# Patient Record
Sex: FEMALE | Race: WHITE
[De-identification: names, ages, dates, MRNs, and addresses within clinical notes are randomized per-mention and may not be internally consistent; named-entity substitution may affect disease eponyms.]

---

## 2019-07-31 NOTE — XMS REPORT
Transition of Care/Referral Summary

                             Created on: 2081



DYLAN KHAN

External Reference #: [id number]

: 1960

Sex: Female



Demographics







                          Address                   [address]

[place]  [address]

 

                          Home Phone                [phone]

 

                          Preferred Language        English

 

                          Marital Status            

 

                          [employer] Affiliation     [employer]

 

                          Race                      White

 

                                        Additional Race(s)  

 

                          Ethnic Group              Not  or 





Author







                          Author                    [facility]

 

                          Organization              [facility]

 

                          Address                   Unknown

 

                          Phone                     Unavailable







Care Team Providers







                    Care Team Member Name    Role                Phone

 

                    Dhiraj Mijares    PCP                 [phone]

 

                    Boston Hein      PCP                 [phone]







Encounter





[facility] FIN [id number] Date(s): 2/15/18 - 2/15/18

[facility] [address] [place], [place] [address] [phone]

Encounter Diagnosis

Abnormal liver enzymes (Discharge Diagnosis) - 2/15/18

Uncontrolled type 2 diabetes mellitus with hyperglycemia (Discharge Diagnosis) -
2/15/18

Combined hyperlipidemia (Discharge Diagnosis) - 2/15/18

Discharge Disposition: 01-Home or Self Care

Attending Physician: Gayla Hook MD





Vital Signs







 



                           Most recent to            1



                                         oldest [Reference 



                                         Range]: 

 

 



                           Peripheral Pulse          83 bpm



                           Rate [ bpm]         (2/15/18 11:11 AM)

 

 



                           Blood Pressure            125/80 mmHg



                           [/60-90 mmHg]       (2/15/18 11:11 AM)







Problem List







    



              Condition     Effective Dates     Status       Health Status     Informant

 

    



                           Acquired                  Active  



                                         spondylolisthesis(Co    



                                         nfirmed)    

 

    



                           Acute                     Active  



                                         pain(Confirmed)    

 

    



                           Hay fever(Confirmed)      Active  

 

    



                           Arthritis(Confirmed)      Resolved  

 

    



                           Right Carpal tunnel       Resolved  



                                         release(Confirmed)    

 

    



                           Carpal tunnel             Resolved  



                                         release    



                                         decompression(Confir    



                                         med)    

 

    



                           Cholecystectomy(Conf      Resolved  



                                         irmed)    

 

    



                           IBS (irritable bowel      Active  



                                         syndrome)(Confirmed)    

 

    



                     CHF (congestive      Active              patient



                                         heart    



                                         failure)(Confirmed)    

 

    



                           CAD (coronary artery      Active  



                                         disease)(Confirmed)    

 

    



                           Decompression(Confir      Resolved  



                                         med)    

 

    



                           Depression(Confirmed      Resolved  



                                         )    

 

    



                           Depression(Confirmed      Active  



                                         )    

 

    



                           Diabetes(Confirmed)       Active  

 

    



                           Diabetes(Confirmed)       Resolved  

 

    



                           GERD                      Active  



                                         (gastroesophageal    



                                         reflux    



                                         disease)(Confirmed)    

 

    



                     Ischemic            < 5/8/15            Resolved  



                                         cardiomyopathy(Confi    



                                         rmed)    

 

    



                           Presence of               Active  



                                         biventricular    



                                         AICD(Confirmed)    

 

    



                           History of lumbar         Active  



                                         fusion(Confirmed)    

 

    



                           Hyperlipidemia(Confi      Active  



                                         rmed)    

 

    



                     Asthma(Confirmed)     < 17           Resolved  

 

    



                           Hypertension(Confirm      Resolved  



                                         ed)    

 

    



                           Hypertension(Confirm      Resolved  



                                         ed)    

 

    



                           HTN                       Active  



                                         (hypertension)(Confi    



                                         rmed)    

 

    



                           Hysterectomy(Confirm      Resolved  



                                         ed)    

 

    



                           Impaired gas              Active  



                                         exchange(Confirmed)1    

 

    



                           Irritable                 Resolved  



                                         bowel(Confirmed)    

 

    



                           Laminectomy(Confirme      Resolved  



                                         d)    

 

    



                           LBBB (left bundle         Active  



                                         branch    



                                         block)(Confirmed)    

 

    



                           LUQ pain(Confirmed)       Active  

 

    



                           Migraine(Confirmed)       Resolved  

 

    



                           Asthma(Confirmed)         Active  

 

    



                     Morbid              Active              patient



                                         obesity(Confirmed)    

 

    



                           Myocardial                Resolved  



                                         infarction(Confirmed    



                                         )    

 

    



                           Septicemia                Resolved  



                                         pneumonia(Confirmed)    

 

    



                     L knee              13              Resolved  



                                         Synovectomy(Confirme    



                                         d)    

 

    



                           Tissue perfusion          Active  



                                         alteration(Confirmed    



                                         )2    

 

    



                     Tobacco             Active              patient



                                         user(Confirmed)    

 

    



                     Tubal               79              Resolved  



                                         ligation(Confirmed)    

 

    



                           UTI (urinary tract        Active  



                                         infection)(Confirmed    



                                         )    

 

    



                     Chicken             < 17           Resolved  



                                         pox(Confirmed)    







1Problem added automatically by system based on initiation of Impaired Gas 
Exchange Plan of Care



2Problem added automatically by system based on initiation of Tissue Perfusion 
Cerebral Plan of Care



Allergies, Adverse Reactions, Alerts







   



                 Substance       Reaction        Severity        Status

 

   



                     sulfamethoxazole     Urticaria (hives)      Active



                                         HIVES  

 

   



                     morphine            Burning             Active

 

   



                     barium sulfate      Swelling, hives      Active

 

   



                     celecoxib           HIVES               Active

 

   



                     influenza virus vaccine,     Convulsion          Active



                                         inactivated   







Medications





acetaminophen

1,000 mg, Oral, q6hr, as needed for pain, 0 Refill(s)

Start Date: 14

Status: Ordered



Ambien 10 mg oral tablet

10 mg 1 tabs, Oral, Bedtime (once a day), as needed for sleep, [facility] [phone]
[phone], X 30 days, # 30 tabs, 0 Refill(s)

Start Date: 18

Stop Date: 18

Status: Ordered



amLODIPine 10 mg oral tablet

10 mg 1 tabs, Oral, Daily, # 90 tabs, 5 Refill(s), Pharmacy: [facility], 1 tabs Oral Daily

Start Date: 16

Status: Ordered



Aspir 81

81 mg, Oral, Daily, 0 Refill(s)

Start Date: 14

Status: Ordered



atorvastatin 10 mg oral tablet

See Instructions, TAKE 1 TABLET BY MOUTH  DAILY, # 90 tabs, [facility]Rx: [facility]

Start Date: 17

Status: Ordered



Basaglar KwikPen 100 units/mL subcutaneous solution

See Instructions, 34 units at hs SubCutaneous, # 1 boxes, 3 Refill(s), Pharmacy:
[facility], 34 units at hs SubCutaneous

Start Date: 17

Status: Ordered



Bentyl 20 mg oral tablet

20 mg 1 tabs, Oral, q8hr, Abdominal Cramping, # 15 tabs, 1 Refill(s), Pharmacy: 
[id number], 1 tabs Oral q8hr,PRN:Abdominal Cramping

Start Date: 18

Status: Ordered



bumetanide 1 mg oral tablet

See Instructions, Take 2 tablets by mouth in  the morning and 1 tablet by mouth 
in the evening, # 270 tabs, 1 Refill(s), Pharmacy: [facility], Take 2 
tablets by mouth in  the morning and 1 tablet by mouth in the evening

Start Date: 17

Status: Ordered



buPROPion 150 mg/12 hours (SR) oral tablet, extended release

See Instructions, Take 1 tablet by mouth two  times daily, # 180 tabs, 3 Refill(
s), eRx: [facility]

Start Date: 17

Status: Ordered



carvedilol 25 mg oral tablet

25 mg 1 tabs, Oral, BID, # 180 tabs, 1 Refill(s), Pharmacy: [facility]
, 1 tabs Oral BID

Start Date: 17

Status: Ordered



cloNIDine 0.1 mg oral tablet

0.1 mg 1 tabs, Oral, BID, # 180 tabs, 2 Refill(s), other reason (Rx)

Start Date: 18

Status: Ordered



clopidogrel 75 mg oral tablet

See Instructions, TAKE 1 TABLET BY MOUTH  DAILY, # 90 tabs, 2 Refill(s), [facility]: [facility]

Start Date: 18

Status: Ordered



diclofenac 3% topical gel

0.5 g, Topical, BID, # 50 g, 1 Refill(s), [facility]acy: [id number]

Start Date: 8/15/17

Status: Ordered



Digox 125 mcg (0.125 mg) oral tablet

See Instructions, TAKE 1 TABLET BY MOUTH  DAILY, # 90 tabs, 2 Refill(s), [facility]: [facility]

Start Date: 18

Status: Ordered



DULoxetine 20 mg oral delayed release capsule

20 mg 1 caps, Oral, Daily, do not crush or chew    (Take one capsule by mouth da
kervin with Duloxetine 60mg), # 90 caps, 1 Refill(s), [facility], 1 caps Oral Daily,x90 days,Instr:do not crush or chew; (Take one capsule by m
outh daily...

Start Date: 17

Stop Date: 3/4/18

Status: Ordered



DULoxetine 60 mg oral delayed release capsule

See Instructions, Take 1 capsule by mouth  daily . Do not crush or  chew.    (Ta
ke 1 tablet daily with Duloxetine 20mg), # 90 caps, 1 Refill(s), [facility], Take 1 capsule by mouth  daily . Do not crush or  chew. (Take 1
tablet porsche...

Start Date: 17

Status: Ordered



Glucometer Lancets (DME)

DME Item one touch delica lancets  use to test bg x3/daily  dx:e11.65, See Instr
uctions, # 300 Each, 3 Refill(s), Pharmacy: [facility] [id number], one touc
h delica lancets; use to test bg x3/daily; dx:e11.65, Supply

Start Date: 3/13/17

Status: Ordered



HumaLOG 100 units/mL subcutaneous solution

See Instructions, INJECT SUBCUTANEOUSLY 10  UNITS 3 TIMES DAILY BEFORE  MEALS, #
27 mL, 3 Refill(s), eRx: [facility]

Start Date: 17

Status: Ordered



Insulin Pin Needles (DME)

DME Item 32g x4mm (jacob needles)  use to inject insulin   dx:e11.65, See Instruc
tions, # 100 Each, 3 Refill(s), Pharmacy: [id number], 32g x4mm (
jacob needles); use to inject insulin ; dx:e11.65, Supply

Start Date: 17

Status: Ordered



Melatonin

10 mg, Oral, Bedtime (once a day), as needed for insomnia, 0 Refill(s)

Start Date: 14

Status: Ordered



metFORMIN 500 mg oral tablet

See Instructions, Take 2 tablets by mouth  twice daily, # 360 tabs, 1 Refill(s),
eRx: [facility], Take 2 tablets by mouth  twice daily

Start Date: 17

Status: Ordered



mometasone 50 mcg/inh nasal spray

See Instructions, Use 2 sprays nasally daily, # 17 g, 5 Refill(s), [facility]Rx: [facility]

Start Date: 17

Status: Ordered



Norco 10 mg-325 mg oral tablet

1 tabs, Oral, q6hr, as needed for pain, # 30 tabs, 0 Refill(s)

Start Date: 18

Status: Ordered



potassium chloride 20 mEq oral tablet, extended release

See Instructions, TAKE 1 TABLET BY MOUTH  DAILY, # 90 tabs, 2 Refill(s), [facility]: [facility]

Start Date: 18

Status: Ordered



ProAir HFA 90 mcg/inh inhalation aerosol

See Instructions, INHALE 2 PUFFS 4 TIMES  DAILY AS NEEDED FOR  WHEEZING, # 25.5 
g, 2 Refill(s), eRx: [facility]

Start Date: 18

Status: Ordered



RABEprazole 20 mg oral delayed release tablet

See Instructions, Take 2 tablets by mouth  daily, # 180 tabs, 1 Refill(s), eRx: 
[facility]

Start Date: 17

Status: Ordered



SUMAtriptan 50 mg oral tablet

See Instructions, TAKE 1 TABLET BY MOUTH DAILY AS NEEDED FOR MIGRAINE HEADACHE. 
MAY REPEAT DOSE AFTER 2 HOURS UP TO A. MAXIMUM  MG IN 24 HOURS, # 9 tabs, 
eRx: [id number]

Start Date: 18

Status: Ordered



Symbicort 160 mcg-4.5 mcg/inh inhalation aerosol

See Instructions, INHALE 2 PUFFS TWICE DAILY, # 30.6 g, 2 Refill(s), [facility]

Start Date: 18

Status: Ordered



SYRNG   .5CC ULTII 31 G SRT

See Instructions, Use to Inject insulin once  daily, # 90 Each, 2 Refill(s), eRx
: [facility], Use to Inject insulin once  daily

Start Date: 17

Status: Ordered



valsartan 160 mg oral tablet

160 mg 1 tabs, Oral, BID, X 90 days, # 180 tabs, 3 Refill(s), [facility], 1 tabs Oral BID,x90 days

Start Date: 4/3/17

Stop Date: 3/29/18

Status: Ordered



Results





No data available for this section



Immunizations





Given and Recorded





   



                 Vaccine         Date            Status          Refusal Reason

 

   



                     tetanus-diphth toxoids (Td) adult/adol     00             Given 







Procedures







    



              Procedure     Date         Related Diagnosis     Body Site     Status

 

    



                     L knee PMM/ part. synovectomy     13              Completed

 

    



                     lt median nerve decompression     5/10/11             Completed

 

    



                     Tubal ligation                      Completed

 

    



                           Carpal tunnel release rt        Completed

 

    



                           Cholecystectomy           Completed

 

    



                           Hysterectomy              Completed

 

    



                           Hysterectomy and bilateral        Completed



                                         salpingo-oophorectomy sample    

 

    



                           Laminectomy               Completed

 

    



                           Pacemaker                 Completed







Social History







 



                           Social History Type       Response

 

 



                           Smoking Status            Former smoker; Type: Cigarettes1



                                         entered on: 5/8/15







1Quit around 



Assessment and Plan





No data available for this section

## 2019-07-31 NOTE — XMS REPORT
Transition of Care/Referral Summary

                             Created on: 10/28/2096



BILLDYLAN VANEGAS

External Reference #: [id number]

: 1960

Sex: Female



Demographics







                          Address                   [address]

[place]  [address]

 

                          Home Phone                [phone]

 

                          Preferred Language        English

 

                          Marital Status            

 

                          Hoahaoism Affiliation     Nondenominational

 

                          Race                      White

 

                          Ethnic Group              Not  or 





Author







                          Author                    [facility]

 

                          Organization              [facility]

 

                          Address                   Unknown

 

                          Phone                     Unavailable







Care Team Providers







                    Care Team Member Name    Role                Phone

 

                    Dhiraj Mijares    PCP                 [phone]







Encounter





[facility] [id number] Date(s): 16 - 16

[facility] [address] [place], [place] [address] [phone]

Discharge Disposition: 01-Home or Self Care

Attending Physician: Linda Kruger MD

Admitting Physician: Linda Kruger MD





Vital Signs





No data available for this section



Problem List







    



              Condition     Effective Dates     Status       Health Status     Informant

 

    



                           Acquired                  Active  



                                         spondylolisthesis(Co    



                                         nfirmed)    

 

    



                           Acute                     Active  



                                         pain(Confirmed)    

 

    



                           Arthritis(Confirmed)      Resolved  

 

    



                           Right Carpal tunnel       Resolved  



                                         release(Confirmed)    

 

    



                           Carpal tunnel             Resolved  



                                         release    



                                         decompression(Confir    



                                         med)    

 

    



                           Cholecystectomy(Conf      Resolved  



                                         irmed)    

 

    



                     CHF (congestive      Active              patient



                                         heart    



                                         failure)(Confirmed)    

 

    



                           CAD (coronary artery      Active  



                                         disease)(Confirmed)    

 

    



                           Decompression(Confir      Resolved  



                                         med)    

 

    



                           Depression(Confirmed      Resolved  



                                         )    

 

    



                           Depression(Confirmed      Active  



                                         )    

 

    



                           Diabetes(Confirmed)       Active  

 

    



                           Diabetes(Confirmed)       Resolved  

 

    



                           GERD                      Active  



                                         (gastroesophageal    



                                         reflux    



                                         disease)(Confirmed)    

 

    



                           Ischemic                  Active  



                                         cardiomyopathy(Confi    



                                         rmed)    

 

    



                           Presence of               Active  



                                         biventricular    



                                         AICD(Confirmed)    

 

    



                           History of lumbar         Active  



                                         fusion(Confirmed)    

 

    



                           Hyperlipidemia(Confi      Resolved  



                                         rmed)    

 

    



                           Hypertension(Confirm      Resolved  



                                         ed)    

 

    



                           Hypertension(Confirm      Resolved  



                                         ed)    

 

    



                           HTN                       Active  



                                         (hypertension)(Confi    



                                         rmed)    

 

    



                           Hysterectomy(Confirm      Resolved  



                                         ed)    

 

    



                           Impaired gas              Active  



                                         exchange(Confirmed)1    

 

    



                           Irritable                 Resolved  



                                         bowel(Confirmed)    

 

    



                           Laminectomy(Confirme      Resolved  



                                         d)    

 

    



                           LBBB (left bundle         Active  



                                         branch    



                                         block)(Confirmed)    

 

    



                           LUQ pain(Confirmed)       Active  

 

    



                           Migraine(Confirmed)       Resolved  

 

    



                           Asthma(Confirmed)         Active  

 

    



                     Morbid              Active              patient



                                         obesity(Confirmed)    

 

    



                           Myocardial                Resolved  



                                         infarction(Confirmed    



                                         )    

 

    



                           Septicemia                Resolved  



                                         pneumonia(Confirmed)    

 

    



                     L knee              13              Resolved  



                                         Synovectomy(Confirme    



                                         d)    

 

    



                           Tissue perfusion          Active  



                                         alteration(Confirmed    



                                         )2    

 

    



                     Tobacco             Active              patient



                                         user(Confirmed)    

 

    



                     Tubal               79              Resolved  



                                         ligation(Confirmed)    







1Problem added automatically by system based on initiation of Impaired Gas 
Exchange Plan of Care



2Problem added automatically by system based on initiation of Tissue Perfusion 
Cerebral Plan of Care



Allergies, Adverse Reactions, Alerts







   



                 Substance       Reaction        Severity        Status

 

   



                     barium sulfate      Adverse Reaction      Active



                                         Swelling, hives  

 

   



                     celecoxib           HIVES               Active

 

   



                           influenza virus vaccine,       Active



                                         inactivated   

 

   



                     morphine            Adverse Reaction      Active

 

   



                     sulfamethoxazole     HIVES               Active



                                         Urticaria (hives)  







Medications





acetaminophen

1,000 mg, Oral, q6hr, as needed for pain, 0 Refill(s)

Start Date: 14

Status: Ordered



albuterol 2.5 mg/3 mL (0.083%) inhalation solution

3 mL, Inhalation, BID, Dx: Asthma, # 25 Each, 3 Refill(s), Pharmacy: [facility] [id number], 3 mL Inhalation BID,Instr:Dx: Asthma

Start Date: 4/21/15

Status: Ordered



amLODIPine 10 mg oral tablet

10 mg 1 tabs, Oral, Daily, # 90 tabs, 5 Refill(s), Pharmacy: [facility]
E, 1 tabs Oral Daily

Start Date: 16

Status: Ordered



Aspir 81

81 mg, Oral, Daily, 0 Refill(s)

Start Date: 14

Status: Ordered



atorvastatin 10 mg oral tablet

10 mg 1 tabs, Oral, Daily, # 90 tabs, 5 Refill(s), Pharmacy: [facility]
E, 1 tabs Oral Daily

Start Date: 16

Status: Ordered



bumetanide 1 mg oral tablet

See Instructions, Take 2 mg of Bumex in AM and 1 mg PM., # 300 tabs, 5 Refill(s)
, Pharmacy: [facility], Take 2 mg of Bumex in AM and 1 mg PM.

Start Date: 9/22/15

Status: Ordered



buPROPion 150 mg/12 hours (SR) oral tablet, extended release

See Instructions, Take 1 tablet by mouth two  times daily, # 180 tabs, 2 Refill(
s), eRx: [facility], Take 1 tablet by mouth two  times daily

Start Date: 3/2/16

Status: Ordered



carvedilol 25 mg oral tablet

25 mg 1 tabs, Oral, BID, # 180 tabs, 5 Refill(s), Pharmacy: [facility]
, 1 tabs Oral BID

Start Date: 9/22/15

Status: Ordered



cloNIDine 0.1 mg oral tablet

See Instructions, Take 1 tablet by mouth  every evening, # 90 tabs, 1 Refill(s),
eRx: [facility], Take 1 tablet by mouth  every evening

Start Date: 16

Status: Ordered



clopidogrel 75 mg oral tablet

See Instructions, Take 1 tablet by mouth  daily, # 90 tabs, 2 Refill(s), [facility]: [facility], Take 1 tablet by mouth  daily

Start Date: 16

Status: Ordered



digoxin 125 mcg (0.125 mg) oral tablet

125 mcg 1 tabs, Oral, Daily, # 90 tabs, 5 Refill(s), [facility], 1 tabs Oral Daily

Start Date: 9/22/15

Status: Ordered



DULoxetine 20 mg oral delayed release capsule

See Instructions, Take 1 capsule by mouth  daily . Do not crush or  chew, # 90 c
aps, eRx: [facility], Take 1 capsule by mouth  daily . Do not crush or
 chew

Start Date: 16

Status: Ordered



DULoxetine 60 mg oral delayed release capsule

60 mg 1 caps, Oral, Daily, takes along with 1 capsule of 20mg, 0 Refill(s)

Start Date: 16

Status: Ordered



glipiZIDE 5 mg oral tablet

See Instructions, Take 1 tablet by mouth two  times daily, # 180 tabs, [facility]: [facility], Take 1 tablet by mouth two  times daily

Start Date: 5/3/16

Status: Ordered



Insulin Syringe (DME)

DME Item use to inject insulin once daily     dx: E11.9, See Instructions, # 90 
Each, 4 Refill(s), Pharmacy: [id number], use to inject insulin o
nce daily     dx: E11.9, Supply

Start Date: 11/10/15

Status: Ordered



Lantus 100 units/mL subcutaneous solution

See Instructions, Inject subcutaneously 30  units every night at  bedtime once a
day, # 30 mL, 1 Refill(s), eRx: [facility], Inject subcutaneously 30  
units every night at  bedtime once a day

Start Date: 16

Status: Ordered



Melatonin

10 mg, Oral, Bedtime (once a day), as needed for insomnia, 0 Refill(s)

Start Date: 14

Status: Ordered



metFORMIN 500 mg oral tablet

See Instructions, Take 2 tablets by mouth  twice a day, # 360 tabs, [facility]: [facility], Take 2 tablets by mouth  twice a day

Start Date: 2/3/16

Status: Ordered



Norco 10 mg-325 mg oral tablet

1 tabs, Oral, q6hr, as needed for pain, # 30 tabs, 0 Refill(s)

Start Date: 16

Status: Ordered



potassium chloride 20 mEq oral tablet, extended release

See Instructions, Take 1 tablet by mouth  daily, # 90 tabs, [facility]: [facility], Take 1 tablet by mouth  daily

Start Date: 16

Status: Ordered



ProAir HFA 90 mcg/inh inhalation aerosol

2 puffs, Inhalation, QID, as needed for wheezing, # 3 Each, 3 Refill(s), Pharmac
y: [facility]

Start Date: 1/23/15

Status: Ordered



RABEprazole 20 mg oral delayed release tablet

See Instructions, Take 2 tablets by mouth  daily, # 180 tabs, [facility]: [facility], Take 2 tablets by mouth  daily

Start Date: 16

Status: Ordered



SUMAtriptan 50 mg oral tablet

50 mg 1 tabs, Oral, Daily, as needed for migraine headache, may repeat dose afte
r 2 hours up to a maximum of 200 mg in 24 hours, # 9 tabs, 1 Refill(s), Pharmacy
: [id number], 1 tabs Oral Daily,PRN:as needed for migraine heada
godwin,Instr:m...

Start Date: 16

Status: Ordered



Symbicort 160 mcg-4.5 mcg/inh inhalation aerosol

See Instructions, Use 2 puffs twice daily, # 30.6 g, 2 Refill(s), [facility]: [facility], Use 2 puffs twice daily

Start Date: 16

Status: Ordered



valsartan 160 mg oral tablet

See Instructions, 1 TABS ORAL DAILY, # 30 tabs, 1 Refill(s), [facility]: [id number], 1 TABS ORAL DAILY

Start Date: 10/12/15

Status: Ordered



Results





No data available for this section



Immunizations







  



                     Vaccine             Date                Refusal Reason

 

  



                           tetanus-diphth toxoids (Td) adult/adol     00 







Procedures







   



                 Procedure       Date            Related Diagnosis     Body Site

 

   



                           L knee PMM/ part. synovectomy     13  

 

   



                           lt median nerve decompression     5/10/11  

 

   



                           Tubal ligation            1979  

 

   



                                         Carpal tunnel release rt   

 

   



                                         Cholecystectomy   

 

   



                                         Hysterectomy   

 

   



                                         Hysterectomy and bilateral   



                                         salpingo-oophorectomy sample   

 

   



                                         Laminectomy   

 

   



                                         Pacemaker   







Social History







 



                           Social History Type       Response

 

 



                           Smoking Status            Former smoker; Type: Cigarettes1







1Quit around 



Assessment and Plan





No data available for this section

## 2019-07-31 NOTE — XMS REPORT
Transition of Care/Referral Summary

                             Created on: 2028



BILLDYLAN VANEGAS EDITH

External Reference #: [id number]

: 1960

Sex: Female



Demographics







                          Address                   [address]

[place]  [address]

 

                          Home Phone                [phone]

 

                          Preferred Language        English

 

                          Marital Status            

 

                          Pentecostal Affiliation     Zoroastrian

 

                          Race                      White

 

                          Ethnic Group              Not  or 





Author







                          Author                    [facility]

 

                          Organization              [facility]

 

                          Address                   Unknown

 

                          Phone                     Unavailable







Care Team Providers







                    Care Team Member Name    Role                Phone

 

                    Dhiraj Mijares    PCP                 [phone]







Encounter





VC FIN [id number] Date(s): 7/31/15 - 7/31/15

[facility]  [place], [place] [address] [place] [phone]

Discharge Diagnosis: ABDOMINAL PAIN

Discharge Disposition: 01-Home or Self Care

Attending Physician: Tang Dailey MD

Admitting Physician: Tang Dailey MD

Referring Physician: Dhiraj Mijares MD





Vital Signs







 



                           Most recent to            1



                                         oldest [Reference 



                                         Range]: 

 

 



                           Peripheral Pulse          88 bpm



                           Rate [ bpm]         (7/31/15 8:36 AM)

 

 



                           Blood Pressure            142/82 mmHg



                           [/60-90 mmHg]       *HI*



                                         (7/31/15 8:36 AM)







Problem List







    



              Condition     Effective Dates     Status       Health Status     Informant

 

    



                           Acquired                  Active  



                                         spondylolisthesis(Co    



                                         nfirmed)    

 

    



                           Acute                     Active  



                                         pain(Confirmed)    

 

    



                           Arthritis(Confirmed)      Resolved  

 

    



                           Right Carpal tunnel       Resolved  



                                         release(Confirmed)    

 

    



                           Carpal tunnel             Resolved  



                                         release    



                                         decompression(Confir    



                                         med)    

 

    



                           Cholecystectomy(Conf      Resolved  



                                         irmed)    

 

    



                     CHF (congestive      Active              patient



                                         heart    



                                         failure)(Confirmed)    

 

    



                           CAD (coronary artery      Active  



                                         disease)(Confirmed)    

 

    



                           Decompression(Confir      Resolved  



                                         med)    

 

    



                           Depression(Confirmed      Resolved  



                                         )    

 

    



                           Depression(Confirmed      Active  



                                         )    

 

    



                           Diabetes(Confirmed)       Active  

 

    



                           Diabetes(Confirmed)       Resolved  

 

    



                           GERD                      Active  



                                         (gastroesophageal    



                                         reflux    



                                         disease)(Confirmed)    

 

    



                           Ischemic                  Active  



                                         cardiomyopathy(Confi    



                                         rmed)    

 

    



                           Presence of               Active  



                                         biventricular    



                                         AICD(Confirmed)    

 

    



                           History of lumbar         Active  



                                         fusion(Confirmed)    

 

    



                           Hyperlipidemia(Confi      Resolved  



                                         rmed)    

 

    



                           Hypertension(Confirm      Resolved  



                                         ed)    

 

    



                           Hypertension(Confirm      Resolved  



                                         ed)    

 

    



                           HTN                       Active  



                                         (hypertension)(Confi    



                                         rmed)    

 

    



                           Hysterectomy(Confirm      Resolved  



                                         ed)    

 

    



                           Impaired gas              Active  



                                         exchange(Confirmed)1    

 

    



                           Irritable                 Resolved  



                                         bowel(Confirmed)    

 

    



                           Laminectomy(Confirme      Resolved  



                                         d)    

 

    



                           LBBB (left bundle         Active  



                                         branch    



                                         block)(Confirmed)    

 

    



                           LUQ pain(Confirmed)       Active  

 

    



                           Migraine(Confirmed)       Resolved  

 

    



                           Asthma(Confirmed)         Active  

 

    



                     Morbid              Active              patient



                                         obesity(Confirmed)    

 

    



                           Myocardial                Resolved  



                                         infarction(Confirmed    



                                         )    

 

    



                           Septicemia                Resolved  



                                         pneumonia(Confirmed)    

 

    



                     L knee              13              Resolved  



                                         Synovectomy(Confirme    



                                         d)    

 

    



                           Tissue perfusion          Active  



                                         alteration(Confirmed    



                                         )2    

 

    



                     Tobacco             Active              patient



                                         user(Confirmed)    

 

    



                     Tubal               79              Resolved  



                                         ligation(Confirmed)    







1Problem added automatically by system based on initiation of Impaired Gas 
Exchange Plan of Care



2Problem added automatically by system based on initiation of Tissue Perfusion 
Cerebral Plan of Care



Allergies, Adverse Reactions, Alerts







   



                 Substance       Reaction        Severity        Status

 

   



                     barium sulfate      Adverse Reaction      Active



                                         Swelling, hives  

 

   



                     celecoxib           HIVES               Active

 

   



                           influenza virus vaccine,       Active



                                         inactivated   

 

   



                     morphine            Adverse Reaction      Active

 

   



                     sulfamethoxazole     HIVES               Active



                                         Urticaria (hives)  







Medications





acetaminophen

1,000 mg, Oral, q6hr, as needed for pain, 0 Refill(s)

Start Date: 14

Status: Ordered



AcipHex 20 mg oral delayed release tablet

40 mg 2 tabs, Oral, Daily, # 180 tabs, 2 Refill(s), Pharmacy: [facility], 2 tabs Oral Daily

Start Date: 9/21/15

Status: Ordered



albuterol 2.5 mg/3 mL (0.083%) inhalation solution

3 mL, Inhalation, BID, Dx: Asthma, # 25 Each, 3 Refill(s), [facility]acy: [facility] [id number], 3 mL Inhalation BID,Instr:Dx: Asthma

Start Date: 4/21/15

Status: Ordered



amLODIPine 5 mg oral tablet

See Instructions, Take 1 tablet by mouth  daily, # 90 tabs, [facility]: [facility], Take 1 tablet by mouth  daily

Start Date: 2/3/16

Status: Ordered



Aspir 81

81 mg, Oral, Daily, 0 Refill(s)

Start Date: 14

Status: Ordered



atorvastatin 10 mg oral tablet

10 mg 1 tabs, Oral, Daily, # 90 tabs, 5 Refill(s), Pharmacy: [facility]
E, 1 tabs Oral Daily

Start Date: 16

Status: Ordered



bumetanide 1 mg oral tablet

See Instructions, Take 2 mg of Bumex in AM and 1 mg PM., # 300 tabs, 5 Refill(s)
, Pharmacy: [facility], Take 2 mg of Bumex in AM and 1 mg PM.

Start Date: 9/22/15

Status: Ordered



buPROPion 150 mg/12 hours (SR) oral tablet, extended release

See Instructions, Take 1 tablet by mouth two  times daily, # 180 unknown unit, 1
Refill(s), eRx: [facility], Take 1 tablet by mouth two  times daily

Start Date: 9/9/15

Status: Ordered



carvedilol 25 mg oral tablet

25 mg 1 tabs, Oral, BID, # 180 tabs, 5 Refill(s), Pharmacy: [facility]
, 1 tabs Oral BID

Start Date: 9/22/15

Status: Ordered



Claritin

10 mg, Oral, Daily, 0 Refill(s)

Start Date: 5/18/15

Status: Ordered



cloNIDine 0.1 mg oral tablet

0.1 mg 1 tabs, Oral, qPM, # 90 tabs, 1 Refill(s), Pharmacy: [facility]
, 1 tabs Oral qPM

Start Date: 10/12/15

Status: Ordered



clopidogrel 75 mg oral tablet

1 tabs, Oral, Daily, # 90 tabs, 3 Refill(s), Pharmacy: [facility], 1 t
abs Oral Daily

Start Date: 4/14/15

Status: Ordered



digoxin 125 mcg (0.125 mg) oral tablet

125 mcg 1 tabs, Oral, Daily, # 90 tabs, 5 Refill(s), [facility], 1 tabs Oral Daily

Start Date: 9/22/15

Status: Ordered



DULoxetine 20 mg oral delayed release capsule

See Instructions, Take 1 capsule by mouth  daily do not crush or chew, # 90 caps
, eRx: [facility], Take 1 capsule by mouth  daily do not crush or chew

Start Date: 16

Status: Ordered



glipiZIDE 5 mg oral tablet

See Instructions, Take 1 tablet by mouth two  times daily, # 180 tabs, [facility], Take 1 tablet by mouth two  times daily

Start Date: 2/3/16

Status: Ordered



Insulin Syringe (DME)

DME Item use to inject insulin once daily     dx: E11.9, See Instructions, # 90 
Each, 4 Refill(s), Pharmacy: [id number], use to inject insulin o
nce daily     dx: E11.9, Supply

Start Date: 11/10/15

Status: Ordered



Lantus 100 units/mL subcutaneous solution

See Instructions, Inject subcutaneously 30  units every night at  bedtime once a
day, # 30 mL, 2 Refill(s), eRx: [facility], Inject subcutaneously 30  
units every night at  bedtime once a day

Start Date: 7/31/15

Status: Ordered



Melatonin

10 mg, Oral, Bedtime (once a day), as needed for insomnia, 0 Refill(s)

Start Date: 14

Status: Ordered



metFORMIN 500 mg oral tablet

See Instructions, Take 2 tablets by mouth  twice a day, # 360 tabs, [facility]: [facility], Take 2 tablets by mouth  twice a day

Start Date: 2/3/16

Status: Ordered



Norco 10 mg-325 mg oral tablet

1 tabs, Oral, q6hr, as needed for pain, # 30 tabs, 0 Refill(s)

Start Date: 16

Status: Ordered



OTC Calcium

OTC Calcium, See Instructions, 1 tablet oral daily, 0 Refill(s)

Start Date: 2/3/15

Status: Ordered



potassium chloride 20 mEq oral tablet, extended release

See Instructions, Take 1 tablet by mouth  daily, # 90 unknown unit, 1 Refill(s),
eRx: [facility], Take 1 tablet by mouth  daily

Start Date: 10/2/15

Status: Ordered



ProAir HFA 90 mcg/inh inhalation aerosol

2 puffs, Inhalation, QID, as needed for wheezing, # 3 Each, 3 Refill(s), Pharmac
y: [facility]

Start Date: 1/23/15

Status: Ordered



SUMAtriptan 50 mg oral tablet

50 mg 1 tabs, Oral, Daily, as needed for migraine headache, may repeat dose afte
r 2 hours up to a maximum of 200 mg in 24 hours, # 9 tabs, 0 Refill(s), Pharmacy
: [id number], 1 tabs Oral Daily,PRN:as needed for migraine heada
godwin,Instr:m...

Start Date: 9/16/15

Status: Ordered



Symbicort 160 mcg-4.5 mcg/inh inhalation aerosol

See Instructions, Use 2 puffs twice daily, # 10.2 g, 2 Refill(s), [facility], Use 2 puffs twice daily

Start Date: 12/2/15

Status: Ordered



valsartan 160 mg oral tablet

See Instructions, 1 TABS ORAL DAILY, # 30 tabs, 1 Refill(s), [facility]: [id number], 1 TABS ORAL DAILY

Start Date: 10/12/15

Status: Ordered



Results





No data available for this section



Immunizations







  



                     Vaccine             Date                Refusal Reason

 

  



                           tetanus-diphth toxoids (Td) adult/adol     00 







Procedures







   



                 Procedure       Date            Related Diagnosis     Body Site

 

   



                           L knee PMM/ part. synovectomy     13  

 

   



                           lt median nerve decompression     5/10/11  

 

   



                           Tubal ligation            1979  

 

   



                                         Carpal tunnel release rt   

 

   



                                         Cholecystectomy   

 

   



                                         Hysterectomy   

 

   



                                         Hysterectomy and bilateral   



                                         salpingo-oophorectomy sample   

 

   



                                         Laminectomy   

 

   



                                         Pacemaker   







Social History







 



                           Social History Type       Response

 

 



                           Smoking Status            Former smoker; Type: Cigarettes1







1Quit around 



Assessment and Plan

Extracted from:





  



                     Title: Ambulatory Patient Education     Author: Tang Dailey MD     Date: 7/31/15











                                        Family Medicine

Hernia

A hernia occurs when an internal organ pushes out through a weak spot in the 
abdominal wall. Hernias most commonly occur in the groin and around the navel. 
Hernias often can be pushed back into place (reduced). Most hernias tend to get 
worse over time. Some abdominal hernias can get stuck in the opening 
(irreducible or incarcerated hernia) and cannot be reduced. An irreducible 
abdominal hernia which is tightly squeezed into the opening is at risk for 
impaired blood supply (strangulated hernia). A strangulated hernia is a medical 
emergency. Because of the risk for an irreducible or strangulated hernia, 
surgery may be recommended to repair a hernia.



CAUSES



  Heavy lifting.



  Prolonged coughing.



  Straining to have a bowel movement. 



  A cut (incision) made during an abdominal surgery.



HOME CARE INSTRUCTIONS



  Bed rest is not required. You may continue your normal activities. 



  Avoid lifting more than 10 pounds (4.5 kg) or straining. 



  Cough gently. If you are a smoker it is best to stop. Even the best hernia 
repair can break down with the continual strain of coughing. Even if you do not 
have your hernia repaired, a cough will continue to aggravate the problem. 



  Do not wear anything tight over your hernia. Do not try to keep it in with an
outside bandage or truss. These can damage abdominal contents if they are 
trapped within the hernia sac.



  Eat a normal diet. 



  Avoid constipation. Straining over long periods of time will increase hernia 
size and encourage breakdown of repairs. If you cannot do this with diet alone, 
stool softeners may be used. 



SEEK IMMEDIATE MEDICAL CARE IF:



  You have a fever.



  You develop increasing abdominal pain.



  You feel nauseous or vomit.



  Your hernia is stuck outside the abdomen, looks discolored, feels hard, or is
tender.



  You have any changes in your bowel habits or in the hernia that are unusual 
for you.



  You have increased pain or swelling around the hernia.



  You cannot push the hernia back in place by applying gentle pressure while 
lying down.



MAKE SURE YOU:



  Understand these instructions. 



  Will watch your condition.



  Will get help right away if you are not doing well or get worse.



Document Released: 2006 Document Revised: 2013 Document Reviewed: 
2009

[facility] Patient Information [facility] [employer]. This information is not 
intended to replace advice given to you by your health care provider. Make sure 
you discuss any questions you have with your health care provider.





No follow up information was provided.





Extracted from:





  



                     Title: Office Visit Note     Author: Brannon Moncada MD     Date: 7/31/15









                                         Assessment/Plan

 ABDOMINAL PAIN  Differential includes  Spigellian  hernia versus fat necrosis. 
We will obtain a CT scan of the abdomen in order to confirm the diagnosis. She 
would be very high risk for surgical intervention, but patient feels that her 
symptoms are significant enough that she would want a hernia fixed if it is 
present. We will discuss her options further after the results of the CT scan 
are back.





  Addendum:

  I have physically seen and examined the patient. I have discussed the patient 
with the Resident/APRN/PA/RN/PharmD, reviewed the chart, and concur with the 
assessment and plan as above.   Will proceed with CT. I will call her with 
the results of this.

## 2019-07-31 NOTE — XMS REPORT
Transition of Care/Referral Summary

                             Created on: 2129



DYLAN KHAN

External Reference #: [id number]

: 1960

Sex: Female



Demographics







                          Address                   [address]  [address]

 

                          Home Phone                [phone]

 

                          Preferred Language        English

 

                          Marital Status            

 

                          Restorationism Affiliation     Latter-day

 

                          Race                      White

 

                          Ethnic Group              Not  or 





Author







                          Author                    [facility]

 

                          Organization              [facility]

 

                          Address                   Unknown

 

                          Phone                     Unavailable







Care Team Providers







                    Care Team Member Name    Role                Phone

 

                    Dhiraj Mijares    PCP                 [phone]







Encounter





VC FIN [id number] Date(s): 16 - 16

[facility] [address] [place], [place] [address]
S [phone]

Discharge Diagnosis: Diabetes

Discharge Diagnosis: Hyperlipidemia

Discharge Diagnosis: Physical exam

Discharge Diagnosis: HTN (hypertension)

Discharge Diagnosis: CAD (coronary artery disease)

Discharge Disposition: 01-Home or Self Care

Attending Physician: Dhiraj Mijraes MD

Admitting Physician: Dhiraj Mijares MD





Vital Signs







 



                           Most recent to            1



                                         oldest [Reference 



                                         Range]: 

 

 



                           Apical Heart Rate         89 bpm



                           [ bpm]              (16 7:58 AM)

 

 



                           Blood Pressure            128/74 mmHg



                           [/60-90 mmHg]       (16 7:58 AM)

 

 



                           SpO2                      92 %



                                         (16 7:58 AM)







Problem List







    



              Condition     Effective Dates     Status       Health Status     Informant

 

    



                           Acquired                  Active  



                                         spondylolisthesis(Co    



                                         nfirmed)    

 

    



                           Acute                     Active  



                                         pain(Confirmed)    

 

    



                           Arthritis(Confirmed)      Resolved  

 

    



                           Right Carpal tunnel       Resolved  



                                         release(Confirmed)    

 

    



                           Carpal tunnel             Resolved  



                                         release    



                                         decompression(Confir    



                                         med)    

 

    



                           Cholecystectomy(Conf      Resolved  



                                         irmed)    

 

    



                     CHF (congestive      Active              patient



                                         heart    



                                         failure)(Confirmed)    

 

    



                           CAD (coronary artery      Active  



                                         disease)(Confirmed)    

 

    



                           Decompression(Confir      Resolved  



                                         med)    

 

    



                           Depression(Confirmed      Resolved  



                                         )    

 

    



                           Depression(Confirmed      Active  



                                         )    

 

    



                           Diabetes(Confirmed)       Active  

 

    



                           Diabetes(Confirmed)       Resolved  

 

    



                           GERD                      Active  



                                         (gastroesophageal    



                                         reflux    



                                         disease)(Confirmed)    

 

    



                           Ischemic                  Active  



                                         cardiomyopathy(Confi    



                                         rmed)    

 

    



                           Presence of               Active  



                                         biventricular    



                                         AICD(Confirmed)    

 

    



                           History of lumbar         Active  



                                         fusion(Confirmed)    

 

    



                           Hyperlipidemia(Confi      Resolved  



                                         rmed)    

 

    



                           Hypertension(Confirm      Resolved  



                                         ed)    

 

    



                           Hypertension(Confirm      Resolved  



                                         ed)    

 

    



                           HTN                       Active  



                                         (hypertension)(Confi    



                                         rmed)    

 

    



                           Hysterectomy(Confirm      Resolved  



                                         ed)    

 

    



                           Impaired gas              Active  



                                         exchange(Confirmed)1    

 

    



                           Irritable                 Resolved  



                                         bowel(Confirmed)    

 

    



                           Laminectomy(Confirme      Resolved  



                                         d)    

 

    



                           LBBB (left bundle         Active  



                                         branch    



                                         block)(Confirmed)    

 

    



                           LUQ pain(Confirmed)       Active  

 

    



                           Migraine(Confirmed)       Resolved  

 

    



                           Asthma(Confirmed)         Active  

 

    



                     Morbid              Active              patient



                                         obesity(Confirmed)    

 

    



                           Myocardial                Resolved  



                                         infarction(Confirmed    



                                         )    

 

    



                           Septicemia                Resolved  



                                         pneumonia(Confirmed)    

 

    



                     L knee              13              Resolved  



                                         Synovectomy(Confirme    



                                         d)    

 

    



                           Tissue perfusion          Active  



                                         alteration(Confirmed    



                                         )2    

 

    



                     Tobacco             Active              patient



                                         user(Confirmed)    

 

    



                     Tubal               79              Resolved  



                                         ligation(Confirmed)    







1Problem added automatically by system based on initiation of Impaired Gas 
Exchange Plan of Care



2Problem added automatically by system based on initiation of Tissue Perfusion 
Cerebral Plan of Care



Allergies, Adverse Reactions, Alerts







   



                 Substance       Reaction        Severity        Status

 

   



                     barium sulfate      Adverse Reaction      Active



                                         Swelling, hives  

 

   



                     celecoxib           HIVES               Active

 

   



                           influenza virus vaccine,       Active



                                         inactivated   

 

   



                     morphine            Adverse Reaction      Active

 

   



                     sulfamethoxazole     HIVES               Active



                                         Urticaria (hives)  







Medications





acetaminophen

1,000 mg, Oral, q6hr, as needed for pain, 0 Refill(s)

Start Date: 14

Status: Ordered



albuterol 2.5 mg/3 mL (0.083%) inhalation solution

3 mL, Inhalation, BID, Dx: Asthma, # 25 Each, 3 Refill(s), Pharmacy: [id number], 3 mL Inhalation BID,Instr:Dx: Asthma

Start Date: 4/21/15

Status: Ordered



amLODIPine 10 mg oral tablet

10 mg 1 tabs, Oral, Daily, # 90 tabs, 5 Refill(s), Pharmacy: [facility]
E, 1 tabs Oral Daily

Start Date: 16

Status: Ordered



Aspir 81

81 mg, Oral, Daily, 0 Refill(s)

Start Date: 14

Status: Ordered



atorvastatin 10 mg oral tablet

10 mg 1 tabs, Oral, Daily, # 90 tabs, 5 Refill(s), Pharmacy: [facility]
E, 1 tabs Oral Daily

Start Date: 16

Status: Ordered



bumetanide 1 mg oral tablet

See Instructions, Take 2 mg of Bumex in AM and 1 mg PM., # 300 tabs, 5 Refill(s)
, Pharmacy: [facility], Take 2 mg of Bumex in AM and 1 mg PM.

Start Date: 9/22/15

Status: Ordered



buPROPion 150 mg/12 hours (SR) oral tablet, extended release

See Instructions, Take 1 tablet by mouth two  times daily, # 180 tabs, 2 Refill(
s), eRx: [facility], Take 1 tablet by mouth two  times daily

Start Date: 3/2/16

Status: Ordered



carvedilol 25 mg oral tablet

25 mg 1 tabs, Oral, BID, # 180 tabs, 5 Refill(s), Pharmacy: [facility]
, 1 tabs Oral BID

Start Date: 9/22/15

Status: Ordered



cloNIDine 0.1 mg oral tablet

See Instructions, Take 1 tablet by mouth  every evening, # 90 tabs, 1 Refill(s),
eRx: [facility], Take 1 tablet by mouth  every evening

Start Date: 16

Status: Ordered



clopidogrel 75 mg oral tablet

See Instructions, Take 1 tablet by mouth  daily, # 90 tabs, 2 Refill(s), [facility]: [facility], Take 1 tablet by mouth  daily

Start Date: 16

Status: Ordered



digoxin 125 mcg (0.125 mg) oral tablet

125 mcg 1 tabs, Oral, Daily, # 90 tabs, 5 Refill(s), [facility], 1 tabs Oral Daily

Start Date: 9/22/15

Status: Ordered



DULoxetine 20 mg oral delayed release capsule

See Instructions, Take 1 capsule by mouth  daily . Do not crush or  chew, # 90 c
aps, eRx: [facility], Take 1 capsule by mouth  daily . Do not crush or
 chew

Start Date: 16

Status: Ordered



DULoxetine 60 mg oral delayed release capsule

60 mg 1 caps, Oral, Daily, takes along with 1 capsule of 20mg, 0 Refill(s)

Start Date: 16

Status: Ordered



glipiZIDE 5 mg oral tablet

See Instructions, Take 1 tablet by mouth two  times daily, # 180 tabs, [facility]: [facility], Take 1 tablet by mouth two  times daily

Start Date: 5/3/16

Status: Ordered



Insulin Syringe (DME)

DME Item use to inject insulin once daily     dx: E11.9, See Instructions, # 90 
Each, 4 Refill(s), Pharmacy: [id number], use to inject insulin o
nce daily     dx: E11.9, Supply

Start Date: 11/10/15

Status: Ordered



Lantus 100 units/mL subcutaneous solution

See Instructions, Inject subcutaneously 30  units every night at  bedtime once a
day, # 30 mL, 1 Refill(s), eRx: [facility], Inject subcutaneously 30  
units every night at  bedtime once a day

Start Date: 16

Status: Ordered



Melatonin

10 mg, Oral, Bedtime (once a day), as needed for insomnia, 0 Refill(s)

Start Date: 14

Status: Ordered



metFORMIN 500 mg oral tablet

See Instructions, Take 2 tablets by mouth  twice a day, # 360 tabs, [facility]: [facility], Take 2 tablets by mouth  twice a day

Start Date: 2/3/16

Status: Ordered



Norco 10 mg-325 mg oral tablet

1 tabs, Oral, q6hr, as needed for pain, # 30 tabs, 0 Refill(s)

Start Date: 16

Status: Ordered



potassium chloride 20 mEq oral tablet, extended release

See Instructions, Take 1 tablet by mouth  daily, # 90 tabs, [facility]: [facility], Take 1 tablet by mouth  daily

Start Date: 16

Status: Ordered



ProAir HFA 90 mcg/inh inhalation aerosol

2 puffs, Inhalation, QID, as needed for wheezing, # 3 Each, 3 Refill(s), Pharmac
y: [facility]

Start Date: 1/23/15

Status: Ordered



RABEprazole 20 mg oral delayed release tablet

See Instructions, Take 2 tablets by mouth  daily, # 180 tabs, [facility]: [facility], Take 2 tablets by mouth  daily

Start Date: 16

Status: Ordered



SUMAtriptan 50 mg oral tablet

50 mg 1 tabs, Oral, Daily, as needed for migraine headache, may repeat dose afte
r 2 hours up to a maximum of 200 mg in 24 hours, # 9 tabs, 1 Refill(s), Pharmacy
: [id number], 1 tabs Oral Daily,PRN:as needed for migraine heada
godwin,Instr:m...

Start Date: 16

Status: Ordered



Symbicort 160 mcg-4.5 mcg/inh inhalation aerosol

See Instructions, Use 2 puffs twice daily, # 30.6 g, 2 Refill(s), [facility]: [facility], Use 2 puffs twice daily

Start Date: 16

Status: Ordered



valsartan 160 mg oral tablet

See Instructions, 1 TABS ORAL DAILY, # 30 tabs, 1 Refill(s), [facility]: [id number], 1 TABS ORAL DAILY

Start Date: 10/12/15

Status: Ordered



Results





No data available for this section



Immunizations







  



                     Vaccine             Date                Refusal Reason

 

  



                           tetanus-diphth toxoids (Td) adult/adol     00 







Procedures







   



                 Procedure       Date            Related Diagnosis     Body Site

 

   



                           L knee PMM/ part. synovectomy     13  

 

   



                           lt median nerve decompression     5/10/11  

 

   



                           Tubal ligation            1979  

 

   



                                         Carpal tunnel release rt   

 

   



                                         Cholecystectomy   

 

   



                                         Hysterectomy   

 

   



                                         Hysterectomy and bilateral   



                                         salpingo-oophorectomy sample   

 

   



                                         Laminectomy   

 

   



                                         Pacemaker   







Social History







 



                           Social History Type       Response

 

 



                           Smoking Status            Former smoker; Type: Cigarettes1







1Quit around 



Assessment and Plan

Extracted from:





  



                     Title: Office Visit Note     Author: Dhiraj Mijares MD     Date: 16









                                         Assessment/Plan

 1.CAD (coronary artery disease) 

 1.Physical exam  Ordered:

 Hemoglobin A1c

 Urinalysis with Culture if Indicated 

 2.Diabetes  Ordered:

 Hemoglobin A1c 

 3.HTN (hypertension) 

 4.Hyperlipidemia 



 Follow-up on lab results when available

 No other changes are needed at this time. Recommend patient continue the same
dose of their current medications, and otherwise followup as needed. 
Medication refills will be provided as needed. Anticipate seeing them back at 
their next scheduled appointment.

 We briefly discussed the option of Neurontin forneuropathy symptoms. She 
wants to wait on this for now.

## 2019-07-31 NOTE — XMS REPORT
Transition of Care/Referral Summary

                             Created on: 06/10/2109



DYLAN KHAN EDITH

External Reference #: [id number]

: 1960

Sex: Female



Demographics







                          Address                   [address]

[place]  [address]

 

                          Home Phone                [phone]

 

                          Preferred Language        English

 

                          Marital Status            

 

                          Christian Affiliation     Nondenominational

 

                          Race                      White

 

                          Ethnic Group              Not  or 





Author







                          Author                    [facility]

 

                          Organization              [facility]

 

                          Address                   Unknown

 

                          Phone                     Unavailable







Care Team Providers







                    Care Team Member Name    Role                Phone

 

                    Dhiraj Mijares    PCP                 [phone]







Encounter





VC FIN [id number] Date(s): 16 - 16

[facility] [address] [place] [address]  [phone]
[phone]

Discharge Diagnosis: Pain in pacemaker pocket

Discharge Diagnosis: Chest wall pain

Discharge Diagnosis: Atypical chest pain

Discharge Disposition: 01-Home or Self Care

Attending Physician: Prabhjot Cobos DO

Admitting Physician: Prabhjot Cobos DO





Vital Signs







 



                           Most recent to            1



                                         oldest [Reference 



                                         Range]: 

 

 



                           Temperature Oral          36.9 degC



                           [35.8-37.3 degC]          (16 12:17 PM)

 

 



                           Peripheral Pulse          86 bpm



                           Rate [ bpm]         (16 2:56 PM)

 

 



                           Heart Rate Monitored      83 bpm



                           [ bpm]              (16 2:00 PM)

 

 



                           Respiratory Rate          17 br/min



                           [14-20 br/min]            (16 2:56 PM)

 

 



                           Blood Pressure            133/60 mmHg



                           [/60-90 mmHg]       (16 2:56 PM)

 

 



                           Mean Arterial             91 mmHg



                           Pressure, Cuff            (16 2:00 PM)

 

 



                           SpO2                      96 %



                                         (16 2:56 PM)







Problem List







    



              Condition     Effective Dates     Status       Health Status     Informant

 

    



                           Acquired                  Active  



                                         spondylolisthesis(Co    



                                         nfirmed)    

 

    



                           Acute                     Active  



                                         pain(Confirmed)    

 

    



                           Arthritis(Confirmed)      Resolved  

 

    



                           Right Carpal tunnel       Resolved  



                                         release(Confirmed)    

 

    



                           Carpal tunnel             Resolved  



                                         release    



                                         decompression(Confir    



                                         med)    

 

    



                           Cholecystectomy(Conf      Resolved  



                                         irmed)    

 

    



                     CHF (congestive      Active              patient



                                         heart    



                                         failure)(Confirmed)    

 

    



                           CAD (coronary artery      Active  



                                         disease)(Confirmed)    

 

    



                           Decompression(Confir      Resolved  



                                         med)    

 

    



                           Depression(Confirmed      Resolved  



                                         )    

 

    



                           Depression(Confirmed      Active  



                                         )    

 

    



                           Diabetes(Confirmed)       Active  

 

    



                           Diabetes(Confirmed)       Resolved  

 

    



                           GERD                      Active  



                                         (gastroesophageal    



                                         reflux    



                                         disease)(Confirmed)    

 

    



                           Ischemic                  Active  



                                         cardiomyopathy(Confi    



                                         rmed)    

 

    



                           Presence of               Active  



                                         biventricular    



                                         AICD(Confirmed)    

 

    



                           History of lumbar         Active  



                                         fusion(Confirmed)    

 

    



                           Hyperlipidemia(Confi      Resolved  



                                         rmed)    

 

    



                           Hypertension(Confirm      Resolved  



                                         ed)    

 

    



                           Hypertension(Confirm      Resolved  



                                         ed)    

 

    



                           HTN                       Active  



                                         (hypertension)(Confi    



                                         rmed)    

 

    



                           Hysterectomy(Confirm      Resolved  



                                         ed)    

 

    



                           Impaired gas              Active  



                                         exchange(Confirmed)1    

 

    



                           Irritable                 Resolved  



                                         bowel(Confirmed)    

 

    



                           Laminectomy(Confirme      Resolved  



                                         d)    

 

    



                           LBBB (left bundle         Active  



                                         branch    



                                         block)(Confirmed)    

 

    



                           LUQ pain(Confirmed)       Active  

 

    



                           Migraine(Confirmed)       Resolved  

 

    



                           Asthma(Confirmed)         Active  

 

    



                     Morbid              Active              patient



                                         obesity(Confirmed)    

 

    



                           Myocardial                Resolved  



                                         infarction(Confirmed    



                                         )    

 

    



                           Septicemia                Resolved  



                                         pneumonia(Confirmed)    

 

    



                     L knee              13              Resolved  



                                         Synovectomy(Confirme    



                                         d)    

 

    



                           Tissue perfusion          Active  



                                         alteration(Confirmed    



                                         )2    

 

    



                     Tobacco             Active              patient



                                         user(Confirmed)    

 

    



                     Tubal               79              Resolved  



                                         ligation(Confirmed)    







1Problem added automatically by system based on initiation of Impaired Gas 
Exchange Plan of Care



2Problem added automatically by system based on initiation of Tissue Perfusion 
Cerebral Plan of Care



Allergies, Adverse Reactions, Alerts







   



                 Substance       Reaction        Severity        Status

 

   



                     barium sulfate      Adverse Reaction      Active



                                         Swelling, hives  

 

   



                     celecoxib           HIVES               Active

 

   



                           influenza virus vaccine,       Active



                                         inactivated   

 

   



                     morphine            Adverse Reaction      Active

 

   



                     sulfamethoxazole     HIVES               Active



                                         Urticaria (hives)  







Medications





acetaminophen

1,000 mg, Oral, q6hr, as needed for pain, 0 Refill(s)

Start Date: 14

Status: Ordered



amLODIPine 10 mg oral tablet

10 mg 1 tabs, Oral, Daily, # 90 tabs, 5 Refill(s), [facility]acy: [facility]
E, 1 tabs Oral Daily

Start Date: 16

Status: Ordered



Aspir 81

81 mg, Oral, Daily, 0 Refill(s)

Start Date: 14

Status: Ordered



atorvastatin 10 mg oral tablet

10 mg 1 tabs, Oral, Daily, # 90 tabs, 5 Refill(s), [facility]: [facility]
E, 1 tabs Oral Daily

Start Date: 16

Status: Ordered



bumetanide 1 mg oral tablet

See Instructions, Take 2 tablets by mouth in  the morning and 1 tablet by mouth 
in the evening, # 270 tabs, eRx: [facility], Take 2 tablets by mouth i
n  the morning and 1 tablet by mouth in the evening

Start Date: 10/3/16

Status: Ordered



buPROPion 150 mg/12 hours (SR) oral tablet, extended release

See Instructions, Take 1 tablet by mouth two  times daily, # 180 tabs, 1 Refill(
s), [facility]Rx: [facility], Take 1 tablet by mouth two  times daily

Start Date: 10/3/16

Status: Ordered



carvedilol 25 mg oral tablet

See Instructions, Take 1 tablet by mouth two  times daily, # 180 tabs, [facility], Take 1 tablet by mouth two  times daily

Start Date: 10/3/16

Status: Ordered



cloNIDine 0.1 mg oral tablet

See Instructions, Take 1 tablet by mouth  every evening, # 90 tabs, 2 Refill(s),
eRx: [facility], Take 1 tablet by mouth  every evening

Start Date: 16

Status: Ordered



clopidogrel 75 mg oral tablet

See Instructions, Take 1 tablet by mouth  daily, # 90 tabs, 1 Refill(s), [facility]: [facility], Take 1 tablet by mouth  daily

Start Date: 10/3/16

Status: Ordered



digoxin 125 mcg (0.125 mg) oral tablet

See Instructions, Take 1 tablet by mouth  daily, # 90 tabs, [facility]: [facility], Take 1 tablet by mouth  daily

Start Date: 10/3/16

Status: Ordered



DULoxetine 60 mg oral delayed release capsule

See Instructions, Take 1 capsule by mouth  daily do not crush or chew, # 90 caps
, 1 Refill(s), eRx: [facility], Take 1 capsule by mouth  daily do not 
crush or chew

Start Date: 10/3/16

Status: Ordered



glipiZIDE 5 mg oral tablet

See Instructions, Take 1 tablet by mouth two  times daily, # 180 tabs, 3 Refill(
s), eRx: [facility], Take 1 tablet by mouth two  times daily

Start Date: 16

Status: Ordered



HumaLOG 100 units/mL subcutaneous solution

5 units, SubCutaneous, TIDAC, DX E11.9, # 30 mL, 2 Refill(s), [facility]acy: [facility], 5 units SubCutaneous TIDAC,Instr:DX E11.9

Start Date: 16

Status: Ordered



Insulin Syringe (DME)

DME Item use to inject insulin once daily     dx: E11.9, See Instructions, # 90 
Each, 4 Refill(s), Pharmacy: [id number], use to inject insulin o
nce daily     dx: E11.9, Supply

Start Date: 11/10/15

Status: Ordered



Lantus 100 units/mL subcutaneous solution

See Instructions, Inject subcutaneously 30  units every night at  bedtime, # 30 
mL, 1 Refill(s), eRx: [facility], Inject subcutaneously 30  units ever
y night at  bedtime

Start Date: 10/3/16

Status: Ordered



Melatonin

10 mg, Oral, Bedtime (once a day), as needed for insomnia, 0 Refill(s)

Start Date: 14

Status: Ordered



METFORMIN  500MG  TAB

See Instructions, Take 2 tablets by mouth  twice daily, # 360 tabs, 3 Refill(s),
eRx: [facility], Take 2 tablets by mouth  twice daily

Start Date: 16

Status: Ordered



Nasonex 50 mcg/inh nasal spray

2 sprays, Nasal, Daily, # 1 Each, 5 Refill(s), Pharmacy: [id number]
[id number]

Start Date: 10/3/16

Status: Ordered



Norco 10 mg-325 mg oral tablet

1 tabs, Oral, q6hr, as needed for pain, # 30 tabs, 0 Refill(s)

Start Date: 16

Status: Ordered



Norco 5 mg-325 mg oral tablet

1 tabs, Oral, q4hr, as needed for pain, # 24 tabs, 0 Refill(s)

Start Date: 16

Stop Date: 11/15/16

Status: Ordered



potassium chloride 20 mEq oral tablet, extended release

See Instructions, Take 1 tablet by mouth  daily, # 90 tabs, 1 Refill(s), [facility]Rx: [facility], Take 1 tablet by mouth  daily

Start Date: 16

Status: Ordered



ProAir HFA 90 mcg/inh inhalation aerosol

See Instructions, Inhale 2 puffs 4 times  daily as needed for  wheezing, # 25.5 
g, eRx: [facility], Inhale 2 puffs 4 times  daily as needed for  wheez
ing

Start Date: 10/3/16

Status: Ordered



RABEprazole 20 mg oral delayed release tablet

See Instructions, Take 2 tablets by mouth  daily, # 180 tabs, 1 Refill(s), eRx: 
[facility], Take 2 tablets by mouth  daily

Start Date: 16

Status: Ordered



SUMAtriptan 50 mg oral tablet

50 mg 1 tabs, Oral, Daily, as needed for migraine headache, may repeat dose afte
r 2 hours up to a maximum of 200 mg in 24 hours, # 9 tabs, 1 Refill(s), Pharmacy
: [id number], 1 tabs Oral Daily,PRN:as needed for migraine heada
godwin,Instr:m...

Start Date: 16

Status: Ordered



Symbicort 160 mcg-4.5 mcg/inh inhalation aerosol

See Instructions, Use 2 puffs twice daily, # 30.6 g, 2 Refill(s), [facility]: [facility], Use 2 puffs twice daily

Start Date: 10/3/16

Status: Ordered



SYRNG   .5CC ULTII 31 G SRT

See Instructions, Use to Inject insulin once  daily, # 90 Each, 3 Refill(s), eRx
: [facility], Use to Inject insulin once  daily

Start Date: 16

Status: Ordered



valsartan 160 mg oral tablet

See Instructions, 1 TABS ORAL DAILY, # 30 tabs, 1 Refill(s), eRx: [id number], 1 TABS ORAL DAILY

Start Date: 10/12/15

Status: Ordered



Results





Hematology





 



                           Most recent to            1



                                         oldest [Reference 



                                         Range]: 

 

 



                           WBC [4.8-10.8             13.1 10*3/uL



                           10*3/uL]                  *HI*



                                         (16 12:42 PM)

 

 



                           RBC [4.00-5.20]           4.05



                                         (16 12:42 PM)

 

 



                           Hgb [12.0-16.0            11.6 gm/dL



                           gm/dL]                    *LOW*



                                         (16 12:42 PM)

 

 



                           Hct [37.0-47.0 %]         36.9 %



                                         *LOW*



                                         (16 12:42 PM)

 

 



                           MCV [82.0-99.0 fL]        91.1 fL



                                         (16 12:42 PM)

 

 



                           MCH [27.0-32.0 pg]        28.6 pg



                                         (16 12:42 PM)

 

 



                           MCHC [32.0-36.0           31.4 gm/dL



                           gm/dL]                    *LOW*



                                         (16 12:42 PM)

 

 



                           RDW [11.5-14.5 %]         14.4 %



                                         (16 12:42 PM)

 

 



                           Platelet [150-400         280 10*3/uL



                           10*3/uL]                  (16 12:42 PM)

 

 



                           MPV [9.4-12.4 fL]         9.4 fL



                                         (16 12:42 PM)

 

 



                           Immature                  0.6 %



                           Granulocytes              (16:42 PM)



                                         [0.0-1.0 %] 

 

 



                           Neutrophils [51-75        73 %



                           %]                        (16 12:42 PM)

 

 



                           Lymphocytes [20-46        20 %



                           %]                        (16 12:42 PM)

 

 



                           Monocytes [4-11 %]        4 %



                                         (16 12:42 PM)

 

 



                           Eosinophils [0-4 %]       2 %



                                         (16 12:42 PM)

 

 



                           Basophils [0-2 %]         0 %



                                         (16 12:42 PM)

 

 



                           Neutro Absolute           9.62 10*3



                           [1.90-7.00 10*3]          *HI*



                                         (16 12:42 PM)

 

 



                           Lymph Absolute            2.59 10*3



                           [0.80-3.30 10*3]          (16 12:42 PM)

 

 



                           Mono Absolute             0.56 10*3



                           [0.30-1.00 10*3]          (16 12:42 PM)

 

 



                           Eos Absolute              0.24 10*3



                           [0.00-0.50 10*3]          (16 12:42 PM)

 

 



                           Baso Absolute             0.03 10*3



                           [0.00-0.20 10*3]          (16 12:42 PM)

 

 



                           Nucleated RBC             0.0 /100 WBC



                           Automated [0 /100         (16 12:42 PM)



                                         WBC] 







Chemistry





 



                           Most recent to            1



                                         oldest [Reference 



                                         Range]: 

 

 



                           Sodium Lvl [136-144       137 mEq/L



                           mEq/L]                    (16 12:42 PM)

 

 



                           Potassium Lvl             3.7 mEq/L



                           [3.6-5.1 mEq/L]           (16 12:42 PM)

 

 



                           Chloride [          95 mEq/L



                           mEq/L]                    *LOW*



                                         (16:42 PM)

 

 



                           CO2 [22-32 mEq/L]         30 mEq/L



                                         (16 12:42 PM)

 

 



                           AGAP [3-20]               12



                                         (16 12:42 PM)

 

 



                           BUN [4-20 mg/dL]          10 mg/dL



                                         (16 12:42 PM)

 

 



                           Glucose Lvl [       221 mg/dL



                           mg/dL]                    *HI*



                                         (16 12:42 PM)

 

 



                           Creatinine Lvl            0.58 mg/dL



                           [0.44-1.03 mg/dL]         (16 12:42 PM)

 

 



                           eGFR [>60]                >60 1



                                         (16 12:42 PM)

 

 



                           Calcium Lvl               9.3 mg/dL



                           [8.6-10.0 mg/dL]          (16 12:42 PM)

 

 



                           Albumin Lvl [3.5-4.8      3.6 gm/dL



                           gm/dL]                    (16 12:42 PM)

 

 



                           Total Protein             6.8 gm/dL



                           [6.1-7.9 gm/dL]           (16 12:42 PM)

 

 



                           Globulin [1.9-4.3         3.2 gm/dL



                           gm/dL]                    (16 12:42 PM)

 

 



                           ALT [14-54 U/L]           52 U/L



                                         (16 12:42 PM)

 

 



                           AST [15-41 U/L]           52 U/L



                                         *HI*



                                         (16 12:42 PM)

 

 



                           Alk Phos [          128 U/L



                           U/L]                      *HI*



                                         (16 12:42 PM)

 

 



                           Bili Total [0.2-1.2       0.3 mg/dL 2



                           mg/dL]                    (16 12:42 PM)

 

 



                           BNP [0-99 pg/mL]          51 pg/mL



                                         (16 12:42 PM)

 

 



                           Troponin [<0.06           <0.05 ng/mL



                           ng/mL]                    (16 12:42 PM)







1Result Comment: Multiply eGFR results by 1.21 for  race.



2Result Comment: Naproxen, specifically the metabolite O-desmethylnaproxen,

may cause spurious elevation in Total Bilirubin levels.



Immunizations







  



                     Vaccine             Date                Refusal Reason

 

  



                           tetanus-diphth toxoids (Td) adult/adol     00 







Procedures







   



                 Procedure       Date            Related Diagnosis     Body Site

 

   



                           L knee PMM/ part. synovectomy     13  

 

   



                           lt median nerve decompression     5/10/11  

 

   



                           Tubal ligation            1979  

 

   



                                         Carpal tunnel release rt   

 

   



                                         Cholecystectomy   

 

   



                                         Hysterectomy   

 

   



                                         Hysterectomy and bilateral   



                                         salpingo-oophorectomy sample   

 

   



                                         Laminectomy   

 

   



                                         Pacemaker   







Social History







 



                           Social History Type       Response

 

 



                           Smoking Status            Former smoker; Type: Cigarettes1







1Quit around 



Assessment and Plan





No data available for this section

## 2019-07-31 NOTE — DIAGNOSTIC IMAGING REPORT
INDICATION: Pain at the top of the right foot.



TIME OF EXAM: 10:57 a.m.



EXAMINATION: Three views of the right foot were obtained.



FINDINGS: Metatarsals appear to be intact. The phalanges are

intact. Midfoot and hindfoot are unremarkable apart from plantar

calcaneal spur. No fractures are seen.



IMPRESSION: No acute bony abnormality is detected.



Dictated by: 



  Dictated on workstation # [id number]

## 2019-07-31 NOTE — XMS REPORT
Transition of Care/Referral Summary

                             Created on: 2123



BILLDYLAN EDITH

External Reference #: [id number]

: 1960

Sex: Female



Demographics







                          Address                   [address]

[place]  [address]

 

                          Home Phone                [phone]

 

                          Preferred Language        English

 

                          Marital Status            

 

                          Cheondoism Affiliation     Taoism

 

                          Race                      White

 

                          Ethnic Group              Not  or 





Author







                          Author                    [facility]

 

                          Organization              [facility]

 

                          Address                   Unknown

 

                          Phone                     Unavailable







Care Team Providers







                    Care Team Member Name    Role                Phone

 

                    Dhiraj Mijares    PCP                 [phone]







Encounter





[facility] FIN [id number] Date(s): 16 - 16

[facility] [address], [place] [address] [phone]

Discharge Diagnosis: CAD (coronary artery disease)

Discharge Diagnosis: Benign essential HTN

Discharge Diagnosis: ICD (implantable cardioverter-defibrillator) in place

Discharge Diagnosis: Nonischemic cardiomyopathy

Discharge Diagnosis: Chronic systolic CHF (congestive heart failure)

Discharge Disposition: 01-Home or Self Care

Attending Physician: Linda Kruger MD

Admitting Physician: Linda Kruger MD

Referring Physician: Dhiraj Mijares MD





Vital Signs





No data available for this section



Problem List







    



              Condition     Effective Dates     Status       Health Status     Informant

 

    



                           Acquired                  Active  



                                         spondylolisthesis(Co    



                                         nfirmed)    

 

    



                           Acute                     Active  



                                         pain(Confirmed)    

 

    



                           Arthritis(Confirmed)      Resolved  

 

    



                           Right Carpal tunnel       Resolved  



                                         release(Confirmed)    

 

    



                           Carpal tunnel             Resolved  



                                         release    



                                         decompression(Confir    



                                         med)    

 

    



                           Cholecystectomy(Conf      Resolved  



                                         irmed)    

 

    



                     CHF (congestive      Active              patient



                                         heart    



                                         failure)(Confirmed)    

 

    



                           CAD (coronary artery      Active  



                                         disease)(Confirmed)    

 

    



                           Decompression(Confir      Resolved  



                                         med)    

 

    



                           Depression(Confirmed      Resolved  



                                         )    

 

    



                           Depression(Confirmed      Active  



                                         )    

 

    



                           Diabetes(Confirmed)       Resolved  

 

    



                           Diabetes(Confirmed)       Active  

 

    



                           GERD                      Active  



                                         (gastroesophageal    



                                         reflux    



                                         disease)(Confirmed)    

 

    



                           Ischemic                  Active  



                                         cardiomyopathy(Confi    



                                         rmed)    

 

    



                           Presence of               Active  



                                         biventricular    



                                         AICD(Confirmed)    

 

    



                           History of lumbar         Active  



                                         fusion(Confirmed)    

 

    



                           Hyperlipidemia(Confi      Resolved  



                                         rmed)    

 

    



                           Hypertension(Confirm      Resolved  



                                         ed)    

 

    



                           Hypertension(Confirm      Resolved  



                                         ed)    

 

    



                           HTN                       Active  



                                         (hypertension)(Confi    



                                         rmed)    

 

    



                           Hysterectomy(Confirm      Resolved  



                                         ed)    

 

    



                           Impaired gas              Active  



                                         exchange(Confirmed)1    

 

    



                           Irritable                 Resolved  



                                         bowel(Confirmed)    

 

    



                           Laminectomy(Confirme      Resolved  



                                         d)    

 

    



                           LBBB (left bundle         Active  



                                         branch    



                                         block)(Confirmed)    

 

    



                           LUQ pain(Confirmed)       Active  

 

    



                           Migraine(Confirmed)       Resolved  

 

    



                           Asthma(Confirmed)         Active  

 

    



                     Morbid              Active              patient



                                         obesity(Confirmed)    

 

    



                           Myocardial                Resolved  



                                         infarction(Confirmed    



                                         )    

 

    



                           Septicemia                Resolved  



                                         pneumonia(Confirmed)    

 

    



                     L knee              13              Resolved  



                                         Synovectomy(Confirme    



                                         d)    

 

    



                           Tissue perfusion          Active  



                                         alteration(Confirmed    



                                         )2    

 

    



                     Tobacco             Active              patient



                                         user(Confirmed)    

 

    



                     Tubal               79              Resolved  



                                         ligation(Confirmed)    







1Problem added automatically by system based on initiation of Impaired Gas 
Exchange Plan of Care



2Problem added automatically by system based on initiation of Tissue Perfusion 
Cerebral Plan of Care



Allergies, Adverse Reactions, Alerts







   



                 Substance       Reaction        Severity        Status

 

   



                     barium sulfate      Adverse Reaction      Active



                                         Swelling, hives  

 

   



                     celecoxib           HIVES               Active

 

   



                           influenza virus vaccine,       Active



                                         inactivated   

 

   



                     morphine            Adverse Reaction      Active

 

   



                     sulfamethoxazole     HIVES               Active



                                         Urticaria (hives)  







Medications





acetaminophen

1,000 mg, Oral, q6hr, as needed for pain, 0 Refill(s)

Start Date: 14

Status: Ordered



AcipHex 20 mg oral delayed release tablet

40 mg 2 tabs, Oral, Daily, # 180 tabs, 2 Refill(s), Pharmacy: [facility], 2 tabs Oral Daily

Start Date: 9/21/15

Status: Ordered



albuterol 2.5 mg/3 mL (0.083%) inhalation solution

3 mL, Inhalation, BID, Dx: Asthma, # 25 Each, 3 Refill(s), Pharmacy: [facility] [id number], 3 mL Inhalation BID,Instr:Dx: Asthma

Start Date: 4/21/15

Status: Ordered



amLODIPine 5 mg oral tablet

See Instructions, Take 1 tablet by mouth  daily, # 90 tabs, 1 Refill(s), Pharmac
y: [facility], Take 1 tablet by mouth  daily

Start Date: 9/2/15

Status: Ordered



Aspir 81

81 mg, Oral, Daily, 0 Refill(s)

Start Date: 14

Status: Ordered



atorvastatin 10 mg oral tablet

10 mg 1 tabs, Oral, Daily, # 90 tabs, 5 Refill(s), Pharmacy: [facility]
E, 1 tabs Oral Daily

Start Date: 16

Status: Ordered



bumetanide 1 mg oral tablet

See Instructions, Take 2 mg of Bumex in AM and 1 mg PM., # 300 tabs, 5 Refill(s)
, Pharmacy: [facility], Take 2 mg of Bumex in AM and 1 mg PM.

Start Date: 9/22/15

Status: Ordered



buPROPion 150 mg/12 hours (SR) oral tablet, extended release

See Instructions, Take 1 tablet by mouth two  times daily, # 180 unknown unit, 1
Refill(s), eRx: [facility], Take 1 tablet by mouth two  times daily

Start Date: 9/9/15

Status: Ordered



carvedilol 25 mg oral tablet

25 mg 1 tabs, Oral, BID, # 180 tabs, 5 Refill(s), Pharmacy: [facility]
, 1 tabs Oral BID

Start Date: 9/22/15

Status: Ordered



Claritin

10 mg, Oral, Daily, 0 Refill(s)

Start Date: 5/18/15

Status: Ordered



cloNIDine 0.1 mg oral tablet

0.1 mg 1 tabs, Oral, qPM, # 90 tabs, 1 Refill(s), Pharmacy: [facility]
, 1 tabs Oral qPM

Start Date: 10/12/15

Status: Ordered



clopidogrel 75 mg oral tablet

1 tabs, Oral, Daily, # 90 tabs, 3 Refill(s), Pharmacy: [facility], 1 t
abs Oral Daily

Start Date: 4/14/15

Status: Ordered



digoxin 125 mcg (0.125 mg) oral tablet

125 mcg 1 tabs, Oral, Daily, # 90 tabs, 5 Refill(s), [facility], 1 tabs Oral Daily

Start Date: 9/22/15

Status: Ordered



DULoxetine 20 mg oral delayed release capsule

20 mg 1 caps, Oral, Daily, takes with 60mg duloxetine=80mg daily, 0 Refill(s)

Start Date: 10/19/15

Status: Ordered



DULoxetine 60 mg oral delayed release capsule

See Instructions, Take 1 capsule by mouth  daily do not crush or chew, # 90 unkn
own unit, 1 Refill(s), eRx: [facility], Take 1 capsule by mouth  daily
do not crush or chew

Start Date: 9/30/15

Status: Ordered



glipiZIDE 5 mg oral tablet

5 mg 1 tabs, Oral, BID, X 90 days, # 180 tabs, 1 Refill(s), [facility]acy: [facility], 1 tabs Oral BID,x90 days

Start Date: 9/2/15

Stop Date: 16

Status: Ordered



Insulin Syringe (DME)

DME Item use to inject insulin once daily     dx: E11.9, See Instructions, # 90 
Each, 4 Refill(s), Pharmacy: [id number], use to inject insulin o
nce daily     dx: E11.9, Supply

Start Date: 11/10/15

Status: Ordered



Lantus 100 units/mL subcutaneous solution

See Instructions, Inject subcutaneously 30  units every night at  bedtime once a
day, # 30 mL, 2 Refill(s), eRx: [facility], Inject subcutaneously 30  
units every night at  bedtime once a day

Start Date: 7/31/15

Status: Ordered



Melatonin

10 mg, Oral, Bedtime (once a day), as needed for insomnia, 0 Refill(s)

Start Date: 14

Status: Ordered



metFORMIN 500 mg oral tablet

See Instructions, Take 2 tablets by mouth  twice a day, # 360 tabs, 1 Refill(s),
Pharmacy: [facility], Take 2 tablets by mouth  twice a day

Start Date: 9/2/15

Status: Ordered



Norco 10 mg-325 mg oral tablet

1 tabs, Oral, q6hr, as needed for pain, # 30 tabs, 0 Refill(s)

Start Date: 16

Status: Ordered



OTC Calcium

OTC Calcium, See Instructions, 1 tablet oral daily, 0 Refill(s)

Start Date: 2/3/15

Status: Ordered



potassium chloride 20 mEq oral tablet, extended release

See Instructions, Take 1 tablet by mouth  daily, # 90 unknown unit, 1 Refill(s),
eRx: [facility], Take 1 tablet by mouth  daily

Start Date: 10/2/15

Status: Ordered



ProAir HFA 90 mcg/inh inhalation aerosol

2 puffs, Inhalation, QID, as needed for wheezing, # 3 Each, 3 Refill(s), Pharmac
y: [facility]

Start Date: 1/23/15

Status: Ordered



SUMAtriptan 50 mg oral tablet

50 mg 1 tabs, Oral, Daily, as needed for migraine headache, may repeat dose afte
r 2 hours up to a maximum of 200 mg in 24 hours, # 9 tabs, 0 Refill(s), Pharmacy
: [id number], 1 tabs Oral Daily,PRN:as needed for migraine heada
godwin,Instr:m...

Start Date: 9/16/15

Status: Ordered



Symbicort 160 mcg-4.5 mcg/inh inhalation aerosol

See Instructions, Use 2 puffs twice daily, # 10.2 g, 2 Refill(s), [facility], Use 2 puffs twice daily

Start Date: 12/2/15

Status: Ordered



valsartan 160 mg oral tablet

See Instructions, 1 TABS ORAL DAILY, # 30 tabs, 1 Refill(s), eRx: [facility] [id number], 1 TABS ORAL DAILY

Start Date: 10/12/15

Status: Ordered



Results





Chemistry





 



                           Most recent to            1



                                         oldest [Reference 



                                         Range]: 

 

 



                           Sodium Lvl [135-144       139 mEq/L



                           mEq/L]                    (16 4:48 PM)

 

 



                           Potassium Lvl             4.7 mEq/L



                           [3.5-5.2 mEq/L]           (16 4:48 PM)

 

 



                           Chloride [          96 mEq/L



                           mEq/L]                    *LOW*



                                         (16 4:48 PM)

 

 



                           CO2 [22-31 mEq/L]         31 mEq/L



                                         (16 4:48 PM)

 

 



                           AGAP [3-20]               12



                                         (16 4:48 PM)

 

 



                           BUN [10-20 mg/dL]         13 mg/dL



                                         (16 4:48 PM)

 

 



                           Glucose Lvl [70-99        211 mg/dL



                           mg/dL]                    *HI*



                                         (16 4:48 PM)

 

 



                           Creatinine Lvl            0.69 mg/dL



                           [0.57-1.11 mg/dL]         (16 4:48 PM)

 

 



                           eGFR [>60 mL/min]         >60 mL/min 1



                                         (16 4:48 PM)

 

 



                           Calcium Lvl               9.6 mg/dL



                           [8.9-10.5 mg/dL]          (16 4:48 PM)







1Result Comment: Multiply eGFR results by 1.21 for  race.



Immunizations







  



                     Vaccine             Date                Refusal Reason

 

  



                           tetanus-diphth toxoids (Td) adult/adol     00 







Procedures







   



                 Procedure       Date            Related Diagnosis     Body Site

 

   



                           L knee PMM/ part. synovectomy     13  

 

   



                           lt median nerve decompression     5/10/11  

 

   



                           Tubal ligation            1979  

 

   



                                         Carpal tunnel release rt   

 

   



                                         Cholecystectomy   

 

   



                                         Hysterectomy   

 

   



                                         Hysterectomy and bilateral   



                                         salpingo-oophorectomy sample   

 

   



                                         Laminectomy   

 

   



                                         Pacemaker   







Social History







 



                           Social History Type       Response

 

 



                           Smoking Status            Former smoker; Type: Cigarettes1







1Quit around 



Assessment and Plan

Extracted from:





  



                     Title: Ambulatory Patient Education     Author: Linda Kruger MD     Date:

 16









                                        Cardiovascular

Heart Failure

Heart failure is a condition in which the heart has trouble pumping blood. This 
means your heart does not pump blood efficiently for your body to work well. In 
some cases of heart failure, fluid may back up into your lungs or you may have 
swelling (edema) in your lower legs. Heart failure is usually a long-term 
(chronic) condition. It is important for you to take good care of yourself and 
follow your health care provider's treatment plan.





CAUSES

Some health conditions can cause heart failure. Those health conditions include:



  High blood pressure (hypertension). Hypertension causes the heart muscle to 
work harder than normal. When pressure in the blood vessels is high, the heart 
needs to pump (contract) with more force in order to circulate blood throughout 
the body. High blood pressure eventually causes the heart to become stiff and 
weak.



  Coronary artery disease (CAD). CAD is the buildup of cholesterol and fat 
(plaque) in the arteries of the heart. The blockage in the arteries deprives the
heart muscle of oxygen and blood. This can cause chest pain and may lead to a 
heart attack. High blood pressure can also contribute to CAD.



  Heart attack (myocardial infarction). A heart attack occurs when one or more 
arteries in the heart become blocked. The loss of oxygen damages the muscle 
tissue of the heart. When this happens, part of the heart muscle dies. The 
injured tissue does not contract as well and weakens the heart's ability to pump
blood.



  Abnormal heart valves. When the heart valves do not open and close properly, 
it can cause heart failure. This makes the heart muscle pump harder to keep the 
blood flowing. 



  Heart muscle disease (cardiomyopathy or myocarditis). Heart muscle disease is
damage to the heart muscle from a variety of causes. These can include drug or 
alcohol abuse, infections, or unknown reasons. These can increase the risk of 
heart failure.



  Lung disease. Lung disease makes the heart work harder because the lungs do 
not work properly. This can cause a strain on the heart, leading it to fail. 



  Diabetes. Diabetes increases the risk of heart failure. High blood sugar 
contributes to high fat (lipid) levels in the blood. Diabetes can also cause 
slow damage to tiny blood vessels that carry important nutrients to the heart 
muscle. When the heart does not get enough oxygen and food, it can cause the 
heart to become weak and stiff. This leads to a heart that does not contract 
efficiently.



  Other conditions can contribute to heart failure. These include abnormal 
heart rhythms, thyroid problems, and low blood counts (anemia).

Certain unhealthy behaviors can increase the risk of heart failure, including:



  Being overweight. 



  Smoking or chewing tobacco. 



  Eating foods high in fat and cholesterol. 



  Abusing illicit drugs or alcohol. 



  Lacking physical activity. 



SYMPTOMS

Heart failure symptoms may vary and can be hard to detect. Symptoms may include:



  Shortness of breath with activity, such as climbing stairs.



  Persistent cough.



  Swelling of the feet, ankles, legs, or abdomen.



  Unexplained weight gain.



  Difficulty breathing when lying flat (orthopnea).



  Waking from sleep because of the need to sit up and get more air.



  Rapid heartbeat.



  Fatigue and loss of energy.



  Feeling light-headed, dizzy, or close to fainting.



  Loss of appetite.



  Nausea. 



  Increased urination during the night (nocturia).



DIAGNOSIS

A diagnosis of heart failure is based on your history, symptoms, physical 
examination, and diagnostic tests. Diagnostic tests for heart failure may 
include:



  Echocardiography.



  Electrocardiography. 



  Chest X-ray.



  Blood tests.



  Exercise stress test.



  Cardiac angiography.



  Radionuclide scans.



TREATMENT

Treatment is aimed at managing the symptoms of heart failure. Medicines, 
behavioral changes, or surgical intervention may be necessary to treat heart 
failure.



  Medicines to help treat heart failure may include:





  Angiotensin-converting enzyme (ACE) inhibitors. This type of medicine blocks 
the effects of a blood protein called angiotensin-converting enzyme. ACE 
inhibitors relax (dilate) the blood vessels and help lower blood pressure. 





  Angiotensin receptor blockers (ARBs). This type of medicine blocks the actions
of a blood protein called angiotensin. Angiotensin receptor blockers dilate the 
blood vessels and help lower blood pressure. 





  Water pills (diuretics). Diuretics cause the kidneys to remove salt and water 
from the blood. The extra fluid is removed through urination. This loss of extra
fluid lowers the volume of blood the heart pumps.





  Beta blockers. These prevent the heart from beating too fast and improve heart
muscle strength. 





  Digitalis. This increases the force of the heartbeat.



  Healthy behavior changes include:





  Obtaining and maintaining a healthy weight.





  Stopping smoking or chewing tobacco.





  Eating heart-healthy foods.





  Limiting or avoiding alcohol.





  Stopping illicit drug use. 





  Physical activity as directed by your health care provider. 



  Surgical treatment for heart failure may include:





  A procedure to open blocked arteries, repair damaged heart valves, or remove 
damaged heart muscle tissue.





  A pacemaker to improve heart muscle function and control certain abnormal 
heart rhythms.





  An internal cardioverter defibrillator to treat certain serious abnormal heart
rhythms.





  A left ventricular assist device (LVAD) to assist the pumping ability of the 
heart.



HOME CARE INSTRUCTIONS



  Take medicines only as directed by your health care provider. Medicines are 
important in reducing the workload of your heart, slowing the progression of 
heart failure, and improving your symptoms.





  Do not stop taking your medicine unless directed by your health care provider.





  Do not skip any dose of medicine.





  Refill your prescriptions before you run out of medicine. Your medicines are 
needed every day.



  Engage in moderate physical activity if directed by your health care 
provider. Moderate physical activity can benefit some people. The elderly and 
people with severe heart failure should consult with a health care provider for 
physical activity recommendations.



  Eat heart-healthy foods. Food choices should be free of trans fat and low in 
saturated fat, cholesterol, and salt (sodium). Healthy choices include fresh or 
frozen fruits and vegetables, fish, lean meats, legumes, fat-free or low-fat 
dairy products, and whole grain or high fiber foods. Talk to a dietitian to 
learn more about heart-healthy foods.



  Limit sodium if directed by your health care provider. Sodium restriction may
reduce symptoms of heart failure in some people. Talk to a dietitian to learn 
more about heart-healthy seasonings. 



  Use healthy cooking methods. Healthy cooking methods include roasting, 
grilling, broiling, baking, poaching, steaming, or stir-frying. Talk to a 
dietitian to learn more about healthy cooking methods. 



  Limit fluids if directed by your health care provider. Fluid restriction may 
reduce symptoms of heart failure in some people.



  Weigh yourself every day. Daily weights are important in the early 
recognition of excess fluid. You should weigh yourself every morning after you 
urinate and before you eat breakfast. Wear the same amount of clothing each time
you weigh yourself. Record your daily weight. Provide your health care provider 
with your weight record. 



  Monitor and record your blood pressure if directed by your health care 
provider.



  Check your pulse if directed by your health care provider.



  Lose weight if directed by your health care provider. Weight loss may reduce 
symptoms of heart failure in some people.



  Stop smoking or chewing tobacco. Nicotine makes your heart work harder by 
causing your blood vessels to constrict. Do not use nicotine gum or patches 
before talking to your health care provider.



  Keep all follow-up visits as directed by your health care provider. This is 
important.



  Limit alcohol intake to no more than 1 drink per day for nonpregnant women 
and 2 drinks per day for men. One drink equals 12 ounces of beer, 5 ounces of 
wine, or 1 ounces of hard liquor. Drinking more than that is harmful to your 
heart. Tell your health care provider if you drink alcohol several times a week.
Talk with your health care provider about whether alcohol is safe for you. If 
your heart has already been damaged by alcohol or you have severe heart failure,
drinking alcohol should be stopped completely. 



  Stop illicit drug use.



  Stay up-to-date with immunizations. It is especially important to prevent 
respiratory infections through current pneumococcal and influenza immunizations.



  Manage other health conditions such as hypertension, diabetes, thyroid 
disease, or abnormal heart rhythms as directed by your health care provider.



  Learn to manage stress.



  Plan rest periods when fatigued.



  Learn strategies to manage high temperatures. If the weather is extremely 
hot:





  Avoid vigorous physical activity.





  Use air conditioning or fans or seek a cooler location. 





  Avoid caffeine and alcohol.





  Wear loose-fitting, lightweight, and light-colored clothing.



  Learn strategies to manage cold temperatures. If the weather is extremely 
cold:





  Avoid vigorous physical activity.





  Layer clothes.





  Wear mittens or gloves, a hat, and a scarf when going outside.





  Avoid alcohol.



  Obtain ongoing education and support as needed.



  Participate in or seek rehabilitation as needed to maintain or improve 
independence and quality of life.



SEEK MEDICAL CARE IF:



  Your weight increases by 03 lb/1.4 kg in 1 day or 05 lb/2.3 kg in a week.



  You have increasing shortness of breath that is unusual for you.



  You are unable to participate in your usual physical activities.



  You tire easily.



  You cough more than normal, especially with physical activity.



  You have any or more swelling in areas such as your hands, feet, ankles, or 
abdomen.



  You are unable to sleep because it is hard to breathe.



  You feel like your heart is beating fast (palpitations).



  You become dizzy or light-headed upon standing up. 



SEEK IMMEDIATE MEDICAL CARE IF:



  You have difficulty breathing.



  There is a change in mental status such as decreased alertness or difficulty 
with concentration.



  You have a pain or discomfort in your chest.



  You have an episode of fainting (syncope).



MAKE SURE YOU:



  Understand these instructions.



  Will watch your condition.



  Will get help right away if you are not doing well or get worse.



Document Released: 2006 Document Revised: 2015 Document Reviewed: 
2014

[facility] Patient Information 2015 [employer]. This information is not 
intended to replace advice given to you by your health care provider. Make sure 
you discuss any questions you have with your health care provider.





No follow up information was provided.





Extracted from:





  



                     Title: Office Visit Note     Author: Linda Kruger MD     Date: 16









                                         Assessment/Plan

 1.Chronic systolic CHF (congestive heart failure)  Euvolemic on the exam. 
LVEF 50% in 10/2015. S/p BiV ICD in 2015. Continue Bumex, Valsartan, Coreg and
Digoxin.

 2.Nonischemic cardiomyopathy  LVEF improved to 50%. See above.

 3.CAD (coronary artery disease)  Continue aspirin, Plavix, statin, Coreg.

 4.ICD (implantable cardioverter-defibrillator) in place

 5.Benign essential HTN  Controlled. Continue current meds.



  F/u 5 months.







Referrals to Other Providers





Referred by: Linda Kruger MD

## 2019-07-31 NOTE — XMS REPORT
Transition of Care/Referral Summary

                             Created on: 2020



DYLAN KHAN

External Reference #: [id number]

: 1960

Sex: Female



Demographics







                          Address                   [address]  [address]

 

                          Home Phone                [phone]

 

                          Preferred Language        English

 

                          Marital Status            

 

                          Baptist Affiliation     Anabaptist

 

                          Race                      White

 

                          Ethnic Group              Not  or 





Author







                          Author                    [facility]

 

                          Organization              [facility]

 

                          Address                   Unknown

 

                          Phone                     Unavailable







Care Team Providers







                    Care Team Member Name    Role                Phone

 

                    Dhiraj Mijares    PCP                 [phone]







Encounter





 FIN [id number] Date(s): 17 - 17

[facility] [address] [place], [place] [address]
S [phone]

Discharge Disposition: 01-Home or Self Care

Attending Physician: Dhiraj Mijares MD

Admitting Physician: Dhiraj Mijares MD





Vital Signs







 



                           Most recent to            1



                                         oldest [Reference 



                                         Range]: 

 

 



                           Apical Heart Rate         85 bpm



                           [ bpm]              (17 8:55 AM)

 

 



                           Blood Pressure            126/70 mmHg



                           [/60-90 mmHg]       (17 8:55 AM)

 

 



                           SpO2                      93 %



                                         (17 8:55 AM)







Problem List







    



              Condition     Effective Dates     Status       Health Status     Informant

 

    



                           Acquired                  Active  



                                         spondylolisthesis(Co    



                                         nfirmed)    

 

    



                           Acute                     Active  



                                         pain(Confirmed)    

 

    



                           Hay fever(Confirmed)      Active  

 

    



                           Arthritis(Confirmed)      Resolved  

 

    



                           Right Carpal tunnel       Resolved  



                                         release(Confirmed)    

 

    



                           Carpal tunnel             Resolved  



                                         release    



                                         decompression(Confir    



                                         med)    

 

    



                           Cholecystectomy(Conf      Resolved  



                                         irmed)    

 

    



                           IBS (irritable bowel      Active  



                                         syndrome)(Confirmed)    

 

    



                     CHF (congestive      Active              patient



                                         heart    



                                         failure)(Confirmed)    

 

    



                           CAD (coronary artery      Active  



                                         disease)(Confirmed)    

 

    



                           Decompression(Confir      Resolved  



                                         med)    

 

    



                           Depression(Confirmed      Resolved  



                                         )    

 

    



                           Depression(Confirmed      Active  



                                         )    

 

    



                           Diabetes(Confirmed)       Active  

 

    



                           Diabetes(Confirmed)       Resolved  

 

    



                           GERD                      Active  



                                         (gastroesophageal    



                                         reflux    



                                         disease)(Confirmed)    

 

    



                           Ischemic                  Active  



                                         cardiomyopathy(Confi    



                                         rmed)    

 

    



                           Presence of               Active  



                                         biventricular    



                                         AICD(Confirmed)    

 

    



                           History of lumbar         Active  



                                         fusion(Confirmed)    

 

    



                           Hyperlipidemia(Confi      Resolved  



                                         rmed)    

 

    



                           Asthma(Confirmed)         Active  

 

    



                           Hypertension(Confirm      Resolved  



                                         ed)    

 

    



                           Hypertension(Confirm      Resolved  



                                         ed)    

 

    



                           HTN                       Active  



                                         (hypertension)(Confi    



                                         rmed)    

 

    



                           Hysterectomy(Confirm      Resolved  



                                         ed)    

 

    



                           Impaired gas              Active  



                                         exchange(Confirmed)1    

 

    



                           Irritable                 Resolved  



                                         bowel(Confirmed)    

 

    



                           Laminectomy(Confirme      Resolved  



                                         d)    

 

    



                           LBBB (left bundle         Active  



                                         branch    



                                         block)(Confirmed)    

 

    



                           LUQ pain(Confirmed)       Active  

 

    



                           Migraine(Confirmed)       Resolved  

 

    



                           Asthma(Confirmed)         Active  

 

    



                     Morbid              Active              patient



                                         obesity(Confirmed)    

 

    



                           Myocardial                Resolved  



                                         infarction(Confirmed    



                                         )    

 

    



                           Septicemia                Resolved  



                                         pneumonia(Confirmed)    

 

    



                     L knee              13              Resolved  



                                         Synovectomy(Confirme    



                                         d)    

 

    



                           Tissue perfusion          Active  



                                         alteration(Confirmed    



                                         )2    

 

    



                     Tobacco             Active              patient



                                         user(Confirmed)    

 

    



                     Tubal               79              Resolved  



                                         ligation(Confirmed)    

 

    



                           UTI (urinary tract        Active  



                                         infection)(Confirmed    



                                         )    

 

    



                           Chicken                   Active  



                                         pox(Confirmed)    







1Problem added automatically by system based on initiation of Impaired Gas 
Exchange Plan of Care



2Problem added automatically by system based on initiation of Tissue Perfusion 
Cerebral Plan of Care



Allergies, Adverse Reactions, Alerts







   



                 Substance       Reaction        Severity        Status

 

   



                     barium sulfate      Swelling, hives      Active

 

   



                     celecoxib           HIVES               Active

 

   



                     influenza virus vaccine,     Convulsion          Active



                                         inactivated   

 

   



                     morphine            Burning             Active

 

   



                     sulfamethoxazole     HIVES               Active



                                         Urticaria (hives)  







Medications





acetaminophen

1,000 mg, Oral, q6hr, as needed for pain, 0 Refill(s)

Start Date: 14

Status: Ordered



albuterol 2.5 mg/3 mL (0.083%) inhalation solution

2.5 mg 3 mL, Inhalation, q6hr (scheduled), # 25 Each, 0 Refill(s)

Start Date: 4/3/17

Status: Ordered



Ambien 10 mg oral tablet

10 mg 1 tabs, Oral, Bedtime (once a day), as needed for sleep, X 30 days, # 30 t
abs, 2 Refill(s)

Start Date: 17

Stop Date: 17

Status: Ordered



amLODIPine 10 mg oral tablet

10 mg 1 tabs, Oral, Daily, # 90 tabs, 5 Refill(s), Pharmacy: [facility]
E, 1 tabs Oral Daily

Start Date: 16

Status: Ordered



Aspir 81

81 mg, Oral, Daily, 0 Refill(s)

Start Date: 14

Status: Ordered



atorvastatin 10 mg oral tablet

10 mg 1 tabs, Oral, Daily, # 90 tabs, 5 Refill(s), [facility]: [facility]
E, 1 tabs Oral Daily

Start Date: 16

Status: Ordered



basaglar

basaglar, See Instructions, inject 30 units at bedtime daily, # 1 boxes, 3 Refil
l(s), Pharmacy: [facility] [id number], inject 30 units at bedtime daily

Start Date: 17

Status: Ordered



bumetanide 1 mg oral tablet

See Instructions, Take 2 tablets by mouth in  the morning and 1 tablet by mouth 
in the evening, # 270 tabs, eRx: [facility]

Start Date: 3/6/17

Status: Ordered



buPROPion 150 mg/12 hours (SR) oral tablet, extended release

See Instructions, Take 1 tablet by mouth two  times daily, # 180 tabs, 1 Refill(
s), eRx: [facility], Take 1 tablet by mouth two  times daily

Start Date: 10/3/16

Status: Ordered



carvedilol 25 mg oral tablet

See Instructions, Take 1 tablet by mouth two  times daily, # 180 tabs, [facility], Take 1 tablet by mouth two  times daily

Start Date: 10/3/16

Status: Ordered



cloNIDine 0.1 mg oral tablet

See Instructions, Take 1 tablet by mouth  every evening, # 90 tabs, 2 Refill(s),
eRx: [facility], Take 1 tablet by mouth  every evening

Start Date: 16

Status: Ordered



clopidogrel 75 mg oral tablet

See Instructions, Take 1 tablet by mouth  daily, # 90 tabs, 1 Refill(s), [facility]: [facility], Take 1 tablet by mouth  daily

Start Date: 10/3/16

Status: Ordered



digoxin 125 mcg (0.125 mg) oral tablet

See Instructions, Take 1 tablet by mouth  daily, # 90 tabs, [facility]: [facility]

Start Date: 17

Status: Ordered



DULoxetine 60 mg oral delayed release capsule

See Instructions, Take 1 capsule by mouth  daily . Do not crush or  chew., # 90 
caps, 1 Refill(s), eRx: [facility]

Start Date: 17

Status: Ordered



Glucometer Lancets (DME)

DME Item one touch delica lancets  use to test bg x3/daily  dx:e11.65, See Instr
uctions, # 300 Each, 3 Refill(s), Pharmacy: [email or web address] [id number], one touc
h delica lancets; use to test bg x3/daily; dx:e11.65, Supply

Start Date: 3/13/17

Status: Ordered



HumaLOG 100 units/mL subcutaneous solution

5 units, SubCutaneous, TIDAC, DX E11.9, # 30 mL, 2 Refill(s), [facility]: [facility], 5 units SubCutaneous TIDAC,Instr:DX E11.9

Start Date: 16

Status: Ordered



Insulin Pin Needles (DME)

DME Item 32g x4mm (jacob needles)  use to inject insulin   dx:e11.65, See Instruc
tions, # 100 Each, 3 Refill(s), Pharmacy: [id number], 32g x4mm (
jacob needles); use to inject insulin ; dx:e11.65, Supply

Start Date: 17

Status: Ordered



Insulin Syringe (DME)

DME Item use to inject insulin once daily     dx: E11.9, See Instructions, # 90 
Each, 4 Refill(s), Pharmacy: [email or web address] [id number], use to inject insulin o
nce daily     dx: E11.9, Supply

Start Date: 11/10/15

Status: Ordered



Melatonin

10 mg, Oral, Bedtime (once a day), as needed for insomnia, 0 Refill(s)

Start Date: 14

Status: Ordered



metFORMIN 500 mg oral tablet

1,000 mg 2 tabs, Oral, BID, # 360 tabs, 2 Refill(s), other reason (Rx)

Start Date: 17

Status: Ordered



Miscellaneous DME

DME Item One Touch verio Test Strips   Use to check blood sugar 3 times daily.  
 DX E11.9, See Instructions, # 300 Each, 6 Refill(s), Pharmacy: [email or web address] [id number], One Touch verio Test Strips ; Use to check blood sugar 3 times jey
y. DX E11.9...

Start Date: 17

Status: Ordered



Nasonex 50 mcg/inh nasal spray

2 sprays, Nasal, Daily, # 1 Each, 2 Refill(s), Pharmacy: [facility]

Start Date: 17

Status: Ordered



Norco 10 mg-325 mg oral tablet

1 tabs, Oral, q6hr, as needed for pain, # 30 tabs, 0 Refill(s)

Start Date: 17

Status: Ordered



potassium chloride 20 mEq oral tablet, extended release

See Instructions, Take 1 tablet by mouth  daily, # 90 tabs, 1 Refill(s), [facility]: [facility], Take 1 tablet by mouth  daily

Start Date: 16

Status: Ordered



ProAir HFA 90 mcg/inh inhalation aerosol

See Instructions, Inhale 2 puffs 4 times  daily as needed for  wheezing, # 25.5 
g, 5 Refill(s), eRx: [facility]

Start Date: 17

Status: Ordered



Promethazine with Codeine 6.25 mg-10 mg/5 mL oral syrup

See Instructions, as needed for cough, 1-2 tsp po q 6hrs prn cough, # 240 mL, 0 
Refill(s)

Start Date: 17

Stop Date: 17

Status: Ordered



RABEprazole 20 mg oral delayed release tablet

See Instructions, Take 2 tablets by mouth  daily, # 180 tabs, 1 Refill(s), eRx: 
[facility], Take 2 tablets by mouth  daily

Start Date: 16

Status: Ordered



SUMAtriptan 50 mg oral tablet

50 mg 1 tabs, Oral, Daily, as needed for migraine headache, may repeat dose afte
r 2 hours up to a maximum of 200 mg in 24 hours, # 9 tabs, 1 Refill(s), Pharmacy
: [facility] [id number], 1 tabs Oral Daily,PRN:as needed for migraine heada
gdowin,Instr:m...

Start Date: 2/15/17

Status: Ordered



Symbicort 160 mcg-4.5 mcg/inh inhalation aerosol

See Instructions, Use 2 puffs twice daily, # 30.6 g, 2 Refill(s), [facility]: [facility], Use 2 puffs twice daily

Start Date: 10/3/16

Status: Ordered



SYRNG   .5CC ULTII 31 G SRT

See Instructions, Use to Inject insulin once  daily, # 90 Each, 3 Refill(s), eRx
: [facility], Use to Inject insulin once  daily

Start Date: 16

Status: Ordered



valsartan 160 mg oral tablet

160 mg 1 tabs, Oral, BID, X 90 days, # 180 tabs, 3 Refill(s), Pharmacy: [facility], 1 tabs Oral BID,x90 days

Start Date: 4/3/17

Stop Date: 3/29/18

Status: Ordered



Results





No data available for this section



Immunizations





Given and Recorded





   



                 Vaccine         Date            Status          Refusal Reason

 

   



                     tetanus-diphth toxoids (Td) adult/adol     00             Given 







Procedures







   



                 Procedure       Date            Related Diagnosis     Body Site

 

   



                           L knee PMM/ part. synovectomy     13  

 

   



                           lt median nerve decompression     5/10/11  

 

   



                           Tubal ligation            1979  

 

   



                                         Carpal tunnel release rt   

 

   



                                         Cholecystectomy   

 

   



                                         Hysterectomy   

 

   



                                         Hysterectomy and bilateral   



                                         salpingo-oophorectomy sample   

 

   



                                         Laminectomy   

 

   



                                         Pacemaker   







Social History







 



                           Social History Type       Response

 

 



                           Smoking Status            Former smoker; Type: Cigarettes1







1Quit around 



Assessment and Plan





No data available for this section

## 2019-07-31 NOTE — XMS REPORT
Transition of Care/Referral Summary

                             Created on: 2020



BILLDYLAN EDITH

External Reference #: [id number]

: 1960

Sex: Female



Demographics







                          Address                   [address]

[place]  [address]

 

                          Home Phone                [phone]

 

                          Preferred Language        English

 

                          Marital Status            

 

                          Voodoo Affiliation     Restoration

 

                          Race                      White

 

                          Ethnic Group              Not  or 





Author







                          Author                    [facility]

 

                          Organization              [facility]

 

                          Address                   Unknown

 

                          Phone                     Unavailable







Care Team Providers







                    Care Team Member Name    Role                Phone

 

                    Dhiraj Mijares    PCP                 [phone]







Encounter





VC FIN [id number] Date(s): 5/18/15 - 5/18/15

[facility] [address] [place], [place] [address] [phone]

Discharge Disposition: 01-Home or Self Care

Attending Physician: JIM Jerry MD

Admitting Physician: JIM Jerry MD

Referring Physician: Dhiraj Mijares MD





Vital Signs





No data available for this section



Problem List







    



              Condition     Effective Dates     Status       Health Status     Informant

 

    



                           Acquired                  Active  



                                         spondylolisthesis(Co    



                                         nfirmed)    

 

    



                           Acute                     Active  



                                         pain(Confirmed)    

 

    



                           Arthritis(Confirmed)      Resolved  

 

    



                           Right Carpal tunnel       Resolved  



                                         release(Confirmed)    

 

    



                           Carpal tunnel             Resolved  



                                         release    



                                         decompression(Confir    



                                         med)    

 

    



                           Cholecystectomy(Conf      Resolved  



                                         irmed)    

 

    



                     CHF (congestive      Active              patient



                                         heart    



                                         failure)(Confirmed)    

 

    



                           CAD (coronary artery      Active  



                                         disease)(Confirmed)    

 

    



                           Decompression(Confir      Resolved  



                                         med)    

 

    



                           Depression(Confirmed      Resolved  



                                         )    

 

    



                           Depression(Confirmed      Active  



                                         )    

 

    



                           Diabetes(Confirmed)       Resolved  

 

    



                           Diabetes(Confirmed)       Active  

 

    



                           GERD                      Active  



                                         (gastroesophageal    



                                         reflux    



                                         disease)(Confirmed)    

 

    



                           Ischemic                  Active  



                                         cardiomyopathy(Confi    



                                         rmed)    

 

    



                           Presence of               Active  



                                         biventricular    



                                         AICD(Confirmed)    

 

    



                           History of lumbar         Active  



                                         fusion(Confirmed)    

 

    



                           Hyperlipidemia(Confi      Resolved  



                                         rmed)    

 

    



                           Hypertension(Confirm      Resolved  



                                         ed)    

 

    



                           Hypertension(Confirm      Resolved  



                                         ed)    

 

    



                           HTN                       Active  



                                         (hypertension)(Confi    



                                         rmed)    

 

    



                           Hysterectomy(Confirm      Resolved  



                                         ed)    

 

    



                           Impaired gas              Active  



                                         exchange(Confirmed)1    

 

    



                           Irritable                 Resolved  



                                         bowel(Confirmed)    

 

    



                           Laminectomy(Confirme      Resolved  



                                         d)    

 

    



                           LBBB (left bundle         Active  



                                         branch    



                                         block)(Confirmed)    

 

    



                           LUQ pain(Confirmed)       Active  

 

    



                           Migraine(Confirmed)       Resolved  

 

    



                           Asthma(Confirmed)         Active  

 

    



                     Morbid              Active              patient



                                         obesity(Confirmed)    

 

    



                           Myocardial                Resolved  



                                         infarction(Confirmed    



                                         )    

 

    



                           Septicemia                Resolved  



                                         pneumonia(Confirmed)    

 

    



                     L knee              13              Resolved  



                                         Synovectomy(Confirme    



                                         d)    

 

    



                           Tissue perfusion          Active  



                                         alteration(Confirmed    



                                         )2    

 

    



                     Tobacco             Active              patient



                                         user(Confirmed)    

 

    



                     Tubal               79              Resolved  



                                         ligation(Confirmed)    







1Problem added automatically by system based on initiation of Impaired Gas 
Exchange Plan of Care



2Problem added automatically by system based on initiation of Tissue Perfusion 
Cerebral Plan of Care



Allergies, Adverse Reactions, Alerts







   



                 Substance       Reaction        Severity        Status

 

   



                     barium sulfate      Adverse Reaction      Active



                                         Swelling, hives  

 

   



                     celecoxib           HIVES               Active

 

   



                           influenza virus vaccine,       Active



                                         inactivated   

 

   



                     morphine            Adverse Reaction      Active

 

   



                     sulfamethoxazole     HIVES               Active



                                         Urticaria (hives)  







Medications





acetaminophen

1,000 mg, Oral, q6hr, as needed for pain, 0 Refill(s)

Start Date: 14

Status: Ordered



AcipHex 20 mg oral delayed release tablet

40 mg 2 tabs, Oral, Daily, # 180 tabs, 2 Refill(s), [facility]: [facility], 2 tabs Oral Daily

Start Date: 9/21/15

Status: Ordered



albuterol 2.5 mg/3 mL (0.083%) inhalation solution

3 mL, Inhalation, BID, Dx: Asthma, # 25 Each, 3 Refill(s), [facility]acy: [facility] [id number], 3 mL Inhalation BID,Instr:Dx: Asthma

Start Date: 4/21/15

Status: Ordered



amLODIPine 5 mg oral tablet

See Instructions, Take 1 tablet by mouth  daily, # 90 tabs, 1 Refill(s), Pharmac
y: [facility], Take 1 tablet by mouth  daily

Start Date: 9/2/15

Status: Ordered



Aspir 81

81 mg, Oral, Daily, 0 Refill(s)

Start Date: 14

Status: Ordered



bumetanide 1 mg oral tablet

See Instructions, Take 2 mg of Bumex in AM and 1 mg PM., # 300 tabs, 5 Refill(s)
, Pharmacy: [facility], Take 2 mg of Bumex in AM and 1 mg PM.

Start Date: 9/22/15

Status: Ordered



buPROPion 150 mg/12 hours (SR) oral tablet, extended release

See Instructions, Take 1 tablet by mouth two  times daily, # 180 unknown unit, 1
Refill(s), eRx: [facility], Take 1 tablet by mouth two  times daily

Start Date: 9/9/15

Status: Ordered



carvedilol 25 mg oral tablet

25 mg 1 tabs, Oral, BID, # 180 tabs, 5 Refill(s), Pharmacy: [facility]
, 1 tabs Oral BID

Start Date: 9/22/15

Status: Ordered



Claritin

10 mg, Oral, Daily, 0 Refill(s)

Start Date: 5/18/15

Status: Ordered



cloNIDine 0.1 mg oral tablet

0.1 mg 1 tabs, Oral, qPM, # 90 tabs, 1 Refill(s), Pharmacy: [facility]
, 1 tabs Oral qPM

Start Date: 10/12/15

Status: Ordered



clopidogrel 75 mg oral tablet

1 tabs, Oral, Daily, # 90 tabs, 3 Refill(s), Pharmacy: [facility], 1 t
abs Oral Daily

Start Date: 4/14/15

Status: Ordered



digoxin 125 mcg (0.125 mg) oral tablet

125 mcg 1 tabs, Oral, Daily, # 90 tabs, 5 Refill(s), [facility], 1 tabs Oral Daily

Start Date: 9/22/15

Status: Ordered



DULoxetine 20 mg oral delayed release capsule

20 mg 1 caps, Oral, BID, 0 Refill(s)

Start Date: 10/19/15

Status: Ordered



DULoxetine 60 mg oral delayed release capsule

See Instructions, Take 1 capsule by mouth  daily do not crush or chew, # 90 unkn
own unit, 1 Refill(s), eRx: [facility], Take 1 capsule by mouth  daily
do not crush or chew

Start Date: 9/30/15

Status: Ordered



glipiZIDE 5 mg oral tablet

5 mg 1 tabs, Oral, BID, X 90 days, # 180 tabs, 1 Refill(s), [facility], 1 tabs Oral BID,x90 days

Start Date: 9/2/15

Stop Date: 16

Status: Ordered



Insulin Syringe (DME)

DME Item use to inject insulin once daily     dx: E11.9, See Instructions, # 90 
Each, 4 Refill(s), Pharmacy: [id number], use to inject insulin o
nce daily     dx: E11.9, Supply

Start Date: 11/10/15

Status: Ordered



Lantus 100 units/mL subcutaneous solution

See Instructions, Inject subcutaneously 30  units every night at  bedtime once a
day, # 30 mL, 2 Refill(s), eRx: [facility], Inject subcutaneously 30  
units every night at  bedtime once a day

Start Date: 7/31/15

Status: Ordered



Melatonin

10 mg, Oral, Bedtime (once a day), as needed for insomnia, 0 Refill(s)

Start Date: 14

Status: Ordered



metFORMIN 500 mg oral tablet

See Instructions, Take 2 tablets by mouth  twice a day, # 360 tabs, 1 Refill(s),
Pharmacy: [facility], Take 2 tablets by mouth  twice a day

Start Date: 9/2/15

Status: Ordered



Multiple Vitamins oral tablet

1 tabs, Oral, Daily, 0 Refill(s)

Start Date: 14

Status: Ordered



Norco 10 mg-325 mg oral tablet

1 tabs, Oral, q6hr, as needed for pain, # 30 tabs, 0 Refill(s)

Start Date: 10/19/15

Status: Ordered



OTC Black Cohash

OTC Black Cohash, See Instructions, 1 tablet oral daily, 0 Refill(s)

Start Date: 2/3/15

Status: Ordered



OTC Calcium

OTC Calcium, See Instructions, 1 tablet oral daily, 0 Refill(s)

Start Date: 2/3/15

Status: Ordered



potassium chloride 20 mEq oral tablet, extended release

See Instructions, Take 1 tablet by mouth  daily, # 90 unknown unit, 1 Refill(s),
eRx: [facility], Take 1 tablet by mouth  daily

Start Date: 10/2/15

Status: Ordered



ProAir HFA 90 mcg/inh inhalation aerosol

2 puffs, Inhalation, QID, as needed for wheezing, # 3 Each, 3 Refill(s), Pharmac
y: [facility]

Start Date: 1/23/15

Status: Ordered



SUMAtriptan 50 mg oral tablet

50 mg 1 tabs, Oral, Daily, as needed for migraine headache, may repeat dose afte
r 2 hours up to a maximum of 200 mg in 24 hours, # 9 tabs, 0 Refill(s), Pharmacy
: [id number], 1 tabs Oral Daily,PRN:as needed for migraine heada
godwin,Instr:rosalinda..

Start Date: 9/16/15

Status: Ordered



Symbicort 160 mcg-4.5 mcg/inh inhalation aerosol

See Instructions, Use 2 puffs twice daily, # 10.2 g, 2 Refill(s), [facility]: [facility], Use 2 puffs twice daily

Start Date: 9/9/15

Status: Ordered



valsartan 160 mg oral tablet

See Instructions, 1 TABS ORAL DAILY, # 30 tabs, 1 Refill(s), [facility]Rx: [id number], 1 TABS ORAL DAILY

Start Date: 10/12/15

Status: Ordered



Vitamin D3

4000 unit, Oral, Daily, 0 Refill(s)

Start Date: 2/3/15

Status: Ordered



Results





No data available for this section



Immunizations







  



                     Vaccine             Date                Refusal Reason

 

  



                           tetanus-diphth toxoids (Td) adult/adol     00 







Procedures







   



                 Procedure       Date            Related Diagnosis     Body Site

 

   



                           Insertion of pacing electrode, cardiac venous     5/18/15  



                                         system, for left ventricular pacing, at time   



                                         of insertion of implantable defibrillator or   



                                         pacemaker pulse generator (eg, for upgrade to   



                                         dual chamber system) (List separately in   



                                         addition to code for primary pro   

 

   



                           Insertion or replacement of permanent     5/18/15  



                                         implantable defibrillator system, with   



                                         transvenous lead(s), single or dual chamber   

 

   



                           L knee PMM/ part. synovectomy     13  

 

   



                           lt median nerve decompression     5/10/11  

 

   



                           Tubal ligation            1979  

 

   



                                         Carpal tunnel release rt   

 

   



                                         Cholecystectomy   

 

   



                                         Hysterectomy   

 

   



                                         Hysterectomy and bilateral   



                                         salpingo-oophorectomy sample   

 

   



                                         Laminectomy   

 

   



                                         Pacemaker   







Social History







 



                           Social History Type       Response

 

 



                           Smoking Status            Former smoker; Type: Cigarettes1







1Quit around 



Assessment and Plan





No data available for this section

## 2019-07-31 NOTE — XMS REPORT
Transition of Care/Referral Summary

                             Created on: 2071



BILLDYLAN VANEGAS EDITH

External Reference #: [id number]

: 1960

Sex: Female



Demographics







                          Address                   [address]

[place]  [address]

 

                          Home Phone                [phone]

 

                          Preferred Language        English

 

                          Marital Status            

 

                          [employer] Affiliation     Voodoo

 

                          Race                      White

 

                          Ethnic Group              Not  or 





Author







                          Author                    [facility]

 

                          Organization              [facility]

 

                          Address                   Unknown

 

                          Phone                     Unavailable







Care Team Providers







                    Care Team Member Name    Role                Phone

 

                    Dhiraj Mijares    PCP                 [phone]







Encounter





[facility] FIN [id number] Date(s): 10/2/15 - 10/2/15

[facility] [address] [place], [place] [address] [phone]

Discharge Diagnosis: Nonischemic cardiomyopathy

Discharge Diagnosis: Chronic systolic CHF (congestive heart failure)

Discharge Diagnosis: CAD (coronary artery disease)

Discharge Disposition: 01-Home or Self Care

Attending Physician: Linda Kruger MD

Admitting Physician: Linda Kruger MD





Vital Signs





No data available for this section



Problem List







    



              Condition     Effective Dates     Status       Health Status     Informant

 

    



                           Acquired                  Active  



                                         spondylolisthesis(Co    



                                         nfirmed)    

 

    



                           Acute                     Active  



                                         pain(Confirmed)    

 

    



                           Arthritis(Confirmed)      Resolved  

 

    



                           Right Carpal tunnel       Resolved  



                                         release(Confirmed)    

 

    



                           Carpal tunnel             Resolved  



                                         release    



                                         decompression(Confir    



                                         med)    

 

    



                           Cholecystectomy(Conf      Resolved  



                                         irmed)    

 

    



                     CHF (congestive      Active              patient



                                         heart    



                                         failure)(Confirmed)    

 

    



                           CAD (coronary artery      Active  



                                         disease)(Confirmed)    

 

    



                           Decompression(Confir      Resolved  



                                         med)    

 

    



                           Depression(Confirmed      Resolved  



                                         )    

 

    



                           Depression(Confirmed      Active  



                                         )    

 

    



                           Diabetes(Confirmed)       Active  

 

    



                           Diabetes(Confirmed)       Resolved  

 

    



                           GERD                      Active  



                                         (gastroesophageal    



                                         reflux    



                                         disease)(Confirmed)    

 

    



                           Ischemic                  Active  



                                         cardiomyopathy(Confi    



                                         rmed)    

 

    



                           Presence of               Active  



                                         biventricular    



                                         AICD(Confirmed)    

 

    



                           History of lumbar         Active  



                                         fusion(Confirmed)    

 

    



                           Hyperlipidemia(Confi      Resolved  



                                         rmed)    

 

    



                           Hypertension(Confirm      Resolved  



                                         ed)    

 

    



                           Hypertension(Confirm      Resolved  



                                         ed)    

 

    



                           HTN                       Active  



                                         (hypertension)(Confi    



                                         rmed)    

 

    



                           Hysterectomy(Confirm      Resolved  



                                         ed)    

 

    



                           Impaired gas              Active  



                                         exchange(Confirmed)1    

 

    



                           Irritable                 Resolved  



                                         bowel(Confirmed)    

 

    



                           Laminectomy(Confirme      Resolved  



                                         d)    

 

    



                           LBBB (left bundle         Active  



                                         branch    



                                         block)(Confirmed)    

 

    



                           LUQ pain(Confirmed)       Active  

 

    



                           Migraine(Confirmed)       Resolved  

 

    



                           Asthma(Confirmed)         Active  

 

    



                     Morbid              Active              patient



                                         obesity(Confirmed)    

 

    



                           Myocardial                Resolved  



                                         infarction(Confirmed    



                                         )    

 

    



                           Septicemia                Resolved  



                                         pneumonia(Confirmed)    

 

    



                     L knee              13              Resolved  



                                         Synovectomy(Confirme    



                                         d)    

 

    



                           Tissue perfusion          Active  



                                         alteration(Confirmed    



                                         )2    

 

    



                     Tobacco             Active              patient



                                         user(Confirmed)    

 

    



                     Tubal               79              Resolved  



                                         ligation(Confirmed)    







1Problem added automatically by system based on initiation of Impaired Gas 
Exchange Plan of Care



2Problem added automatically by system based on initiation of Tissue Perfusion 
Cerebral Plan of Care



Allergies, Adverse Reactions, Alerts







   



                 Substance       Reaction        Severity        Status

 

   



                     barium sulfate      Adverse Reaction      Active



                                         Swelling, hives  

 

   



                     celecoxib           HIVES               Active

 

   



                           influenza virus vaccine,       Active



                                         inactivated   

 

   



                     morphine            Adverse Reaction      Active

 

   



                     sulfamethoxazole     HIVES               Active



                                         Urticaria (hives)  







Medications





acetaminophen

1,000 mg, Oral, q6hr, as needed for pain, 0 Refill(s)

Start Date: 14

Status: Ordered



AcipHex 20 mg oral delayed release tablet

40 mg 2 tabs, Oral, Daily, # 180 tabs, 2 Refill(s), Pharmacy: [facility], 2 tabs Oral Daily

Start Date: 9/21/15

Status: Ordered



albuterol 2.5 mg/3 mL (0.083%) inhalation solution

3 mL, Inhalation, BID, Dx: Asthma, # 25 Each, 3 Refill(s), [facility]: [facility], 3 mL Inhalation BID,Instr:Dx: Asthma

Start Date: 4/21/15

Status: Ordered



amLODIPine 5 mg oral tablet

See Instructions, Take 1 tablet by mouth  daily, # 90 tabs, [facility]: [facility], Take 1 tablet by mouth  daily

Start Date: 2/3/16

Status: Ordered



Aspir 81

81 mg, Oral, Daily, 0 Refill(s)

Start Date: 14

Status: Ordered



atorvastatin 10 mg oral tablet

10 mg 1 tabs, Oral, Daily, # 90 tabs, 5 Refill(s), Pharmacy: [facility]
E, 1 tabs Oral Daily

Start Date: 16

Status: Ordered



bumetanide 1 mg oral tablet

See Instructions, Take 2 mg of Bumex in AM and 1 mg PM., # 300 tabs, 5 Refill(s)
, Pharmacy: [facility], Take 2 mg of Bumex in AM and 1 mg PM.

Start Date: 9/22/15

Status: Ordered



buPROPion 150 mg/12 hours (SR) oral tablet, extended release

See Instructions, Take 1 tablet by mouth two  times daily, # 180 tabs, 2 Refill(
s), eRx: [facility], Take 1 tablet by mouth two  times daily

Start Date: 3/2/16

Status: Ordered



carvedilol 25 mg oral tablet

25 mg 1 tabs, Oral, BID, # 180 tabs, 5 Refill(s), Pharmacy: [facility]
, 1 tabs Oral BID

Start Date: 9/22/15

Status: Ordered



cloNIDine 0.1 mg oral tablet

See Instructions, Take 1 tablet by mouth  every evening, # 90 tabs, 1 Refill(s),
eRx: [facility], Take 1 tablet by mouth  every evening

Start Date: 16

Status: Ordered



clopidogrel 75 mg oral tablet

See Instructions, Take 1 tablet by mouth  daily, # 90 tabs, 2 Refill(s), [facility], Take 1 tablet by mouth  daily

Start Date: 16

Status: Ordered



digoxin 125 mcg (0.125 mg) oral tablet

125 mcg 1 tabs, Oral, Daily, # 90 tabs, 5 Refill(s), [facility], 1 tabs Oral Daily

Start Date: 9/22/15

Status: Ordered



DULoxetine 20 mg oral delayed release capsule

See Instructions, Take 1 capsule by mouth  daily do not crush or chew, # 90 caps
, eRx: [facility], Take 1 capsule by mouth  daily do not crush or chew

Start Date: 16

Status: Ordered



glipiZIDE 5 mg oral tablet

See Instructions, Take 1 tablet by mouth two  times daily, # 180 tabs, [facility], Take 1 tablet by mouth two  times daily

Start Date: 2/3/16

Status: Ordered



Insulin Syringe (DME)

DME Item use to inject insulin once daily     dx: E11.9, See Instructions, # 90 
Each, 4 Refill(s), Pharmacy: [id number], use to inject insulin o
nce daily     dx: E11.9, Supply

Start Date: 11/10/15

Status: Ordered



Lantus 100 units/mL subcutaneous solution

See Instructions, Inject subcutaneously 30  units every night at  bedtime once a
day, # 30 mL, 1 Refill(s), eRx: [facility], Inject subcutaneously 30  
units every night at  bedtime once a day

Start Date: 16

Status: Ordered



Melatonin

10 mg, Oral, Bedtime (once a day), as needed for insomnia, 0 Refill(s)

Start Date: 14

Status: Ordered



metFORMIN 500 mg oral tablet

See Instructions, Take 2 tablets by mouth  twice a day, # 360 tabs, [facility]: [facility], Take 2 tablets by mouth  twice a day

Start Date: 2/3/16

Status: Ordered



Norco 10 mg-325 mg oral tablet

1 tabs, Oral, q6hr, as needed for pain, # 30 tabs, 0 Refill(s)

Start Date: 16

Status: Ordered



OTC Calcium

OTC Calcium, See Instructions, 1 tablet oral daily, 0 Refill(s)

Start Date: 2/3/15

Status: Ordered



potassium chloride 20 mEq oral tablet, extended release

See Instructions, Take 1 tablet by mouth  daily, # 90 unknown unit, 1 Refill(s),
eRx: [facility], Take 1 tablet by mouth  daily

Start Date: 10/2/15

Status: Ordered



ProAir HFA 90 mcg/inh inhalation aerosol

2 puffs, Inhalation, QID, as needed for wheezing, # 3 Each, 3 Refill(s), Pharmac
y: [facility]

Start Date: 1/23/15

Status: Ordered



SUMAtriptan 50 mg oral tablet

50 mg 1 tabs, Oral, Daily, as needed for migraine headache, may repeat dose afte
r 2 hours up to a maximum of 200 mg in 24 hours, # 9 tabs, 1 Refill(s), Pharmacy
: [id number], 1 tabs Oral Daily,PRN:as needed for migraine heada
godwin,Instr:m...

Start Date: 16

Status: Ordered



Symbicort 160 mcg-4.5 mcg/inh inhalation aerosol

See Instructions, Use 2 puffs twice daily, # 10.2 g, 2 Refill(s), [facility], Use 2 puffs twice daily

Start Date: 12/2/15

Status: Ordered



valsartan 160 mg oral tablet

See Instructions, 1 TABS ORAL DAILY, # 30 tabs, 1 Refill(s), [facility]: [id number], 1 TABS ORAL DAILY

Start Date: 10/12/15

Status: Ordered



Results





No data available for this section



Immunizations







  



                     Vaccine             Date                Refusal Reason

 

  



                           tetanus-diphth toxoids (Td) adult/adol     00 







Procedures







   



                 Procedure       Date            Related Diagnosis     Body Site

 

   



                           L knee PMM/ part. synovectomy     13  

 

   



                           lt median nerve decompression     5/10/11  

 

   



                           Tubal ligation            1979  

 

   



                                         Carpal tunnel release rt   

 

   



                                         Cholecystectomy   

 

   



                                         Hysterectomy   

 

   



                                         Hysterectomy and bilateral   



                                         salpingo-oophorectomy sample   

 

   



                                         Laminectomy   

 

   



                                         Pacemaker   







Social History







 



                           Social History Type       Response

 

 



                           Smoking Status            Former smoker; Type: Cigarettes1







1Quit around 



Assessment and Plan





No data available for this section

## 2019-07-31 NOTE — XMS REPORT
Transition of Care/Referral Summary

                             Created on: 2062



BILLDYLAN VANEGAS

External Reference #: [id number]

: 1960

Sex: Female



Demographics







                          Address                   [address]

[place]  [address]

 

                          Home Phone                [phone]

 

                          Preferred Language        English

 

                          Marital Status            

 

                          Mosque Affiliation     Tenriism

 

                          Race                      White

 

                                        Additional Race(s)  

 

                          Ethnic Group              Not  or 





Author







                          Author                    [facility]

 

                          Organization              [facility]

 

                          Address                   Unknown

 

                          Phone                     Unavailable







Care Team Providers







                    Care Team Member Name    Role                Phone

 

                    Dhiraj Mijares    PCP                 [phone]







Encounter





VC FIN [id number] Date(s): 17 - 17

[facility] [address] [place] [address], [place] [address] [phone]

Discharge Diagnosis: Presence of biventricular AICD

Discharge Diagnosis: Ischemic cardiomyopathy

Discharge Diagnosis: LBBB (left bundle branch block)

Discharge Disposition: 01-Home or Self Care

Attending Physician: JIM Jerry MD

Admitting Physician: JIM Jerry MD





Vital Signs







 



                           Most recent to            1



                                         oldest [Reference 



                                         Range]: 

 

 



                           Peripheral Pulse          84 bpm



                           Rate [ bpm]         (17 1:43 PM)

 

 



                           Blood Pressure            140/80 mmHg



                           [/60-90 mmHg]       (17 1:43 PM)







Problem List







    



              Condition     Effective Dates     Status       Health Status     Informant

 

    



                           Acquired                  Active  



                                         spondylolisthesis(Co    



                                         nfirmed)    

 

    



                           Acute                     Active  



                                         pain(Confirmed)    

 

    



                           Hay fever(Confirmed)      Active  

 

    



                           Arthritis(Confirmed)      Resolved  

 

    



                           Right Carpal tunnel       Resolved  



                                         release(Confirmed)    

 

    



                           Carpal tunnel             Resolved  



                                         release    



                                         decompression(Confir    



                                         med)    

 

    



                           Cholecystectomy(Conf      Resolved  



                                         irmed)    

 

    



                           IBS (irritable bowel      Active  



                                         syndrome)(Confirmed)    

 

    



                     CHF (congestive      Active              patient



                                         heart    



                                         failure)(Confirmed)    

 

    



                           CAD (coronary artery      Active  



                                         disease)(Confirmed)    

 

    



                           Decompression(Confir      Resolved  



                                         med)    

 

    



                           Depression(Confirmed      Resolved  



                                         )    

 

    



                           Depression(Confirmed      Active  



                                         )    

 

    



                           Diabetes(Confirmed)       Active  

 

    



                           Diabetes(Confirmed)       Resolved  

 

    



                           GERD                      Active  



                                         (gastroesophageal    



                                         reflux    



                                         disease)(Confirmed)    

 

    



                           Ischemic                  Active  



                                         cardiomyopathy(Confi    



                                         rmed)    

 

    



                           Presence of               Active  



                                         biventricular    



                                         AICD(Confirmed)    

 

    



                           History of lumbar         Active  



                                         fusion(Confirmed)    

 

    



                           Hyperlipidemia(Confi      Resolved  



                                         rmed)    

 

    



                           Asthma(Confirmed)         Active  

 

    



                           Hypertension(Confirm      Resolved  



                                         ed)    

 

    



                           Hypertension(Confirm      Resolved  



                                         ed)    

 

    



                           HTN                       Active  



                                         (hypertension)(Confi    



                                         rmed)    

 

    



                           Hysterectomy(Confirm      Resolved  



                                         ed)    

 

    



                           Impaired gas              Active  



                                         exchange(Confirmed)1    

 

    



                           Irritable                 Resolved  



                                         bowel(Confirmed)    

 

    



                           Laminectomy(Confirme      Resolved  



                                         d)    

 

    



                           LBBB (left bundle         Active  



                                         branch    



                                         block)(Confirmed)    

 

    



                           LUQ pain(Confirmed)       Active  

 

    



                           Migraine(Confirmed)       Resolved  

 

    



                           Asthma(Confirmed)         Active  

 

    



                     Morbid              Active              patient



                                         obesity(Confirmed)    

 

    



                           Myocardial                Resolved  



                                         infarction(Confirmed    



                                         )    

 

    



                           Septicemia                Resolved  



                                         pneumonia(Confirmed)    

 

    



                     L knee              13              Resolved  



                                         Synovectomy(Confirme    



                                         d)    

 

    



                           Tissue perfusion          Active  



                                         alteration(Confirmed    



                                         )2    

 

    



                     Tobacco             Active              patient



                                         user(Confirmed)    

 

    



                     Tubal               79              Resolved  



                                         ligation(Confirmed)    

 

    



                           UTI (urinary tract        Active  



                                         infection)(Confirmed    



                                         )    

 

    



                           Chicken                   Active  



                                         pox(Confirmed)    







1Problem added automatically by system based on initiation of Impaired Gas 
Exchange Plan of Care



2Problem added automatically by system based on initiation of Tissue Perfusion 
Cerebral Plan of Care



Allergies, Adverse Reactions, Alerts







   



                 Substance       Reaction        Severity        Status

 

   



                     sulfamethoxazole     Urticaria (hives)      Active



                                         HIVES  

 

   



                     morphine            Burning             Active

 

   



                     barium sulfate      Swelling, hives      Active

 

   



                     celecoxib           HIVES               Active

 

   



                     influenza virus vaccine,     Convulsion          Active



                                         inactivated   







Medications





acetaminophen

1,000 mg, Oral, q6hr, as needed for pain, 0 Refill(s)

Start Date: 14

Status: Ordered



albuterol 2.5 mg/3 mL (0.083%) inhalation solution

2.5 mg 3 mL, Inhalation, q6hr (scheduled), # 25 Each, 0 Refill(s)

Start Date: 4/3/17

Status: Ordered



Ambien 10 mg oral tablet

10 mg 1 tabs, Oral, Bedtime (once a day), as needed for sleep, X 30 days, # 30 t
abs, 2 Refill(s)

Start Date: 17

Stop Date: 17

Status: Ordered



amLODIPine 10 mg oral tablet

10 mg 1 tabs, Oral, Daily, # 90 tabs, 5 Refill(s), Pharmacy: [facility]
E, 1 tabs Oral Daily

Start Date: 16

Status: Ordered



Aspir 81

81 mg, Oral, Daily, 0 Refill(s)

Start Date: 14

Status: Ordered



atorvastatin 10 mg oral tablet

See Instructions, Take 1 tablet by mouth  daily, # 90 tabs, 1 Refill(s), [facility]: [facility]

Start Date: 17

Status: Ordered



basaglar

basaglar, See Instructions, inject 30 units at bedtime daily, # 1 boxes, 3 Refil
l(s), Pharmacy: [id number], inject 30 units at bedtime daily

Start Date: 17

Status: Ordered



bumetanide 1 mg oral tablet

See Instructions, Take 2 tablets by mouth in  the morning and 1 tablet by mouth 
in the evening, # 270 tabs, 1 Refill(s), Pharmacy: [facility], Take 2 
tablets by mouth in  the morning and 1 tablet by mouth in the evening

Start Date: 17

Status: Ordered



buPROPion 150 mg/12 hours (SR) oral tablet, extended release

See Instructions, Take 1 tablet by mouth two  times daily, # 180 tabs, 3 Refill(
s), eRx: [facility]

Start Date: 17

Status: Ordered



carvedilol 25 mg oral tablet

25 mg 1 tabs, Oral, BID, # 180 tabs, 1 Refill(s), Pharmacy: [facility]
, 1 tabs Oral BID

Start Date: 17

Status: Ordered



cloNIDine 0.1 mg oral tablet

See Instructions, Take 1 tablet by mouth  every evening, # 90 tabs, 1 Refill(s),
eRx: [facility]

Start Date: 17

Status: Ordered



clopidogrel 75 mg oral tablet

See Instructions, Take 1 tablet by mouth  daily, # 90 tabs, 1 Refill(s), eRx: [facility]

Start Date: 17

Status: Ordered



diclofenac 3% topical gel

0.5 g, Topical, BID, # 50 g, 1 Refill(s), Pharmacy: [facility] [id number]

Start Date: 8/15/17

Status: Ordered



digoxin 125 mcg (0.125 mg) oral tablet

See Instructions, Take 1 tablet by mouth  daily, # 90 tabs, [facility]: [facility]

Start Date: 17

Status: Ordered



DULoxetine 20 mg oral delayed release capsule

20 mg 1 caps, Oral, Daily, do not crush or chew    (Take one capsule by mouth da
kervin with Duloxetine 60mg), # 90 caps, 1 Refill(s), Pharmacy: [facility]
E, 1 caps Oral Daily,x90 days,Instr:do not crush or chew; (Take one capsule by m
outh daily...

Start Date: 17

Stop Date: 3/4/18

Status: Ordered



DULoxetine 60 mg oral delayed release capsule

See Instructions, Take 1 capsule by mouth  daily . Do not crush or  chew.    (Ta
ke 1 tablet daily with Duloxetine 20mg), # 90 caps, 1 Refill(s), [facility]acy: [facility], Take 1 capsule by mouth  daily . Do not crush or  chew. (Take 1
tablet porsche...

Start Date: 17

Status: Ordered



Glucometer Lancets (DME)

DME Item one touch delica lancets  use to test bg x3/daily  dx:e11.65, See Instr
uctions, # 300 Each, 3 Refill(s), Pharmacy: [facility] [id number], one touc
h delica lancets; use to test bg x3/daily; dx:e11.65, Supply

Start Date: 3/13/17

Status: Ordered



glucosamine

Oral, 0 Refill(s)

Start Date: 17

Status: Ordered



HumaLOG 100 units/mL subcutaneous solution

See Instructions, Inject subcutaneously 10  units 3 times daily before  meals, #
27 mL, 3 Refill(s), eRx: [facility]

Start Date: 17

Status: Ordered



Insulin Pin Needles (DME)

DME Item 32g x4mm (jacob needles)  use to inject insulin   dx:e11.65, See Instruc
tions, # 100 Each, 3 Refill(s), Pharmacy: [id number], 32g x4mm (
jacob needles); use to inject insulin ; dx:e11.65, Supply

Start Date: 17

Status: Ordered



Melatonin

10 mg, Oral, Bedtime (once a day), as needed for insomnia, 0 Refill(s)

Start Date: 14

Status: Ordered



metFORMIN 500 mg oral tablet

1,000 mg 2 tabs, Oral, BID, # 360 tabs, 2 Refill(s), other reason (Rx)

Start Date: 17

Status: Ordered



mometasone 50 mcg/inh nasal spray

See Instructions, Use 2 sprays nasally daily, # 51 g, eRx: [facility]

Start Date: 17

Status: Ordered



Norco 10 mg-325 mg oral tablet

1 tabs, Oral, q6hr, as needed for pain, # 30 tabs, 0 Refill(s)

Start Date: 17

Status: Ordered



potassium chloride 20 mEq oral tablet, extended release

See Instructions, Take 1 tablet by mouth  daily, # 90 tabs, 1 Refill(s), eRx: [facility]

Start Date: 17

Status: Ordered



ProAir HFA 90 mcg/inh inhalation aerosol

See Instructions, Inhale 2 puffs 4 times  daily as needed for  wheezing, # 25.5 
g, 5 Refill(s), eRx: [facility]

Start Date: 17

Status: Ordered



RABEprazole 20 mg oral delayed release tablet

See Instructions, Take 2 tablets by mouth  daily, # 180 tabs, eRx: [facility]

Start Date: 17

Status: Ordered



SUMAtriptan 50 mg oral tablet

50 mg 1 tabs, Oral, Daily, as needed for migraine headache, may repeat dose afte
r 2 hours up to a maximum of 200 mg in 24 hours, # 9 tabs, 1 Refill(s), Pharmacy
: [facility] [id number], 1 tabs Oral Daily,PRN:as needed for migraine heada
godwin,Instr:m...

Start Date: 2/15/17

Status: Ordered



Symbicort 160 mcg-4.5 mcg/inh inhalation aerosol

See Instructions, Use 2 puffs twice daily, # 10.2 g, 2 Refill(s), [facility]

Start Date: 17

Status: Ordered



SYRNG   .5CC ULTII 31 G SRT

See Instructions, Use to Inject insulin once  daily, # 90 Each, 2 Refill(s), eRx
: [facility], Use to Inject insulin once  daily

Start Date: 17

Status: Ordered



valsartan 160 mg oral tablet

160 mg 1 tabs, Oral, BID, X 90 days, # 180 tabs, 3 Refill(s), Pharmacy: [facility], 1 tabs Oral BID,x90 days

Start Date: 4/3/17

Stop Date: 3/29/18

Status: Ordered



Results





No data available for this section



Immunizations





Given and Recorded





   



                 Vaccine         Date            Status          Refusal Reason

 

   



                     tetanus-diphth toxoids (Td) adult/adol     00             Given 







Procedures







   



                 Procedure       Date            Related Diagnosis     Body Site

 

   



                           L knee PMM/ part. synovectomy     13  

 

   



                           lt median nerve decompression     5/10/11  

 

   



                           Tubal ligation            1979  

 

   



                                         Carpal tunnel release rt   

 

   



                                         Cholecystectomy   

 

   



                                         Hysterectomy   

 

   



                                         Hysterectomy and bilateral   



                                         salpingo-oophorectomy sample   

 

   



                                         Laminectomy   

 

   



                                         Pacemaker   







Social History







 



                           Social History Type       Response

 

 



                           Smoking Status            Former smoker; Type: Cigarettes1



                                         entered on: 5/8/15







1Quit around 



Assessment and Plan

Extracted from:





  



                     Title: Office Visit Note     Author: JIM Jerry MD     Date: 17









                                                  1.Ischemic cardiomyopathy  

  Ordered: 

   Icd Device Progr Eval Mult [id number] 

  Office Visit Level 4 Est 86294 

  Return to Clinic   

    

  2.LBBB (left bundle branch block)  

   

  3.Presence of biventricular AICD  

    

  Dictation performed with dragon voice recognition and not proofread       









Referrals to Other Providers





Referred by: JIM Jerry MD

## 2019-07-31 NOTE — XMS REPORT
Transition of Care/Referral Summary

                             Created on: 2051



BILLDYLAN VANEGAS EDITH

External Reference #: [id number]

: 1960

Sex: Female



Demographics







                          Address                   [address]

[place]  [address]

 

                          Home Phone                [phone]

 

                          Preferred Language        English

 

                          Marital Status            

 

                          Bahai Affiliation     Yarsanism

 

                          Race                      White

 

                          Ethnic Group              Not  or 





Author







                          Author                    [facility]

 

                          Organization              [facility]

 

                          Address                   Unknown

 

                          Phone                     Unavailable







Care Team Providers







                    Care Team Member Name    Role                Phone

 

                    Dhiraj Mijares    PCP                 [phone]







Encounter





[facility] FIN [id number] Date(s): 10/2/15 - 10/2/15

[facility] [address] [place], [place] [address] [[email or web address]] [phone]

Discharge Diagnosis: Nonischemic cardiomyopathy

Discharge Diagnosis: Chronic systolic CHF (congestive heart failure)

Discharge Diagnosis: CAD (coronary artery disease)

Discharge Disposition: 01-Home or Self Care

Attending Physician: Linda Kruger MD

Admitting Physician: Linda Kruger MD





Vital Signs





No data available for this section



Problem List







    



              Condition     Effective Dates     Status       Health Status     Informant

 

    



                           Acquired                  Active  



                                         spondylolisthesis(Co    



                                         nfirmed)    

 

    



                           Acute                     Active  



                                         pain(Confirmed)    

 

    



                           Arthritis(Confirmed)      Resolved  

 

    



                           Right Carpal tunnel       Resolved  



                                         release(Confirmed)    

 

    



                           Carpal tunnel             Resolved  



                                         release    



                                         decompression(Confir    



                                         med)    

 

    



                           Cholecystectomy(Conf      Resolved  



                                         irmed)    

 

    



                     CHF (congestive      Active              patient



                                         heart    



                                         failure)(Confirmed)    

 

    



                           CAD (coronary artery      Active  



                                         disease)(Confirmed)    

 

    



                           Decompression(Confir      Resolved  



                                         med)    

 

    



                           Depression(Confirmed      Resolved  



                                         )    

 

    



                           Depression(Confirmed      Active  



                                         )    

 

    



                           Diabetes(Confirmed)       Resolved  

 

    



                           Diabetes(Confirmed)       Active  

 

    



                           GERD                      Active  



                                         (gastroesophageal    



                                         reflux    



                                         disease)(Confirmed)    

 

    



                           Ischemic                  Active  



                                         cardiomyopathy(Confi    



                                         rmed)    

 

    



                           Presence of               Active  



                                         biventricular    



                                         AICD(Confirmed)    

 

    



                           History of lumbar         Active  



                                         fusion(Confirmed)    

 

    



                           Hyperlipidemia(Confi      Resolved  



                                         rmed)    

 

    



                           Hypertension(Confirm      Resolved  



                                         ed)    

 

    



                           Hypertension(Confirm      Resolved  



                                         ed)    

 

    



                           HTN                       Active  



                                         (hypertension)(Confi    



                                         rmed)    

 

    



                           Hysterectomy(Confirm      Resolved  



                                         ed)    

 

    



                           Impaired gas              Active  



                                         exchange(Confirmed)1    

 

    



                           Irritable                 Resolved  



                                         bowel(Confirmed)    

 

    



                           Laminectomy(Confirme      Resolved  



                                         d)    

 

    



                           LBBB (left bundle         Active  



                                         branch    



                                         block)(Confirmed)    

 

    



                           Migraine(Confirmed)       Resolved  

 

    



                           Asthma(Confirmed)         Active  

 

    



                     Morbid              Active              patient



                                         obesity(Confirmed)    

 

    



                           Myocardial                Resolved  



                                         infarction(Confirmed    



                                         )    

 

    



                           Septicemia                Resolved  



                                         pneumonia(Confirmed)    

 

    



                     L knee              13              Resolved  



                                         Synovectomy(Confirme    



                                         d)    

 

    



                           Tissue perfusion          Active  



                                         alteration(Confirmed    



                                         )2    

 

    



                     Tobacco             Active              patient



                                         user(Confirmed)    

 

    



                     Tubal               79              Resolved  



                                         ligation(Confirmed)    







1Problem added automatically by system based on initiation of Impaired Gas 
Exchange Plan of Care



2Problem added automatically by system based on initiation of Tissue Perfusion 
Cerebral Plan of Care



Allergies, Adverse Reactions, Alerts







   



                 Substance       Reaction        Severity        Status

 

   



                     barium sulfate      Adverse Reaction      Active



                                         Swelling, hives  

 

   



                     celecoxib           HIVES               Active

 

   



                           influenza virus vaccine,       Active



                                         inactivated   

 

   



                     morphine            Adverse Reaction      Active

 

   



                     sulfamethoxazole     HIVES               Active



                                         Urticaria (hives)  







Medications





acetaminophen

1,000 mg, Oral, q6hr, as needed for pain, 0 Refill(s)

Start Date: 14

Status: Ordered



AcipHex 20 mg oral delayed release tablet

40 mg 2 tabs, Oral, Daily, # 180 tabs, 2 Refill(s), [facility]: [facility], 2 tabs Oral Daily

Start Date: 9/21/15

Status: Ordered



albuterol 2.5 mg/3 mL (0.083%) inhalation solution

3 mL, Inhalation, BID, Dx: Asthma, # 25 Each, 3 Refill(s), [facility]: [id number], 3 mL Inhalation BID,Instr:Dx: Asthma

Start Date: 4/21/15

Status: Ordered



amLODIPine 5 mg oral tablet

See Instructions, Take 1 tablet by mouth  daily, # 90 tabs, 1 Refill(s), Pharmac
y: [facility], Take 1 tablet by mouth  daily

Start Date: 9/2/15

Status: Ordered



Aspir 81

81 mg, Oral, Daily, 0 Refill(s)

Start Date: 14

Status: Ordered



bumetanide 1 mg oral tablet

See Instructions, Take 2 mg of Bumex in AM and 1 mg PM., # 300 tabs, 5 Refill(s)
, Pharmacy: [facility], Take 2 mg of Bumex in AM and 1 mg PM.

Start Date: 9/22/15

Status: Ordered



buPROPion 150 mg/12 hours (SR) oral tablet, extended release

See Instructions, Take 1 tablet by mouth two  times daily, # 180 unknown unit, 1
Refill(s), eRx: [facility], Take 1 tablet by mouth two  times daily

Start Date: 9/9/15

Status: Ordered



carvedilol 25 mg oral tablet

25 mg 1 tabs, Oral, BID, # 180 tabs, 5 Refill(s), Pharmacy: [facility]
, 1 tabs Oral BID

Start Date: 9/22/15

Status: Ordered



Claritin

10 mg, Oral, Daily, 0 Refill(s)

Start Date: 5/18/15

Status: Ordered



cloNIDine 0.1 mg oral tablet

0.1 mg 1 tabs, Oral, qPM, # 14 tabs, 1 Refill(s), Pharmacy: [facility]
[id number], 1 tabs Oral qPM,x14 days

Start Date: 9/17/15

Stop Date: 10/15/15

Status: Ordered



clopidogrel 75 mg oral tablet

1 tabs, Oral, Daily, # 90 tabs, 3 Refill(s), Pharmacy: [facility], 1 t
abs Oral Daily

Start Date: 4/14/15

Status: Ordered



digoxin 125 mcg (0.125 mg) oral tablet

125 mcg 1 tabs, Oral, Daily, # 90 tabs, 5 Refill(s), [facility], 1 tabs Oral Daily

Start Date: 9/22/15

Status: Ordered



DULoxetine 60 mg oral delayed release capsule

See Instructions, Take 1 capsule by mouth  daily do not crush or chew, # 90 unkn
own unit, 1 Refill(s), eRx: [facility], Take 1 capsule by mouth  daily
do not crush or chew

Start Date: 9/30/15

Status: Ordered



glipiZIDE 5 mg oral tablet

5 mg 1 tabs, Oral, BID, X 90 days, # 180 tabs, 1 Refill(s), [facility], 1 tabs Oral BID,x90 days

Start Date: 9/2/15

Stop Date: 16

Status: Ordered



Insulin Syringe (DME)

DME Item use to inject insulin once daily     dx: 250.00, See Instructions, # 90
Each, 4 Refill(s), Pharmacy: [facility], use to inject insulin once d
aily     dx: 250.00, Supply

Start Date: 5/8/15

Status: Ordered



Lantus 100 units/mL subcutaneous solution

See Instructions, Inject subcutaneously 30  units every night at  bedtime once a
day, # 30 mL, 2 Refill(s), eRx: [facility], Inject subcutaneously 30  
units every night at  bedtime once a day

Start Date: 7/31/15

Status: Ordered



Melatonin

10 mg, Oral, Bedtime (once a day), as needed for insomnia, 0 Refill(s)

Start Date: 14

Status: Ordered



metFORMIN 500 mg oral tablet

See Instructions, Take 2 tablets by mouth  twice a day, # 360 tabs, 1 Refill(s),
Pharmacy: [facility], Take 2 tablets by mouth  twice a day

Start Date: 9/2/15

Status: Ordered



Multiple Vitamins oral tablet

1 tabs, Oral, Daily, 0 Refill(s)

Start Date: 14

Status: Ordered



Norco 10 mg-325 mg oral tablet

1 tabs, Oral, q6hr, as needed for pain, # 30 tabs, 0 Refill(s)

Start Date: 9/8/15

Status: Ordered



omeprazole 20 mg oral delayed release capsule

20 mg 1 caps, Oral, BID, X 90 days, # 180 caps, 1 Refill(s), [facility], 1 caps Oral BID,x90 days

Start Date: 9/2/15

Stop Date: 16

Status: Ordered



OTC Black Cohash

OTC Black Cohash, See Instructions, 1 tablet oral daily, 0 Refill(s)

Start Date: 2/3/15

Status: Ordered



OTC Calcium

OTC Calcium, See Instructions, 1 tablet oral daily, 0 Refill(s)

Start Date: 2/3/15

Status: Ordered



potassium chloride 20 mEq oral tablet, extended release

See Instructions, Take 1 tablet by mouth  daily, # 90 unknown unit, 1 Refill(s),
eRx: [facility], Take 1 tablet by mouth  daily

Start Date: 10/2/15

Status: Ordered



ProAir HFA 90 mcg/inh inhalation aerosol

2 puffs, Inhalation, QID, as needed for wheezing, # 3 Each, 3 Refill(s), Pharmac
y: [facility]

Start Date: 1/23/15

Status: Ordered



SUMAtriptan 50 mg oral tablet

50 mg 1 tabs, Oral, Daily, as needed for migraine headache, may repeat dose afte
r 2 hours up to a maximum of 200 mg in 24 hours, # 9 tabs, 0 Refill(s), Pharmacy
: [id number], 1 tabs Oral Daily,PRN:as needed for migraine heada
godwin,Instr:m...

Start Date: 9/16/15

Status: Ordered



Symbicort 160 mcg-4.5 mcg/inh inhalation aerosol

See Instructions, Use 2 puffs twice daily, # 10.2 g, 2 Refill(s), [facility], Use 2 puffs twice daily

Start Date: 9/9/15

Status: Ordered



valsartan 160 mg oral tablet

1 tabs, Oral, Daily, # 30 tabs, 5 Refill(s), Pharmacy: [id number]
0, 1 tabs Oral Daily

Start Date: 4/16/15

Status: Ordered



Vitamin D3

4000 unit, Oral, Daily, 0 Refill(s)

Start Date: 2/3/15

Status: Ordered



Results





No data available for this section



Immunizations







  



                     Vaccine             Date                Refusal Reason

 

  



                           tetanus-diphth toxoids (Td) adult/adol     00 







Procedures







   



                 Procedure       Date            Related Diagnosis     Body Site

 

   



                           L knee PMM/ part. synovectomy     13  

 

   



                           lt median nerve decompression     5/10/11  

 

   



                           Tubal ligation            1979  

 

   



                                         Carpal tunnel release rt   

 

   



                                         Cholecystectomy   

 

   



                                         Hysterectomy   

 

   



                                         Hysterectomy and bilateral   



                                         salpingo-oophorectomy sample   

 

   



                                         Laminectomy   

 

   



                                         Pacemaker   







Social History







 



                           Social History Type       Response

 

 



                           Smoking Status            Former smoker; Type: Cigarettes1







1Quit around 



Assessment and Plan





No data available for this section

## 2019-07-31 NOTE — XMS REPORT
Transition of Care/Referral Summary

                             Created on: 2093



DYLAN KHAN

External Reference #: [id number]

: 1960

Sex: Female



Demographics







                          Address                   [address]  [address]

 

                          Home Phone                [phone]

 

                          Preferred Language        English

 

                          Marital Status            

 

                          Judaism Affiliation     Confucianism

 

                          Race                      White

 

                                        Additional Race(s)  

 

                          Ethnic Group              Not  or 





Author







                          Author                    [facility]

 

                          Organization              [facility]

 

                          Address                   Unknown

 

                          Phone                     Unavailable







Care Team Providers







                    Care Team Member Name    Role                Phone

 

                    Dhiraj Mijares    PCP                 [phone]







Encounter





VC FIN [id number] Date(s): 18 - 18

[facility] [address] [place] [address] 199
[facility] [phone]

Discharge Disposition: 01-Home or Self Care

Attending Physician: Dhiraj Mjiares MD





Vital Signs







 



                           Most recent to            1



                                         oldest [Reference 



                                         Range]: 

 

 



                           Blood Pressure            134/72 mmHg



                           [/60-90 mmHg]       (18 1:12 PM)







Problem List







    



              Condition     Effective Dates     Status       Health Status     Informant

 

    



                           Acquired                  Active  



                                         spondylolisthesis(Co    



                                         nfirmed)    

 

    



                           Acute                     Active  



                                         pain(Confirmed)    

 

    



                           Hay fever(Confirmed)      Active  

 

    



                           Arthritis(Confirmed)      Resolved  

 

    



                           Right Carpal tunnel       Resolved  



                                         release(Confirmed)    

 

    



                           Carpal tunnel             Resolved  



                                         release    



                                         decompression(Confir    



                                         med)    

 

    



                           Cholecystectomy(Conf      Resolved  



                                         irmed)    

 

    



                           IBS (irritable bowel      Active  



                                         syndrome)(Confirmed)    

 

    



                     CHF (congestive      Active              patient



                                         heart    



                                         failure)(Confirmed)    

 

    



                           CAD (coronary artery      Active  



                                         disease)(Confirmed)    

 

    



                           Decompression(Confir      Resolved  



                                         med)    

 

    



                           Depression(Confirmed      Resolved  



                                         )    

 

    



                           Depression(Confirmed      Active  



                                         )    

 

    



                           Diabetes(Confirmed)       Active  

 

    



                           Diabetes(Confirmed)       Resolved  

 

    



                           GERD                      Active  



                                         (gastroesophageal    



                                         reflux    



                                         disease)(Confirmed)    

 

    



                     Ischemic            < 5/8/15            Resolved  



                                         cardiomyopathy(Confi    



                                         rmed)    

 

    



                           Presence of               Active  



                                         biventricular    



                                         AICD(Confirmed)    

 

    



                           History of lumbar         Active  



                                         fusion(Confirmed)    

 

    



                           Hyperlipidemia(Confi      Active  



                                         rmed)    

 

    



                     Asthma(Confirmed)     < 17           Resolved  

 

    



                           Hypertension(Confirm      Resolved  



                                         ed)    

 

    



                           Hypertension(Confirm      Resolved  



                                         ed)    

 

    



                           HTN                       Active  



                                         (hypertension)(Confi    



                                         rmed)    

 

    



                           Hysterectomy(Confirm      Resolved  



                                         ed)    

 

    



                           Impaired gas              Active  



                                         exchange(Confirmed)1    

 

    



                           Irritable                 Resolved  



                                         bowel(Confirmed)    

 

    



                           Laminectomy(Confirme      Resolved  



                                         d)    

 

    



                           LBBB (left bundle         Active  



                                         branch    



                                         block)(Confirmed)    

 

    



                           LUQ pain(Confirmed)       Active  

 

    



                           Migraine(Confirmed)       Resolved  

 

    



                           Asthma(Confirmed)         Active  

 

    



                     Morbid              Active              patient



                                         obesity(Confirmed)    

 

    



                           Myocardial                Resolved  



                                         infarction(Confirmed    



                                         )    

 

    



                           Septicemia                Resolved  



                                         pneumonia(Confirmed)    

 

    



                     L knee              13              Resolved  



                                         Synovectomy(Confirme    



                                         d)    

 

    



                           Tissue perfusion          Active  



                                         alteration(Confirmed    



                                         )2    

 

    



                     Tobacco             Active              patient



                                         user(Confirmed)    

 

    



                     Tubal               79              Resolved  



                                         ligation(Confirmed)    

 

    



                           UTI (urinary tract        Active  



                                         infection)(Confirmed    



                                         )    

 

    



                     Chicken             < 17           Resolved  



                                         pox(Confirmed)    







1Problem added automatically by system based on initiation of Impaired Gas 
Exchange Plan of Care



2Problem added automatically by system based on initiation of Tissue Perfusion 
Cerebral Plan of Care



Allergies, Adverse Reactions, Alerts







   



                 Substance       Reaction        Severity        Status

 

   



                     sulfamethoxazole     Urticaria (hives)      Active



                                         HIVES  

 

   



                     morphine            Burning             Active

 

   



                     barium sulfate      Swelling, hives      Active

 

   



                     celecoxib           HIVES               Active

 

   



                     influenza virus vaccine,     Convulsion          Active



                                         inactivated   







Medications





100ML 3ML SOLUTION  VIAL HUMALOG U

See Instructions, INJECT SUBCUTANEOUSLY 10  UNITS 3 TIMES DAILY BEFORE  MEALS, #
27 mL, 4 Refill(s), eRx: [facility], INJECT SUBCUTANEOUSLY 10  UNITS 3
TIMES DAILY BEFORE  MEALS

Start Date: 18

Status: Ordered



acetaminophen

1,000 mg, Oral, q6hr, as needed for pain, 0 Refill(s)

Start Date: 14

Status: Ordered



Ambien 10 mg oral tablet

10 mg 1 tabs, Oral, Bedtime (once a day), as needed for sleep, [phone], X
90 days, # 90 tabs, 0 Refill(s)

Start Date: 18

Stop Date: 18

Status: Ordered



amLODIPine 10 mg oral tablet

10 mg 1 tabs, Oral, Daily, # 90 tabs, 5 Refill(s), Pharmacy: [facility]
E, 1 tabs Oral Daily

Start Date: 16

Status: Ordered



Aspir 81

81 mg, Oral, Daily, 0 Refill(s)

Start Date: 14

Status: Ordered



atorvastatin 10 mg oral tablet

See Instructions, TAKE 1 TABLET BY MOUTH  DAILY, # 90 tabs, 3 Refill(s), eRx: [facility]

Start Date: 18

Status: Ordered



B-D PEN NDL JACOB [id number]() GRN

See Instructions, USE TO INJECT INSULIN, # 100 unknown unit, eRx: [facility] [id number], USE TO INJECT INSULIN

Start Date: 3/27/18

Status: Ordered



Basaglar KwikPen 100 units/mL subcutaneous solution

See Instructions, 42 units at hs SubCutaneous, # 1 boxes, 3 Refill(s), Pharmacy:
[facility], 42 units at hs SubCutaneous

Start Date: 18

Status: Ordered



Bentyl 20 mg oral tablet

20 mg 1 tabs, Oral, q8hr, Abdominal Cramping, # 15 tabs, 1 Refill(s), Pharmacy: 
[facility] [id number], 1 tabs Oral q8hr,PRN:Abdominal Cramping

Start Date: 18

Status: Ordered



bumetanide 1 mg oral tablet

See Instructions, TAKE 2 TABLETS BY MOUTH IN  THE MORNING AND 1 TABLET BY MOUTH 
IN THE EVENING, # 270 tabs, eRx: [facility]

Start Date: 18

Status: Ordered



buPROPion 150 mg/12 hours (SR) oral tablet, extended release

See Instructions, Take 1 tablet by mouth two  times daily, # 180 tabs, 3 Refill(
s), eRx: [facility]

Start Date: 17

Status: Ordered



carvedilol 25 mg oral tablet

See Instructions, TAKE 1 TABLET BY MOUTH TWO  TIMES DAILY, # 180 tabs, [facility]: [facility]

Start Date: 4/10/18

Status: Ordered



cloNIDine 0.2 mg oral tablet

0.2 mg 1 tabs, Oral, BID, # 180 tabs, 2 Refill(s), Pharmacy: [facility]
E, 1 tabs Oral BID

Start Date: 18

Status: Ordered



clopidogrel 75 mg oral tablet

See Instructions, TAKE 1 TABLET BY MOUTH  DAILY, # 90 tabs, 2 Refill(s), eRx: [facility]

Start Date: 18

Status: Ordered



diclofenac 3% topical gel

0.5 g, Topical, BID, # 50 g, 1 Refill(s), Pharmacy: [facility] [id number]

Start Date: 8/15/17

Status: Ordered



DULoxetine 20 mg oral delayed release capsule

See Instructions, TAKE 1 CAP BY MOUTH DAILY.  DO NOT CRUSH OR CHEW; (TAKE WITH D
ULOXETINE 60MG), # 90 caps, 1 Refill(s), eRx: [facility]

Start Date: 18

Status: Ordered



DULoxetine 60 mg oral delayed release capsule

See Instructions, TAKE 1 CAPSULE BY MOUTH  DAILY . DO NOT CRUSH OR  CHEW. (TAKE 
WITH DULOXETINE 20MG), # 90 caps, 1 Refill(s), eRx: [facility]

Start Date: 18

Status: Ordered



gabapentin 100 mg oral capsule

See Instructions, 1-3 caps up to 3 times daily, # 100 caps, 3 Refill(s)

Start Date: 18

Status: Ordered



Glucometer Lancets (DME)

DME Item one touch delica lancets  use to test bg x3/daily  dx:e11.65, See Instr
uctions, # 300 Each, 3 Refill(s), Pharmacy: [facility] [id number], one touc
h delica lancets; use to test bg x3/daily; dx:e11.65, Supply

Start Date: 3/13/17

Status: Ordered



HumaLOG 100 units/mL injectable solution

See Instructions, 12 units SubCutaneous TIDAC, # 3 vials, 1 Refill(s), Pharmacy:
[facility], 12 units SubCutaneous TIDAC

Start Date: 18

Status: Ordered



Insulin Pin Needles (DME)

DME Item 32g x4mm (jacob needles)  use to inject insulin   dx:e11.65, See Instruc
tions, # 100 Each, 3 Refill(s), Pharmacy: [facility] [id number], 32g x4mm (
jacob needles); use to inject insulin ; dx:e11.65, Supply

Start Date: 17

Status: Ordered



Melatonin

10 mg, Oral, Bedtime (once a day), as needed for insomnia, 0 Refill(s)

Start Date: 14

Status: Ordered



metFORMIN 500 mg oral tablet

See Instructions, TAKE 2 TABLETS BY MOUTH  TWICE DAILY, # 360 tabs, eRx: [facility]

Start Date: 4/10/18

Status: Ordered



mometasone 50 mcg/inh nasal spray

See Instructions, Use 2 sprays nasally daily, # 17 g, 5 Refill(s), eRx: [facility]

Start Date: 17

Status: Ordered



Norco 10 mg-325 mg oral tablet

1 tabs, Oral, q6hr, as needed for pain, # 30 tabs, 0 Refill(s)

Start Date: 18

Status: Ordered



ONE TOUCH VERIO TEST ST(NEW)100'S

See Instructions, TEST THREE TIMES DAILY, # 300 strip, eRx: [facility]
[id number], TEST THREE TIMES DAILY

Start Date: 3/27/18

Status: Ordered



potassium chloride 20 mEq oral tablet, extended release

See Instructions, TAKE 1 TABLET BY MOUTH  DAILY, # 90 tabs, 2 Refill(s), eRx: [facility]

Start Date: 18

Status: Ordered



ProAir HFA 90 mcg/inh inhalation aerosol

See Instructions, INHALE 2 PUFFS 4 TIMES  DAILY AS NEEDED FOR  WHEEZING, # 25.5 
g, 2 Refill(s), eRx: [facility]

Start Date: 18

Status: Ordered



RABEPRAZOLE  20MG  TAB

See Instructions, TAKE 2 TABLETS BY MOUTH  DAILY, # 180 tabs, 2 Refill(s), eRx: 
[facility], TAKE 2 TABLETS BY MOUTH  DAILY

Start Date: 4/3/18

Status: Ordered



RABEprazole 20 mg oral delayed release tablet

See Instructions, Take 2 tablets by mouth  daily, # 180 tabs, 1 Refill(s), eRx: 
[facility]

Start Date: 17

Status: Ordered



SUMAtriptan 50 mg oral tablet

See Instructions, TAKE 1 TABLET BY MOUTH DAILY AS NEEDED FOR MIGRAINE HEADACHE. 
MAY REPEAT DOSE AFTER 2 HOURS UP TO A. MAXIMUM  MG IN 24 HOURS, # 9 tabs, 
eRx: [facility] [id number]

Start Date: 18

Status: Ordered



Symbicort 160 mcg-4.5 mcg/inh inhalation aerosol

See Instructions, INHALE 2 PUFFS TWICE DAILY, # 30.6 g, 2 Refill(s), [facility]: [facility]

Start Date: 18

Status: Ordered



SYRNG   .5CC ULTII 31 G SRT

See Instructions, Use to Inject insulin once  daily, # 90 Each, 2 Refill(s), eRx
: [facility], Use to Inject insulin once  daily

Start Date: 17

Status: Ordered



traMADol 50 mg oral tablet

See Instructions, 1-2 tabs po q 6hrs prn pain, # 50 tabs, 1 Refill(s)

Start Date: 18

Stop Date: 18

Status: Ordered



valsartan 160 mg oral tablet

160 mg 1 tabs, Oral, BID, # 180 tabs, 1 Refill(s), Pharmacy: [facility]
E, 1 tabs Oral BID

Start Date: 18

Status: Ordered



Results





Chemistry





 



                           Most recent to            1



                                         oldest [Reference 



                                         Range]: 

 

 



                           Sodium Lvl [135-144       141 mEq/L



                           mEq/L]                    (18 12:00 AM)

 

 



                           Potassium Lvl             4.7 mEq/L



                           [3.5-5.2 mEq/L]           (18 12:00 AM)

 

 



                           Chloride [          97 mEq/L



                           mEq/L]                    *LOW*



                                         (18 12:00 AM)

 

 



                           CO2 [22-31 mEq/L]         34 mEq/L



                                         *HI*



                                         (18 12:00 AM)

 

 



                           AGAP [3-20 mEq/L]         10 mEq/L



                                         (18 12:00 AM)

 

 



                           BUN [10-20 mg/dL]         19 mg/dL



                                         (18 12:00 AM)

 

 



                           Glucose Lvl [70-99        111 mg/dL



                           mg/dL]                    *HI*



                                         (18 12:00 AM)

 

 



                           Creatinine Lvl            0.81 mg/dL



                           [0.57-1.11 mg/dL]         (18 12:00 AM)

 

 



                           eGFR [>60 mL/min]         >60 mL/min 1



                                         (18 12:00 AM)

 

 



                           Calcium Lvl               9.9 mg/dL



                           [8.4-10.2 mg/dL]          (18 12:00 AM)

 

 



                           Hgb A1c [4.1-5.6 %]       9.1 %



                                         *HI*



                                         (18 12:00 AM)

 

 



                           eAvg Glucose              214.5 mg/dL



                                         (18 12:00 AM)







1Result Comment: Multiply eGFR results by 1.21 for African American race.



Immunizations





Given and Recorded





   



                 Vaccine         Date            Status          Refusal Reason

 

   



                     tetanus-diphth toxoids (Td) adult/adol     00             Given 







Procedures







    



              Procedure     Date         Related Diagnosis     Body Site     Status

 

    



                     L knee PMM/ part. synovectomy     13              Completed

 

    



                     lt median nerve decompression     5/10/11             Completed

 

    



                     Tubal ligation                      Completed

 

    



                           Carpal tunnel release rt        Completed

 

    



                           Cholecystectomy           Completed

 

    



                           Hysterectomy              Completed

 

    



                           Hysterectomy and bilateral        Completed



                                         salpingo-oophorectomy sample    

 

    



                           Laminectomy               Completed

 

    



                           Pacemaker                 Completed







Social History







 



                           Social History Type       Response

 

 



                           Smoking Status            Former smoker; Type: Cigarettes1



                                         entered on: 5/8/15







1Quit around 



Assessment and Plan





No data available for this section

## 2019-07-31 NOTE — XMS REPORT
Transition of Care/Referral Summary

                             Created on: 2019



BILLDYLAN EDITH

External Reference #: [id number]

: 1960

Sex: Female



Demographics







                          Address                   [address]

[place]  [address]

 

                          Home Phone                [phone]

 

                          Preferred Language        English

 

                          Marital Status            

 

                          Religion Affiliation     Zoroastrianism

 

                          Race                      White

 

                          Ethnic Group              Not  or 





Author







                          Organization              Unknown

 

                          Address                   Unknown

 

                          Phone                     Unavailable







Care Team Providers







                    Care Team Member Name    Role                Phone

 

                    Dhiraj Mijares    PCP                 [phone]







Encounter





VC FIN [id number] Date(s): 1/13/15 - 1/13/15

[address], [facility] [address], [place] [address]
S [phone]

Discharge Diagnosis: Viral gastroenteritis

Discharge Diagnosis: Fever

Discharge Diagnosis: Wheeze

Discharge Disposition: Home or Self Care

Attending Physician: Ember Schwarz

Admitting Physician: Ember Schwarz





Vital Signs







 



                           Most recent to            1



                                         oldest [Reference 



                                         Range]: 

 

 



                           Temperature Tympanic      36.6 degC



                           [36.6-38.1 degC]          (1/13/15 9:08 AM)

 

 



                           Peripheral Pulse          93 bpm



                           Rate [ bpm]         (1/13/15 9:08 AM)

 

 



                           Blood Pressure            154/90 mmHg



                           [/60-90 mmHg]       *HI*



                                         (1/13/15 9:08 AM)









 



                           Most recent to            1



                                         oldest [Reference 



                                         Range]: 

 

 



                           SpO2                      100 %



                                         (1/13/15 9:08 AM)







Problem List







    



              Condition     Effective Dates     Status       Health Status     Informant

 

    



                           Acute                     Active  



                                         pain(Confirmed)    

 

    



                           Arthritis(Confirmed)      Resolved  

 

    



                           Asthma(Confirmed)         Active  

 

    



                           Right Carpal tunnel       Resolved  



                                         release(Confirmed)    

 

    



                           Carpal tunnel             Resolved  



                                         release    



                                         decompression(Confir    



                                         med)    

 

    



                           Cholecystectomy(Conf      Resolved  



                                         irmed)    

 

    



                           CAD (coronary artery      Active  



                                         disease)(Confirmed)    

 

    



                           Decompression(Confir      Resolved  



                                         med)    

 

    



                           Depression(Confirmed      Resolved  



                                         )    

 

    



                           Depression(Confirmed      Active  



                                         )    

 

    



                           Diabetes(Confirmed)       Resolved  

 

    



                           Diabetes(Confirmed)       Active  

 

    



                           Hyperlipidemia(Confi      Resolved  



                                         rmed)    

 

    



                           Hypertension(Confirm      Resolved  



                                         ed)    

 

    



                           Hypertension(Confirm      Resolved  



                                         ed)    

 

    



                           HTN                       Active  



                                         (hypertension)(Confi    



                                         rmed)    

 

    



                           Hysterectomy(Confirm      Resolved  



                                         ed)    

 

    



                           Impaired gas              Active  



                                         exchange(Confirmed)1    

 

    



                           Irritable                 Resolved  



                                         bowel(Confirmed)    

 

    



                           Laminectomy(Confirme      Resolved  



                                         d)    

 

    



                           Migraine(Confirmed)       Resolved  

 

    



                           Myocardial                Resolved  



                                         infarction(Confirmed    



                                         )    

 

    



                           Septicemia                Resolved  



                                         pneumonia(Confirmed)    

 

    



                     L knee              13              Resolved  



                                         Synovectomy(Confirme    



                                         d)    

 

    



                     Tubal               79              Resolved  



                                         ligation(Confirmed)    







1Problem added automatically by system based on initiation of Impaired Gas 
Exchange Plan of Care



Allergies, Adverse Reactions, Alerts







   



                 Substance       Reaction        Severity        Status

 

   



                     barium sulfate      Adverse Reaction      Active



                                         Swelling, hives  

 

   



                     celecoxib           HIVES               Active

 

   



                     morphine            Adverse Reaction      Active

 

   



                     sulfamethoxazole     HIVES               Active



                                         Urticaria (hives)  







Medications





acetaminophen

1,000 mg, Oral, q6hr, as needed for pain, 0 Refill(s)

Start Date: 14

Status: Ordered



albuterol 2.5 mg/3 mL (0.083%) inhalation solution

3 mL, Inhalation, BID, Dx: Asthma, # 25 Each, 3 Refill(s), Pharmacy: [facility]
, 3 mL Inhalation BID,Instr:Dx: Asthma

Special Instructions: Dx: Asthma

Start Date: 14

Status: Ordered



Aspir 81

81 mg, Oral, Daily, 0 Refill(s)

Start Date: 14

Status: Ordered



atorvastatin

20 mg, Oral, Bedtime (once a day), 0 Refill(s)

Start Date: 14

Status: Ordered



BD Ultra-Fine Needles and Syringes

BD Ultra-Fine Needles and Syringes, See Instructions, Use a needle and syringe t
o inject insuline one time daily Dx 250.00, # 50 Each, 5 Refill(s), Pharmacy: [facility], Use a needle and syringe to inject insuline one time daily Dx 250.00

Special Instructions: Use a needle and syringe to inject insuline one time daily
Dx 250.00

Start Date: 14

Status: Ordered



clopidogrel 75 mg oral tablet

1 tabs, Oral, Daily, # 90 tabs, 5 Refill(s), Pharmacy: [facility], 1 tabs Oral 
Daily

Start Date: 14

Status: Ordered



Coreg 3.125 mg oral tablet

1 tabs, Oral, BIDWM, # 180 tabs, 3 Refill(s), Pharmacy: [facility], 1 tabs Oral BIDWM,x90 days

Start Date: 1/12/15

Stop Date: 16

Status: Ordered



Cymbalta 20 mg oral delayed release capsule

1 caps, Oral, Daily, do not crush or chew, # 90 caps, 0 Refill(s), [facility], 1 caps Oral Daily,Instr:do not crush or chew

Special Instructions: do not crush or chew

Start Date: 1/13/15

Status: Ordered



Diovan  mg-12.5 mg oral tablet

1 tabs, Oral, Daily, 0 Refill(s)

Start Date: 14

Status: Ordered



Dulera 200 mcg-5 mcg/inh inhalation aerosol

2 puffs, Inhalation, BID, # 3 inhalers, 3 Refill(s), Pharmacy: [facility]

Start Date: 1/13/15

Status: Ordered



DULoxetine 60 mg oral delayed release capsule

1 caps, Oral, Daily, take with 20mg tab, 0 Refill(s)

Special Instructions: take with 20mg tab

Start Date: 14

Status: Ordered



glipiZIDE 5 mg oral tablet

1 tabs, Oral, Before BKFT and Dinner, # 60 tabs, 0 Refill(s)

Start Date: 14

Status: Ordered



Lantus 100 units/mL subcutaneous solution

30 units, SubCutaneous, Bedtime (once a day), # 10 mL, 0 Refill(s)

Start Date: 14

Stop Date: 1/20/15

Status: Ordered



Lasix 40 mg oral tablet

1 tabs, Oral, Daily, # 90 tabs, 3 Refill(s), Pharmacy: [facility]
VERY, 1 tabs Oral Daily,x90 days

Start Date: 1/12/15

Stop Date: 16

Status: Ordered



Melatonin

10 mg, Oral, Bedtime (once a day), as needed for insomnia, 0 Refill(s)

Start Date: 14

Status: Ordered



metFORMIN 500 mg oral tablet

2 tabs, Oral, BID, # 120 tabs, 0 Refill(s)

Start Date: 14

Status: Ordered



Multiple Vitamins oral tablet

Oral, Daily, 0 Refill(s)

Start Date: 14

Status: Ordered



omeprazole

20 mg, Oral, BID, 0 Refill(s)

Start Date: 14

Status: Ordered



Once Touch Delica Lancets

Once Touch Delica Lancets, See Instructions, Use a new lancet to check blood sug
ars four times daily dx 250.00, # 100 Each, 5 Refill(s), Pharmacy: [facility], 
Use a new lancet to check blood sugars four times daily dx 250.00

Special Instructions: Use a new lancet to check blood sugars four times daily dx
250.00

Start Date: 14

Status: Ordered



Once touch Test strips

Once touch Test strips, See Instructions, Use a new strip four times daily to ch
aysha blood sugars Dx 250.00, # 100 Each, 5 Refill(s), Pharmacy: [facility], Use 
a new strip four times daily to check blood sugars Dx 250.00

Special Instructions: Use a new strip four times daily to check blood sugars Dx 
250.00

Start Date: 14

Status: Ordered



One Touch glucometer

One Touch glucometer, See Instructions, Use monitor to check blood sugars four t
imes daily dx 250.00, # 1 Each, 0 Refill(s), Pharmacy: [facility], Use monitor 
to check blood sugars four times daily dx 250.00

Special Instructions: Use monitor to check blood sugars four times daily dx 250.
00

Start Date: 14

Status: Ordered



potassium chloride 20 mEq oral tablet, extended release

1 tabs, Oral, Daily, # 90 tabs, 3 Refill(s), Pharmacy: [facility]
VERY, 1 tabs Oral Daily,x90 days

Start Date: 1/12/15

Stop Date: 16

Status: Ordered



ProAir HFA 90 mcg/inh inhalation aerosol

2 puffs, Inhalation, QID, as needed for wheezing, # 1 Each, 3 Refill(s), Pharmac
y: [facility]

Start Date: 14

Status: Ordered



ProAir HFA 90 mcg/inh inhalation aerosol

2 puffs, Inhalation, QID, as needed for wheezing, # 3 Each, 3 Refill(s), Pharmac
y: [facility]

Start Date: 1/13/15

Status: Ordered



SUMAtriptan 50 mg oral tablet

1 tabs, Oral, Daily, as needed for migraine headache, may repeat dose after 2 ho
urs up to a maximum of 200 mg in 24 hours, # 18 tabs, 0 Refill(s)

Special Instructions: may repeat dose after 2 hours up to a maximum of 200 mg in
24 hours

Start Date: 14

Status: Ordered



Symbicort 160 mcg-4.5 mcg/inh inhalation aerosol

2 puffs, Inhalation, BID, # 10.2 g, 3 Refill(s), Pharmacy: [facility]

Start Date: 14

Status: Ordered



Wellbutrin  mg/12 hours oral tablet, extended release

2 tabs, Oral, Daily, # 180 tabs, 0 Refill(s)

Start Date: 14

Status: Ordered



Results





No data available for this section



Immunizations







  



                     Vaccine             Date                Refusal Reason

 

  



                           tetanus-diphth toxoids (Td) adult/adol     00 







Procedures







   



                 Procedure       Date            Related Diagnosis     Body Site

 

   



                           L knee PMM/ part. synovectomy     13  

 

   



                           lt median nerve decompression     5/10/11  

 

   



                           Tubal ligation            1979  

 

   



                                         Carpal tunnel release rt   

 

   



                                         Cholecystectomy   

 

   



                                         Hysterectomy   

 

   



                                         Hysterectomy and bilateral   



                                         salpingo-oophorectomy sample   

 

   



                                         Laminectomy   







Social History







 



                           Social History Type       Response

 

 



                           Smoking Status            Former smoker; Type: Cigarettes







Assessment and Plan

Extracted from:





  



                     Title: Office Visit Note     Author: Ember Schwarz     Date: 1/13/15









                                         Assessment/Plan

 Fever  She hasn't had a fever since yesterday, so she can go back to work 
tomorrow if she continues to improve. If she doesn't improve, she will call 
back.

  Ordered:

 Office Visit Level 4 Est 18678 

 Viral gastroenteritis  continue with symptomatic treatment. She can't push 
fluids be cause the cardiologist has her on fluid restriction.

  Ordered:

 Office Visit Level 4 Est 22690 

 Wheeze  She was given a new Rx for Dulera and she will use albuterol as 
needed.

  Ordered:

 Office Visit Level 4 Est 93180 

 Orders:  albuterol, 2 puffs, Inhalation, QID, as needed for wheezing, # 3 Each,
3 Refill(s), Pharmacy: [facility]

  DULoxetine, 1 caps, Oral, Daily, do not crush or chew, # 90 caps, 0 Refill(s),
Pharmacy: [facility], 1 caps Oral Daily,Instr:do not crush or
chew

  mometasone-formoterol, 2 puffs, Inhalation, BID, # 3 inhalers, 3 Refill(s), 
Pharmacy: [facility]





Extracted from:





  



                     Title: Work Excuse     Author: Tonio Whitney MA     Date: 1/13/15









                                        SIMON Sherman MD



                                        [address]

[place] [address]

Ph: [phone]





Re:  Dylan Urbina

      :1960





To Whom It May Concern,



This patient is currently under my medical care.  Please excuse her from work 
01/12/15 through 01/13/15 due to an acute illness.



If you have any questions or concerns please feel free to contact my office at 
the number above.





Sincerely,



SIMON Sherman

## 2019-07-31 NOTE — XMS REPORT
Transition of Care/Referral Summary

                             Created on: 07/15/2105



DYLAN KHAN

External Reference #: [id number]

: 1960

Sex: Female



Demographics







                          Address                   [address]

[place]  [address]

 

                          Home Phone                [phone]

 

                          Preferred Language        English

 

                          Marital Status            

 

                          Christianity Affiliation     Nondenominational

 

                          Race                      White

 

                                        Additional Race(s)  

 

                          Ethnic Group              Not  or 





Author







                          Author                    [facility]

 

                          Organization              [facility]

 

                          Address                   Unknown

 

                          Phone                     Unavailable







Care Team Providers







                    Care Team Member Name    Role                Phone

 

                    Dhiraj Mijares    PCP                 [phone]







Encounter





[facility] FIN [id number] Date(s): 19 - 19

[facility] [address], [place] [address] [phone]

Encounter Diagnosis

HFrecEF (Discharge Diagnosis) - 19

CAD (coronary artery disease) (Discharge Diagnosis) - 19

Hyperlipidemia (Discharge Diagnosis) - 19

HTN (hypertension) (Discharge Diagnosis) - 19

Discharge Disposition: 01-Home or Self Care

Attending Physician: Duane Mccarty MD

Referring Physician: Lexi Velazquez MD





Vital Signs







 



                           Most recent to            1



                                         oldest [Reference 



                                         Range]: 

 

 



                           Peripheral Pulse          76 bpm



                           Rate [ bpm]         (19 12:55 PM)

 

 



                           Blood Pressure            108/78 mmHg



                           [/60-90 mmHg]       (19 12:55 PM)







Problem List







    



              Condition     Effective Dates     Status       Health Status     Informant

 

    



                           Acquired                  Active  



                                         spondylolisthesis(Co    



                                         nfirmed)    

 

    



                           Acute                     Active  



                                         pain(Confirmed)    

 

    



                           Hay fever(Confirmed)      Active  

 

    



                           Arthritis(Confirmed)      Resolved  

 

    



                           Carpal tunnel             Resolved  



                                         release    



                                         decompression(Confir    



                                         med)    

 

    



                           Right Carpal tunnel       Resolved  



                                         release(Confirmed)    

 

    



                           Cholecystectomy(Conf      Resolved  



                                         irmed)    

 

    



                           IBS (irritable bowel      Active  



                                         syndrome)(Confirmed)    

 

    



                     CHF (congestive      Active              patient



                                         heart    



                                         failure)(Confirmed)    

 

    



                           CAD (coronary artery      Active  



                                         disease)(Confirmed)    

 

    



                           Decompression(Confir      Resolved  



                                         med)    

 

    



                           Depression(Confirmed      Resolved  



                                         )    

 

    



                           Depression(Confirmed      Active  



                                         )    

 

    



                           Diabetes(Confirmed)       Active  

 

    



                           Diabetes(Confirmed)       Resolved  

 

    



                           Diabetic                  Active  



                                         neuropathy(Confirmed    



                                         )    

 

    



                           GERD                      Active  



                                         (gastroesophageal    



                                         reflux    



                                         disease)(Confirmed)    

 

    



                     Ischemic            < 5/8/15            Resolved  



                                         cardiomyopathy(Confi    



                                         rmed)    

 

    



                           Presence of               Active  



                                         biventricular    



                                         AICD(Confirmed)    

 

    



                           History of lumbar         Active  



                                         fusion(Confirmed)    

 

    



                           Hyperlipidemia(Confi      Active  



                                         rmed)    

 

    



                     Asthma(Confirmed)     < 17           Resolved  

 

    



                           Hypertension(Confirm      Resolved  



                                         ed)    

 

    



                           Hypertension(Confirm      Resolved  



                                         ed)    

 

    



                           HTN                       Active  



                                         (hypertension)(Confi    



                                         rmed)    

 

    



                           Hysterectomy(Confirm      Resolved  



                                         ed)    

 

    



                           Impaired gas              Active  



                                         exchange(Confirmed)1    

 

    



                           Irritable                 Resolved  



                                         bowel(Confirmed)    

 

    



                           Laminectomy(Confirme      Resolved  



                                         d)    

 

    



                           LBBB (left bundle         Active  



                                         branch    



                                         block)(Confirmed)    

 

    



                           LUQ pain(Confirmed)       Active  

 

    



                           Migraine(Confirmed)       Resolved  

 

    



                           Asthma(Confirmed)         Active  

 

    



                     Morbid              Active              patient



                                         obesity(Confirmed)    

 

    



                           Myocardial                Resolved  



                                         infarction(Confirmed    



                                         )    

 

    



                           Septicemia                Resolved  



                                         pneumonia(Confirmed)    

 

    



                     L knee              13              Resolved  



                                         Synovectomy(Confirme    



                                         d)    

 

    



                           Tissue perfusion          Active  



                                         alteration(Confirmed    



                                         )2    

 

    



                     Tobacco             Active              patient



                                         user(Confirmed)    

 

    



                     Tubal               79              Resolved  



                                         ligation(Confirmed)    

 

    



                           UTI (urinary tract        Active  



                                         infection)(Confirmed    



                                         )    

 

    



                     Chicken             < 17           Resolved  



                                         pox(Confirmed)    







1Problem added automatically by system based on initiation of Impaired Gas 
Exchange Plan of Care



2Problem added automatically by system based on initiation of Tissue Perfusion 
Cerebral Plan of Care



Allergies, Adverse Reactions, Alerts







   



                 Substance       Reaction        Severity        Status

 

   



                     sulfamethoxazole     Urticaria (hives)      Active



                                         HIVES  

 

   



                     morphine            Burning             Active

 

   



                     barium sulfate      Swelling, hives      Active

 

   



                     celecoxib           HIVES               Active

 

   



                     influenza virus vaccine,     Convulsion          Active



                                         inactivated   







Medications





100ML 3ML SOLUTION  VIAL HUMALOG U

See Instructions, INJECT SUBCUTANEOUSLY 10  UNITS 3 TIMES DAILY BEFORE  MEALS, #
27 mL, 4 Refill(s), eRx: [facility], INJECT SUBCUTANEOUSLY 10  UNITS 3
TIMES DAILY BEFORE  MEALS

Start Date: 18

Status: Ordered



acetaminophen

1,000 mg, Oral, q6hr, as needed for pain, 0 Refill(s)

Start Date: 14

Status: Ordered



Ambien 10 mg oral tablet

10 mg 1 tabs, Oral, Bedtime (once a day), as needed for sleep, [phone], #
90 tabs, 0 Refill(s)

Start Date: 19

Stop Date: 19

Status: Ordered



Aspir 81

81 mg, Oral, Daily, 0 Refill(s)

Start Date: 14

Status: Ordered



atorvastatin 20 mg oral tablet

20 mg 1 tabs, Oral, Daily, # 90 tabs, 1 Refill(s), Pharmacy: [facility]
E, 1 tabs Oral Daily

Start Date: 19

Status: Ordered



Basaglar KwikPen 100 units/mL subcutaneous solution

See Instructions, INJECT SUBCUTANEOUSLY 56 units at hs, # 15 mL, 3 Refill(s), Ph
armacy: [facility], INJECT SUBCUTANEOUSLY 56 units at hs

Start Date: 3/11/19

Status: Ordered



bd syringes

bd syringes, See Instructions, bd syringe  0.5ml  31gx5/16  dx:e11.65, # [address]
h, 3 Refill(s), Pharmacy: [facility], bd syringe; 0.5ml; 31gx5/16; dx:
e11.65

Start Date: 18

Status: Ordered



Bentyl 20 mg oral tablet

20 mg 1 tabs, Oral, q8hr, Abdominal Cramping, # 15 tabs, 0 Refill(s), Pharmacy: 
[id number], 1 tabs Oral q8hr,PRN:Abdominal Cramping

Start Date: 3/28/19

Status: Ordered



bumetanide 1 mg oral tablet

See Instructions, TAKE 2 TABLETS BY MOUTH IN  THE MORNING AND 1 TABLET BY MOUTH 
IN THE EVENING, # 270 tabs, 3 Refill(s), eRx: [facility]

Start Date: 19

Status: Ordered



buPROPion 150 mg/12 hours (SR) oral tablet, extended release

See Instructions, TAKE 1 TABLET BY MOUTH TWO  TIMES DAILY, # 180 tabs, [facility]: [facility]

Start Date: 6/10/19

Status: Ordered



carvedilol 25 mg oral tablet

See Instructions, TAKE 1 TABLET BY MOUTH TWO  TIMES DAILY, # 180 tabs, [facility]: [facility]

Start Date: 19

Status: Ordered



cloNIDine 0.2 mg oral tablet

See Instructions, TAKE 1 TABLET BY MOUTH TWO  TIMES DAILY, # 180 tabs, 3 Refill(
s), eRx: [facility]

Start Date: 18

Status: Ordered



clopidogrel 75 mg oral tablet

See Instructions, TAKE 1 TABLET BY MOUTH  DAILY, # 90 tabs, 1 Refill(s), Pharmac
y: [facility], TAKE 1 TABLET BY MOUTH  DAILY

Start Date: 19

Status: Ordered



colchicine 0.6 mg oral tablet

See Instructions, as needed for gout pain, first sign of gout flare then 1 tab 1
hr later.  Max 1.8 mg in 24 hours.  Must wait 3 days before next dose, # 10 tab
s, 0 Refill(s), Pharmacy: [facility] [id number], first sign of gout flare t
hen 1 tab 1...

Start Date: 19

Status: Ordered



diclofenac 3% topical gel

0.5 g, Topical, BID, # 50 g, 1 Refill(s), Pharmacy: [facility] [id number]

Start Date: 8/15/17

Status: Ordered



doxycycline hyclate 100 mg oral capsule

100 mg 1 caps, Oral, BID, X 10 days, # 20 caps, 0 Refill(s), Pharmacy: [facility] [id number], 1 caps Oral BID,x10 days

Start Date: 19

Stop Date: 19

Status: Ordered



DULoxetine 20 mg oral delayed release capsule

See Instructions, TAKE 1 CAPSULE BY MOUTH  DAILY.  DO NOT CRUSH OR  CHEW.  (TAKE
WITH  DULOXETINE 60MG), # 90 caps, eRx: [facility]

Start Date: 6/10/19

Status: Ordered



DULoxetine 60 mg oral delayed release capsule

See Instructions, TAKE 1 CAPSULE BY MOUTH  DAILY. DO NOT CRUSH OR  CHEW. (TAKE W
ITH DULOXETINE 20MG), # 90 caps, eRx: [facility]

Start Date: 6/10/19

Status: Ordered



gabapentin 100 mg oral capsule

See Instructions, 1-3 caps up to 3 times daily, # 100 caps, 3 Refill(s)

Start Date: 18

Status: Ordered



Glucometer Lancets (DME)

DME Item one touch delica lancets  use to test bg x3/daily  dx:e11.65, See Instr
uctions, # 300 Each, 3 Refill(s), Pharmacy: [facility] [id number], one touc
h delica lancets; use to test bg x3/daily; dx:e11.65, Supply

Start Date: 3/13/17

Status: Ordered



HumaLOG 100 units/mL injectable solution

See Instructions, INJECT 16 units for breakfast and lunch and 19 units for dinne
r., # 40 mL, 4 Refill(s), Pharmacy: [facility], INJECT 16 units for br
eakfast and lunch and 19 units for dinner.

Start Date: 3/11/19

Status: Ordered



Insulin Pen Needles (DME)

DME Item insulin pen needle jacob [id number]  use to inject insulin  dx:e11.65, See 
Instructions, # 100 Each, 3 Refill(s), [facility], insulin p
en needle jacob [id number]; use to inject insulin; dx:e11.65, Supply

Start Date: 18

Status: Ordered



Insulin Syringe (DME)

DME Item bd insulin syringe  1ml syringe [id number]  use to inject insulin x4/day  
dx:e11.65, See Instructions, # 3 boxes, 3 Refill(s), [id number], bd insulin syringe; 1ml syringe [id number]; use to inject insulin x4/day
; dx:e11.65...

Start Date: 18

Status: Ordered



losartan 50 mg oral tablet

50 mg 1 tabs, Oral, BID, # 180 tabs, 1 Refill(s), Pharmacy: [facility]

Start Date: 19

Status: Ordered



Melatonin

10 mg, Oral, Bedtime (once a day), as needed for insomnia, 0 Refill(s)

Start Date: 14

Status: Ordered



metFORMIN 500 mg oral tablet

See Instructions, TAKE 2 TABLETS BY MOUTH  TWICE DAILY, # 360 tabs, [facility]Rx: [facility]

Start Date: 19

Status: Ordered



mometasone 50 mcg/inh nasal spray

2 sprays, Nasal, Daily, # 3 Each, 3 Refill(s), Pharmacy: [facility]

Start Date: 19

Status: Ordered



Norco 7.5 mg-325 mg oral tablet

2 tabs, Oral, q8hr, as needed for pain, # 50 tabs, 0 Refill(s)

Start Date: 19

Status: Ordered



ONE TOUCH VERIO TEST ST(NEW)100'S

See Instructions, TEST THREE TIMES DAILY, # 300 strip, 3 Refill(s), eRx: [id number], TEST THREE TIMES DAILY

Start Date: 18

Status: Ordered



potassium chloride 20 mEq oral tablet, extended release

See Instructions, TAKE 1 TABLET BY MOUTH  DAILY, # 90 tabs, 3 Refill(s), Pharmac
y: [facility], TAKE 1 TABLET BY MOUTH  DAILY

Start Date: 19

Status: Ordered



ProAir HFA 90 mcg/inh inhalation aerosol

See Instructions, INHALE 2 PUFFS 4 TIMES  DAILY AS NEEDED FOR  WHEEZING, # 25.5 
g, 5 Refill(s), eRx: [facility]

Start Date: 18

Status: Ordered



RABEprazole 20 mg oral delayed release tablet

See Instructions, TAKE 2 TABLETS BY MOUTH  DAILY, # 180 tabs, 2 Refill(s), eRx: 
[facility]

Start Date: 12/10/18

Status: Ordered



SUMAtriptan 50 mg oral tablet

See Instructions, TAKE 1 TABLET BY MOUTH DAILY AS NEEDED FOR MIGRAINE HEADACHE. 
MAY REPEAT DOSE AFTER 2 HOURS UP TO A. MAXIMUM  MG IN 24 HOURS, # 9 tabs, 
eRx: [facility] [id number]

Start Date: 9/10/18

Status: Ordered



Symbicort 160 mcg-4.5 mcg/inh inhalation aerosol

See Instructions, INHALE 2 PUFFS TWICE DAILY, # 30.6 g, 3 Refill(s), [facility]: [facility]

Start Date: 19

Status: Ordered



SYR_INS_UF_0.5ML_31GX5/16"

See Instructions, USE TO INJECT INSULIN ONCE  DAILY, # 90 Each, [facility]Rx: [facility], USE TO INJECT INSULIN ONCE  DAILY

Start Date: 18

Status: Ordered



Results





No data available for this section



Immunizations





Given and Recorded





   



                 Vaccine         Date            Status          Refusal Reason

 

   



                     tetanus/diphth/pertuss (Tdap) adult/adol     19              Given 

 

   



                     tetanus-diphth toxoids (Td) adult/adol     00             Given 







Procedures







    



              Procedure     Date         Related Diagnosis     Body Site     Status

 

    



                     L knee PMM/ part. synovectomy     13              Completed

 

    



                     lt median nerve decompression     5/10/11             Completed

 

    



                     Tubal ligation                      Completed

 

    



                           Carpal tunnel release rt        Completed

 

    



                           Cholecystectomy           Completed

 

    



                           Hysterectomy              Completed

 

    



                           Hysterectomy and bilateral        Completed



                                         salpingo-oophorectomy sample    

 

    



                           Laminectomy               Completed

 

    



                           Pacemaker                 Completed







Social History







 



                           Social History Type       Response

 

 



                           Smoking Status            Former smoker; Type: Cigarettes1



                                         entered on: 5/8/15







1Quit around 2000



Assessment and Plan

Extracted from:





  



                     Title: Office Visit Note     Author: Lagosdimas BeyerDuane     Date: 19



                                         Gualberto REED 









                                                  1.HFrecEF 

  ACC/AHA Stage chronic HFrecEFbased on echocardiogram fromJu2016 
(LVEF=55-60%), secondary to nonischemic cardiomyopathybased on coronary 
angiogram fromMa2014. She underwentCRT-D, given underlying LBBB on May 
2015. She currently reports NYHA Class I-II symptoms, looks euvolemic and well 
perfused on today's examination withadequately controlledblood 
pressure.Most recent echocardiogram from 2019revealed a preserved 
left ventricular systolic function(LVEF=55-60%), grade 1 diastolic 
dysfunction, no significant valvular abnormality and mildly elevated pulmonary 
artery systolic pressure (40 mmHg). She will continue current doses 
ofbumetanide, carvedilol and losartan. Patient is instructed to follow a low 
caloric, low-fat, low-salt diet, to become more physically active and to lose 
weight.I will see her back in clinic within 6 months, sooner if needed.

   

    

  2.CAD (coronary artery disease) 

  Single vessel obstructive coronary artery disease with prior NSTEMI in May 
2014 secondary to obstructive disease of ramus intermedius, which was unableto
be percutaneously revascularizedsincewire was notable to cross the lesion,
and evidence of nonobstructive coronary artery disease on remaining epicardial 
coronary arteries. She currently reports no chest pain,angina or worsening 
dyspnea. Echocardiogram from 2019 revealed a preserved left 
ventricular function with LVEF=55-60% and no regional wall motion 
abnormality.She will continue appropriate medical therapy for her coronary 
artery disease with dual antiplatelettherapy, including aspirin and 
clopidogrel, dualantianginal therapy, including calcium channel blockersand 
beta blockersand moderate intensity statin therapy with atorvastatin 20 mg 
daily. I will see her back in clinic within 6 months or sooner if any problem 
arises.  

    

  3.HTN (hypertension) 

  Blood pressure is adequately controlled with current doses ofdiuretics, 
losartan, clonidine and beta blockers. No changes made to her antihypertensive
therapy today.Patient is advised to follow a low salt diet, to become more 
physically active and to lose weight.  

    

  4.Hyperlipidemia 

  Lipid profile from 2018 revealed LDL=65 and MN=240. Continue 
atorvastatin 20 mg daily. Patient is advised to follow a low caloric, low-fat, 
low carbohydrate diet, to become more physically active and to lose weight.   

    

  5.Pre-operative cardiovascular evaluation 

  Elderly female with hypertension, former tobacco use, hyperlipidemia, obesity,
asthma, single vessel obstructive coronary artery disease with prior non-STEMI 
inMay involving the ramus intermedius,which was unable to 
bepercutaneously revascularizedand chronic systolic heart failure s/p BiV 
AICD implantationin May 2015 with recovered left ventricular systolic function
based on echocardiogram fromJuly  (LVEF=55-60%), presented for 
preoperative cardiovascular evaluation before undergoing excision of atypical 
ductal hypertrophy in the right breast. She reports chronic exertional dyspnea
but denies chest pain, orthopnea, PND, leg swelling, palpitations, claudication 
or syncope. She doesn't have history ofvalvularheart disease, 
arrhythmias,renal impairment,prior stroke, peripheral vascular disease or 
syncope. She is active and able to perform all her activities of daily living 
with no major limitations, achieving at least 4 METs in a daily basis. She 
will undergo anlow risk procedure. She has an elevated risk for 
perioperative cardiovascular evaluation undergoinglow risk surgery. 
However,her chronic cardiac conditions are stable and there is no further
intervention, different than medications,that would reduce her risks, 
therefore, she can undergo scheduled surgery with no 
furtherintervention.I'll recommend to hold aspirin and clopidogrel (since 
she does nothave coronary stents)7 days beforesurgery and to restartthe 
day after surgery. Recommend to continuecurrent doses of beta blockers and 
statins during the perioperative period without interruption to minimize her 
cardiovascular risk.  

   Ordered: 

   EKG (AMB)

## 2019-07-31 NOTE — XMS REPORT
Transition of Care/Referral Summary

                             Created on: 2109



BILLDYLAN VANEGAS

External Reference #: [id number]

: 1960

Sex: Female



Demographics







                          Address                   [address]

[place]  [address]

 

                          Home Phone                [phone]

 

                          Preferred Language        English

 

                          Marital Status            

 

                          Confucianist Affiliation     Yazdanism

 

                          Race                      White

 

                          Ethnic Group              Not  or 





Author







                          Author                    [facility]

 

                          Organization              [facility]

 

                          Address                   Unknown

 

                          Phone                     Unavailable







Care Team Providers







                    Care Team Member Name    Role                Phone

 

                    Dhiraj Mijares    PCP                 [phone]







Encounter





VC FIN [id number] Date(s): 9/1/15 - 9/1/15

[facility] [address] [place], [place] [address] [phone]

Discharge Diagnosis: Presence of biventricular AICD

Discharge Diagnosis: CHF (congestive heart failure)

Discharge Diagnosis: CAD (coronary artery disease)

Discharge Diagnosis: LBBB (left bundle branch block)

Discharge Disposition: 01-Home or Self Care

Attending Physician: JIM Jerry MD

Admitting Physician: JIM Jerry MD





Vital Signs







 



                           Most recent to            1



                                         oldest [Reference 



                                         Range]: 

 

 



                           Peripheral Pulse          86 bpm



                           Rate [ bpm]         (9/1/15 2:17 PM)

 

 



                           Blood Pressure            114/72 mmHg



                           [/60-90 mmHg]       (9/1/15 2:17 PM)







Problem List







    



              Condition     Effective Dates     Status       Health Status     Informant

 

    



                           Acquired                  Active  



                                         spondylolisthesis(Co    



                                         nfirmed)    

 

    



                           Acute                     Active  



                                         pain(Confirmed)    

 

    



                           Arthritis(Confirmed)      Resolved  

 

    



                           Right Carpal tunnel       Resolved  



                                         release(Confirmed)    

 

    



                           Carpal tunnel             Resolved  



                                         release    



                                         decompression(Confir    



                                         med)    

 

    



                           Cholecystectomy(Conf      Resolved  



                                         irmed)    

 

    



                     CHF (congestive      Active              patient



                                         heart    



                                         failure)(Confirmed)    

 

    



                           CAD (coronary artery      Active  



                                         disease)(Confirmed)    

 

    



                           Decompression(Confir      Resolved  



                                         med)    

 

    



                           Depression(Confirmed      Resolved  



                                         )    

 

    



                           Depression(Confirmed      Active  



                                         )    

 

    



                           Diabetes(Confirmed)       Active  

 

    



                           Diabetes(Confirmed)       Resolved  

 

    



                           GERD                      Active  



                                         (gastroesophageal    



                                         reflux    



                                         disease)(Confirmed)    

 

    



                           Ischemic                  Active  



                                         cardiomyopathy(Confi    



                                         rmed)    

 

    



                           Presence of               Active  



                                         biventricular    



                                         AICD(Confirmed)    

 

    



                           History of lumbar         Active  



                                         fusion(Confirmed)    

 

    



                           Hyperlipidemia(Confi      Resolved  



                                         rmed)    

 

    



                           Hypertension(Confirm      Resolved  



                                         ed)    

 

    



                           Hypertension(Confirm      Resolved  



                                         ed)    

 

    



                           HTN                       Active  



                                         (hypertension)(Confi    



                                         rmed)    

 

    



                           Hysterectomy(Confirm      Resolved  



                                         ed)    

 

    



                           Impaired gas              Active  



                                         exchange(Confirmed)1    

 

    



                           Irritable                 Resolved  



                                         bowel(Confirmed)    

 

    



                           Laminectomy(Confirme      Resolved  



                                         d)    

 

    



                           LBBB (left bundle         Active  



                                         branch    



                                         block)(Confirmed)    

 

    



                           LUQ pain(Confirmed)       Active  

 

    



                           Migraine(Confirmed)       Resolved  

 

    



                           Asthma(Confirmed)         Active  

 

    



                     Morbid              Active              patient



                                         obesity(Confirmed)    

 

    



                           Myocardial                Resolved  



                                         infarction(Confirmed    



                                         )    

 

    



                           Septicemia                Resolved  



                                         pneumonia(Confirmed)    

 

    



                     L knee              13              Resolved  



                                         Synovectomy(Confirme    



                                         d)    

 

    



                           Tissue perfusion          Active  



                                         alteration(Confirmed    



                                         )2    

 

    



                     Tobacco             Active              patient



                                         user(Confirmed)    

 

    



                     Tubal               79              Resolved  



                                         ligation(Confirmed)    







1Problem added automatically by system based on initiation of Impaired Gas 
Exchange Plan of Care



2Problem added automatically by system based on initiation of Tissue Perfusion 
Cerebral Plan of Care



Allergies, Adverse Reactions, Alerts







   



                 Substance       Reaction        Severity        Status

 

   



                     barium sulfate      Adverse Reaction      Active



                                         Swelling, hives  

 

   



                     celecoxib           HIVES               Active

 

   



                           influenza virus vaccine,       Active



                                         inactivated   

 

   



                     morphine            Adverse Reaction      Active

 

   



                     sulfamethoxazole     HIVES               Active



                                         Urticaria (hives)  







Medications





acetaminophen

1,000 mg, Oral, q6hr, as needed for pain, 0 Refill(s)

Start Date: 14

Status: Ordered



AcipHex 20 mg oral delayed release tablet

40 mg 2 tabs, Oral, Daily, # 180 tabs, 2 Refill(s), Pharmacy: [facility], 2 tabs Oral Daily

Start Date: 9/21/15

Status: Ordered



albuterol 2.5 mg/3 mL (0.083%) inhalation solution

3 mL, Inhalation, BID, Dx: Asthma, # 25 Each, 3 Refill(s), [facility]acy: [facility] [address], 3 mL Inhalation BID,Instr:Dx: Asthma

Start Date: 4/21/15

Status: Ordered



amLODIPine 5 mg oral tablet

See Instructions, Take 1 tablet by mouth  daily, # 90 tabs, eRx: [facility], Take 1 tablet by mouth  daily

Start Date: 2/3/16

Status: Ordered



Aspir 81

81 mg, Oral, Daily, 0 Refill(s)

Start Date: 14

Status: Ordered



atorvastatin 10 mg oral tablet

10 mg 1 tabs, Oral, Daily, # 90 tabs, 5 Refill(s), Pharmacy: [facility]
E, 1 tabs Oral Daily

Start Date: 16

Status: Ordered



bumetanide 1 mg oral tablet

See Instructions, Take 2 mg of Bumex in AM and 1 mg PM., # 300 tabs, 5 Refill(s)
, Pharmacy: [facility], Take 2 mg of Bumex in AM and 1 mg PM.

Start Date: 9/22/15

Status: Ordered



buPROPion 150 mg/12 hours (SR) oral tablet, extended release

See Instructions, Take 1 tablet by mouth two  times daily, # 180 tabs, 2 Refill(
s), eRx: [facility], Take 1 tablet by mouth two  times daily

Start Date: 3/2/16

Status: Ordered



carvedilol 25 mg oral tablet

25 mg 1 tabs, Oral, BID, # 180 tabs, 5 Refill(s), Pharmacy: [facility]
, 1 tabs Oral BID

Start Date: 9/22/15

Status: Ordered



cloNIDine 0.1 mg oral tablet

See Instructions, Take 1 tablet by mouth  every evening, # 90 tabs, 1 Refill(s),
eRx: [facility], Take 1 tablet by mouth  every evening

Start Date: 16

Status: Ordered



clopidogrel 75 mg oral tablet

See Instructions, Take 1 tablet by mouth  daily, # 90 tabs, 2 Refill(s), [facility]: [facility], Take 1 tablet by mouth  daily

Start Date: 16

Status: Ordered



digoxin 125 mcg (0.125 mg) oral tablet

125 mcg 1 tabs, Oral, Daily, # 90 tabs, 5 Refill(s), [facility], 1 tabs Oral Daily

Start Date: 9/22/15

Status: Ordered



DULoxetine 20 mg oral delayed release capsule

See Instructions, Take 1 capsule by mouth  daily do not crush or chew, # 90 caps
, eRx: [facility], Take 1 capsule by mouth  daily do not crush or chew

Start Date: 16

Status: Ordered



glipiZIDE 5 mg oral tablet

See Instructions, Take 1 tablet by mouth two  times daily, # 180 tabs, [facility]: [facility], Take 1 tablet by mouth two  times daily

Start Date: 2/3/16

Status: Ordered



Insulin Syringe (DME)

DME Item use to inject insulin once daily     dx: E11.9, See Instructions, # 90 
Each, 4 Refill(s), Pharmacy: [id number], use to inject insulin o
nce daily     dx: E11.9, Supply

Start Date: 11/10/15

Status: Ordered



Lantus 100 units/mL subcutaneous solution

See Instructions, Inject subcutaneously 30  units every night at  bedtime once a
day, # 30 mL, 2 Refill(s), eRx: [facility], Inject subcutaneously 30  
units every night at  bedtime once a day

Start Date: 7/31/15

Status: Ordered



Melatonin

10 mg, Oral, Bedtime (once a day), as needed for insomnia, 0 Refill(s)

Start Date: 14

Status: Ordered



metFORMIN 500 mg oral tablet

See Instructions, Take 2 tablets by mouth  twice a day, # 360 tabs, [facility]: [facility], Take 2 tablets by mouth  twice a day

Start Date: 2/3/16

Status: Ordered



Norco 10 mg-325 mg oral tablet

1 tabs, Oral, q6hr, as needed for pain, # 30 tabs, 0 Refill(s)

Start Date: 16

Status: Ordered



OTC Calcium

OTC Calcium, See Instructions, 1 tablet oral daily, 0 Refill(s)

Start Date: 2/3/15

Status: Ordered



potassium chloride 20 mEq oral tablet, extended release

See Instructions, Take 1 tablet by mouth  daily, # 90 unknown unit, 1 Refill(s),
eRx: [facility], Take 1 tablet by mouth  daily

Start Date: 10/2/15

Status: Ordered



ProAir HFA 90 mcg/inh inhalation aerosol

2 puffs, Inhalation, QID, as needed for wheezing, # 3 Each, 3 Refill(s), Pharmac
y: [facility]

Start Date: 1/23/15

Status: Ordered



SUMAtriptan 50 mg oral tablet

50 mg 1 tabs, Oral, Daily, as needed for migraine headache, may repeat dose afte
r 2 hours up to a maximum of 200 mg in 24 hours, # 9 tabs, 1 Refill(s), Pharmacy
: [id number], 1 tabs Oral Daily,PRN:as needed for migraine heada
godwin,Instr:m...

Start Date: 16

Status: Ordered



Symbicort 160 mcg-4.5 mcg/inh inhalation aerosol

See Instructions, Use 2 puffs twice daily, # 10.2 g, 2 Refill(s), [facility], Use 2 puffs twice daily

Start Date: 12/2/15

Status: Ordered



valsartan 160 mg oral tablet

See Instructions, 1 TABS ORAL DAILY, # 30 tabs, 1 Refill(s), [facility]: [id number], 1 TABS ORAL DAILY

Start Date: 10/12/15

Status: Ordered



Results





No data available for this section



Immunizations







  



                     Vaccine             Date                Refusal Reason

 

  



                           tetanus-diphth toxoids (Td) adult/adol     00 







Procedures







   



                 Procedure       Date            Related Diagnosis     Body Site

 

   



                           L knee PMM/ part. synovectomy     13  

 

   



                           lt median nerve decompression     5/10/11  

 

   



                           Tubal ligation            1979  

 

   



                                         Carpal tunnel release rt   

 

   



                                         Cholecystectomy   

 

   



                                         Hysterectomy   

 

   



                                         Hysterectomy and bilateral   



                                         salpingo-oophorectomy sample   

 

   



                                         Laminectomy   

 

   



                                         Pacemaker   







Social History







 



                           Social History Type       Response

 

 



                           Smoking Status            Former smoker; Type: Cigarettes1







1Quit around 



Assessment and Plan

Extracted from:





  



                     Title: Office Visit Note     Author: JIM Jerry MD     Date: 9/1/15









                                         Assessment/Plan

 1.Presence of biventricular AICD  Ordered:

 Icd Device Progr Eval Mult 51837

 Office Visit Level 3 Est 61599

 Return to Clinic 

 CAD (coronary artery disease) 

 CHF (congestive heart failure) 

 LBBB (left bundle branch block) 







Referrals to Other Providers





Referred by: JIM Jerry MD

## 2019-07-31 NOTE — XMS REPORT
Transition of Care/Referral Summary

                             Created on: 2110



DYLAN KHAN

External Reference #: [id number]

: 1960

Sex: Female



Demographics







                          Address                   [address]

[place]  [address]

 

                          Home Phone                [phone]

 

                          Preferred Language        English

 

                          Marital Status            

 

                          Islam Affiliation     Shinto

 

                          Race                      White

 

                                        Additional Race(s)  

 

                          Ethnic Group              Not  or 





Author







                          Author                    [facility]

 

                          Organization              [facility]

 

                          Address                   Unknown

 

                          Phone                     Unavailable







Care Team Providers







                    Care Team Member Name    Role                Phone

 

                    Dhiraj Mijares    PCP                 [phone]







Encounter





VC FIN [id number] Date(s): 19 - 19

[facility] [address] [place] [address]  [phone]
[phone]

Discharge Disposition: 01-Home or Self Care

Attending Physician: Lexi Velazquez MD

Admitting Physician: Lexi Velazquez MD





Vital Signs







 



                           Most recent to            1



                                         oldest [Reference 



                                         Range]: 

 

 



                           Temperature Oral          36.7 degC



                           [35.8-37.3 degC]          (19 10:09 AM)

 

 



                           Temperature Temporal      36.1 degC



                           Artery [36.3-37.8         *LOW*



                           degC]                     (19 1:30 PM)

 

 



                           Peripheral Pulse          94 bpm



                           Rate [ bpm]         (19 10:09 AM)

 

 



                           Heart Rate Monitored      82 bpm



                           [ bpm]              (19 9:09 PM)

 

 



                           Respiratory Rate          16 br/min



                           [14-20 br/min]            (19 10:13 AM)

 

 



                           Blood Pressure            122/65 mmHg



                           [/60-90 mmHg]       (19 10:09 AM)

 

 



                           Mean Arterial             71 mmHg



                           Pressure, Cuff            (19 3:00 AM)

 

 



                           Pulse Rate [        84 bpm



                           bpm]                      (19 10:14 AM)

 

 



                           SpO2                      94 %



                                         (19 10:09 AM)

 

 



                           Remote Telemetry          Ongoing



                                         (19 8:30 PM)







Problem List







    



              Condition     Effective Dates     Status       Health Status     Informant

 

    



                           Acquired                  Active  



                                         spondylolisthesis(Co    



                                         nfirmed)    

 

    



                           Hay fever(Confirmed)      Active  

 

    



                           Arthritis(Confirmed)      Resolved  

 

    



                           Carpal tunnel             Resolved  



                                         release    



                                         decompression(Confir    



                                         med)    

 

    



                           Right Carpal tunnel       Resolved  



                                         release(Confirmed)    

 

    



                           Cholecystectomy(Conf      Resolved  



                                         irmed)    

 

    



                           IBS (irritable bowel      Active  



                                         syndrome)(Confirmed)    

 

    



                     CHF (congestive      Active              patient



                                         heart    



                                         failure)(Confirmed)    

 

    



                           CAD (coronary artery      Active  



                                         disease)(Confirmed)    

 

    



                           Decompression(Confir      Resolved  



                                         med)    

 

    



                           Depression(Confirmed      Resolved  



                                         )    

 

    



                           Depression(Confirmed      Active  



                                         )    

 

    



                           Diabetes(Confirmed)       Active  

 

    



                           Diabetes(Confirmed)       Resolved  

 

    



                           Diabetic                  Active  



                                         neuropathy(Confirmed    



                                         )    

 

    



                           Diabetic peripheral       Active  



                                         neuropathy(Confirmed    



                                         )    

 

    



                           GERD                      Active  



                                         (gastroesophageal    



                                         reflux    



                                         disease)(Confirmed)    

 

    



                     Ischemic            < 5/8/15            Resolved  



                                         cardiomyopathy(Confi    



                                         rmed)    

 

    



                           Presence of               Active  



                                         biventricular    



                                         AICD(Confirmed)    

 

    



                           History of lumbar         Active  



                                         fusion(Confirmed)    

 

    



                           Hyperlipidemia(Confi      Active  



                                         rmed)    

 

    



                     Asthma(Confirmed)     < 17           Resolved  

 

    



                           Hypertension(Confirm      Resolved  



                                         ed)    

 

    



                           Hypertension(Confirm      Resolved  



                                         ed)    

 

    



                           HTN                       Active  



                                         (hypertension)(Confi    



                                         rmed)    

 

    



                           Hysterectomy(Confirm      Resolved  



                                         ed)    

 

    



                           Irritable                 Resolved  



                                         bowel(Confirmed)    

 

    



                           Laminectomy(Confirme      Resolved  



                                         d)    

 

    



                           LBBB (left bundle         Active  



                                         branch    



                                         block)(Confirmed)    

 

    



                           LUQ pain(Confirmed)       Active  

 

    



                           Long term current         Active  



                                         use of    



                                         insulin(Confirmed)    

 

    



                           Migraine(Confirmed)       Resolved  

 

    



                           Asthma(Confirmed)         Active  

 

    



                     Morbid              Active              patient



                                         obesity(Confirmed)    

 

    



                           Myocardial                Resolved  



                                         infarction(Confirmed    



                                         )    

 

    



                           Septicemia                Resolved  



                                         pneumonia(Confirmed)    

 

    



                     L knee              13              Resolved  



                                         Synovectomy(Confirme    



                                         d)    

 

    



                     Tobacco             Active              patient



                                         user(Confirmed)    

 

    



                     Tubal               79              Resolved  



                                         ligation(Confirmed)    

 

    



                           Type 2 diabetes           Active  



                                         mellitus(Confirmed)    

 

    



                           UTI (urinary tract        Active  



                                         infection)(Confirmed    



                                         )    

 

    



                     Chicken             < 17           Resolved  



                                         pox(Confirmed)    







Allergies, Adverse Reactions, Alerts







   



                 Substance       Reaction        Severity        Status

 

   



                     sulfamethoxazole     Urticaria (hives)      Active



                                         HIVES  

 

   



                     morphine            Burning             Active

 

   



                     barium sulfate      Swelling, hives      Active

 

   



                     celecoxib           HIVES               Active

 

   



                     influenza virus vaccine,     Convulsion          Active



                                         inactivated   

 

   



                           Strawberry C              Active







Medications





acetaminophen

1,000 mg, Oral, q6hr, as needed for pain, 0 Refill(s)

Start Date: 14

Status: Ordered



Ambien 10 mg oral tablet

10 mg 1 tabs, Oral, Bedtime (once a day), as needed for sleep, [phone], #
90 tabs, 0 Refill(s)

Start Date: 19

Stop Date: 19

Status: Ordered



Aspir 81

81 mg, Oral, Daily, 0 Refill(s)

Start Date: 14

Status: Ordered



atorvastatin 20 mg oral tablet

20 mg 1 tabs, Oral, Daily, # 90 tabs, 1 Refill(s), Pharmacy: [facility], 1 tabs Oral Daily

Start Date: 19

Status: Ordered



Basaglar KwikPen 100 units/mL subcutaneous solution

See Instructions, INJECT SUBCUTANEOUSLY 56 units at hs, # 15 mL, 3 Refill(s), Ph
armacy: [facility], INJECT SUBCUTANEOUSLY 56 units at hs

Start Date: 3/11/19

Status: Ordered



Bentyl 20 mg oral tablet

20 mg 1 tabs, Oral, q8hr, Abdominal Cramping, # 15 tabs, 0 Refill(s), Pharmacy: 
[facility] [id number], 1 tabs Oral q8hr,PRN:Abdominal Cramping

Start Date: 3/28/19

Status: Ordered



bumetanide 1 mg oral tablet

1 mg 1 tabs, Oral, After Lunch, pm, 0 Refill(s)

Start Date: 19

Status: Ordered



bumetanide 2 mg oral tablet

2 mg 1 tabs, Oral, Daily, am, 0 Refill(s)

Start Date: 19

Status: Ordered



buPROPion 150 mg/12 hours (SR) oral tablet, extended release

150 mg 1 tabs, Oral, BID, 0 Refill(s)

Start Date: 19

Status: Ordered



carvedilol

25 mg, Oral, BID, 0 Refill(s)

Start Date: 19

Status: Ordered



cloNIDine 0.2 mg oral tablet

0.2 mg 1 tabs, Oral, BID, 0 Refill(s)

Start Date: 19

Status: Ordered



clopidogrel 75 mg oral tablet

75 mg 1 tabs, Oral, Daily, 0 Refill(s)

Start Date: 19

Status: Ordered



colchicine 0.6 mg oral capsule

0.6 mg 1 caps, Oral, Daily, at first sign of gout.  May repeat in one hr.  Max 1
.8mg/24 hrs. Must wait for 3 days before next dose., 0 Refill(s)

Start Date: 19

Status: Ordered



DULoxetine

60 mg, Oral, Daily, take with duloxetine 20mg, 0 Refill(s)

Start Date: 19

Status: Ordered



DULoxetine 20 mg oral delayed release capsule

20 mg 1 caps, Oral, Daily, take with duloxetine 60 mg, 0 Refill(s)

Start Date: 19

Status: Ordered



gabapentin 100 mg oral capsule

See Instructions, 1-3 caps up to 3 times daily, # 100 caps, 3 Refill(s)

Start Date: 18

Status: Ordered



HumaLOG 100 units/mL injectable solution

See Instructions, INJECT 16 units for breakfast and lunch and 19 units for dinne
r., # 40 mL, 4 Refill(s), Pharmacy: [facility], INJECT 16 units for br
eakfast and lunch and 19 units for dinner.

Start Date: 3/11/19

Status: Ordered



losartan 50 mg oral tablet

50 mg 1 tabs, Oral, BID, # 180 tabs, 1 Refill(s), Pharmacy: [facility]

Start Date: 19

Status: Ordered



Melatonin

10 mg, Oral, Bedtime (once a day), as needed for insomnia, 0 Refill(s)

Start Date: 14

Status: Ordered



metFORMIN

1,000 mg, Oral, BID, 0 Refill(s)

Start Date: 19

Status: Ordered



mometasone 50 mcg/inh nasal spray

2 sprays, Nasal, Daily, # 3 Each, 3 Refill(s), Pharmacy: [facility]

Start Date: 19

Status: Ordered



Norco 7.5 mg-325 mg oral tablet

2 tabs, Oral, q8hr, as needed for pain, # 50 tabs, 0 Refill(s)

Start Date: 19

Status: Ordered



potassium chloride 20 mEq oral tablet, extended release

20 mEq 1 tabs, Oral, Daily, 0 Refill(s)

Start Date: 19

Status: Ordered



ProAir HFA 90 mcg/inh inhalation aerosol

See Instructions, INHALE 2 PUFFS 4 TIMES  DAILY AS NEEDED FOR  WHEEZING, # 25.5 
g, 5 Refill(s), eRx: [facility]

Start Date: 18

Status: Ordered



RABEprazole 20 mg oral delayed release tablet

20 mg 1 tabs, Oral, BID, 0 Refill(s)

Start Date: 19

Status: Ordered



SUMAtriptan

50 mg, Oral, Once, as needed for migraine headache, 0 Refill(s)

Start Date: 19

Status: Ordered



Symbicort 160 mcg-4.5 mcg/inh inhalation aerosol

See Instructions, INHALE 2 PUFFS TWICE DAILY, # 30.6 g, 3 Refill(s), [facility]: [facility]

Start Date: 19

Status: Ordered



Results











 



                           Most recent to            1



                                         oldest [Reference 



                                         Range]: 

 

 



                           BUN [4-20 mg/dL]          13 mg/dL



                                         (19 9:37 AM)

 

 



                           Glucose Lvl [       96 mg/dL



                           mg/dL]                    (19 9:37 AM)

 

 



                           Potassium Lvl             3.7 mEq/L



                           [3.6-5.1 mEq/L]           (19 9:37 AM)

 

 



                           Sodium Lvl [136-144       138 mEq/L



                           mEq/L]                    (19 9:37 AM)

 

 



                           Calcium Lvl               8.9 mg/dL



                           [8.6-10.0 mg/dL]          (19 9:37 AM)

 

 



                           Chloride [          98 mEq/L



                           mEq/L]                    *LOW*



                                         (19 9:37 AM)

 

 



                           CO2 [22-32 mEq/L]         30 mEq/L



                                         (19 9:37 AM)

 

 



                           AGAP [3-20 mEq/L]         10 mEq/L



                                         (19 9:37 AM)

 

 



                           eGFR [>60 mL/min]         >60 mL/min 1



                                         (19 9:37 AM)

 

 



                           Creatinine Lvl            0.91 mg/dL



                           [0.44-1.03 mg/dL]         (19 9:37 AM)

 

 



                           Blood Glucose,            74 mg/dL



                           Capillary [         (19 6:17 AM)



                                         mg/dL] 







1Result Comment: Multiply eGFR results by 1.21 for African American race.



Immunizations





Given and Recorded





   



                 Vaccine         Date            Status          Refusal Reason

 

   



                     tetanus/diphth/pertuss (Tdap) adult/adol     19              Given 

 

   



                     tetanus-diphth toxoids (Td) adult/adol     00             Given 







Procedures







    



              Procedure     Date         Related Diagnosis     Body Site     Status

 

    



                     Lumpectomy Breast1     19             Completed

 

    



                     Placement of breast localization device(s)     19             Completed



                                         (eg, clip, metallic pellet, wire/needle,    



                                         radioactive seeds), percutaneous; first    



                                         lesion, including mammographic guidance    

 

    



                     L knee PMM/ part. synovectomy     13              Completed

 

    



                     lt median nerve decompression     5/10/11             Completed

 

    



                     Tubal ligation                      Completed

 

    



                           Carpal tunnel release rt        Completed

 

    



                           Cholecystectomy           Completed

 

    



                           Hysterectomy              Completed

 

    



                           Hysterectomy and bilateral        Completed



                                         salpingo-oophorectomy sample    

 

    



                           Laminectomy               Completed

 

    



                           Pacemaker                 Completed







1auto-populated from documented surgical case



Social History







 



                           Social History Type       Response

 

 



                           Smoking Status            Former smoker; Type: Cigarettes1



                                         entered on: 5/8/15







1Quit around 



Assessment and Plan





No data available for this section

## 2019-07-31 NOTE — XMS REPORT
Transition of Care/Referral Summary

                             Created on: 2067



DYLAN KHAN

External Reference #: [id number]

: 1960

Sex: Female



Demographics







                          Address                   [address]  [address]

 

                          Home Phone                [phone]

 

                          Preferred Language        English

 

                          Marital Status            

 

                          Church Affiliation     [employer]

 

                          Race                      White

 

                          Ethnic Group              Not  or 





Author







                          Author                    [facility]

 

                          Organization              [facility]

 

                          Address                   Unknown

 

                          Phone                     Unavailable







Care Team Providers







                    Care Team Member Name    Role                Phone

 

                    Dhiraj Mijares    PCP                 [phone]







Encounter





VC FIN [id number] Date(s): 8/25/15 - 8/25/15

[facility] [address] [place], [place] [address]
S [phone]

Discharge Diagnosis: Abdominal pain

Discharge Diagnosis: Hepatomegaly

Discharge Diagnosis: Hiatal hernia

Discharge Diagnosis: Fatty liver

Discharge Disposition: -Home or Self Care

Attending Physician: Dhiraj Mijares MD

Admitting Physician: Dhiraj Mijares MD





Vital Signs







 



                           Most recent to            1



                                         oldest [Reference 



                                         Range]: 

 

 



                           Blood Pressure            136/76 mmHg



                           [/60-90 mmHg]       (8/25/15 2:20 PM)







Problem List







    



              Condition     Effective Dates     Status       Health Status     Informant

 

    



                           Acquired                  Active  



                                         spondylolisthesis(Co    



                                         nfirmed)    

 

    



                           Acute                     Active  



                                         pain(Confirmed)    

 

    



                           Arthritis(Confirmed)      Resolved  

 

    



                           Right Carpal tunnel       Resolved  



                                         release(Confirmed)    

 

    



                           Carpal tunnel             Resolved  



                                         release    



                                         decompression(Confir    



                                         med)    

 

    



                           Cholecystectomy(Conf      Resolved  



                                         irmed)    

 

    



                     CHF (congestive      Active              patient



                                         heart    



                                         failure)(Confirmed)    

 

    



                           CAD (coronary artery      Active  



                                         disease)(Confirmed)    

 

    



                           Decompression(Confir      Resolved  



                                         med)    

 

    



                           Depression(Confirmed      Resolved  



                                         )    

 

    



                           Depression(Confirmed      Active  



                                         )    

 

    



                           Diabetes(Confirmed)       Active  

 

    



                           Diabetes(Confirmed)       Resolved  

 

    



                           GERD                      Active  



                                         (gastroesophageal    



                                         reflux    



                                         disease)(Confirmed)    

 

    



                           Ischemic                  Active  



                                         cardiomyopathy(Confi    



                                         rmed)    

 

    



                           Presence of               Active  



                                         biventricular    



                                         AICD(Confirmed)    

 

    



                           History of lumbar         Active  



                                         fusion(Confirmed)    

 

    



                           Hyperlipidemia(Confi      Resolved  



                                         rmed)    

 

    



                           Hypertension(Confirm      Resolved  



                                         ed)    

 

    



                           Hypertension(Confirm      Resolved  



                                         ed)    

 

    



                           HTN                       Active  



                                         (hypertension)(Confi    



                                         rmed)    

 

    



                           Hysterectomy(Confirm      Resolved  



                                         ed)    

 

    



                           Impaired gas              Active  



                                         exchange(Confirmed)1    

 

    



                           Irritable                 Resolved  



                                         bowel(Confirmed)    

 

    



                           Laminectomy(Confirme      Resolved  



                                         d)    

 

    



                           LBBB (left bundle         Active  



                                         branch    



                                         block)(Confirmed)    

 

    



                           LUQ pain(Confirmed)       Active  

 

    



                           Migraine(Confirmed)       Resolved  

 

    



                           Asthma(Confirmed)         Active  

 

    



                     Morbid              Active              patient



                                         obesity(Confirmed)    

 

    



                           Myocardial                Resolved  



                                         infarction(Confirmed    



                                         )    

 

    



                           Septicemia                Resolved  



                                         pneumonia(Confirmed)    

 

    



                     L knee              13              Resolved  



                                         Synovectomy(Confirme    



                                         d)    

 

    



                           Tissue perfusion          Active  



                                         alteration(Confirmed    



                                         )2    

 

    



                     Tobacco             Active              patient



                                         user(Confirmed)    

 

    



                     Tubal               79              Resolved  



                                         ligation(Confirmed)    







1Problem added automatically by system based on initiation of Impaired Gas 
Exchange Plan of Care



2Problem added automatically by system based on initiation of Tissue Perfusion 
Cerebral Plan of Care



Allergies, Adverse Reactions, Alerts







   



                 Substance       Reaction        Severity        Status

 

   



                     barium sulfate      Adverse Reaction      Active



                                         Swelling, hives  

 

   



                     celecoxib           HIVES               Active

 

   



                           influenza virus vaccine,       Active



                                         inactivated   

 

   



                     morphine            Adverse Reaction      Active

 

   



                     sulfamethoxazole     HIVES               Active



                                         Urticaria (hives)  







Medications





acetaminophen

1,000 mg, Oral, q6hr, as needed for pain, 0 Refill(s)

Start Date: 14

Status: Ordered



AcipHex 20 mg oral delayed release tablet

40 mg 2 tabs, Oral, Daily, # 180 tabs, 2 Refill(s), Pharmacy: [facility], 2 tabs Oral Daily

Start Date: 9/21/15

Status: Ordered



albuterol 2.5 mg/3 mL (0.083%) inhalation solution

3 mL, Inhalation, BID, Dx: Asthma, # 25 Each, 3 Refill(s), [facility]: [id number], 3 mL Inhalation BID,Instr:Dx: Asthma

Start Date: 4/21/15

Status: Ordered



amLODIPine 5 mg oral tablet

See Instructions, Take 1 tablet by mouth  daily, # 90 tabs, [facility]: [facility], Take 1 tablet by mouth  daily

Start Date: 2/3/16

Status: Ordered



Aspir 81

81 mg, Oral, Daily, 0 Refill(s)

Start Date: 14

Status: Ordered



atorvastatin 10 mg oral tablet

10 mg 1 tabs, Oral, Daily, # 90 tabs, 5 Refill(s), Pharmacy: [facility]
E, 1 tabs Oral Daily

Start Date: 16

Status: Ordered



bumetanide 1 mg oral tablet

See Instructions, Take 2 mg of Bumex in AM and 1 mg PM., # 300 tabs, 5 Refill(s)
, Pharmacy: [facility], Take 2 mg of Bumex in AM and 1 mg PM.

Start Date: 9/22/15

Status: Ordered



buPROPion 150 mg/12 hours (SR) oral tablet, extended release

See Instructions, Take 1 tablet by mouth two  times daily, # 180 tabs, 2 Refill(
s), eRx: [facility], Take 1 tablet by mouth two  times daily

Start Date: 3/2/16

Status: Ordered



carvedilol 25 mg oral tablet

25 mg 1 tabs, Oral, BID, # 180 tabs, 5 Refill(s), Pharmacy: [facility]
, 1 tabs Oral BID

Start Date: 9/22/15

Status: Ordered



cloNIDine 0.1 mg oral tablet

See Instructions, Take 1 tablet by mouth  every evening, # 90 tabs, 1 Refill(s),
eRx: [facility], Take 1 tablet by mouth  every evening

Start Date: 16

Status: Ordered



clopidogrel 75 mg oral tablet

See Instructions, Take 1 tablet by mouth  daily, # 90 tabs, 2 Refill(s), [facility]: [facility], Take 1 tablet by mouth  daily

Start Date: 16

Status: Ordered



digoxin 125 mcg (0.125 mg) oral tablet

125 mcg 1 tabs, Oral, Daily, # 90 tabs, 5 Refill(s), [facility], 1 tabs Oral Daily

Start Date: 9/22/15

Status: Ordered



DULoxetine 20 mg oral delayed release capsule

See Instructions, Take 1 capsule by mouth  daily do not crush or chew, # 90 caps
, eRx: [facility], Take 1 capsule by mouth  daily do not crush or chew

Start Date: 16

Status: Ordered



glipiZIDE 5 mg oral tablet

See Instructions, Take 1 tablet by mouth two  times daily, # 180 tabs, [facility], Take 1 tablet by mouth two  times daily

Start Date: 2/3/16

Status: Ordered



Insulin Syringe (DME)

DME Item use to inject insulin once daily     dx: E11.9, See Instructions, # 90 
Each, 4 Refill(s), Pharmacy: [id number], use to inject insulin o
nce daily     dx: E11.9, Supply

Start Date: 11/10/15

Status: Ordered



Lantus 100 units/mL subcutaneous solution

See Instructions, Inject subcutaneously 30  units every night at  bedtime once a
day, # 30 mL, 2 Refill(s), eRx: [facility], Inject subcutaneously 30  
units every night at  bedtime once a day

Start Date: 7/31/15

Status: Ordered



Melatonin

10 mg, Oral, Bedtime (once a day), as needed for insomnia, 0 Refill(s)

Start Date: 14

Status: Ordered



metFORMIN 500 mg oral tablet

See Instructions, Take 2 tablets by mouth  twice a day, # 360 tabs, [facility]: [facility], Take 2 tablets by mouth  twice a day

Start Date: 2/3/16

Status: Ordered



Norco 10 mg-325 mg oral tablet

1 tabs, Oral, q6hr, as needed for pain, # 30 tabs, 0 Refill(s)

Start Date: 16

Status: Ordered



OTC Calcium

OTC Calcium, See Instructions, 1 tablet oral daily, 0 Refill(s)

Start Date: 2/3/15

Status: Ordered



potassium chloride 20 mEq oral tablet, extended release

See Instructions, Take 1 tablet by mouth  daily, # 90 unknown unit, 1 Refill(s),
eRx: [facility], Take 1 tablet by mouth  daily

Start Date: 10/2/15

Status: Ordered



ProAir HFA 90 mcg/inh inhalation aerosol

2 puffs, Inhalation, QID, as needed for wheezing, # 3 Each, 3 Refill(s), Pharmac
y: [facility]

Start Date: 1/23/15

Status: Ordered



SUMAtriptan 50 mg oral tablet

50 mg 1 tabs, Oral, Daily, as needed for migraine headache, may repeat dose afte
r 2 hours up to a maximum of 200 mg in 24 hours, # 9 tabs, 1 Refill(s), Pharmacy
: [id number], 1 tabs Oral Daily,PRN:as needed for migraine heada
godwin,Instr:m...

Start Date: 16

Status: Ordered



Symbicort 160 mcg-4.5 mcg/inh inhalation aerosol

See Instructions, Use 2 puffs twice daily, # 10.2 g, 2 Refill(s), [facility]: [facility], Use 2 puffs twice daily

Start Date: 12/2/15

Status: Ordered



valsartan 160 mg oral tablet

See Instructions, 1 TABS ORAL DAILY, # 30 tabs, 1 Refill(s), [facility]: [id number], 1 TABS ORAL DAILY

Start Date: 10/12/15

Status: Ordered



Results





No data available for this section



Immunizations







  



                     Vaccine             Date                Refusal Reason

 

  



                           tetanus-diphth toxoids (Td) adult/adol     00 







Procedures







   



                 Procedure       Date            Related Diagnosis     Body Site

 

   



                           L knee PMM/ part. synovectomy     13  

 

   



                           lt median nerve decompression     5/10/11  

 

   



                           Tubal ligation            1979  

 

   



                                         Carpal tunnel release rt   

 

   



                                         Cholecystectomy   

 

   



                                         Hysterectomy   

 

   



                                         Hysterectomy and bilateral   



                                         salpingo-oophorectomy sample   

 

   



                                         Laminectomy   

 

   



                                         Pacemaker   







Social History







 



                           Social History Type       Response

 

 



                           Smoking Status            Former smoker; Type: Cigarettes1







1Quit around 



Assessment and Plan

Extracted from:





  



                     Title: Office Visit Note     Author: Dhiraj Mijares MD     Date: 8/25/15









                                         Assessment/Plan

 1.Abdominal pain

 2.Hepatomegaly  Cause is unclear but this is most likely related to fatty 
liver change

 3.Fatty liver  Aggravated by her weight

 4.Hiatal hernia  Still symptomatic, despite omeprazole



 I recommended we try changing her from omeprazole to Dexilant 60 mg daily. 
Samples provided. Follow-up for results in the next one to 2 weeks

 I reassured her that I think the hepatomegaly is most likely related to her 
fatty liver change, even though her liver enzymes are normal. The rest of her 
abdominal CT was normal. No change or other evaluation indicated at this 
time. If her GI symptoms persist despite changing her to Dexilant, we will 
probably pursue referral to a GI specialist for further evaluation anyway.

## 2019-07-31 NOTE — XMS REPORT
Transition of Care/Referral Summary

                             Created on: 2042



DYLAN KHAN

External Reference #: [id number]

: 1960

Sex: Female



Demographics







                          Address                   [address]  [address]

 

                          Home Phone                [phone]

 

                          Preferred Language        English

 

                          Marital Status            

 

                          Christianity Affiliation     Mormonism

 

                          Race                      White

 

                          Ethnic Group              Not  or 





Author







                          Author                    [facility]

 

                          Organization              [facility]

 

                          Address                   Unknown

 

                          Phone                     Unavailable







Care Team Providers







                    Care Team Member Name    Role                Phone

 

                    Dhiraj Mijares    PCP                 [phone]







Encounter





[facility] [id number] Date(s): 9/17/15 - 9/17/15

[facility] [address] [place], [place] [address]
S [phone]

Discharge Diagnosis: DM2 (diabetes mellitus, type 2)

Discharge Diagnosis: Epigastric pain

Discharge Disposition: 01-Home or Self Care

Attending Physician: Ember Schwarz

Admitting Physician: Ember Schwarz





Vital Signs







 



                           Most recent to            1



                                         oldest [Reference 



                                         Range]: 

 

 



                           Blood Pressure            130/72 mmHg



                           [/60-90 mmHg]       (9/17/15 8:35 AM)







Problem List







    



              Condition     Effective Dates     Status       Health Status     Informant

 

    



                           Acquired                  Active  



                                         spondylolisthesis(Co    



                                         nfirmed)    

 

    



                           Acute                     Active  



                                         pain(Confirmed)    

 

    



                           Arthritis(Confirmed)      Resolved  

 

    



                           Right Carpal tunnel       Resolved  



                                         release(Confirmed)    

 

    



                           Carpal tunnel             Resolved  



                                         release    



                                         decompression(Confir    



                                         med)    

 

    



                           Cholecystectomy(Conf      Resolved  



                                         irmed)    

 

    



                     CHF (congestive      Active              patient



                                         heart    



                                         failure)(Confirmed)    

 

    



                           CAD (coronary artery      Active  



                                         disease)(Confirmed)    

 

    



                           Decompression(Confir      Resolved  



                                         med)    

 

    



                           Depression(Confirmed      Resolved  



                                         )    

 

    



                           Depression(Confirmed      Active  



                                         )    

 

    



                           Diabetes(Confirmed)       Active  

 

    



                           Diabetes(Confirmed)       Resolved  

 

    



                           GERD                      Active  



                                         (gastroesophageal    



                                         reflux    



                                         disease)(Confirmed)    

 

    



                           Ischemic                  Active  



                                         cardiomyopathy(Confi    



                                         rmed)    

 

    



                           Presence of               Active  



                                         biventricular    



                                         AICD(Confirmed)    

 

    



                           History of lumbar         Active  



                                         fusion(Confirmed)    

 

    



                           Hyperlipidemia(Confi      Resolved  



                                         rmed)    

 

    



                           Hypertension(Confirm      Resolved  



                                         ed)    

 

    



                           Hypertension(Confirm      Resolved  



                                         ed)    

 

    



                           HTN                       Active  



                                         (hypertension)(Confi    



                                         rmed)    

 

    



                           Hysterectomy(Confirm      Resolved  



                                         ed)    

 

    



                           Impaired gas              Active  



                                         exchange(Confirmed)1    

 

    



                           Irritable                 Resolved  



                                         bowel(Confirmed)    

 

    



                           Laminectomy(Confirme      Resolved  



                                         d)    

 

    



                           LBBB (left bundle         Active  



                                         branch    



                                         block)(Confirmed)    

 

    



                           LUQ pain(Confirmed)       Active  

 

    



                           Migraine(Confirmed)       Resolved  

 

    



                           Asthma(Confirmed)         Active  

 

    



                     Morbid              Active              patient



                                         obesity(Confirmed)    

 

    



                           Myocardial                Resolved  



                                         infarction(Confirmed    



                                         )    

 

    



                           Septicemia                Resolved  



                                         pneumonia(Confirmed)    

 

    



                     L knee              13              Resolved  



                                         Synovectomy(Confirme    



                                         d)    

 

    



                           Tissue perfusion          Active  



                                         alteration(Confirmed    



                                         )2    

 

    



                     Tobacco             Active              patient



                                         user(Confirmed)    

 

    



                     Tubal               79              Resolved  



                                         ligation(Confirmed)    







1Problem added automatically by system based on initiation of Impaired Gas 
Exchange Plan of Care



2Problem added automatically by system based on initiation of Tissue Perfusion 
Cerebral Plan of Care



Allergies, Adverse Reactions, Alerts







   



                 Substance       Reaction        Severity        Status

 

   



                     barium sulfate      Adverse Reaction      Active



                                         Swelling, hives  

 

   



                     celecoxib           HIVES               Active

 

   



                           influenza virus vaccine,       Active



                                         inactivated   

 

   



                     morphine            Adverse Reaction      Active

 

   



                     sulfamethoxazole     HIVES               Active



                                         Urticaria (hives)  







Medications





acetaminophen

1,000 mg, Oral, q6hr, as needed for pain, 0 Refill(s)

Start Date: 14

Status: Ordered



AcipHex 20 mg oral delayed release tablet

40 mg 2 tabs, Oral, Daily, # 180 tabs, 2 Refill(s), Pharmacy: [facility], 2 tabs Oral Daily

Start Date: 9/21/15

Status: Ordered



albuterol 2.5 mg/3 mL (0.083%) inhalation solution

3 mL, Inhalation, BID, Dx: Asthma, # 25 Each, 3 Refill(s), [facility]: [facility]
[place] [id number], 3 mL Inhalation BID,Instr:Dx: Asthma

Start Date: 4/21/15

Status: Ordered



amLODIPine 5 mg oral tablet

See Instructions, Take 1 tablet by mouth  daily, # 90 tabs, [facility], Take 1 tablet by mouth  daily

Start Date: 2/3/16

Status: Ordered



Aspir 81

81 mg, Oral, Daily, 0 Refill(s)

Start Date: 14

Status: Ordered



atorvastatin 10 mg oral tablet

10 mg 1 tabs, Oral, Daily, # 90 tabs, 5 Refill(s), Pharmacy: [facility]
E, 1 tabs Oral Daily

Start Date: 16

Status: Ordered



bumetanide 1 mg oral tablet

See Instructions, Take 2 mg of Bumex in AM and 1 mg PM., # 300 tabs, 5 Refill(s)
, Pharmacy: [facility], Take 2 mg of Bumex in AM and 1 mg PM.

Start Date: 9/22/15

Status: Ordered



buPROPion 150 mg/12 hours (SR) oral tablet, extended release

See Instructions, Take 1 tablet by mouth two  times daily, # 180 tabs, 2 Refill(
s), eRx: [facility], Take 1 tablet by mouth two  times daily

Start Date: 3/2/16

Status: Ordered



carvedilol 25 mg oral tablet

25 mg 1 tabs, Oral, BID, # 180 tabs, 5 Refill(s), Pharmacy: [facility]
, 1 tabs Oral BID

Start Date: 9/22/15

Status: Ordered



cloNIDine 0.1 mg oral tablet

See Instructions, Take 1 tablet by mouth  every evening, # 90 tabs, 1 Refill(s),
eRx: [facility], Take 1 tablet by mouth  every evening

Start Date: 16

Status: Ordered



clopidogrel 75 mg oral tablet

See Instructions, Take 1 tablet by mouth  daily, # 90 tabs, 2 Refill(s), [facility]: [facility], Take 1 tablet by mouth  daily

Start Date: 16

Status: Ordered



digoxin 125 mcg (0.125 mg) oral tablet

125 mcg 1 tabs, Oral, Daily, # 90 tabs, 5 Refill(s), [facility], 1 tabs Oral Daily

Start Date: 9/22/15

Status: Ordered



DULoxetine 20 mg oral delayed release capsule

See Instructions, Take 1 capsule by mouth  daily do not crush or chew, # 90 caps
, eRx: [facility], Take 1 capsule by mouth  daily do not crush or chew

Start Date: 16

Status: Ordered



glipiZIDE 5 mg oral tablet

See Instructions, Take 1 tablet by mouth two  times daily, # 180 tabs, [facility], Take 1 tablet by mouth two  times daily

Start Date: 2/3/16

Status: Ordered



Insulin Syringe (DME)

DME Item use to inject insulin once daily     dx: E11.9, See Instructions, # 90 
Each, 4 Refill(s), Pharmacy: [id number], use to inject insulin o
nce daily     dx: E11.9, Supply

Start Date: 11/10/15

Status: Ordered



Lantus 100 units/mL subcutaneous solution

See Instructions, Inject subcutaneously 30  units every night at  bedtime once a
day, # 30 mL, 2 Refill(s), eRx: [facility], Inject subcutaneously 30  
units every night at  bedtime once a day

Start Date: 7/31/15

Status: Ordered



Melatonin

10 mg, Oral, Bedtime (once a day), as needed for insomnia, 0 Refill(s)

Start Date: 14

Status: Ordered



metFORMIN 500 mg oral tablet

See Instructions, Take 2 tablets by mouth  twice a day, # 360 tabs, [facility]: [facility], Take 2 tablets by mouth  twice a day

Start Date: 2/3/16

Status: Ordered



Norco 10 mg-325 mg oral tablet

1 tabs, Oral, q6hr, as needed for pain, # 30 tabs, 0 Refill(s)

Start Date: 16

Status: Ordered



OTC Calcium

OTC Calcium, See Instructions, 1 tablet oral daily, 0 Refill(s)

Start Date: 2/3/15

Status: Ordered



potassium chloride 20 mEq oral tablet, extended release

See Instructions, Take 1 tablet by mouth  daily, # 90 unknown unit, 1 Refill(s),
eRx: [facility], Take 1 tablet by mouth  daily

Start Date: 10/2/15

Status: Ordered



ProAir HFA 90 mcg/inh inhalation aerosol

2 puffs, Inhalation, QID, as needed for wheezing, # 3 Each, 3 Refill(s), Pharmac
y: [facility]

Start Date: 1/23/15

Status: Ordered



SUMAtriptan 50 mg oral tablet

50 mg 1 tabs, Oral, Daily, as needed for migraine headache, may repeat dose afte
r 2 hours up to a maximum of 200 mg in 24 hours, # 9 tabs, 1 Refill(s), Pharmacy
: [id number], 1 tabs Oral Daily,PRN:as needed for migraine heada
godwin,Instr:m...

Start Date: 16

Status: Ordered



Symbicort 160 mcg-4.5 mcg/inh inhalation aerosol

See Instructions, Use 2 puffs twice daily, # 10.2 g, 2 Refill(s), [facility], Use 2 puffs twice daily

Start Date: 12/2/15

Status: Ordered



valsartan 160 mg oral tablet

See Instructions, 1 TABS ORAL DAILY, # 30 tabs, 1 Refill(s), [facility]Rx: [id number], 1 TABS ORAL DAILY

Start Date: 10/12/15

Status: Ordered



Results





Hematology





 



                           Most recent to            1



                                         oldest [Reference 



                                         Range]: 

 

 



                           WBC [4.8-10.8             10.7 10*3/uL



                           10*3/uL]                  (9/17/15 9:12 AM)

 

 



                           RBC [4.00-5.20]           3.94



                                         *LOW*



                                         (9/17/15 9:12 AM)

 

 



                           Hgb [12.0-16.0            11.6 gm/dL



                           gm/dL]                    *LOW*



                                         (9/17/15 9:12 AM)

 

 



                           Hct [37.0-47.0 %]         37.1 %



                                         (9/17/15 9:12 AM)

 

 



                           MCV [82.0-99.0 fL]        94.2 fL



                                         (9/17/15 9:12 AM)

 

 



                           MCH [27.0-32.0 pg]        29.4 pg



                                         (9/17/15 9:12 AM)

 

 



                           MCHC [32.0-36.0           31.3 gm/dL



                           gm/dL]                    *LOW*



                                         (9/17/15 9:12 AM)

 

 



                           RDW [11.5-14.5 %]         15.2 %



                                         *HI*



                                         (9/17/15 9:12 AM)

 

 



                           Platelet [150-400         312 10*3/uL



                           10*3/uL]                  (9/17/15 9:12 AM)

 

 



                           MPV [8.8-14.8 fL]         10.0 fL



                                         (9/17/15 9:12 AM)

 

 



                           Immature                  0.6 %



                           Granulocytes              (9/17/15 9:12 AM)



                                         [0.0-1.0 %] 

 

 



                           Neutrophils [51-75        72 %



                           %]                        (9/17/15 9:12 AM)

 

 



                           Lymphocytes [20-46        20 %



                           %]                        (9/17/15 9:12 AM)

 

 



                           Monocytes [4-11 %]        5 %



                                         (9/17/15 9:12 AM)

 

 



                           Eosinophils [0-4 %]       2 %



                                         (9/17/15 9:12 AM)

 

 



                           Basophils [0-2 %]         0 %



                                         (9/17/15 9:12 AM)

 

 



                           Neutro Absolute           7.62 10*3



                           [1.90-7.00 10*3]          *HI*



                                         (9/17/15 9:12 AM)

 

 



                           Lymph Absolute            2.13 10*3



                           [0.80-3.30 10*3]          (9/17/15 9:12 AM)

 

 



                           Mono Absolute             0.57 10*3



                           [0.30-1.00 10*3]          (9/17/15 9:12 AM)

 

 



                           Eos Absolute              0.26 10*3



                           [0.00-0.50 10*3]          (9/17/15 9:12 AM)

 

 



                           Baso Absolute             0.02 10*3



                           [0.00-0.20 10*3]          (9/17/15 9:12 AM)







Chemistry





 



                           Most recent to            1



                                         oldest [Reference 



                                         Range]: 

 

 



                           Sodium Lvl [135-144       142 mEq/L



                           mEq/L]                    (9/17/15 9:12 AM)

 

 



                           Potassium Lvl             5.1 mEq/L



                           [3.5-5.2 mEq/L]           (9/17/15 9:12 AM)

 

 



                           Chloride [          96 mEq/L



                           mEq/L]                    *LOW*



                                         (9/17/15 9:12 AM)

 

 



                           CO2 [22-31 mEq/L]         35 mEq/L



                                         *HI*



                                         (9/17/15 9:12 AM)

 

 



                           AGAP [3-20]               11



                                         (9/17/15 9:12 AM)

 

 



                           BUN [10-20 mg/dL]         20 mg/dL



                                         (9/17/15 9:12 AM)

 

 



                           Glucose Lvl [70-99        275 mg/dL



                           mg/dL]                    *HI*



                                         (9/17/15 9:12 AM)

 

 



                           Creatinine Lvl            0.88 mg/dL



                           [0.57-1.11 mg/dL]         (9/17/15 9:12 AM)

 

 



                           eGFR [>60 mL/min]         >60 mL/min 1



                                         (9/17/15 9:12 AM)

 

 



                           Calcium Lvl               10.5 mg/dL



                           [8.9-10.5 mg/dL]          (9/17/15 9:12 AM)

 

 



                           Albumin Lvl [3.5-5.0      4.1 gm/dL



                           gm/dL]                    (9/17/15 9:12 AM)

 

 



                           Total Protein             7.0 gm/dL



                           [6.4-8.3 gm/dL]           (9/17/15 9:12 AM)

 

 



                           Globulin [1.8-4.0         2.9 gm/dL



                           gm/dL]                    (9/17/15 9:12 AM)

 

 



                           ALT [0-55 U/L]            48 U/L



                                         (9/17/15 9:12 AM)

 

 



                           AST [5-34 U/L]            28 U/L



                                         (9/17/15 9:12 AM)

 

 



                           Alk Phos [          143 U/L



                           U/L]                      (9/17/15 9:12 AM)

 

 



                           Bili Total [0.2-1.2       0.3 mg/dL



                           mg/dL]                    (9/17/15 9:12 AM)

 

 



                           Chol [0-199 mg/dL]        218 mg/dL



                                         *HI*



                                         (9/17/15 9:12 AM)

 

 



                           Trig [0-149 mg/dL]        242 mg/dL



                                         *HI*



                                         (9/17/15 9:12 AM)

 

 



                           HDL [40-84 mg/dL]         52 mg/dL



                                         (9/17/15 9:12 AM)

 

 



                           LDL [0-130 mg/dL]         118 mg/dL



                                         (9/17/15 9:12 AM)

 

 



                           VLDL Cholesterol          48 mg/dL



                           [0-28 mg/dL]              *HI*



                                         (9/17/15 9:12 AM)

 

 



                           Cardiac Risk              4.2



                           [0.0-5.0]                 (9/17/15 9:12 AM)

 

 



                           TSH [0.35-4.94]           1.59



                                         (9/17/15 9:12 AM)

 

 



                           Hgb A1c [4.1-5.6 %]       8.9 %



                                         *HI*



                                         (9/17/15 9:12 AM)

 

 



                           eAvg Glucose              208.7 mg/dL



                                         (9/17/15 9:12 AM)







1Result Comment: Multiply eGFR results by 1.21 for African American race.



Urinalysis





 



                           Most recent to            1



                                         oldest [Reference 



                                         Range]: 

 

 



                           UA Color                  Yellow



                                         (9/17/15 9:47 AM)

 

 



                           UA Appear                 Sl Cloudy



                                         (9/17/15 9:47 AM)

 

 



                           UA pH [5.0-8.0]           5.5



                                         (9/17/15 9:47 AM)

 

 



                           UA Leuk Est               Negative



                           [Negative]                (9/17/15 9:47 AM)

 

 



                           UA Nitrite                Negative



                           [Negative]                (9/17/15 9:47 AM)

 

 



                           UA Protein                Negative



                           [Negative]                (9/17/15 9:47 AM)

 

 



                           UA Glucose                Negative



                           [Negative]                (9/17/15 9:47 AM)

 

 



                           UA Ketones                Negative



                           [Negative]                (9/17/15 9:47 AM)

 

 



                           UA Urobilinogen           0.2 mg/dL



                                         (9/17/15 9:47 AM)

 

 



                           UA Bili [Negative]        Negative



                                         (9/17/15 9:47 AM)

 

 



                           UA Blood                  Negative



                                         (9/17/15 9:47 AM)

 

 



                           UA Spec Grav              1.020



                           [1.003-1.030]             (9/17/15 9:47 AM)

 

 



                           Type                      Clean Catch



                                         (9/17/15 9:47 AM)







Immunizations







  



                     Vaccine             Date                Refusal Reason

 

  



                           tetanus-diphth toxoids (Td) adult/adol     00 







Procedures







   



                 Procedure       Date            Related Diagnosis     Body Site

 

   



                           Collection of venous blood by venipuncture     9/17/15  

 

   



                           L knee PMM/ part. synovectomy     13  

 

   



                           lt median nerve decompression     5/10/11  

 

   



                           Tubal ligation            1979  

 

   



                                         Carpal tunnel release rt   

 

   



                                         Cholecystectomy   

 

   



                                         Hysterectomy   

 

   



                                         Hysterectomy and bilateral   



                                         salpingo-oophorectomy sample   

 

   



                                         Laminectomy   

 

   



                                         Pacemaker   







Social History







 



                           Social History Type       Response

 

 



                           Smoking Status            Former smoker; Type: Cigarettes1







1Quit around 



Assessment and Plan

Extracted from:





  



                     Title: Ambulatory Patient Education     Author: Ember Schwarz APRN     Date: 9/17/15











                                        Family Medicine

Diabetes and Standards of Medical Care

Diabetes is complicated. You may find that your diabetes team includes a 
dietitian, nurse, diabetes educator, eye doctor, and more. To help everyone know
 what is going on and to help you get the care you deserve, the following 
schedule of care was developed to help keep you on track. Below are the tests, 
exams, vaccines, medicines, education, and plans you will need.

HbA1c test

This test shows how well you have controlled your glucose over the past 2



3 months. It is used to see if your diabetes management plan needs to be 
adjusted. 



  It is performed at least 2 times a year if you are meeting treatment goals. 



  It is performed 4 times a year if therapy has changed or if you are not 
meeting treatment goals.

Blood pressure test



  This test is performed at every routine medical visit. The goal is less than 
140/90 mm Hg for most people, but 130/80 mm Hg in some cases. Ask your health 
care provider about your goal. 

Dental exam



  Follow up with the dentist regularly.

Eye exam



  If you are diagnosed with type 1 diabetes as a child, get an exam upon 
reaching the age of 10 years or older and have had diabetes for 3



5 years. Yearly eye exams are recommended after that initial eye exam.



  If you are diagnosed with type 1 diabetes as an adult, get an exam within 5 
years of diagnosis and then yearly.



  If you are diagnosed with type 2 diabetes, get an exam as soon as possible 
after the diagnosis and then yearly. 

Foot care exam



  Visual foot exams are performed at every routine medical visit. The exams 
check for cuts, injuries, or other problems with the feet.



  A comprehensive foot exam should be done yearly. This includes visual 
inspection as well as assessing foot pulses and testing for loss of sensation.



  Check your feet nightly for cuts, injuries, or other problems with your feet.
 Tell your health care provider if anything is not healing.

Kidney function test (urine microalbumin)



  This test is performed once a year. 





  Type 1 diabetes: The first test is performed 5 years after diagnosis.





  Type 2 diabetes: The first test is performed at the time of diagnosis.



  A serum creatinine and estimated glomerular filtration rate (eGFR) test is 
done once a year to assess the level of chronic kidney disease (CKD), if 
present.

Lipid profile (cholesterol, HDL, LDL, triglycerides)



  Performed every 5 years for most people.





  The goal for LDL is less than 100 mg/dL. If you are at high risk, the goal is 
less than 70 mg/dL.





  The goal for HDL is 40 mg/dL



50 mg/dL for men and 50 mg/dL



60 mg/dL for women. An HDL cholesterol of 60 mg/dL or higher gives some 
protection against heart disease.





  The goal for triglycerides is less than 150 mg/dL.

Influenza vaccine, pneumococcal vaccine, and hepatitis B vaccine



  The influenza vaccine is recommended yearly.



  It is recommended that people with diabetes who are over 65 years old get the
 pneumonia vaccine. In some cases, two separate shots may be given. Ask your 
health care provider if your pneumonia vaccination is up to date.



  The hepatitis B vaccine is also recommended for adults with diabetes.

Diabetes self-management education



  Education is recommended at diagnosis and ongoing as needed.

Treatment plan



  Your treatment plan is reviewed at every medical visit.



Document Released: 10/15/2010 Document Revised: 2015 Document Reviewed: 
2014

[facility] Patient Information 2015 [facility]. This information is not 
intended to replace advice given to you by your health care provider. Make sure 
you discuss any questions you have with your health care provider.





No follow up information was provided.





Extracted from:





  



                     Title: Office Visit Note     Author: Ember Schwarz APRN     Date: 9/17/15









                                         Assessment/Plan

 1.DM2 (diabetes mellitus, type 2)  She has not been checking her blood 
sugars. We will get an A1c level today.

  Ordered:

 CBC w/ Differential

 Hemoglobin A1c

 Office Visit Level 4 Est 85775 

 2.Epigastric pain  She was given samples of Exelon 60 mg 1 a day number 
10. Then, she will start on Nexium. She is to take a liquid antiacid 30 
minutes before meals and at bedtime. She was given an appointment with a 
gastroenterologist.

  Ordered:

 Office Visit Level 4 Est 80757 

 Hypertension  She will start on clonidine for hot flashes. This was okayed 
by the cardiologist.

## 2019-07-31 NOTE — XMS REPORT
Transition of Care/Referral Summary

                             Created on: 10/19/2130



DYLAN KHAN

External Reference #: [id number]

: 1960

Sex: Female



Demographics







                          Address                   [address]

[place]  [address]

 

                          Home Phone                [phone]

 

                          Preferred Language        English

 

                          Marital Status            

 

                          Christian Affiliation     Rastafarian

 

                          Race                      White

 

                                        Additional Race(s)  

 

                          Ethnic Group              Not  or 





Author







                          Author                    [facility]

 

                          Organization              [facility]

 

                          Address                   Unknown

 

                          Phone                     Unavailable







Care Team Providers







                    Care Team Member Name    Role                Phone

 

                    Dhiraj Mijares    PCP                 [phone]







Encounter





[facility] FIN [id number] Date(s): 18 - 18

[facility] [address], [place] [address] [phone]

Encounter Diagnosis

Uncontrolled type 2 diabetes mellitus with hyperglycemia (Discharge Diagnosis) -
18

Elevated liver function tests (Discharge Diagnosis) - 18

Combined hyperlipidemia (Discharge Diagnosis) - 18

Discharge Disposition: 01-Home or Self Care

Attending Physician: Gayla Hook MD

Admitting Physician: Gayla Hook MD





Vital Signs







 



                           Most recent to            1



                                         oldest [Reference 



                                         Range]: 

 

 



                           Peripheral Pulse          79 bpm



                           Rate [ bpm]         (18 1:44 PM)

 

 



                           Blood Pressure            138/82 mmHg



                           [/60-90 mmHg]       (18 1:44 PM)







Problem List







    



              Condition     Effective Dates     Status       Health Status     Informant

 

    



                           Acquired                  Active  



                                         spondylolisthesis(Co    



                                         nfirmed)    

 

    



                           Acute                     Active  



                                         pain(Confirmed)    

 

    



                           Hay fever(Confirmed)      Active  

 

    



                           Arthritis(Confirmed)      Resolved  

 

    



                           Right Carpal tunnel       Resolved  



                                         release(Confirmed)    

 

    



                           Carpal tunnel             Resolved  



                                         release    



                                         decompression(Confir    



                                         med)    

 

    



                           Cholecystectomy(Conf      Resolved  



                                         irmed)    

 

    



                           IBS (irritable bowel      Active  



                                         syndrome)(Confirmed)    

 

    



                     CHF (congestive      Active              patient



                                         heart    



                                         failure)(Confirmed)    

 

    



                           CAD (coronary artery      Active  



                                         disease)(Confirmed)    

 

    



                           Decompression(Confir      Resolved  



                                         med)    

 

    



                           Depression(Confirmed      Resolved  



                                         )    

 

    



                           Depression(Confirmed      Active  



                                         )    

 

    



                           Diabetes(Confirmed)       Active  

 

    



                           Diabetes(Confirmed)       Resolved  

 

    



                           Diabetic                  Active  



                                         neuropathy(Confirmed    



                                         )    

 

    



                           GERD                      Active  



                                         (gastroesophageal    



                                         reflux    



                                         disease)(Confirmed)    

 

    



                     Ischemic            < 5/8/15            Resolved  



                                         cardiomyopathy(Confi    



                                         rmed)    

 

    



                           Presence of               Active  



                                         biventricular    



                                         AICD(Confirmed)    

 

    



                           History of lumbar         Active  



                                         fusion(Confirmed)    

 

    



                           Hyperlipidemia(Confi      Active  



                                         rmed)    

 

    



                     Asthma(Confirmed)     < 17           Resolved  

 

    



                           Hypertension(Confirm      Resolved  



                                         ed)    

 

    



                           Hypertension(Confirm      Resolved  



                                         ed)    

 

    



                           HTN                       Active  



                                         (hypertension)(Confi    



                                         rmed)    

 

    



                           Hysterectomy(Confirm      Resolved  



                                         ed)    

 

    



                           Impaired gas              Active  



                                         exchange(Confirmed)1    

 

    



                           Irritable                 Resolved  



                                         bowel(Confirmed)    

 

    



                           Laminectomy(Confirme      Resolved  



                                         d)    

 

    



                           LBBB (left bundle         Active  



                                         branch    



                                         block)(Confirmed)    

 

    



                           LUQ pain(Confirmed)       Active  

 

    



                           Migraine(Confirmed)       Resolved  

 

    



                           Asthma(Confirmed)         Active  

 

    



                     Morbid              Active              patient



                                         obesity(Confirmed)    

 

    



                           Myocardial                Resolved  



                                         infarction(Confirmed    



                                         )    

 

    



                           Septicemia                Resolved  



                                         pneumonia(Confirmed)    

 

    



                     L knee              13              Resolved  



                                         Synovectomy(Confirme    



                                         d)    

 

    



                           Tissue perfusion          Active  



                                         alteration(Confirmed    



                                         )2    

 

    



                     Tobacco             Active              patient



                                         user(Confirmed)    

 

    



                     Tubal               79              Resolved  



                                         ligation(Confirmed)    

 

    



                           UTI (urinary tract        Active  



                                         infection)(Confirmed    



                                         )    

 

    



                     Chicken             < 17           Resolved  



                                         pox(Confirmed)    







1Problem added automatically by system based on initiation of Impaired Gas 
Exchange Plan of Care



2Problem added automatically by system based on initiation of Tissue Perfusion 
Cerebral Plan of Care



Allergies, Adverse Reactions, Alerts







   



                 Substance       Reaction        Severity        Status

 

   



                     sulfamethoxazole     Urticaria (hives)      Active



                                         HIVES  

 

   



                     morphine            Burning             Active

 

   



                     barium sulfate      Swelling, hives      Active

 

   



                     celecoxib           HIVES               Active

 

   



                     influenza virus vaccine,     Convulsion          Active



                                         inactivated   







Medications





100ML 3ML SOLUTION  VIAL HUMALOG U

See Instructions, INJECT SUBCUTANEOUSLY 10  UNITS 3 TIMES DAILY BEFORE  MEALS, #
27 mL, 4 Refill(s), eRx: [facility], INJECT SUBCUTANEOUSLY 10  UNITS 3
TIMES DAILY BEFORE  MEALS

Start Date: 18

Status: Ordered



acetaminophen

1,000 mg, Oral, q6hr, as needed for pain, 0 Refill(s)

Start Date: 14

Status: Ordered



Ambien 10 mg oral tablet

10 mg 1 tabs, Oral, Bedtime (once a day), as needed for sleep, [phone], X
90 days, # 90 tabs, 0 Refill(s)

Start Date: 18

Stop Date: 18

Status: Ordered



amLODIPine 10 mg oral tablet

10 mg 1 tabs, Oral, Daily, # 90 tabs, 5 Refill(s), Pharmacy: [facility]
E, 1 tabs Oral Daily

Start Date: 16

Status: Ordered



Aspir 81

81 mg, Oral, Daily, 0 Refill(s)

Start Date: 14

Status: Ordered



atorvastatin 10 mg oral tablet

See Instructions, TAKE 1 TABLET BY MOUTH  DAILY, # 90 tabs, 3 Refill(s), [facility]: [facility]

Start Date: 18

Status: Ordered



B-D PEN NDL JACOB [id number]() GRN

See Instructions, USE TO INJECT INSULIN, # 100 unknown unit, 3 Refill(s), eRx: [facility] [address], USE TO INJECT INSULIN

Start Date: 18

Status: Ordered



B-D PEN NDL JACOB [id number]() GRN

See Instructions, USE TO INJECT INSULIN, # 100 unknown unit, eRx: [facility] [address], USE TO INJECT INSULIN

Start Date: 3/27/18

Status: Ordered



Basaglar KwikPen 100 units/mL subcutaneous solution

See Instructions, 42 units at hs SubCutaneous, # 1 boxes, 3 Refill(s), Pharmacy:
[facility] [id number], 42 units at hs SubCutaneous

Start Date: 18

Status: Ordered



bd syringes

bd syringes, See Instructions, bd syringe  0.5ml  31gx5/16  dx:e11.65, # 300 [address]
h, 3 Refill(s), [facility]: [facility], bd syringe; 0.5ml; 31gx5/16; dx:
e11.65

Start Date: 18

Status: Ordered



Bentyl 20 mg oral tablet

20 mg 1 tabs, Oral, q8hr, Abdominal Cramping, # 15 tabs, 1 Refill(s), Pharmacy: 
[facility] [id number], 1 tabs Oral q8hr,PRN:Abdominal Cramping

Start Date: 18

Status: Ordered



bumetanide 1 mg oral tablet

See Instructions, TAKE 2 TABLETS BY MOUTH IN  THE MORNING AND 1 TABLET BY MOUTH 
IN THE EVENING, # 270 tabs, eRx: [facility]

Start Date: 18

Status: Ordered



buPROPion 150 mg/12 hours (SR) oral tablet, extended release

See Instructions, Take 1 tablet by mouth two  times daily, # 180 tabs, 3 Refill(
s), eRx: [facility]

Start Date: 17

Status: Ordered



carvedilol 25 mg oral tablet

See Instructions, TAKE 1 TABLET BY MOUTH TWO  TIMES DAILY, # 180 tabs, [facility]

Start Date: 4/10/18

Status: Ordered



cloNIDine 0.2 mg oral tablet

0.2 mg 1 tabs, Oral, BID, # 180 tabs, 2 Refill(s), Pharmacy: [facility]
E, 1 tabs Oral BID

Start Date: 18

Status: Ordered



clopidogrel 75 mg oral tablet

See Instructions, TAKE 1 TABLET BY MOUTH  DAILY, # 90 tabs, 2 Refill(s), [facility]: [facility]

Start Date: 18

Status: Ordered



diclofenac 3% topical gel

0.5 g, Topical, BID, # 50 g, 1 Refill(s), Pharmacy: [facility] [id number]

Start Date: 8/15/17

Status: Ordered



DULoxetine 20 mg oral delayed release capsule

See Instructions, TAKE 1 CAP BY MOUTH DAILY.  DO NOT CRUSH OR CHEW; (TAKE WITH D
ULOXETINE 60MG), # 90 caps, 1 Refill(s), eRx: [facility]

Start Date: 18

Status: Ordered



DULoxetine 60 mg oral delayed release capsule

See Instructions, TAKE 1 CAPSULE BY MOUTH  DAILY . DO NOT CRUSH OR  CHEW. (TAKE 
WITH DULOXETINE 20MG), # 90 caps, 1 Refill(s), eRx: [facility]

Start Date: 18

Status: Ordered



gabapentin 100 mg oral capsule

See Instructions, 1-3 caps up to 3 times daily, # 100 caps, 3 Refill(s)

Start Date: 18

Status: Ordered



Glucometer Lancets (DME)

DME Item one touch delica lancets  use to test bg x3/daily  dx:e11.65, See Instr
uctions, # 300 Each, 3 Refill(s), Pharmacy: [facility] [id number], one touc
h delica lancets; use to test bg x3/daily; dx:e11.65, Supply

Start Date: 3/13/17

Status: Ordered



HumaLOG 100 units/mL injectable solution

See Instructions, 12 units SubCutaneous TIDAC, # 3 vials, 1 Refill(s), Pharmacy:
[facility] [id number], 12 units SubCutaneous TIDAC

Start Date: 18

Status: Ordered



Insulin Pin Needles (DME)

DME Item 32g x4mm (jacob needles)  use to inject insulin   dx:e11.65, See Instruc
tions, # 100 Each, 3 Refill(s), Pharmacy: [facility] [id number], 32g x4mm (
jacob needles); use to inject insulin ; dx:e11.65, Supply

Start Date: 17

Status: Ordered



Insulin Syringe (DME)

DME Item bd insulin syringe  1ml syringe [id number]  use to inject insulin x4/day  
dx:e11.65, See Instructions, # 3 boxes, 3 Refill(s), Pharmacy: [facility] [id number], bd insulin syringe; 1ml syringe [id number]; use to inject insulin x4/day
; dx:e11.65...

Start Date: 18

Status: Ordered



Melatonin

10 mg, Oral, Bedtime (once a day), as needed for insomnia, 0 Refill(s)

Start Date: 14

Status: Ordered



metFORMIN 500 mg oral tablet

See Instructions, TAKE 2 TABLETS BY MOUTH  TWICE DAILY, # 360 tabs, [facility]Rx: [facility]

Start Date: 4/10/18

Status: Ordered



mometasone 50 mcg/inh nasal spray

See Instructions, Use 2 sprays nasally daily, # 17 g, 5 Refill(s), eRx: [facility]

Start Date: 17

Status: Ordered



Norco 10 mg-325 mg oral tablet

1 tabs, Oral, q6hr, as needed for pain, # 30 tabs, 0 Refill(s)

Start Date: 18

Status: Ordered



ONE TOUCH VERIO TEST ST(NEW)100'S

See Instructions, TEST THREE TIMES DAILY, # 300 strip, eRx: [facility]
[id number], TEST THREE TIMES DAILY

Start Date: 3/27/18

Status: Ordered



potassium chloride 20 mEq oral tablet, extended release

See Instructions, TAKE 1 TABLET BY MOUTH  DAILY, # 90 tabs, 2 Refill(s), [facility]: [facility]

Start Date: 18

Status: Ordered



ProAir HFA 90 mcg/inh inhalation aerosol

See Instructions, INHALE 2 PUFFS 4 TIMES  DAILY AS NEEDED FOR  WHEEZING, # 25.5 
g, 5 Refill(s), eRx: [facility]

Start Date: 18

Status: Ordered



RABEPRAZOLE  20MG  TAB

See Instructions, TAKE 2 TABLETS BY MOUTH  DAILY, # 180 tabs, 2 Refill(s), eRx: 
[facility], TAKE 2 TABLETS BY MOUTH  DAILY

Start Date: 4/3/18

Status: Ordered



RABEprazole 20 mg oral delayed release tablet

See Instructions, Take 2 tablets by mouth  daily, # 180 tabs, 1 Refill(s), eRx: 
[facility]

Start Date: 17

Status: Ordered



SUMAtriptan 50 mg oral tablet

See Instructions, TAKE 1 TABLET BY MOUTH DAILY AS NEEDED FOR MIGRAINE HEADACHE. 
MAY REPEAT DOSE AFTER 2 HOURS UP TO A. MAXIMUM  MG IN 24 HOURS, # 9 tabs, 
eRx: [facility] [id number]

Start Date: 18

Status: Ordered



Symbicort 160 mcg-4.5 mcg/inh inhalation aerosol

See Instructions, INHALE 2 PUFFS TWICE DAILY, # 30.6 g, 2 Refill(s), [facility]: [facility]

Start Date: 18

Status: Ordered



valsartan 160 mg oral tablet

160 mg 1 tabs, Oral, BID, # 180 tabs, 1 Refill(s), Pharmacy: [facility]
E, 1 tabs Oral BID

Start Date: 18

Status: Ordered



Results





No data available for this section



Immunizations





Given and Recorded





   



                 Vaccine         Date            Status          Refusal Reason

 

   



                     tetanus-diphth toxoids (Td) adult/adol     00             Given 







Procedures







    



              Procedure     Date         Related Diagnosis     Body Site     Status

 

    



                     L knee PMM/ part. synovectomy     13              Completed

 

    



                     lt median nerve decompression     5/10/11             Completed

 

    



                     Tubal ligation                      Completed

 

    



                           Carpal tunnel release rt        Completed

 

    



                           Cholecystectomy           Completed

 

    



                           Hysterectomy              Completed

 

    



                           Hysterectomy and bilateral        Completed



                                         salpingo-oophorectomy sample    

 

    



                           Laminectomy               Completed

 

    



                           Pacemaker                 Completed







Social History







 



                           Social History Type       Response

 

 



                           Smoking Status            Former smoker; Type: Cigarettes1



                                         entered on: 5/8/15







1Quit around 



Assessment and Plan





No data available for this section

## 2019-07-31 NOTE — XMS REPORT
Transition of Care/Referral Summary

                             Created on: 2038



DYLAN KHAN

External Reference #: [id number]

: 1960

Sex: Female



Demographics







                          Address                   [address]

[place]  [address]

 

                          Home Phone                [phone]

 

                          Preferred Language        English

 

                          Marital Status            

 

                          Alevism Affiliation     Moravian

 

                          Race                      White

 

                          Ethnic Group              Not  or 





Author







                          Author                    [facility]

 

                          Organization              [facility]

 

                          Address                   Unknown

 

                          Phone                     Unavailable







Care Team Providers







                    Care Team Member Name    Role                Phone

 

                    Dhiraj Mijares    PCP                 [phone]







Encounter





[facility] [id number] Date(s): 9/22/15 - 9/22/15

[facility] [address], [place] [address] [phone]

Discharge Diagnosis: CAD (coronary artery disease)

Discharge Diagnosis: Benign essential HTN

Discharge Diagnosis: Nonischemic cardiomyopathy

Discharge Diagnosis: Chronic systolic CHF (congestive heart failure)

Discharge Diagnosis: ICD (implantable cardioverter-defibrillator) in place

Discharge Disposition: 01-Home or Self Care

Attending Physician: Linda Kruger MD

Admitting Physician: Linda Kruger MD





Vital Signs







 



                           Most recent to            1



                                         oldest [Reference 



                                         Range]: 

 

 



                           Peripheral Pulse          90 bpm



                           Rate [ bpm]         (9/22/15 2:26 PM)

 

 



                           Blood Pressure            122/80 mmHg



                           [/60-90 mmHg]       (9/22/15 2:26 PM)







Problem List







    



              Condition     Effective Dates     Status       Health Status     Informant

 

    



                           Acquired                  Active  



                                         spondylolisthesis(Co    



                                         nfirmed)    

 

    



                           Acute                     Active  



                                         pain(Confirmed)    

 

    



                           Arthritis(Confirmed)      Resolved  

 

    



                           Right Carpal tunnel       Resolved  



                                         release(Confirmed)    

 

    



                           Carpal tunnel             Resolved  



                                         release    



                                         decompression(Confir    



                                         med)    

 

    



                           Cholecystectomy(Conf      Resolved  



                                         irmed)    

 

    



                     CHF (congestive      Active              patient



                                         heart    



                                         failure)(Confirmed)    

 

    



                           CAD (coronary artery      Active  



                                         disease)(Confirmed)    

 

    



                           Decompression(Confir      Resolved  



                                         med)    

 

    



                           Depression(Confirmed      Resolved  



                                         )    

 

    



                           Depression(Confirmed      Active  



                                         )    

 

    



                           Diabetes(Confirmed)       Active  

 

    



                           Diabetes(Confirmed)       Resolved  

 

    



                           GERD                      Active  



                                         (gastroesophageal    



                                         reflux    



                                         disease)(Confirmed)    

 

    



                           Ischemic                  Active  



                                         cardiomyopathy(Confi    



                                         rmed)    

 

    



                           Presence of               Active  



                                         biventricular    



                                         AICD(Confirmed)    

 

    



                           History of lumbar         Active  



                                         fusion(Confirmed)    

 

    



                           Hyperlipidemia(Confi      Resolved  



                                         rmed)    

 

    



                           Hypertension(Confirm      Resolved  



                                         ed)    

 

    



                           Hypertension(Confirm      Resolved  



                                         ed)    

 

    



                           HTN                       Active  



                                         (hypertension)(Confi    



                                         rmed)    

 

    



                           Hysterectomy(Confirm      Resolved  



                                         ed)    

 

    



                           Impaired gas              Active  



                                         exchange(Confirmed)1    

 

    



                           Irritable                 Resolved  



                                         bowel(Confirmed)    

 

    



                           Laminectomy(Confirme      Resolved  



                                         d)    

 

    



                           LBBB (left bundle         Active  



                                         branch    



                                         block)(Confirmed)    

 

    



                           LUQ pain(Confirmed)       Active  

 

    



                           Migraine(Confirmed)       Resolved  

 

    



                           Asthma(Confirmed)         Active  

 

    



                     Morbid              Active              patient



                                         obesity(Confirmed)    

 

    



                           Myocardial                Resolved  



                                         infarction(Confirmed    



                                         )    

 

    



                           Septicemia                Resolved  



                                         pneumonia(Confirmed)    

 

    



                     L knee              13              Resolved  



                                         Synovectomy(Confirme    



                                         d)    

 

    



                           Tissue perfusion          Active  



                                         alteration(Confirmed    



                                         )2    

 

    



                     Tobacco             Active              patient



                                         user(Confirmed)    

 

    



                     Tubal               79              Resolved  



                                         ligation(Confirmed)    







1Problem added automatically by system based on initiation of Impaired Gas 
Exchange Plan of Care



2Problem added automatically by system based on initiation of Tissue Perfusion 
Cerebral Plan of Care



Allergies, Adverse Reactions, Alerts







   



                 Substance       Reaction        Severity        Status

 

   



                     barium sulfate      Adverse Reaction      Active



                                         Swelling, hives  

 

   



                     celecoxib           HIVES               Active

 

   



                           influenza virus vaccine,       Active



                                         inactivated   

 

   



                     morphine            Adverse Reaction      Active

 

   



                     sulfamethoxazole     HIVES               Active



                                         Urticaria (hives)  







Medications





acetaminophen

1,000 mg, Oral, q6hr, as needed for pain, 0 Refill(s)

Start Date: 14

Status: Ordered



AcipHex 20 mg oral delayed release tablet

40 mg 2 tabs, Oral, Daily, # 180 tabs, 2 Refill(s), Pharmacy: [facility], 2 tabs Oral Daily

Start Date: 9/21/15

Status: Ordered



albuterol 2.5 mg/3 mL (0.083%) inhalation solution

3 mL, Inhalation, BID, Dx: Asthma, # 25 Each, 3 Refill(s), [id number], 3 mL Inhalation BID,Instr:Dx: Asthma

Start Date: 4/21/15

Status: Ordered



amLODIPine 5 mg oral tablet

See Instructions, Take 1 tablet by mouth  daily, # 90 tabs, [facility]: [facility], Take 1 tablet by mouth  daily

Start Date: 2/3/16

Status: Ordered



Aspir 81

81 mg, Oral, Daily, 0 Refill(s)

Start Date: 14

Status: Ordered



atorvastatin 10 mg oral tablet

10 mg 1 tabs, Oral, Daily, # 90 tabs, 5 Refill(s), Pharmacy: [facility]
E, 1 tabs Oral Daily

Start Date: 16

Status: Ordered



bumetanide 1 mg oral tablet

See Instructions, Take 2 mg of Bumex in AM and 1 mg PM., # 300 tabs, 5 Refill(s)
, Pharmacy: [facility], Take 2 mg of Bumex in AM and 1 mg PM.

Start Date: 9/22/15

Status: Ordered



buPROPion 150 mg/12 hours (SR) oral tablet, extended release

See Instructions, Take 1 tablet by mouth two  times daily, # 180 tabs, 2 Refill(
s), eRx: [facility], Take 1 tablet by mouth two  times daily

Start Date: 3/2/16

Status: Ordered



carvedilol 25 mg oral tablet

25 mg 1 tabs, Oral, BID, # 180 tabs, 5 Refill(s), Pharmacy: [facility]
, 1 tabs Oral BID

Start Date: 9/22/15

Status: Ordered



cloNIDine 0.1 mg oral tablet

See Instructions, Take 1 tablet by mouth  every evening, # 90 tabs, 1 Refill(s),
eRx: [facility], Take 1 tablet by mouth  every evening

Start Date: 16

Status: Ordered



clopidogrel 75 mg oral tablet

See Instructions, Take 1 tablet by mouth  daily, # 90 tabs, 2 Refill(s), [facility], Take 1 tablet by mouth  daily

Start Date: 16

Status: Ordered



digoxin 125 mcg (0.125 mg) oral tablet

125 mcg 1 tabs, Oral, Daily, # 90 tabs, 5 Refill(s), [facility], 1 tabs Oral Daily

Start Date: 9/22/15

Status: Ordered



DULoxetine 20 mg oral delayed release capsule

See Instructions, Take 1 capsule by mouth  daily do not crush or chew, # 90 caps
, eRx: [facility], Take 1 capsule by mouth  daily do not crush or chew

Start Date: 16

Status: Ordered



glipiZIDE 5 mg oral tablet

See Instructions, Take 1 tablet by mouth two  times daily, # 180 tabs, [facility], Take 1 tablet by mouth two  times daily

Start Date: 2/3/16

Status: Ordered



Insulin Syringe (DME)

DME Item use to inject insulin once daily     dx: E11.9, See Instructions, # 90 
Each, 4 Refill(s), Pharmacy: [facility] [id number], use to inject insulin o
nce daily     dx: E11.9, Supply

Start Date: 11/10/15

Status: Ordered



Lantus 100 units/mL subcutaneous solution

See Instructions, Inject subcutaneously 30  units every night at  bedtime once a
day, # 30 mL, 2 Refill(s), eRx: [facility], Inject subcutaneously 30  
units every night at  bedtime once a day

Start Date: 7/31/15

Status: Ordered



Melatonin

10 mg, Oral, Bedtime (once a day), as needed for insomnia, 0 Refill(s)

Start Date: 14

Status: Ordered



metFORMIN 500 mg oral tablet

See Instructions, Take 2 tablets by mouth  twice a day, # 360 tabs, [facility]: [facility], Take 2 tablets by mouth  twice a day

Start Date: 2/3/16

Status: Ordered



Norco 10 mg-325 mg oral tablet

1 tabs, Oral, q6hr, as needed for pain, # 30 tabs, 0 Refill(s)

Start Date: 16

Status: Ordered



OTC Calcium

OTC Calcium, See Instructions, 1 tablet oral daily, 0 Refill(s)

Start Date: 2/3/15

Status: Ordered



potassium chloride 20 mEq oral tablet, extended release

See Instructions, Take 1 tablet by mouth  daily, # 90 unknown unit, 1 Refill(s),
eRx: [facility], Take 1 tablet by mouth  daily

Start Date: 10/2/15

Status: Ordered



ProAir HFA 90 mcg/inh inhalation aerosol

2 puffs, Inhalation, QID, as needed for wheezing, # 3 Each, 3 Refill(s), Pharmac
y: [facility]

Start Date: 1/23/15

Status: Ordered



SUMAtriptan 50 mg oral tablet

50 mg 1 tabs, Oral, Daily, as needed for migraine headache, may repeat dose afte
r 2 hours up to a maximum of 200 mg in 24 hours, # 9 tabs, 1 Refill(s), Pharmacy
: [facility] [id number], 1 tabs Oral Daily,PRN:as needed for migraine heada
godwin,Instr:m...

Start Date: 16

Status: Ordered



Symbicort 160 mcg-4.5 mcg/inh inhalation aerosol

See Instructions, Use 2 puffs twice daily, # 10.2 g, 2 Refill(s), [facility]: [facility], Use 2 puffs twice daily

Start Date: 12/2/15

Status: Ordered



valsartan 160 mg oral tablet

See Instructions, 1 TABS ORAL DAILY, # 30 tabs, 1 Refill(s), [facility]Rx: [facility] [id number], 1 TABS ORAL DAILY

Start Date: 10/12/15

Status: Ordered



Results





No data available for this section



Immunizations







  



                     Vaccine             Date                Refusal Reason

 

  



                           tetanus-diphth toxoids (Td) adult/adol     00 







Procedures







   



                 Procedure       Date            Related Diagnosis     Body Site

 

   



                           L knee PMM/ part. synovectomy     13  

 

   



                           lt median nerve decompression     5/10/11  

 

   



                           Tubal ligation            1979  

 

   



                                         Carpal tunnel release rt   

 

   



                                         Cholecystectomy   

 

   



                                         Hysterectomy   

 

   



                                         Hysterectomy and bilateral   



                                         salpingo-oophorectomy sample   

 

   



                                         Laminectomy   

 

   



                                         Pacemaker   







Social History







 



                           Social History Type       Response

 

 



                           Smoking Status            Former smoker; Type: Cigarettes1







1Quit around 



Assessment and Plan

Extracted from:





  



                     Title: Ambulatory Patient Education     Author: Linda Kruger MD     Date:

 9/22/15









                                        Cardiovascular

Heart Failure

Heart failure is a condition in which the heart has trouble pumping blood. This 
means your heart does not pump blood efficiently for your body to work well. In 
some cases of heart failure, fluid may back up into your lungs or you may have 
swelling (edema) in your lower legs. Heart failure is usually a long-term 
(chronic) condition. It is important for you to take good care of yourself and 
follow your health care provider's treatment plan.





CAUSES

Some health conditions can cause heart failure. Those health conditions include:



  High blood pressure (hypertension). Hypertension causes the heart muscle to 
work harder than normal. When pressure in the blood vessels is high, the heart 
needs to pump (contract) with more force in order to circulate blood throughout 
the body. High blood pressure eventually causes the heart to become stiff and 
weak.



  Coronary artery disease (CAD). CAD is the buildup of cholesterol and fat 
(plaque) in the arteries of the heart. The blockage in the arteries deprives the
heart muscle of oxygen and blood. This can cause chest pain and may lead to a 
heart attack. High blood pressure can also contribute to CAD.



  Heart attack (myocardial infarction). A heart attack occurs when one or more 
arteries in the heart become blocked. The loss of oxygen damages the muscle 
tissue of the heart. When this happens, part of the heart muscle dies. The 
injured tissue does not contract as well and weakens the heart's ability to pump
blood.



  Abnormal heart valves. When the heart valves do not open and close properly, 
it can cause heart failure. This makes the heart muscle pump harder to keep the 
blood flowing. 



  Heart muscle disease (cardiomyopathy or myocarditis). Heart muscle disease is
damage to the heart muscle from a variety of causes. These can include drug or 
alcohol abuse, infections, or unknown reasons. These can increase the risk of 
heart failure.



  Lung disease. Lung disease makes the heart work harder because the lungs do 
not work properly. This can cause a strain on the heart, leading it to fail. 



  Diabetes. Diabetes increases the risk of heart failure. High blood sugar 
contributes to high fat (lipid) levels in the blood. Diabetes can also cause 
slow damage to tiny blood vessels that carry important nutrients to the heart 
muscle. When the heart does not get enough oxygen and food, it can cause the 
heart to become weak and stiff. This leads to a heart that does not contract 
efficiently.



  Other conditions can contribute to heart failure. These include abnormal 
heart rhythms, thyroid problems, and low blood counts (anemia).

Certain unhealthy behaviors can increase the risk of heart failure, including:



  Being overweight. 



  Smoking or chewing tobacco. 



  Eating foods high in fat and cholesterol. 



  Abusing illicit drugs or alcohol. 



  Lacking physical activity. 



SYMPTOMS

Heart failure symptoms may vary and can be hard to detect. Symptoms may include:



  Shortness of breath with activity, such as climbing stairs.



  Persistent cough.



  Swelling of the feet, ankles, legs, or abdomen.



  Unexplained weight gain.



  Difficulty breathing when lying flat (orthopnea).



  Waking from sleep because of the need to sit up and get more air.



  Rapid heartbeat.



  Fatigue and loss of energy.



  Feeling light-headed, dizzy, or close to fainting.



  Loss of appetite.



  Nausea. 



  Increased urination during the night (nocturia).



DIAGNOSIS

A diagnosis of heart failure is based on your history, symptoms, physical 
examination, and diagnostic tests. Diagnostic tests for heart failure may 
include:



  Echocardiography.



  Electrocardiography. 



  Chest X-ray.



  Blood tests.



  Exercise stress test.



  Cardiac angiography.



  Radionuclide scans.



TREATMENT

Treatment is aimed at managing the symptoms of heart failure. Medicines, 
behavioral changes, or surgical intervention may be necessary to treat heart 
failure.



  Medicines to help treat heart failure may include:





  Angiotensin-converting enzyme (ACE) inhibitors. This type of medicine blocks 
the effects of a blood protein called angiotensin-converting enzyme. ACE 
inhibitors relax (dilate) the blood vessels and help lower blood pressure. 





  Angiotensin receptor blockers (ARBs). This type of medicine blocks the actions
of a blood protein called angiotensin. Angiotensin receptor blockers dilate the 
blood vessels and help lower blood pressure. 





  Water pills (diuretics). Diuretics cause the kidneys to remove salt and water 
from the blood. The extra fluid is removed through urination. This loss of extra
fluid lowers the volume of blood the heart pumps.





  Beta blockers. These prevent the heart from beating too fast and improve heart
muscle strength. 





  Digitalis. This increases the force of the heartbeat.



  Healthy behavior changes include:





  Obtaining and maintaining a healthy weight.





  Stopping smoking or chewing tobacco.





  Eating heart-healthy foods.





  Limiting or avoiding alcohol.





  Stopping illicit drug use. 





  Physical activity as directed by your health care provider. 



  Surgical treatment for heart failure may include:





  A procedure to open blocked arteries, repair damaged heart valves, or remove 
damaged heart muscle tissue.





  A pacemaker to improve heart muscle function and control certain abnormal 
heart rhythms.





  An internal cardioverter defibrillator to treat certain serious abnormal heart
rhythms.





  A left ventricular assist device (LVAD) to assist the pumping ability of the 
heart.



HOME CARE INSTRUCTIONS



  Take medicines only as directed by your health care provider. Medicines are 
important in reducing the workload of your heart, slowing the progression of 
heart failure, and improving your symptoms.





  Do not stop taking your medicine unless directed by your health care provider.





  Do not skip any dose of medicine.





  Refill your prescriptions before you run out of medicine. Your medicines are 
needed every day.



  Engage in moderate physical activity if directed by your health care 
provider. Moderate physical activity can benefit some people. The elderly and 
people with severe heart failure should consult with a health care provider for 
physical activity recommendations.



  Eat heart-healthy foods. Food choices should be free of trans fat and low in 
saturated fat, cholesterol, and salt (sodium). Healthy choices include fresh or 
frozen fruits and vegetables, fish, lean meats, legumes, fat-free or low-fat 
dairy products, and whole grain or high fiber foods. Talk to a dietitian to 
learn more about heart-healthy foods.



  Limit sodium if directed by your health care provider. Sodium restriction may
reduce symptoms of heart failure in some people. Talk to a dietitian to learn 
more about heart-healthy seasonings. 



  Use healthy cooking methods. Healthy cooking methods include roasting, 
grilling, broiling, baking, poaching, steaming, or stir-frying. Talk to a 
dietitian to learn more about healthy cooking methods. 



  Limit fluids if directed by your health care provider. Fluid restriction may 
reduce symptoms of heart failure in some people.



  Weigh yourself every day. Daily weights are important in the early 
recognition of excess fluid. You should weigh yourself every morning after you 
urinate and before you eat breakfast. Wear the same amount of clothing each time
you weigh yourself. Record your daily weight. Provide your health care provider 
with your weight record. 



  Monitor and record your blood pressure if directed by your health care 
provider.



  Check your pulse if directed by your health care provider.



  Lose weight if directed by your health care provider. Weight loss may reduce 
symptoms of heart failure in some people.



  Stop smoking or chewing tobacco. Nicotine makes your heart work harder by 
causing your blood vessels to constrict. Do not use nicotine gum or patches 
before talking to your health care provider.



  Keep all follow-up visits as directed by your health care provider. This is 
important.



  Limit alcohol intake to no more than 1 drink per day for nonpregnant women 
and 2 drinks per day for men. One drink equals 12 ounces of beer, 5 ounces of 
wine, or 1 ounces of hard liquor. Drinking more than that is harmful to your 
heart. Tell your health care provider if you drink alcohol several times a week.
Talk with your health care provider about whether alcohol is safe for you. If 
your heart has already been damaged by alcohol or you have severe heart failure,
drinking alcohol should be stopped completely. 



  Stop illicit drug use.



  Stay up-to-date with immunizations. It is especially important to prevent 
respiratory infections through current pneumococcal and influenza immunizations.



  Manage other health conditions such as hypertension, diabetes, thyroid 
disease, or abnormal heart rhythms as directed by your health care provider.



  Learn to manage stress.



  Plan rest periods when fatigued.



  Learn strategies to manage high temperatures. If the weather is extremely 
hot:





  Avoid vigorous physical activity.





  Use air conditioning or fans or seek a cooler location. 





  Avoid caffeine and alcohol.





  Wear loose-fitting, lightweight, and light-colored clothing.



  Learn strategies to manage cold temperatures. If the weather is extremely 
cold:





  Avoid vigorous physical activity.





  Layer clothes.





  Wear mittens or gloves, a hat, and a scarf when going outside.





  Avoid alcohol.



  Obtain ongoing education and support as needed.



  Participate in or seek rehabilitation as needed to maintain or improve 
independence and quality of life.



SEEK MEDICAL CARE IF:



  Your weight increases by 03 lb/1.4 kg in 1 day or 05 lb/2.3 kg in a week.



  You have increasing shortness of breath that is unusual for you.



  You are unable to participate in your usual physical activities.



  You tire easily.



  You cough more than normal, especially with physical activity.



  You have any or more swelling in areas such as your hands, feet, ankles, or 
abdomen.



  You are unable to sleep because it is hard to breathe.



  You feel like your heart is beating fast (palpitations).



  You become dizzy or light-headed upon standing up. 



SEEK IMMEDIATE MEDICAL CARE IF:



  You have difficulty breathing.



  There is a change in mental status such as decreased alertness or difficulty 
with concentration.



  You have a pain or discomfort in your chest.



  You have an episode of fainting (syncope).



MAKE SURE YOU:



  Understand these instructions.



  Will watch your condition.



  Will get help right away if you are not doing well or get worse.



Document Released: 2006 Document Revised: 2015 Document Reviewed: 
2014

[facility] Patient Information 2015 [facility], [facility]. This information is not 
intended to replace advice given to you by your health care provider. Make sure 
you discuss any questions you have with your health care provider.





No follow up information was provided.





Extracted from:





  



                     Title: Office Visit Note     Author: Linda Kruger MD     Date: 9/22/15









                                         Assessment/Plan

 Benign essential HTN  Controlled. Continue current meds.

  Ordered:

 Office Visit Level 4 Est 72387

 Return to Clinic 

 CAD (coronary artery disease)  Non angina pectoris. Continue aspirin, Plavix, 
Atorvastatin.  Increase Coreg for cardiomyopathy.

  Ordered:

 Office Visit Level 4 Est 02899

 Return to Clinic 

 Chronic systolic CHF (congestive heart failure)  NYHA FC III. 
Euvolemic.Continue ARB. Will increase Coreg to 25 mg bid. Resume Digoxin 125 
mcg daily. Continue Bumex. Recheck echo whether LVEF is improving.

  Ordered:

 Office Visit Level 4 Est 92010

 Request for Cardiovascular Echo

 Return to Clinic 

 ICD (implantable cardioverter-defibrillator) in place  S/p Biv-ICD on 
2015 by Dr. Jerry. Check 2D echo.

  Ordered:

 Office Visit Level 4 Est 20291

 Return to Clinic 

 Nonischemic cardiomyopathy  LVEF 30-35% s/p Biv-ICD. See CHF. F/u in 3 
months.

  Ordered:

 Office Visit Level 4 Est 55780

 Return to Clinic 

 Orders:  bumetanide, See Instructions, Take 2 mg of Bumex in AM and 1 mg PM., #
300 tabs, 5 Refill(s), Pharmacy: [facility], Take 2 mg of Bumex in AM 
and 1 mg PM.

  carvedilol, 25 mg 1 tabs, Oral, BID, # 180 tabs, 5 Refill(s), Pharmacy: 
[facility], 1 tabs Oral BID

  digoxin, 125 mcg 1 tabs, Oral, Daily, # 90 tabs, 5 Refill(s), Pharmacy: 
[facility], 1 tabs Oral Daily







Referrals to Other Providers





Referred by: Linda Kruger MD

## 2019-07-31 NOTE — XMS REPORT
Transition of Care/Referral Summary

                             Created on: 2094



BILLDYLAN EDITH

External Reference #: [id number]

: 1960

Sex: Female



Demographics







                          Address                   [address]

[place]  [address]

 

                          Home Phone                [phone]

 

                          Preferred Language        English

 

                          Marital Status            

 

                          Latter day Affiliation     Hoahaoism

 

                          Race                      White

 

                          Ethnic Group              Not  or 





Author







                          Author                    [facility]

 

                          Organization              [facility]

 

                          Address                   Unknown

 

                          Phone                     Unavailable







Care Team Providers







                    Care Team Member Name    Role                Phone

 

                    Dhiraj Mijares    PCP                 [phone]







Encounter





[facility] [id number] Date(s): 16 - 16

[facility] [address] [place], [place] [address] [phone]

Discharge Diagnosis: Ischemic cardiomyopathy

Discharge Diagnosis: Presence of biventricular AICD

Discharge Diagnosis: LBBB (left bundle branch block)

Discharge Disposition: 01-Home or Self Care

Attending Physician: JIM Jerry MD

Admitting Physician: JIM Jerry MD

Referring Physician: Dhiraj Mijares MD





Vital Signs







 



                           Most recent to            1



                                         oldest [Reference 



                                         Range]: 

 

 



                           Peripheral Pulse          90 bpm



                           Rate [ bpm]         (16 2:20 PM)

 

 



                           Blood Pressure            158/84 mmHg



                           [/60-90 mmHg]       *HI*



                                         (16 2:20 PM)







Problem List







    



              Condition     Effective Dates     Status       Health Status     Informant

 

    



                           Acquired                  Active  



                                         spondylolisthesis(Co    



                                         nfirmed)    

 

    



                           Acute                     Active  



                                         pain(Confirmed)    

 

    



                           Arthritis(Confirmed)      Resolved  

 

    



                           Right Carpal tunnel       Resolved  



                                         release(Confirmed)    

 

    



                           Carpal tunnel             Resolved  



                                         release    



                                         decompression(Confir    



                                         med)    

 

    



                           Cholecystectomy(Conf      Resolved  



                                         irmed)    

 

    



                     CHF (congestive      Active              patient



                                         heart    



                                         failure)(Confirmed)    

 

    



                           CAD (coronary artery      Active  



                                         disease)(Confirmed)    

 

    



                           Decompression(Confir      Resolved  



                                         med)    

 

    



                           Depression(Confirmed      Resolved  



                                         )    

 

    



                           Depression(Confirmed      Active  



                                         )    

 

    



                           Diabetes(Confirmed)       Active  

 

    



                           Diabetes(Confirmed)       Resolved  

 

    



                           GERD                      Active  



                                         (gastroesophageal    



                                         reflux    



                                         disease)(Confirmed)    

 

    



                           Ischemic                  Active  



                                         cardiomyopathy(Confi    



                                         rmed)    

 

    



                           Presence of               Active  



                                         biventricular    



                                         AICD(Confirmed)    

 

    



                           History of lumbar         Active  



                                         fusion(Confirmed)    

 

    



                           Hyperlipidemia(Confi      Resolved  



                                         rmed)    

 

    



                           Hypertension(Confirm      Resolved  



                                         ed)    

 

    



                           Hypertension(Confirm      Resolved  



                                         ed)    

 

    



                           HTN                       Active  



                                         (hypertension)(Confi    



                                         rmed)    

 

    



                           Hysterectomy(Confirm      Resolved  



                                         ed)    

 

    



                           Impaired gas              Active  



                                         exchange(Confirmed)1    

 

    



                           Irritable                 Resolved  



                                         bowel(Confirmed)    

 

    



                           Laminectomy(Confirme      Resolved  



                                         d)    

 

    



                           LBBB (left bundle         Active  



                                         branch    



                                         block)(Confirmed)    

 

    



                           LUQ pain(Confirmed)       Active  

 

    



                           Migraine(Confirmed)       Resolved  

 

    



                           Asthma(Confirmed)         Active  

 

    



                     Morbid              Active              patient



                                         obesity(Confirmed)    

 

    



                           Myocardial                Resolved  



                                         infarction(Confirmed    



                                         )    

 

    



                           Septicemia                Resolved  



                                         pneumonia(Confirmed)    

 

    



                     L knee              13              Resolved  



                                         Synovectomy(Confirme    



                                         d)    

 

    



                           Tissue perfusion          Active  



                                         alteration(Confirmed    



                                         )2    

 

    



                     Tobacco             Active              patient



                                         user(Confirmed)    

 

    



                     Tubal               79              Resolved  



                                         ligation(Confirmed)    







1Problem added automatically by system based on initiation of Impaired Gas 
Exchange Plan of Care



2Problem added automatically by system based on initiation of Tissue Perfusion 
Cerebral Plan of Care



Allergies, Adverse Reactions, Alerts







   



                 Substance       Reaction        Severity        Status

 

   



                     barium sulfate      Adverse Reaction      Active



                                         Swelling, hives  

 

   



                     celecoxib           HIVES               Active

 

   



                           influenza virus vaccine,       Active



                                         inactivated   

 

   



                     morphine            Adverse Reaction      Active

 

   



                     sulfamethoxazole     HIVES               Active



                                         Urticaria (hives)  







Medications





acetaminophen

1,000 mg, Oral, q6hr, as needed for pain, 0 Refill(s)

Start Date: 14

Status: Ordered



albuterol 2.5 mg/3 mL (0.083%) inhalation solution

3 mL, Inhalation, BID, Dx: Asthma, # 25 Each, 3 Refill(s), Pharmacy: [facility] [id number], 3 mL Inhalation BID,Instr:Dx: Asthma

Start Date: 4/21/15

Status: Ordered



amLODIPine 10 mg oral tablet

10 mg 1 tabs, Oral, Daily, # 90 tabs, 5 Refill(s), Pharmacy: [facility]
E, 1 tabs Oral Daily

Start Date: 16

Status: Ordered



Aspir 81

81 mg, Oral, Daily, 0 Refill(s)

Start Date: 14

Status: Ordered



atorvastatin 10 mg oral tablet

10 mg 1 tabs, Oral, Daily, # 90 tabs, 5 Refill(s), Pharmacy: [facility]
E, 1 tabs Oral Daily

Start Date: 16

Status: Ordered



bumetanide 1 mg oral tablet

See Instructions, Take 2 mg of Bumex in AM and 1 mg PM., # 300 tabs, 5 Refill(s)
, Pharmacy: [facility], Take 2 mg of Bumex in AM and 1 mg PM.

Start Date: 9/22/15

Status: Ordered



buPROPion 150 mg/12 hours (SR) oral tablet, extended release

See Instructions, Take 1 tablet by mouth two  times daily, # 180 tabs, 2 Refill(
s), eRx: [facility], Take 1 tablet by mouth two  times daily

Start Date: 3/2/16

Status: Ordered



carvedilol 25 mg oral tablet

25 mg 1 tabs, Oral, BID, # 180 tabs, 5 Refill(s), Pharmacy: [facility]
, 1 tabs Oral BID

Start Date: 9/22/15

Status: Ordered



cloNIDine 0.1 mg oral tablet

See Instructions, Take 1 tablet by mouth  every evening, # 90 tabs, 2 Refill(s),
eRx: [facility], Take 1 tablet by mouth  every evening

Start Date: 16

Status: Ordered



clopidogrel 75 mg oral tablet

See Instructions, Take 1 tablet by mouth  daily, # 90 tabs, 2 Refill(s), [facility]: [facility], Take 1 tablet by mouth  daily

Start Date: 16

Status: Ordered



digoxin 125 mcg (0.125 mg) oral tablet

125 mcg 1 tabs, Oral, Daily, # 90 tabs, 5 Refill(s), [facility]: [facility], 1 tabs Oral Daily

Start Date: 9/22/15

Status: Ordered



DULoxetine 20 mg oral delayed release capsule

See Instructions, Take 1 capsule by mouth  daily . Do not crush or  chew, # 90 c
aps, 1 Refill(s), eRx: [facility], Take 1 capsule by mouth  daily . Do
not crush or  chew

Start Date: 16

Status: Ordered



DULoxetine 60 mg oral delayed release capsule

60 mg 1 caps, Oral, Daily, do not crush or chew, # 30 caps, 0 Refill(s)

Start Date: 16

Status: Ordered



glipiZIDE 5 mg oral tablet

See Instructions, Take 1 tablet by mouth two  times daily, # 180 tabs, 3 Refill(
s), eRx: [facility], Take 1 tablet by mouth two  times daily

Start Date: 16

Status: Ordered



HumaLOG 100 units/mL subcutaneous solution

5 units, SubCutaneous, TIDAC, DX E11.9, # 30 mL, 2 Refill(s), Pharmacy: [facility], 5 units SubCutaneous TIDAC,Instr:DX E11.9

Start Date: 16

Status: Ordered



Insulin Syringe (DME)

DME Item use to inject insulin once daily     dx: E11.9, See Instructions, # 90 
Each, 4 Refill(s), Pharmacy: [facility] [id number], use to inject insulin o
nce daily     dx: E11.9, Supply

Start Date: 11/10/15

Status: Ordered



Lantus 100 units/mL subcutaneous solution

See Instructions, Inject subcutaneously 40 units every night at  bedtime once a 
day, # 30 mL, 1 Refill(s), eRx: [facility], Inject subcutaneously 30  
units every night at  bedtime once a day

Start Date: 16

Status: Ordered



Melatonin

10 mg, Oral, Bedtime (once a day), as needed for insomnia, 0 Refill(s)

Start Date: 14

Status: Ordered



METFORMIN  500MG  TAB

See Instructions, Take 2 tablets by mouth  twice daily, # 360 tabs, 3 Refill(s),
eRx: [facility], Take 2 tablets by mouth  twice daily

Start Date: 16

Status: Ordered



Norco 10 mg-325 mg oral tablet

1 tabs, Oral, q6hr, as needed for pain, # 30 tabs, 0 Refill(s)

Start Date: 16

Status: Ordered



potassium chloride 20 mEq oral tablet, extended release

See Instructions, Take 1 tablet by mouth  daily, # 90 tabs, [facility], Take 1 tablet by mouth  daily

Start Date: 16

Status: Ordered



ProAir HFA 90 mcg/inh inhalation aerosol

2 puffs, Inhalation, QID, as needed for wheezing, # 3 Each, 3 Refill(s), Pharmac
y: [facility]

Start Date: 1/23/15

Status: Ordered



RABEprazole 20 mg oral delayed release tablet

See Instructions, Take 2 tablets by mouth  daily, # 180 tabs, eRx: [facility], Take 2 tablets by mouth  daily

Start Date: 16

Status: Ordered



SUMAtriptan 50 mg oral tablet

50 mg 1 tabs, Oral, Daily, as needed for migraine headache, may repeat dose afte
r 2 hours up to a maximum of 200 mg in 24 hours, # 9 tabs, 1 Refill(s), Pharmacy
: [facility] [id number], 1 tabs Oral Daily,PRN:as needed for migraine heada
godwin,Instr:m...

Start Date: 16

Status: Ordered



Symbicort 160 mcg-4.5 mcg/inh inhalation aerosol

See Instructions, Use 2 puffs twice daily, # 30.6 g, 2 Refill(s), [facility], Use 2 puffs twice daily

Start Date: 16

Status: Ordered



SYRNG   .5CC ULTII 31 G SRT

See Instructions, Use to Inject insulin once  daily, # 90 Each, 3 Refill(s), eRx
: [facility], Use to Inject insulin once  daily

Start Date: 16

Status: Ordered



valsartan 160 mg oral tablet

See Instructions, 1 TABS ORAL DAILY, # 30 tabs, 1 Refill(s), eRx: [facility] [id number], 1 TABS ORAL DAILY

Start Date: 10/12/15

Status: Ordered



Results





No data available for this section



Immunizations







  



                     Vaccine             Date                Refusal Reason

 

  



                           tetanus-diphth toxoids (Td) adult/adol     00 







Procedures







   



                 Procedure       Date            Related Diagnosis     Body Site

 

   



                           L knee PMM/ part. synovectomy     13  

 

   



                           lt median nerve decompression     5/10/11  

 

   



                           Tubal ligation            1979  

 

   



                                         Carpal tunnel release rt   

 

   



                                         Cholecystectomy   

 

   



                                         Hysterectomy   

 

   



                                         Hysterectomy and bilateral   



                                         salpingo-oophorectomy sample   

 

   



                                         Laminectomy   

 

   



                                         Pacemaker   







Social History







 



                           Social History Type       Response

 

 



                           Smoking Status            Former smoker; Type: Cigarettes1







1Quit around 



Assessment and Plan

Extracted from:





  



                     Title: Ambulatory Patient Education     Author: JIM Jerry MD     Date: 16











                                        Family Medicine

Cardiomyopathy

Cardiomyopathy is a long-term (chronic) disease of the heart muscle 
(myocardium). Over time, the heart becomes abnormally large, thick, or stiff. 
This makes it harder for the heart to pump blood and can lead to heart failure.

There are several types of cardiomyopathy:



Dilated cardiomyopathy. This type causes the ventricles become large and 
weak.



Hypertrophic cardiomyopathy. This type causes the heart muscle to thicken.



Restrictive cardiomyopathy. This type causes the heart muscle to become 
rigid and less elastic.



Ischemic cardiomyopathy. This type involves narrowing arteries that cause 
the walls of the heart get thinner.



Peripartum cardiomyopathy. This type occurs during pregnancy or shortly 
after pregnancy.





CAUSES

The cause of cardiomyopathy is often not known. In some cases, it is passed down
(inherited) from a family member who also had cardiomyopathy. The disease may 
develop as a complication of another medical condition. These conditions can 
include:



Diabetes.



High blood pressure.



Viral infection of the heart.



Heart attack.



Coronary heart disease.



RISK FACTORS

You may be more likely to develop cardiomyopathy if you:



Have a family history of cardiomyopathy or other heart problems.



Are overweight or obese.



Use illegal drugs.



Abuse alcohol.



Have diabetes.



Have another disease that can cause cardiomyopathy as a complication.



SIGNS AND SYMPTOMS

Often, cardiomyopathy has no signs or symptoms. If you do have symptoms, they 
may include:



Shortness of breath, especially during activity.



Fatigue.



An irregular heartbeat (arrhythmia).



Dizziness, light-headedness, or fainting.



Chest pain.



Swelling in the lower leg or ankle.



DIAGNOSIS

Your health care provider may suspect cardiomyopathy based on your symptoms and 
medical history. Your health care provider will also do a physical exam. Other 
tests done may include:



Blood tests.



Imaging studies of your heart. These may be done using:



X-rays to check if your heart is enlarged.



Echocardiogram to show the size of your heart and how well it pumps.



MRI.



A test to record the electrical activity of your heart (electrocardiogram 
or ECG).



A test in which you wear a portable device (event monitor) to record your 
heart's electrical activity while you go about your day.



A test to monitor your heart's activity while you exercise (stress test). 




A procedure to check the blood pressure and blood flow in your 
heart(cardiac catheterization).



Injection of dye into your arteries before imaging studies are taken 
(angiogram).



Removal of a sample of heart tissue (biopsy). The sample is examined for 
problems.



TREATMENT

Treatment depends on the type of cardiomyopathy you have and the severity of 
your symptoms. If you are not having any symptoms, you might not need treatment.
If you need treatment, it may include:



Lifestyle changes.



Quit smoking, if you smoke.



Maintain a healthy weight. Lose weight if directed by your health care 
provider.



Eat a healthy diet. Include plenty of fruits, vegetables, and whole grains.



Get regular exercise. Ask your health care provider to suggest some 
activities that are good for you.



Medicine. You may need to take medicine to:



Lower your blood pressure.



Slow down your heart rate.



Keep your heart beating in a steady rhythm.



Clear excess fluids from your body.



Prevent blood clots.



Surgery. You may need surgery to:



Repair a defect.



Remove thickened tissue.



Implant a device to treat serious heart rhythm problems (implantable 
cardioverter-defibrillator or ICD).



Replace your heart (heart transplant) if all other treatments have failed 
(end stage).



HOME CARE INSTRUCTIONS



Take medicines only as directed by your health care provider.



Eat a heart-healthy diet. Work with your health care provider or a 
registered dietitian to learn about healthy eating options.



Maintain a healthy weight and stay physically active.



Do not use any tobacco products, including cigarettes, chewing tobacco, or 
electronic cigarettes. If you need help quitting, ask your health care provider.



Work closely with your health care provider to manage chronic conditions, 
such as diabetes and high blood pressure.



Limit alcohol intake to no more than one drink per day for nonpregnant 
women and no more than two drinks per day for men. One drink equals 12 ounces of
beer, 5 ounces of wine, or 1 ounces of hard liquor.



Try to get at least 7 hours of sleep each night.



Find ways to manage stress.



Keep all follow-up visits as directed by your health care provider. This is
important.



SEEK MEDICAL CARE IF:



Your symptoms get worse, even after treatment.



You have new symptoms.



SEEK IMMEDIATE MEDICAL CARE IF:



You have severe chest pain.



You have shortness of breath.



You cough up pink, bubbly material.



You have sudden sweating.



You feel nauseous and vomit.



You suddenly become light-headed or dizzy.



You feel your heart beating very fast.



It feels like your heart is skipping beats.

These symptoms may represent a serious problem that is an emergency. Do not wait
to see if the symptoms will go away. Get medical help right away. Call your 
local emergency services (911 in the [place] Do not drive yourself to the 
hospital.

This information is not intended to replace advice given to you by your health 
care provider. Make sure you discuss any questions you have with your health 
care provider.



Document Released: 2006 Document Revised: 2016 Document Reviewed: 
2015

ExitCare Patient Information 2016 [facility].





No follow up information was provided.





Extracted from:





  



                     Title: Office Visit Note     Author: JIM Jerry MD     Date: 16









                                         Assessment/Plan

 1.Ischemic cardiomyopathy  Ordered:

 Icd Device Progr Eval Mult 32805

 Office Visit Level 3 Est 85353

 Return to Clinic 

 2.LBBB (left bundle branch block)  Ordered:

 Icd Device Progr Eval Mult 98092

 Office Visit Level 3 Est 38976

 Return to Clinic 

 3.Presence of biventricular AICD  Ordered:

 Icd Device Progr Eval Mult 57154

 Office Visit Level 3 Est 96173

 Return to Clinic 







Referrals to Other Providers





Referred by: JIM Jerry MD

## 2019-07-31 NOTE — XMS REPORT
Transition of Care/Referral Summary

                             Created on: 2071



DYLAN KHAN

External Reference #: [id number]

: 1960

Sex: Female



Demographics







                          Address                   [address]  [address]

 

                          Home Phone                [phone]

 

                          Preferred Language        English

 

                          Marital Status            

 

                          Pentecostal Affiliation     [employer]

 

                          Race                      White

 

                          Ethnic Group              Not  or 





Author







                          Author                    [facility]

 

                          Organization              [facility]

 

                          Address                   Unknown

 

                          Phone                     Unavailable







Care Team Providers







                    Care Team Member Name    Role                Phone

 

                    Dhiraj Mijares    PCP                 [phone]







Encounter





VC FIN [id number] Date(s): 9/11/15 - 9/11/15

[facility] [address] [place], [place] [address]
S [phone]

Discharge Diagnosis: GERD (gastroesophageal reflux disease)

Discharge Diagnosis: Physical exam

Discharge Disposition: 01-Home or Self Care

Attending Physician: Dhiraj Mijares MD

Admitting Physician: Dhiraj Mijares MD





Vital Signs







 



                           Most recent to            1



                                         oldest [Reference 



                                         Range]: 

 

 



                           Blood Pressure            130/80 mmHg



                           [/60-90 mmHg]       (9/11/15 8:43 AM)







Problem List







    



              Condition     Effective Dates     Status       Health Status     Informant

 

    



                           Acquired                  Active  



                                         spondylolisthesis(Co    



                                         nfirmed)    

 

    



                           Acute                     Active  



                                         pain(Confirmed)    

 

    



                           Arthritis(Confirmed)      Resolved  

 

    



                           Right Carpal tunnel       Resolved  



                                         release(Confirmed)    

 

    



                           Carpal tunnel             Resolved  



                                         release    



                                         decompression(Confir    



                                         med)    

 

    



                           Cholecystectomy(Conf      Resolved  



                                         irmed)    

 

    



                     CHF (congestive      Active              patient



                                         heart    



                                         failure)(Confirmed)    

 

    



                           CAD (coronary artery      Active  



                                         disease)(Confirmed)    

 

    



                           Decompression(Confir      Resolved  



                                         med)    

 

    



                           Depression(Confirmed      Resolved  



                                         )    

 

    



                           Depression(Confirmed      Active  



                                         )    

 

    



                           Diabetes(Confirmed)       Active  

 

    



                           Diabetes(Confirmed)       Resolved  

 

    



                           GERD                      Active  



                                         (gastroesophageal    



                                         reflux    



                                         disease)(Confirmed)    

 

    



                           Ischemic                  Active  



                                         cardiomyopathy(Confi    



                                         rmed)    

 

    



                           Presence of               Active  



                                         biventricular    



                                         AICD(Confirmed)    

 

    



                           History of lumbar         Active  



                                         fusion(Confirmed)    

 

    



                           Hyperlipidemia(Confi      Resolved  



                                         rmed)    

 

    



                           Hypertension(Confirm      Resolved  



                                         ed)    

 

    



                           Hypertension(Confirm      Resolved  



                                         ed)    

 

    



                           HTN                       Active  



                                         (hypertension)(Confi    



                                         rmed)    

 

    



                           Hysterectomy(Confirm      Resolved  



                                         ed)    

 

    



                           Impaired gas              Active  



                                         exchange(Confirmed)1    

 

    



                           Irritable                 Resolved  



                                         bowel(Confirmed)    

 

    



                           Laminectomy(Confirme      Resolved  



                                         d)    

 

    



                           LBBB (left bundle         Active  



                                         branch    



                                         block)(Confirmed)    

 

    



                           LUQ pain(Confirmed)       Active  

 

    



                           Migraine(Confirmed)       Resolved  

 

    



                           Asthma(Confirmed)         Active  

 

    



                     Morbid              Active              patient



                                         obesity(Confirmed)    

 

    



                           Myocardial                Resolved  



                                         infarction(Confirmed    



                                         )    

 

    



                           Septicemia                Resolved  



                                         pneumonia(Confirmed)    

 

    



                     L knee              13              Resolved  



                                         Synovectomy(Confirme    



                                         d)    

 

    



                           Tissue perfusion          Active  



                                         alteration(Confirmed    



                                         )2    

 

    



                     Tobacco             Active              patient



                                         user(Confirmed)    

 

    



                     Tubal               79              Resolved  



                                         ligation(Confirmed)    







1Problem added automatically by system based on initiation of Impaired Gas 
Exchange Plan of Care



2Problem added automatically by system based on initiation of Tissue Perfusion 
Cerebral Plan of Care



Allergies, Adverse Reactions, Alerts







   



                 Substance       Reaction        Severity        Status

 

   



                     barium sulfate      Adverse Reaction      Active



                                         Swelling, hives  

 

   



                     celecoxib           HIVES               Active

 

   



                           influenza virus vaccine,       Active



                                         inactivated   

 

   



                     morphine            Adverse Reaction      Active

 

   



                     sulfamethoxazole     HIVES               Active



                                         Urticaria (hives)  







Medications





acetaminophen

1,000 mg, Oral, q6hr, as needed for pain, 0 Refill(s)

Start Date: 14

Status: Ordered



AcipHex 20 mg oral delayed release tablet

40 mg 2 tabs, Oral, Daily, # 180 tabs, 2 Refill(s), Pharmacy: [facility], 2 tabs Oral Daily

Start Date: 9/21/15

Status: Ordered



albuterol 2.5 mg/3 mL (0.083%) inhalation solution

3 mL, Inhalation, BID, Dx: Asthma, # 25 Each, 3 Refill(s), [facility]: [id number], 3 mL Inhalation BID,Instr:Dx: Asthma

Start Date: 4/21/15

Status: Ordered



amLODIPine 5 mg oral tablet

See Instructions, Take 1 tablet by mouth  daily, # 90 tabs, [facility]: [facility], Take 1 tablet by mouth  daily

Start Date: 2/3/16

Status: Ordered



Aspir 81

81 mg, Oral, Daily, 0 Refill(s)

Start Date: 14

Status: Ordered



atorvastatin 10 mg oral tablet

10 mg 1 tabs, Oral, Daily, # 90 tabs, 5 Refill(s), Pharmacy: [facility]
E, 1 tabs Oral Daily

Start Date: 16

Status: Ordered



bumetanide 1 mg oral tablet

See Instructions, Take 2 mg of Bumex in AM and 1 mg PM., # 300 tabs, 5 Refill(s)
, Pharmacy: [facility], Take 2 mg of Bumex in AM and 1 mg PM.

Start Date: 9/22/15

Status: Ordered



buPROPion 150 mg/12 hours (SR) oral tablet, extended release

See Instructions, Take 1 tablet by mouth two  times daily, # 180 tabs, 2 Refill(
s), eRx: [facility], Take 1 tablet by mouth two  times daily

Start Date: 3/2/16

Status: Ordered



carvedilol 25 mg oral tablet

25 mg 1 tabs, Oral, BID, # 180 tabs, 5 Refill(s), Pharmacy: [facility]
, 1 tabs Oral BID

Start Date: 9/22/15

Status: Ordered



cloNIDine 0.1 mg oral tablet

See Instructions, Take 1 tablet by mouth  every evening, # 90 tabs, 1 Refill(s),
eRx: [facility], Take 1 tablet by mouth  every evening

Start Date: 16

Status: Ordered



clopidogrel 75 mg oral tablet

See Instructions, Take 1 tablet by mouth  daily, # 90 tabs, 2 Refill(s), [facility]: [facility], Take 1 tablet by mouth  daily

Start Date: 16

Status: Ordered



digoxin 125 mcg (0.125 mg) oral tablet

125 mcg 1 tabs, Oral, Daily, # 90 tabs, 5 Refill(s), [facility], 1 tabs Oral Daily

Start Date: 9/22/15

Status: Ordered



DULoxetine 20 mg oral delayed release capsule

See Instructions, Take 1 capsule by mouth  daily do not crush or chew, # 90 caps
, eRx: [facility], Take 1 capsule by mouth  daily do not crush or chew

Start Date: 16

Status: Ordered



glipiZIDE 5 mg oral tablet

See Instructions, Take 1 tablet by mouth two  times daily, # 180 tabs, [facility], Take 1 tablet by mouth two  times daily

Start Date: 2/3/16

Status: Ordered



Insulin Syringe (DME)

DME Item use to inject insulin once daily     dx: E11.9, See Instructions, # 90 
Each, 4 Refill(s), Pharmacy: [id number], use to inject insulin o
nce daily     dx: E11.9, Supply

Start Date: 11/10/15

Status: Ordered



Lantus 100 units/mL subcutaneous solution

See Instructions, Inject subcutaneously 30  units every night at  bedtime once a
day, # 30 mL, 2 Refill(s), eRx: [facility], Inject subcutaneously 30  
units every night at  bedtime once a day

Start Date: 7/31/15

Status: Ordered



Melatonin

10 mg, Oral, Bedtime (once a day), as needed for insomnia, 0 Refill(s)

Start Date: 14

Status: Ordered



metFORMIN 500 mg oral tablet

See Instructions, Take 2 tablets by mouth  twice a day, # 360 tabs, [facility]: [facility], Take 2 tablets by mouth  twice a day

Start Date: 2/3/16

Status: Ordered



Norco 10 mg-325 mg oral tablet

1 tabs, Oral, q6hr, as needed for pain, # 30 tabs, 0 Refill(s)

Start Date: 16

Status: Ordered



OTC Calcium

OTC Calcium, See Instructions, 1 tablet oral daily, 0 Refill(s)

Start Date: 2/3/15

Status: Ordered



potassium chloride 20 mEq oral tablet, extended release

See Instructions, Take 1 tablet by mouth  daily, # 90 unknown unit, 1 Refill(s),
eRx: [facility], Take 1 tablet by mouth  daily

Start Date: 10/2/15

Status: Ordered



ProAir HFA 90 mcg/inh inhalation aerosol

2 puffs, Inhalation, QID, as needed for wheezing, # 3 Each, 3 Refill(s), Pharmac
y: [facility]

Start Date: 1/23/15

Status: Ordered



SUMAtriptan 50 mg oral tablet

50 mg 1 tabs, Oral, Daily, as needed for migraine headache, may repeat dose afte
r 2 hours up to a maximum of 200 mg in 24 hours, # 9 tabs, 1 Refill(s), [id number], 1 tabs Oral Daily,PRN:as needed for migraine heada
godwin,Instr:m...

Start Date: 16

Status: Ordered



Symbicort 160 mcg-4.5 mcg/inh inhalation aerosol

See Instructions, Use 2 puffs twice daily, # 10.2 g, 2 Refill(s), [facility], Use 2 puffs twice daily

Start Date: 12/2/15

Status: Ordered



valsartan 160 mg oral tablet

See Instructions, 1 TABS ORAL DAILY, # 30 tabs, 1 Refill(s), [facility]Rx: [id number], 1 TABS ORAL DAILY

Start Date: 10/12/15

Status: Ordered



Results





No data available for this section



Immunizations







  



                     Vaccine             Date                Refusal Reason

 

  



                           tetanus-diphth toxoids (Td) adult/adol     00 







Procedures







   



                 Procedure       Date            Related Diagnosis     Body Site

 

   



                           L knee PMM/ part. synovectomy     13  

 

   



                           lt median nerve decompression     5/10/11  

 

   



                           Tubal ligation              

 

   



                                         Carpal tunnel release rt   

 

   



                                         Cholecystectomy   

 

   



                                         Hysterectomy   

 

   



                                         Hysterectomy and bilateral   



                                         salpingo-oophorectomy sample   

 

   



                                         Laminectomy   

 

   



                                         Pacemaker   







Social History







 



                           Social History Type       Response

 

 



                           Smoking Status            Former smoker; Type: Cigarettes1







1Quit around 



Assessment and Plan

Extracted from:





  



                     Title: Office Visit Note     Author: Dhiraj Mijares MD     Date: 9/11/15









                                         Assessment/Plan

 1.Physical exam

 2.GERD (gastroesophageal reflux disease)  Rx for Nexium instead of Dexilant

 Asthma

 Diabetes

 History of IBS

 Hypertension

 Mild CAD

 Seasonal rhinitis  Sample of Nasonex nasal spray

  I recommended routine use of over-the-counter allergy medications and/or nasal
steroid spray (handout provided).

 Well adult

 Follow-up on lab results when available

 We will schedule patient for a mammogram.

 Consider a possible trialof clonidine to help with her hot flashes,but I 
want to review this with her cardiologist first. Again she is not a good 
candidate for hormone replacementand she is already on duloxetine. Otherwise
she can continue using the OTC supplement she is alreadytaking

 I recommended we schedule the patient for a routine screening colonoscopy. They
are refusing this at this time and will notify us in the future we may want to 
schedule this.

## 2019-07-31 NOTE — XMS REPORT
Transition of Care/Referral Summary

                             Created on: 2088



BILLDYLAN

External Reference #: [id number]

: 1960

Sex: Female



Demographics







                          Address                   [address]

[place]  [address]

 

                          Home Phone                [phone]

 

                          Preferred Language        English

 

                          Marital Status            

 

                          Rastafari Affiliation     Anabaptism

 

                          Race                      White

 

                          Ethnic Group              Not  or 





Author







                          Author                    [facility]

 

                          Organization              [facility]

 

                          Address                   Unknown

 

                          Phone                     Unavailable







Care Team Providers







                    Care Team Member Name    Role                Phone

 

                    Dhiraj Mijares    PCP                 [phone]







Encounter





[facility] FIN [id number] Date(s): 5/8/15 - 5/8/15

[facility] [address] [place], [place] [address] [phone]

Discharge Diagnosis: Ischemic cardiomyopathy

Discharge Diagnosis: CAD (coronary artery disease)

Discharge Diagnosis: LBBB (left bundle branch block)

Discharge Diagnosis: Asthma

Discharge Diagnosis: Diabetes

Discharge Diagnosis: Congestive heart failure

Discharge Disposition: 01-Home or Self Care

Attending Physician: JIM Jerry MD

Admitting Physician: JIM Jerry MD

Referring Physician: Dhiraj Mijares MD





Vital Signs







 



                           Most recent to            1



                                         oldest [Reference 



                                         Range]: 

 

 



                           Peripheral Pulse          80 bpm



                           Rate [ bpm]         (5/8/15 8:10 AM)

 

 



                           Blood Pressure            132/90 mmHg



                           [/60-90 mmHg]       (5/8/15 8:10 AM)







Problem List







    



              Condition     Effective Dates     Status       Health Status     Informant

 

    



                           Acquired                  Active  



                                         spondylolisthesis(Co    



                                         nfirmed)    

 

    



                           Acute                     Active  



                                         pain(Confirmed)    

 

    



                           Arthritis(Confirmed)      Resolved  

 

    



                           Right Carpal tunnel       Resolved  



                                         release(Confirmed)    

 

    



                           Carpal tunnel             Resolved  



                                         release    



                                         decompression(Confir    



                                         med)    

 

    



                           Cholecystectomy(Conf      Resolved  



                                         irmed)    

 

    



                     CHF (congestive      Active              patient



                                         heart    



                                         failure)(Confirmed)    

 

    



                           CAD (coronary artery      Active  



                                         disease)(Confirmed)    

 

    



                           Decompression(Confir      Resolved  



                                         med)    

 

    



                           Depression(Confirmed      Resolved  



                                         )    

 

    



                           Depression(Confirmed      Active  



                                         )    

 

    



                           Diabetes(Confirmed)       Resolved  

 

    



                           Diabetes(Confirmed)       Active  

 

    



                           GERD                      Active  



                                         (gastroesophageal    



                                         reflux    



                                         disease)(Confirmed)    

 

    



                           Ischemic                  Active  



                                         cardiomyopathy(Confi    



                                         rmed)    

 

    



                           Presence of               Active  



                                         biventricular    



                                         AICD(Confirmed)    

 

    



                           History of lumbar         Active  



                                         fusion(Confirmed)    

 

    



                           Hyperlipidemia(Confi      Resolved  



                                         rmed)    

 

    



                           Hypertension(Confirm      Resolved  



                                         ed)    

 

    



                           Hypertension(Confirm      Resolved  



                                         ed)    

 

    



                           HTN                       Active  



                                         (hypertension)(Confi    



                                         rmed)    

 

    



                           Hysterectomy(Confirm      Resolved  



                                         ed)    

 

    



                           Impaired gas              Active  



                                         exchange(Confirmed)1    

 

    



                           Irritable                 Resolved  



                                         bowel(Confirmed)    

 

    



                           Laminectomy(Confirme      Resolved  



                                         d)    

 

    



                           LBBB (left bundle         Active  



                                         branch    



                                         block)(Confirmed)    

 

    



                           LUQ pain(Confirmed)       Active  

 

    



                           Migraine(Confirmed)       Resolved  

 

    



                           Asthma(Confirmed)         Active  

 

    



                     Morbid              Active              patient



                                         obesity(Confirmed)    

 

    



                           Myocardial                Resolved  



                                         infarction(Confirmed    



                                         )    

 

    



                           Septicemia                Resolved  



                                         pneumonia(Confirmed)    

 

    



                     L knee              13              Resolved  



                                         Synovectomy(Confirme    



                                         d)    

 

    



                           Tissue perfusion          Active  



                                         alteration(Confirmed    



                                         )2    

 

    



                     Tobacco             Active              patient



                                         user(Confirmed)    

 

    



                     Tubal               79              Resolved  



                                         ligation(Confirmed)    







1Problem added automatically by system based on initiation of Impaired Gas 
Exchange Plan of Care



2Problem added automatically by system based on initiation of Tissue Perfusion 
Cerebral Plan of Care



Allergies, Adverse Reactions, Alerts







   



                 Substance       Reaction        Severity        Status

 

   



                     barium sulfate      Adverse Reaction      Active



                                         Swelling, hives  

 

   



                     celecoxib           HIVES               Active

 

   



                           influenza virus vaccine,       Active



                                         inactivated   

 

   



                     morphine            Adverse Reaction      Active

 

   



                     sulfamethoxazole     HIVES               Active



                                         Urticaria (hives)  







Medications





acetaminophen

1,000 mg, Oral, q6hr, as needed for pain, 0 Refill(s)

Start Date: 14

Status: Ordered



AcipHex 20 mg oral delayed release tablet

40 mg 2 tabs, Oral, Daily, # 180 tabs, 2 Refill(s), [facility]acy: [facility], 2 tabs Oral Daily

Start Date: 9/21/15

Status: Ordered



albuterol 2.5 mg/3 mL (0.083%) inhalation solution

3 mL, Inhalation, BID, Dx: Asthma, # 25 Each, 3 Refill(s), Pharmacy: [facility] [id number], 3 mL Inhalation BID,Instr:Dx: Asthma

Start Date: 4/21/15

Status: Ordered



amLODIPine 5 mg oral tablet

See Instructions, Take 1 tablet by mouth  daily, # 90 tabs, 1 Refill(s), Pharmac
y: [facility], Take 1 tablet by mouth  daily

Start Date: 9/2/15

Status: Ordered



Aspir 81

81 mg, Oral, Daily, 0 Refill(s)

Start Date: 14

Status: Ordered



bumetanide 1 mg oral tablet

See Instructions, Take 2 mg of Bumex in AM and 1 mg PM., # 300 tabs, 5 Refill(s)
, Pharmacy: [facility], Take 2 mg of Bumex in AM and 1 mg PM.

Start Date: 9/22/15

Status: Ordered



buPROPion 150 mg/12 hours (SR) oral tablet, extended release

See Instructions, Take 1 tablet by mouth two  times daily, # 180 unknown unit, 1
Refill(s), eRx: [facility], Take 1 tablet by mouth two  times daily

Start Date: 9/9/15

Status: Ordered



carvedilol 25 mg oral tablet

25 mg 1 tabs, Oral, BID, # 180 tabs, 5 Refill(s), Pharmacy: [facility]
, 1 tabs Oral BID

Start Date: 9/22/15

Status: Ordered



Claritin

10 mg, Oral, Daily, 0 Refill(s)

Start Date: 5/18/15

Status: Ordered



cloNIDine 0.1 mg oral tablet

0.1 mg 1 tabs, Oral, qPM, # 90 tabs, 1 Refill(s), Pharmacy: [facility]
, 1 tabs Oral qPM

Start Date: 10/12/15

Status: Ordered



clopidogrel 75 mg oral tablet

1 tabs, Oral, Daily, # 90 tabs, 3 Refill(s), Pharmacy: [facility], 1 t
abs Oral Daily

Start Date: 4/14/15

Status: Ordered



digoxin 125 mcg (0.125 mg) oral tablet

125 mcg 1 tabs, Oral, Daily, # 90 tabs, 5 Refill(s), [facility]: [facility], 1 tabs Oral Daily

Start Date: 9/22/15

Status: Ordered



DULoxetine 20 mg oral delayed release capsule

20 mg 1 caps, Oral, BID, 0 Refill(s)

Start Date: 10/19/15

Status: Ordered



DULoxetine 60 mg oral delayed release capsule

See Instructions, Take 1 capsule by mouth  daily do not crush or chew, # 90 unkn
own unit, 1 Refill(s), eRx: [facility], Take 1 capsule by mouth  daily
do not crush or chew

Start Date: 9/30/15

Status: Ordered



glipiZIDE 5 mg oral tablet

5 mg 1 tabs, Oral, BID, X 90 days, # 180 tabs, 1 Refill(s), [facility]acy: [facility], 1 tabs Oral BID,x90 days

Start Date: 9/2/15

Stop Date: 16

Status: Ordered



Insulin Syringe (DME)

DME Item use to inject insulin once daily     dx: E11.9, See Instructions, # 90 
Each, 4 Refill(s), Pharmacy: [facility] [id number], use to inject insulin o
nce daily     dx: E11.9, Supply

Start Date: 11/10/15

Status: Ordered



Lantus 100 units/mL subcutaneous solution

See Instructions, Inject subcutaneously 30  units every night at  bedtime once a
day, # 30 mL, 2 Refill(s), eRx: [facility], Inject subcutaneously 30  
units every night at  bedtime once a day

Start Date: 7/31/15

Status: Ordered



Melatonin

10 mg, Oral, Bedtime (once a day), as needed for insomnia, 0 Refill(s)

Start Date: 14

Status: Ordered



metFORMIN 500 mg oral tablet

See Instructions, Take 2 tablets by mouth  twice a day, # 360 tabs, 1 Refill(s),
Pharmacy: [facility], Take 2 tablets by mouth  twice a day

Start Date: 9/2/15

Status: Ordered



Multiple Vitamins oral tablet

1 tabs, Oral, Daily, 0 Refill(s)

Start Date: 14

Status: Ordered



Norco 10 mg-325 mg oral tablet

1 tabs, Oral, q6hr, as needed for pain, # 30 tabs, 0 Refill(s)

Start Date: 10/19/15

Status: Ordered



OTC Black Cohash

OTC Black Cohash, See Instructions, 1 tablet oral daily, 0 Refill(s)

Start Date: 2/3/15

Status: Ordered



OTC Calcium

OTC Calcium, See Instructions, 1 tablet oral daily, 0 Refill(s)

Start Date: 2/3/15

Status: Ordered



potassium chloride 20 mEq oral tablet, extended release

See Instructions, Take 1 tablet by mouth  daily, # 90 unknown unit, 1 Refill(s),
eRx: [facility], Take 1 tablet by mouth  daily

Start Date: 10/2/15

Status: Ordered



ProAir HFA 90 mcg/inh inhalation aerosol

2 puffs, Inhalation, QID, as needed for wheezing, # 3 Each, 3 Refill(s), Pharmac
y: [facility]

Start Date: 1/23/15

Status: Ordered



SUMAtriptan 50 mg oral tablet

50 mg 1 tabs, Oral, Daily, as needed for migraine headache, may repeat dose afte
r 2 hours up to a maximum of 200 mg in 24 hours, # 9 tabs, 0 Refill(s), Pharmacy
: [facility] [id number], 1 tabs Oral Daily,PRN:as needed for migraine heada
godwin,Instr:m...

Start Date: 9/16/15

Status: Ordered



Symbicort 160 mcg-4.5 mcg/inh inhalation aerosol

See Instructions, Use 2 puffs twice daily, # 10.2 g, 2 Refill(s), [facility]: [facility], Use 2 puffs twice daily

Start Date: 9/9/15

Status: Ordered



valsartan 160 mg oral tablet

See Instructions, 1 TABS ORAL DAILY, # 30 tabs, 1 Refill(s), eRx: [facility] [id number], 1 TABS ORAL DAILY

Start Date: 10/12/15

Status: Ordered



Vitamin D3

4000 unit, Oral, Daily, 0 Refill(s)

Start Date: 2/3/15

Status: Ordered



Results





No data available for this section



Immunizations







  



                     Vaccine             Date                Refusal Reason

 

  



                           tetanus-diphth toxoids (Td) adult/adol     00 







Procedures







   



                 Procedure       Date            Related Diagnosis     Body Site

 

   



                           L knee PMM/ part. synovectomy     13  

 

   



                           lt median nerve decompression     5/10/11  

 

   



                           Tubal ligation            1979  

 

   



                                         Carpal tunnel release rt   

 

   



                                         Cholecystectomy   

 

   



                                         Hysterectomy   

 

   



                                         Hysterectomy and bilateral   



                                         salpingo-oophorectomy sample   

 

   



                                         Laminectomy   

 

   



                                         Pacemaker   







Social History







 



                           Social History Type       Response

 

 



                           Smoking Status            Former smoker; Type: Cigarettes1







1Quit around 



Assessment and Plan





No data available for this section

## 2019-07-31 NOTE — XMS REPORT
Transition of Care/Referral Summary

                             Created on: 2097



DYLAN KHAN

External Reference #: [id number]

: 1960

Sex: Female



Demographics







                          Address                   [facility]  [address]

 

                          Home Phone                [phone]

 

                          Preferred Language        English

 

                          Marital Status            

 

                          Taoist Affiliation     Caodaism

 

                          Race                      White

 

                                        Additional Race(s)  

 

                          Ethnic Group              Not  or 





Author







                          Author                    [facility]

 

                          Organization              [facility]

 

                          Address                   Unknown

 

                          Phone                     Unavailable







Care Team Providers







                    Care Team Member Name    Role                Phone

 

                    Dhiraj Mijares    PCP                 [phone]







Encounter





VC FIN [id number] Date(s): 18 - 18

[facility] [address] [place] [address]
[facility] [phone]

Discharge Disposition: 01-Home or Self Care

Attending Physician: Dhiraj Mijares MD





Vital Signs







 



                           Most recent to            1



                                         oldest [Reference 



                                         Range]: 

 

 



                           Peripheral Pulse          84 bpm



                           Rate [ bpm]         (18 9:11 AM)

 

 



                           Blood Pressure            144/72 mmHg



                           [/60-90 mmHg]       *HI*



                                         (18 9:11 AM)

 

 



                           SpO2                      90 %



                                         (18 9:11 AM)







Problem List







    



              Condition     Effective Dates     Status       Health Status     Informant

 

    



                           Acquired                  Active  



                                         spondylolisthesis(Co    



                                         nfirmed)    

 

    



                           Acute                     Active  



                                         pain(Confirmed)    

 

    



                           Hay fever(Confirmed)      Active  

 

    



                           Arthritis(Confirmed)      Resolved  

 

    



                           Right Carpal tunnel       Resolved  



                                         release(Confirmed)    

 

    



                           Carpal tunnel             Resolved  



                                         release    



                                         decompression(Confir    



                                         med)    

 

    



                           Cholecystectomy(Conf      Resolved  



                                         irmed)    

 

    



                           IBS (irritable bowel      Active  



                                         syndrome)(Confirmed)    

 

    



                     CHF (congestive      Active              patient



                                         heart    



                                         failure)(Confirmed)    

 

    



                           CAD (coronary artery      Active  



                                         disease)(Confirmed)    

 

    



                           Decompression(Confir      Resolved  



                                         med)    

 

    



                           Depression(Confirmed      Resolved  



                                         )    

 

    



                           Depression(Confirmed      Active  



                                         )    

 

    



                           Diabetes(Confirmed)       Active  

 

    



                           Diabetes(Confirmed)       Resolved  

 

    



                           GERD                      Active  



                                         (gastroesophageal    



                                         reflux    



                                         disease)(Confirmed)    

 

    



                     Ischemic            < 5/8/15            Resolved  



                                         cardiomyopathy(Confi    



                                         rmed)    

 

    



                           Presence of               Active  



                                         biventricular    



                                         AICD(Confirmed)    

 

    



                           History of lumbar         Active  



                                         fusion(Confirmed)    

 

    



                           Hyperlipidemia(Confi      Active  



                                         rmed)    

 

    



                     Asthma(Confirmed)     < 17           Resolved  

 

    



                           Hypertension(Confirm      Resolved  



                                         ed)    

 

    



                           Hypertension(Confirm      Resolved  



                                         ed)    

 

    



                           HTN                       Active  



                                         (hypertension)(Confi    



                                         rmed)    

 

    



                           Hysterectomy(Confirm      Resolved  



                                         ed)    

 

    



                           Impaired gas              Active  



                                         exchange(Confirmed)1    

 

    



                           Irritable                 Resolved  



                                         bowel(Confirmed)    

 

    



                           Laminectomy(Confirme      Resolved  



                                         d)    

 

    



                           LBBB (left bundle         Active  



                                         branch    



                                         block)(Confirmed)    

 

    



                           LUQ pain(Confirmed)       Active  

 

    



                           Migraine(Confirmed)       Resolved  

 

    



                           Asthma(Confirmed)         Active  

 

    



                     Morbid              Active              patient



                                         obesity(Confirmed)    

 

    



                           Myocardial                Resolved  



                                         infarction(Confirmed    



                                         )    

 

    



                           Septicemia                Resolved  



                                         pneumonia(Confirmed)    

 

    



                     L knee              13              Resolved  



                                         Synovectomy(Confirme    



                                         d)    

 

    



                           Tissue perfusion          Active  



                                         alteration(Confirmed    



                                         )2    

 

    



                     Tobacco             Active              patient



                                         user(Confirmed)    

 

    



                     Tubal               79              Resolved  



                                         ligation(Confirmed)    

 

    



                           UTI (urinary tract        Active  



                                         infection)(Confirmed    



                                         )    

 

    



                     Chicken             < 17           Resolved  



                                         pox(Confirmed)    







1Problem added automatically by system based on initiation of Impaired Gas 
Exchange Plan of Care



2Problem added automatically by system based on initiation of Tissue Perfusion 
Cerebral Plan of Care



Allergies, Adverse Reactions, Alerts







   



                 Substance       Reaction        Severity        Status

 

   



                     sulfamethoxazole     Urticaria (hives)      Active



                                         HIVES  

 

   



                     morphine            Burning             Active

 

   



                     barium sulfate      Swelling, hives      Active

 

   



                     celecoxib           HIVES               Active

 

   



                     influenza virus vaccine,     Convulsion          Active



                                         inactivated   







Medications





acetaminophen

1,000 mg, Oral, q6hr, as needed for pain, 0 Refill(s)

Start Date: 14

Status: Ordered



Ambien 10 mg oral tablet

10 mg 1 tabs, Oral, Bedtime (once a day), as needed for sleep, [facility] [phone]
[phone], X 30 days, # 30 tabs, 0 Refill(s)

Start Date: 18

Stop Date: 18

Status: Ordered



amLODIPine 10 mg oral tablet

10 mg 1 tabs, Oral, Daily, # 90 tabs, 5 Refill(s), Pharmacy: [facility]
E, 1 tabs Oral Daily

Start Date: 16

Status: Ordered



Aspir 81

81 mg, Oral, Daily, 0 Refill(s)

Start Date: 14

Status: Ordered



atorvastatin 10 mg oral tablet

See Instructions, TAKE 1 TABLET BY MOUTH  DAILY, # 90 tabs, eRx: [facility]

Start Date: 17

Status: Ordered



Basaglar KwikPen 100 units/mL subcutaneous solution

See Instructions, 34 units at hs SubCutaneous, # 1 boxes, 3 Refill(s), Pharmacy:
[facility], 34 units at hs SubCutaneous

Start Date: 17

Status: Ordered



Bentyl 20 mg oral tablet

20 mg 1 tabs, Oral, q8hr, Abdominal Cramping, # 15 tabs, 1 Refill(s), Pharmacy: 
[facility] [id number], 1 tabs Oral q8hr,PRN:Abdominal Cramping

Start Date: 18

Status: Ordered



bumetanide 1 mg oral tablet

See Instructions, Take 2 tablets by mouth in  the morning and 1 tablet by mouth 
in the evening, # 270 tabs, 1 Refill(s), [facility]: [facility], Take 2 
tablets by mouth in  the morning and 1 tablet by mouth in the evening

Start Date: 17

Status: Ordered



buPROPion 150 mg/12 hours (SR) oral tablet, extended release

See Instructions, Take 1 tablet by mouth two  times daily, # 180 tabs, 3 Refill(
s), eRx: [facility]

Start Date: 17

Status: Ordered



carvedilol 25 mg oral tablet

25 mg 1 tabs, Oral, BID, # 180 tabs, 1 Refill(s), Pharmacy: [facility]
, 1 tabs Oral BID

Start Date: 17

Status: Ordered



cloNIDine 0.1 mg oral tablet

See Instructions, Take 1 tablet by mouth  every evening, # 90 tabs, 1 Refill(s),
eRx: [facility]

Start Date: 17

Status: Ordered



clopidogrel 75 mg oral tablet

See Instructions, TAKE 1 TABLET BY MOUTH  DAILY, # 90 tabs, 2 Refill(s), [facility]: [facility]

Start Date: 18

Status: Ordered



diclofenac 3% topical gel

0.5 g, Topical, BID, # 50 g, 1 Refill(s), Pharmacy: [facility] [id number]

Start Date: 8/15/17

Status: Ordered



Digox 125 mcg (0.125 mg) oral tablet

See Instructions, TAKE 1 TABLET BY MOUTH  DAILY, # 90 tabs, 2 Refill(s), [facility]Rx: [facility]

Start Date: 18

Status: Ordered



DULoxetine 20 mg oral delayed release capsule

20 mg 1 caps, Oral, Daily, do not crush or chew    (Take one capsule by mouth da
kervin with Duloxetine 60mg), # 90 caps, 1 Refill(s), [facility]
E, 1 caps Oral Daily,x90 days,Instr:do not crush or chew; (Take one capsule by m
outh daily...

Start Date: 17

Stop Date: 3/4/18

Status: Ordered



DULoxetine 60 mg oral delayed release capsule

See Instructions, Take 1 capsule by mouth  daily . Do not crush or  chew.    (Ta
ke 1 tablet daily with Duloxetine 20mg), # 90 caps, 1 Refill(s), [facility], Take 1 capsule by mouth  daily . Do not crush or  chew. (Take 1
tablet porsche...

Start Date: 17

Status: Ordered



Glucometer Lancets (DME)

DME Item one touch delica lancets  use to test bg x3/daily  dx:e11.65, See Instr
uctions, # 300 Each, 3 Refill(s), Pharmacy: [facility] [id number], one touc
h delica lancets; use to test bg x3/daily; dx:e11.65, Supply

Start Date: 3/13/17

Status: Ordered



HumaLOG 100 units/mL subcutaneous solution

See Instructions, INJECT SUBCUTANEOUSLY 10  UNITS 3 TIMES DAILY BEFORE  MEALS, #
27 mL, 3 Refill(s), eRx: [facility]

Start Date: 17

Status: Ordered



Insulin Pin Needles (DME)

DME Item 32g x4mm (jacob needles)  use to inject insulin   dx:e11.65, See Instruc
tions, # 100 Each, 3 Refill(s), Pharmacy: [facility] [id number], 32g x4mm (
jacob needles); use to inject insulin ; dx:e11.65, Supply

Start Date: 17

Status: Ordered



Melatonin

10 mg, Oral, Bedtime (once a day), as needed for insomnia, 0 Refill(s)

Start Date: 14

Status: Ordered



metFORMIN 500 mg oral tablet

See Instructions, Take 2 tablets by mouth  twice daily, # 360 tabs, 1 Refill(s),
eRx: [facility], Take 2 tablets by mouth  twice daily

Start Date: 17

Status: Ordered



mometasone 50 mcg/inh nasal spray

See Instructions, Use 2 sprays nasally daily, # 17 g, 5 Refill(s), eRx: [facility]

Start Date: 17

Status: Ordered



Norco 10 mg-325 mg oral tablet

1 tabs, Oral, q6hr, as needed for pain, # 30 tabs, 0 Refill(s)

Start Date: 18

Status: Ordered



potassium chloride 20 mEq oral tablet, extended release

See Instructions, TAKE 1 TABLET BY MOUTH  DAILY, # 90 tabs, 2 Refill(s), [facility]Rx: [facility]

Start Date: 18

Status: Ordered



ProAir HFA 90 mcg/inh inhalation aerosol

See Instructions, INHALE 2 PUFFS 4 TIMES  DAILY AS NEEDED FOR  WHEEZING, # 25.5 
g, 2 Refill(s), eRx: [facility]

Start Date: 18

Status: Ordered



RABEprazole 20 mg oral delayed release tablet

See Instructions, Take 2 tablets by mouth  daily, # 180 tabs, 1 Refill(s), eRx: 
[facility]

Start Date: 17

Status: Ordered



SUMAtriptan 50 mg oral tablet

See Instructions, TAKE 1 TABLET BY MOUTH DAILY AS NEEDED FOR MIGRAINE HEADACHE. 
MAY REPEAT DOSE AFTER 2 HOURS UP TO A. MAXIMUM  MG IN 24 HOURS, # 9 tabs, 
eRx: [facility] [id number]

Start Date: 18

Status: Ordered



Symbicort 160 mcg-4.5 mcg/inh inhalation aerosol

See Instructions, INHALE 2 PUFFS TWICE DAILY, # 30.6 g, 2 Refill(s), [facility]

Start Date: 18

Status: Ordered



SYRNG   .5CC ULTII 31 G SRT

See Instructions, Use to Inject insulin once  daily, # 90 Each, 2 Refill(s), eRx
: [facility], Use to Inject insulin once  daily

Start Date: 17

Status: Ordered



valsartan 160 mg oral tablet

160 mg 1 tabs, Oral, BID, X 90 days, # 180 tabs, 3 Refill(s), Pharmacy: [facility], 1 tabs Oral BID,x90 days

Start Date: 4/3/17

Stop Date: 3/29/18

Status: Ordered



Results





Hematology





 



                           Most recent to            1



                                         oldest [Reference 



                                         Range]: 

 

 



                           WBC [4.8-10.8             11.3 10*3/uL



                           10*3/uL]                  *HI*



                                         (18 12:00 AM)

 

 



                           RBC [4.00-5.20]           4.28



                                         (18 12:00 AM)

 

 



                           Hgb [12.0-16.0            12.3 gm/dL



                           gm/dL]                    (18 12:00 AM)

 

 



                           Hct [37.0-47.0 %]         40.5 %



                                         (18 12:00 AM)

 

 



                           MCV [82.0-99.0 fL]        94.6 fL



                                         (18 12:00 AM)

 

 



                           MCH [27.0-32.0 pg]        28.7 pg



                                         (18 12:00 AM)

 

 



                           MCHC [32.0-36.0           30.4 gm/dL



                           gm/dL]                    *LOW*



                                         (18 12:00 AM)

 

 



                           RDW [11.5-14.5 %]         13.8 %



                                         (18 12:00 AM)

 

 



                           Platelet [150-400         315 10*3/uL



                           10*3/uL]                  (18 12:00 AM)

 

 



                           MPV [8.8-14.8 fL]         9.9 fL



                                         (18 12:00 AM)

 

 



                           Immature                  0.7 %



                           Granulocytes              (18 12:00 AM)



                                         [0.0-1.0 %] 

 

 



                           Neutrophils [51-75        67 %



                           %]                        (18 12:00 AM)

 

 



                           Lymphocytes [20-46        26 %



                           %]                        (18 12:00 AM)

 

 



                           Monocytes [4-11 %]        5 %



                                         (18 12:00 AM)

 

 



                           Eosinophils [0-4 %]       2 %



                                         (18 12:00 AM)

 

 



                           Basophils [0-2 %]         0 %



                                         (18 12:00 AM)

 

 



                           Neutro Absolute           7.54



                           [1.90-7.00]               *HI*



                                         (18 12:00 AM)

 

 



                           Lymph Absolute            2.95



                           [0.80-3.30]               (18 12:00 AM)

 

 



                           Mono Absolute             0.53



                           [0.30-1.00]               (18 12:00 AM)

 

 



                           Eos Absolute              0.19



                           [0.00-0.50]               (18 12:00 AM)

 

 



                           Baso Absolute             0.05



                           [0.00-0.20]               (18 12:00 AM)

 

 



                           Nucleated RBC             0.0 /100 WBC



                           Automated [0 /100         (18 12:00 AM)



                                         WBC] 







Chemistry





 



                           Most recent to            1



                                         oldest [Reference 



                                         Range]: 

 

 



                           Sodium Lvl [135-144       139 mEq/L



                           mEq/L]                    (18 12:00 AM)

 

 



                           Potassium Lvl             5.4 mEq/L



                           [3.5-5.2 mEq/L]           *HI*



                                         (18 12:00 AM)

 

 



                           Chloride [          97 mEq/L



                           mEq/L]                    *LOW*



                                         (18 12:00 AM)

 

 



                           CO2 [22-31 mEq/L]         31 mEq/L



                                         (18 12:00 AM)

 

 



                           AGAP [3-20 mEq/L]         11 mEq/L



                                         (18 12:00 AM)

 

 



                           BUN [10-20 mg/dL]         17 mg/dL



                                         (18 12:00 AM)

 

 



                           Glucose Lvl [70-99        338 mg/dL



                           mg/dL]                    *HI*



                                         (18 12:00 AM)

 

 



                           Creatinine Lvl            0.84 mg/dL



                           [0.57-1.11 mg/dL]         (18 12:00 AM)

 

 



                           eGFR [>60 mL/min]         >60 mL/min 1



                                         (18 12:00 AM)

 

 



                           Calcium Lvl               10.3 mg/dL



                           [8.4-10.2 mg/dL]          *HI*



                                         (18 12:00 AM)

 

 



                           Albumin Lvl [3.5-5.0      4.3 gm/dL



                           gm/dL]                    (18 12:00 AM)

 

 



                           Total Protein             7.3 gm/dL



                           [6.0-7.6 gm/dL]           (18 12:00 AM)

 

 



                           Globulin [1.8-4.0         3.0 gm/dL



                           gm/dL]                    (18 12:00 AM)

 

 



                           ALT [0-55 U/L]            56 U/L



                                         *HI*



                                         (18 12:00 AM)

 

 



                           AST [5-34 U/L]            33 U/L



                                         (18 12:00 AM)

 

 



                           Alk Phos [          157 U/L



                           U/L]                      *HI*



                                         (18 12:00 AM)

 

 



                           Bili Total [0.2-1.2       0.4 mg/dL



                           mg/dL]                    (18 12:00 AM)

 

 



                           FSH                       35.2 mIU/mL 2



                                         (18 12:00 AM)

 

 



                           Chol [0-199 mg/dL]        201 mg/dL



                                         *HI*



                                         (18 12:00 AM)

 

 



                           Trig [0-149 mg/dL]        349 mg/dL



                                         *HI*



                                         (18 12:00 AM)

 

 



                           HDL [40-84 mg/dL]         41 mg/dL



                                         (18 12:00 AM)

 

 



                           LDL [0-130 mg/dL]         90 mg/dL



                                         (18 12:00 AM)

 

 



                           VLDL Cholesterol          70 mg/dL



                           [0-28 mg/dL]              *HI*



                                         (18 12:00 AM)

 

 



                           Cardiac Risk              4.9



                           [0.0-5.0]                 (18 12:00 AM)

 

 



                           TSH with Reflex Free      1.38



                           T4 [0.35-4.94]            (18 12:00 AM)

 

 



                           Hgb A1c [4.1-5.6 %]       12.7 %



                                         *HI*



                                         (18 12:00 AM)

 

 



                           eAvg Glucose              317.8 mg/dL



                                         (18 12:00 AM)







1Result Comment: Multiply eGFR results by 1.21 for  race.



2Result Comment: Adult Female ranges:

     Follicular:           3.0 - 8.1 mIU/mL

     Mid-cycle:            2.6 - 16.7 mIU/mL

     Luteal Phase          1.4 - 5.5 mIU/mL

     Post-menopausal       26.7 - 133.4 mIU/mL



Urinalysis





 



                           Most recent to            1



                                         oldest [Reference 



                                         Range]: 

 

 



                           UA Color                  Yellow



                                         (18 12:00 AM)

 

 



                           UA Appear                 Clear



                                         (18 12:00 AM)

 

 



                           UA pH [5.0-8.0]           5.0



                                         (18 12:00 AM)

 

 



                           UA Leuk Est               Negative



                           [Negative]                (18 12:00 AM)

 

 



                           UA Nitrite                Negative



                           [Negative]                (18 12:00 AM)

 

 



                           UA Protein                Negative



                           [Negative]                (18 12:00 AM)

 

 



                           UA Glucose                Trace



                           [Negative]                *ABN*



                                         (18 12:00 AM)

 

 



                           UA Ketones                Negative



                           [Negative]                (18 12:00 AM)

 

 



                           UA Urobilinogen           0.2 mg/dL



                           [<1.0 mg/dL]              (18 12:00 AM)

 

 



                           UA Bili [Negative]        Negative



                                         (18 12:00 AM)

 

 



                           UA Blood [Negative]       Negative



                                         (18 12:00 AM)

 

 



                           UA Spec Grav              1.008



                           [1.003-1.030]             (18 12:00 AM)

 

 



                           Type                      Clean Catch



                                         (18 12:00 AM)







Immunizations





Given and Recorded





   



                 Vaccine         Date            Status          Refusal Reason

 

   



                     tetanus-diphth toxoids (Td) adult/adol     00             Given 







Procedures







   



                 Procedure       Date            Related Diagnosis     Body Site

 

   



                           Collection of venous blood by venipuncture     18  

 

   



                           L knee PMM/ part. synovectomy     13  

 

   



                           lt median nerve decompression     5/10/11  

 

   



                           Tubal ligation            1979  

 

   



                                         Carpal tunnel release rt   

 

   



                                         Cholecystectomy   

 

   



                                         Hysterectomy   

 

   



                                         Hysterectomy and bilateral   



                                         salpingo-oophorectomy sample   

 

   



                                         Laminectomy   

 

   



                                         Pacemaker   







Social History







 



                           Social History Type       Response

 

 



                           Smoking Status            Former smoker; Type: Cigarettes1



                                         entered on: 5/8/15







1Quit around 



Assessment and Plan

Extracted from:





  



                     Title: Office Visit Note     Author: Dhiraj Mijares MD     Date: 18









                                                  Back pain  

   

  CAD (coronary artery disease)  

   

  Diabetes  

  Ordered: 

   Collection Of Venous Blood By Venipuncture 71534   

    

  Hot flashes  

   

  Hypertension  

   

  IBS (irritable bowel syndrome)  

   

  Mixed hyperlipidemia  

  Ordered: 

   Collection Of Venous Blood By Venipuncture 98623   

    

  Well adult exam  

  Ordered: 

   Collection Of Venous Blood By Venipuncture 93722   

     

  Follow-up on lab results when available 

 Routine medications were refilled.  

 A new prescription forBentylwas provided. Dosage, benefits and side 
effects were discussed. For details on dosing and instructions, please refer 
to the medication list. Followup if no improvement. 

 No other changes are needed at this time. Recommend patient continue the same
 dose of their current medications, and otherwise followup as needed. 
Medication refills will be provided as needed. Anticipate seeing them back at 
their next scheduled appointment.

## 2019-07-31 NOTE — XMS REPORT
Transition of Care/Referral Summary

                             Created on: 2114



DYLAN KHAN

External Reference #: [id number]

: 1960

Sex: Female



Demographics







                          Address                   [address]  [address]

 

                          Home Phone                [phone]

 

                          Preferred Language        English

 

                          Marital Status            

 

                          Sikhism Affiliation     Yazidi

 

                          Race                      White

 

                          Ethnic Group              Not  or 





Author







                          Author                    [facility]

 

                          Organization              [facility]

 

                          Address                   Unknown

 

                          Phone                     Unavailable







Care Team Providers







                    Care Team Member Name    Role                Phone

 

                    Dhiraj Mijares    PCP                 [phone]







Encounter





[facility] FIN [id number] Date(s): 10/3/16 - 10/3/16

[facility] [address] [place], [place] [address]
S [phone]

Discharge Disposition: 01-Home or Self Care

Attending Physician: Dhiraj Mijares MD

Admitting Physician: Dhiraj Mijares MD





Vital Signs







 



                           Most recent to            1



                                         oldest [Reference 



                                         Range]: 

 

 



                           Temperature Tympanic      36.7 degC



                           [36.6-38.1 degC]          (10/3/16 1:04 PM)

 

 



                           Apical Heart Rate         83 bpm



                           [ bpm]              (10/3/16 1:04 PM)

 

 



                           Blood Pressure            128/76 mmHg



                           [/60-90 mmHg]       (10/3/16 1:04 PM)

 

 



                           SpO2                      94 %



                                         (10/3/16 1:04 PM)







Problem List







    



              Condition     Effective Dates     Status       Health Status     Informant

 

    



                           Acquired                  Active  



                                         spondylolisthesis(Co    



                                         nfirmed)    

 

    



                           Acute                     Active  



                                         pain(Confirmed)    

 

    



                           Arthritis(Confirmed)      Resolved  

 

    



                           Right Carpal tunnel       Resolved  



                                         release(Confirmed)    

 

    



                           Carpal tunnel             Resolved  



                                         release    



                                         decompression(Confir    



                                         med)    

 

    



                           Cholecystectomy(Conf      Resolved  



                                         irmed)    

 

    



                     CHF (congestive      Active              patient



                                         heart    



                                         failure)(Confirmed)    

 

    



                           CAD (coronary artery      Active  



                                         disease)(Confirmed)    

 

    



                           Decompression(Confir      Resolved  



                                         med)    

 

    



                           Depression(Confirmed      Resolved  



                                         )    

 

    



                           Depression(Confirmed      Active  



                                         )    

 

    



                           Diabetes(Confirmed)       Active  

 

    



                           Diabetes(Confirmed)       Resolved  

 

    



                           GERD                      Active  



                                         (gastroesophageal    



                                         reflux    



                                         disease)(Confirmed)    

 

    



                           Ischemic                  Active  



                                         cardiomyopathy(Confi    



                                         rmed)    

 

    



                           Presence of               Active  



                                         biventricular    



                                         AICD(Confirmed)    

 

    



                           History of lumbar         Active  



                                         fusion(Confirmed)    

 

    



                           Hyperlipidemia(Confi      Resolved  



                                         rmed)    

 

    



                           Hypertension(Confirm      Resolved  



                                         ed)    

 

    



                           Hypertension(Confirm      Resolved  



                                         ed)    

 

    



                           HTN                       Active  



                                         (hypertension)(Confi    



                                         rmed)    

 

    



                           Hysterectomy(Confirm      Resolved  



                                         ed)    

 

    



                           Impaired gas              Active  



                                         exchange(Confirmed)1    

 

    



                           Irritable                 Resolved  



                                         bowel(Confirmed)    

 

    



                           Laminectomy(Confirme      Resolved  



                                         d)    

 

    



                           LBBB (left bundle         Active  



                                         branch    



                                         block)(Confirmed)    

 

    



                           LUQ pain(Confirmed)       Active  

 

    



                           Migraine(Confirmed)       Resolved  

 

    



                           Asthma(Confirmed)         Active  

 

    



                     Morbid              Active              patient



                                         obesity(Confirmed)    

 

    



                           Myocardial                Resolved  



                                         infarction(Confirmed    



                                         )    

 

    



                           Septicemia                Resolved  



                                         pneumonia(Confirmed)    

 

    



                     L knee              13              Resolved  



                                         Synovectomy(Confirme    



                                         d)    

 

    



                           Tissue perfusion          Active  



                                         alteration(Confirmed    



                                         )2    

 

    



                     Tobacco             Active              patient



                                         user(Confirmed)    

 

    



                     Tubal               79              Resolved  



                                         ligation(Confirmed)    







1Problem added automatically by system based on initiation of Impaired Gas 
Exchange Plan of Care



2Problem added automatically by system based on initiation of Tissue Perfusion 
Cerebral Plan of Care



Allergies, Adverse Reactions, Alerts







   



                 Substance       Reaction        Severity        Status

 

   



                     barium sulfate      Adverse Reaction      Active



                                         Swelling, hives  

 

   



                     celecoxib           HIVES               Active

 

   



                           influenza virus vaccine,       Active



                                         inactivated   

 

   



                     morphine            Adverse Reaction      Active

 

   



                     sulfamethoxazole     HIVES               Active



                                         Urticaria (hives)  







Medications





acetaminophen

1,000 mg, Oral, q6hr, as needed for pain, 0 Refill(s)

Start Date: 14

Status: Ordered



Ambien 10 mg oral tablet

10 mg 1 tabs, Oral, Bedtime (once a day), as needed for sleep, X 14 days, # 14 t
abs, 1 Refill(s)

Start Date: 10/3/16

Stop Date: 10/31/16

Status: Ordered



amLODIPine 10 mg oral tablet

10 mg 1 tabs, Oral, Daily, # 90 tabs, 5 Refill(s), Pharmacy: [facility]
E, 1 tabs Oral Daily

Start Date: 16

Status: Ordered



Aspir 81

81 mg, Oral, Daily, 0 Refill(s)

Start Date: 14

Status: Ordered



atorvastatin 10 mg oral tablet

10 mg 1 tabs, Oral, Daily, # 90 tabs, 5 Refill(s), Pharmacy: [facility]
E, 1 tabs Oral Daily

Start Date: 16

Status: Ordered



bumetanide 1 mg oral tablet

See Instructions, Take 2 tablets by mouth in  the morning and 1 tablet by mouth 
in the evening, # 270 tabs, eRx: [facility], Take 2 tablets by mouth i
n  the morning and 1 tablet by mouth in the evening

Start Date: 10/3/16

Status: Ordered



buPROPion 150 mg/12 hours (SR) oral tablet, extended release

See Instructions, Take 1 tablet by mouth two  times daily, # 180 tabs, 1 Refill(
s), eRx: [facility], Take 1 tablet by mouth two  times daily

Start Date: 10/3/16

Status: Ordered



carvedilol 25 mg oral tablet

See Instructions, Take 1 tablet by mouth two  times daily, # 180 tabs, [facility]: [facility], Take 1 tablet by mouth two  times daily

Start Date: 10/3/16

Status: Ordered



cloNIDine 0.1 mg oral tablet

See Instructions, Take 1 tablet by mouth  every evening, # 90 tabs, 2 Refill(s),
eRx: [facility], Take 1 tablet by mouth  every evening

Start Date: 16

Status: Ordered



clopidogrel 75 mg oral tablet

See Instructions, Take 1 tablet by mouth  daily, # 90 tabs, 1 Refill(s), [facility]: [facility], Take 1 tablet by mouth  daily

Start Date: 10/3/16

Status: Ordered



digoxin 125 mcg (0.125 mg) oral tablet

See Instructions, Take 1 tablet by mouth  daily, # 90 tabs, [facility]: [facility], Take 1 tablet by mouth  daily

Start Date: 10/3/16

Status: Ordered



DULoxetine 60 mg oral delayed release capsule

See Instructions, Take 1 capsule by mouth  daily do not crush or chew, # 90 caps
, 1 Refill(s), eRx: [facility], Take 1 capsule by mouth  daily do not 
crush or chew

Start Date: 10/3/16

Status: Ordered



glipiZIDE 5 mg oral tablet

See Instructions, Take 1 tablet by mouth two  times daily, # 180 tabs, 3 Refill(
s), eRx: [facility], Take 1 tablet by mouth two  times daily

Start Date: 16

Status: Ordered



HumaLOG 100 units/mL subcutaneous solution

5 units, SubCutaneous, TIDAC, DX E11.9, # 30 mL, 2 Refill(s), Pharmacy: [facility], 5 units SubCutaneous TIDAC,Instr:DX E11.9

Start Date: 16

Status: Ordered



Insulin Syringe (DME)

DME Item use to inject insulin once daily     dx: E11.9, See Instructions, # 90 
Each, 4 Refill(s), Pharmacy: "[id number], use to inject insulin o
nce daily     dx: E11.9, Supply

Start Date: 11/10/15

Status: Ordered



Lantus 100 units/mL subcutaneous solution

See Instructions, Inject subcutaneously 30  units every night at  bedtime, # 30 
mL, 1 Refill(s), eRx: [facility], Inject subcutaneously 30  units ever
y night at  bedtime

Start Date: 10/3/16

Status: Ordered



Melatonin

10 mg, Oral, Bedtime (once a day), as needed for insomnia, 0 Refill(s)

Start Date: 14

Status: Ordered



METFORMIN  500MG  TAB

See Instructions, Take 2 tablets by mouth  twice daily, # 360 tabs, 3 Refill(s),
eRx: [facility], Take 2 tablets by mouth  twice daily

Start Date: 16

Status: Ordered



Nasonex 50 mcg/inh nasal spray

2 sprays, Nasal, Daily, # 1 Each, 5 Refill(s), Pharmacy: [phone]
[phone]

Start Date: 10/3/16

Status: Ordered



Norco 10 mg-325 mg oral tablet

1 tabs, Oral, q6hr, as needed for pain, # 30 tabs, 0 Refill(s)

Start Date: 16

Status: Ordered



potassium chloride 20 mEq oral tablet, extended release

See Instructions, Take 1 tablet by mouth  daily, # 90 tabs, 1 Refill(s), [facility]: [facility], Take 1 tablet by mouth  daily

Start Date: 16

Status: Ordered



ProAir HFA 90 mcg/inh inhalation aerosol

See Instructions, Inhale 2 puffs 4 times  daily as needed for  wheezing, # 25.5 
g, eRx: [facility], Inhale 2 puffs 4 times  daily as needed for  wheez
ing

Start Date: 10/3/16

Status: Ordered



RABEprazole 20 mg oral delayed release tablet

See Instructions, Take 2 tablets by mouth  daily, # 180 tabs, 1 Refill(s), eRx: 
[facility], Take 2 tablets by mouth  daily

Start Date: 16

Status: Ordered



SUMAtriptan 50 mg oral tablet

50 mg 1 tabs, Oral, Daily, as needed for migraine headache, may repeat dose afte
r 2 hours up to a maximum of 200 mg in 24 hours, # 9 tabs, 1 Refill(s), Pharmacy
: [facility] [id number], 1 tabs Oral Daily,PRN:as needed for migraine heada
godwin,Instr:m...

Start Date: 16

Status: Ordered



Symbicort 160 mcg-4.5 mcg/inh inhalation aerosol

See Instructions, Use 2 puffs twice daily, # 30.6 g, 2 Refill(s), [facility]: [facility], Use 2 puffs twice daily

Start Date: 10/3/16

Status: Ordered



SYRNG   .5CC ULTII 31 G SRT

See Instructions, Use to Inject insulin once  daily, # 90 Each, 3 Refill(s), eRx
: [facility], Use to Inject insulin once  daily

Start Date: 16

Status: Ordered



valsartan 160 mg oral tablet

See Instructions, 1 TABS ORAL DAILY, # 30 tabs, 1 Refill(s), eRx: "[id number], 1 TABS ORAL DAILY

Start Date: 10/12/15

Status: Ordered



Zithromax 250 mg oral tablet

250 mg 1 tabs, Oral, Daily, X 10 days, # 10 tabs, 0 Refill(s), Pharmacy: [facility] [id number], 1 tabs Oral Daily,x10 days

Start Date: 10/3/16

Stop Date: 10/13/16

Status: Ordered



Results





No data available for this section



Immunizations







  



                     Vaccine             Date                Refusal Reason

 

  



                           tetanus-diphth toxoids (Td) adult/adol     00 







Procedures







   



                 Procedure       Date            Related Diagnosis     Body Site

 

   



                           L knee PMM/ part. synovectomy     13  

 

   



                           lt median nerve decompression     5/10/11  

 

   



                           Tubal ligation            1979  

 

   



                                         Carpal tunnel release rt   

 

   



                                         Cholecystectomy   

 

   



                                         Hysterectomy   

 

   



                                         Hysterectomy and bilateral   



                                         salpingo-oophorectomy sample   

 

   



                                         Laminectomy   

 

   



                                         Pacemaker   







Social History







 



                           Social History Type       Response

 

 



                           Smoking Status            Former smoker; Type: Cigarettes1







1Quit around 



Assessment and Plan





No data available for this section

## 2019-07-31 NOTE — XMS REPORT
Transition of Care/Referral Summary

                             Created on: 2123



DYLAN KHAN

External Reference #: [id number]

: 1960

Sex: Female



Demographics







                          Address                   [address] [address]

[place]  [address]

 

                          Home Phone                [phone]

 

                          Preferred Language        English

 

                          Marital Status            

 

                          Latter day Affiliation     Faith

 

                          Race                      White

 

                          Ethnic Group              Not  or 





Author







                          Author                    [facility]

 

                          Organization              [facility]

 

                          Address                   Unknown

 

                          Phone                     Unavailable







Care Team Providers







                    Care Team Member Name    Role                Phone

 

                    Dhiraj Mijares    PCP                 [phone]







Encounter





[facility] FIN [id number] Date(s): 5/18/15 - 5/19/15

[facility] [address] [place] [address]  [phone]
[phone]

Discharge Diagnosis: Left bundle branch block

Discharge Diagnosis: Cardiomyopathy, ischemic

Discharge Diagnosis: CHF

Final: ENDOMYOCARDIAL FIBROSIS

Final: OTHER SPECIFIED FORMS OF CHRONIC ISCHEMIC HEART DISEASE

Final: OTHER LEFT BUNDLE BRANCH BLOCK

Final: CORONARY ATHEROSCLEROSIS OF NATIVE CORONARY ARTERY

Final: Diabetes mellitus without mention of complication, type II or unspecified
type, not stated as uncontrolled

Final: Asthma, unspecified

Final: UNSPECIFIED ESSENTIAL HYPERTENSION

Final: IRRITABLE BOWEL SYNDROME

Final: DEPRESSIVE DISORDER, NOT ELSEWHERE CLASSIFIED

Final: OLD MYOCARDIAL INFARCTION

Final: Personal History of Tobacco Use

Final: Long-Term (Current) Use of Insulin

Final: Long-Term (Current) Use of Other Medications

Final: OTHER AND UNSPECIFIED HYPERLIPIDEMIA

Discharge Disposition: 01-Home or Self Care

Attending Physician: JIM Jerry MD

Admitting Physician: JIM Jerry MD





Vital Signs







 



                           Most recent to            1



                                         oldest [Reference 



                                         Range]: 

 

 



                           Temperature Oral          36.7 degC



                           [35.8-37.3 degC]          (5/19/15 9:00 AM)

 

 



                           Temperature Skin          37.2 degC



                           [36-37 degC]              *HI*



                                         (5/18/15 6:26 AM)

 

 



                           Temperature Temporal      36.8 degC



                           Artery [36.3-37.8         (5/19/15 5:00 AM)



                                         degC] 

 

 



                           Apical Heart Rate         111 bpm



                           [ bpm]              *HI*



                                         (5/18/15 4:29 PM)

 

 



                           Peripheral Pulse          101 bpm



                           Rate [ bpm]         *HI*



                                         (5/19/15 9:21 AM)

 

 



                           Heart Rate Monitored      95 bpm



                           [ bpm]              (5/18/15 9:27 PM)

 

 



                           Respiratory Rate          18 br/min



                           [14-20 br/min]            (5/19/15 9:00 AM)

 

 



                           Blood Pressure            132/87 mmHg



                           [/60-90 mmHg]       (5/19/15 9:21 AM)

 

 



                           SpO2                      97 %



                                         (5/19/15 9:00 AM)







Problem List







    



              Condition     Effective Dates     Status       Health Status     Informant

 

    



                           Acquired                  Active  



                                         spondylolisthesis(Co    



                                         nfirmed)    

 

    



                           Acute                     Active  



                                         pain(Confirmed)    

 

    



                           Arthritis(Confirmed)      Resolved  

 

    



                           Right Carpal tunnel       Resolved  



                                         release(Confirmed)    

 

    



                           Carpal tunnel             Resolved  



                                         release    



                                         decompression(Confir    



                                         med)    

 

    



                           Cholecystectomy(Conf      Resolved  



                                         irmed)    

 

    



                     CHF (congestive      Active              patient



                                         heart    



                                         failure)(Confirmed)    

 

    



                           CAD (coronary artery      Active  



                                         disease)(Confirmed)    

 

    



                           Decompression(Confir      Resolved  



                                         med)    

 

    



                           Depression(Confirmed      Resolved  



                                         )    

 

    



                           Depression(Confirmed      Active  



                                         )    

 

    



                           Diabetes(Confirmed)       Resolved  

 

    



                           Diabetes(Confirmed)       Active  

 

    



                           GERD                      Active  



                                         (gastroesophageal    



                                         reflux    



                                         disease)(Confirmed)    

 

    



                           Ischemic                  Active  



                                         cardiomyopathy(Confi    



                                         rmed)    

 

    



                           Presence of               Active  



                                         biventricular    



                                         AICD(Confirmed)    

 

    



                           History of lumbar         Active  



                                         fusion(Confirmed)    

 

    



                           Hyperlipidemia(Confi      Resolved  



                                         rmed)    

 

    



                           Hypertension(Confirm      Resolved  



                                         ed)    

 

    



                           Hypertension(Confirm      Resolved  



                                         ed)    

 

    



                           HTN                       Active  



                                         (hypertension)(Confi    



                                         rmed)    

 

    



                           Hysterectomy(Confirm      Resolved  



                                         ed)    

 

    



                           Impaired gas              Active  



                                         exchange(Confirmed)1    

 

    



                           Irritable                 Resolved  



                                         bowel(Confirmed)    

 

    



                           Laminectomy(Confirme      Resolved  



                                         d)    

 

    



                           LBBB (left bundle         Active  



                                         branch    



                                         block)(Confirmed)    

 

    



                           LUQ pain(Confirmed)       Active  

 

    



                           Migraine(Confirmed)       Resolved  

 

    



                           Asthma(Confirmed)         Active  

 

    



                     Morbid              Active              patient



                                         obesity(Confirmed)    

 

    



                           Myocardial                Resolved  



                                         infarction(Confirmed    



                                         )    

 

    



                           Septicemia                Resolved  



                                         pneumonia(Confirmed)    

 

    



                     L knee              13              Resolved  



                                         Synovectomy(Confirme    



                                         d)    

 

    



                           Tissue perfusion          Active  



                                         alteration(Confirmed    



                                         )2    

 

    



                     Tobacco             Active              patient



                                         user(Confirmed)    

 

    



                     Tubal               79              Resolved  



                                         ligation(Confirmed)    







1Problem added automatically by system based on initiation of Impaired Gas 
Exchange Plan of Care



2Problem added automatically by system based on initiation of Tissue Perfusion 
Cerebral Plan of Care



Allergies, Adverse Reactions, Alerts







   



                 Substance       Reaction        Severity        Status

 

   



                     barium sulfate      Adverse Reaction      Active



                                         Swelling, hives  

 

   



                     celecoxib           HIVES               Active

 

   



                           influenza virus vaccine,       Active



                                         inactivated   

 

   



                     morphine            Adverse Reaction      Active

 

   



                     sulfamethoxazole     HIVES               Active



                                         Urticaria (hives)  







Medications





acetaminophen

1,000 mg, Oral, q6hr, as needed for pain, 0 Refill(s)

Start Date: 14

Status: Ordered



AcipHex 20 mg oral delayed release tablet

40 mg 2 tabs, Oral, Daily, # 180 tabs, 2 Refill(s), Pharmacy: [facility], 2 tabs Oral Daily

Start Date: 9/21/15

Status: Ordered



albuterol 2.5 mg/3 mL (0.083%) inhalation solution

3 mL, Inhalation, BID, Dx: Asthma, # 25 Each, 3 Refill(s), [facility]: [id number], 3 mL Inhalation BID,Instr:Dx: Asthma

Start Date: 4/21/15

Status: Ordered



amLODIPine 5 mg oral tablet

See Instructions, Take 1 tablet by mouth  daily, # 90 tabs, 1 Refill(s), Pharmac
y: [facility], Take 1 tablet by mouth  daily

Start Date: 9/2/15

Status: Ordered



Aspir 81

81 mg, Oral, Daily, 0 Refill(s)

Start Date: 14

Status: Ordered



bumetanide 1 mg oral tablet

See Instructions, Take 2 mg of Bumex in AM and 1 mg PM., # 300 tabs, 5 Refill(s)
, Pharmacy: [facility], Take 2 mg of Bumex in AM and 1 mg PM.

Start Date: 9/22/15

Status: Ordered



buPROPion 150 mg/12 hours (SR) oral tablet, extended release

See Instructions, Take 1 tablet by mouth two  times daily, # 180 unknown unit, 1
Refill(s), eRx: [facility], Take 1 tablet by mouth two  times daily

Start Date: 9/9/15

Status: Ordered



carvedilol 25 mg oral tablet

25 mg 1 tabs, Oral, BID, # 180 tabs, 5 Refill(s), Pharmacy: [facility]
, 1 tabs Oral BID

Start Date: 9/22/15

Status: Ordered



Claritin

10 mg, Oral, Daily, 0 Refill(s)

Start Date: 5/18/15

Status: Ordered



cloNIDine 0.1 mg oral tablet

0.1 mg 1 tabs, Oral, qPM, # 90 tabs, 1 Refill(s), Pharmacy: [facility]
, 1 tabs Oral qPM

Start Date: 10/12/15

Status: Ordered



clopidogrel 75 mg oral tablet

1 tabs, Oral, Daily, # 90 tabs, 3 Refill(s), Pharmacy: [facility], 1 t
abs Oral Daily

Start Date: 4/14/15

Status: Ordered



digoxin 125 mcg (0.125 mg) oral tablet

125 mcg 1 tabs, Oral, Daily, # 90 tabs, 5 Refill(s), [facility], 1 tabs Oral Daily

Start Date: 9/22/15

Status: Ordered



DULoxetine 20 mg oral delayed release capsule

20 mg 1 caps, Oral, BID, 0 Refill(s)

Start Date: 10/19/15

Status: Ordered



DULoxetine 60 mg oral delayed release capsule

See Instructions, Take 1 capsule by mouth  daily do not crush or chew, # 90 unkn
own unit, 1 Refill(s), eRx: [facility], Take 1 capsule by mouth  daily
do not crush or chew

Start Date: 9/30/15

Status: Ordered



glipiZIDE 5 mg oral tablet

5 mg 1 tabs, Oral, BID, X 90 days, # 180 tabs, 1 Refill(s), [facility]: [facility], 1 tabs Oral BID,x90 days

Start Date: 9/2/15

Stop Date: 16

Status: Ordered



Insulin Syringe (DME)

DME Item use to inject insulin once daily     dx: E11.9, See Instructions, # 90 
Each, 4 Refill(s), Pharmacy: [id number], use to inject insulin o
nce daily     dx: E11.9, Supply

Start Date: 11/10/15

Status: Ordered



Lantus 100 units/mL subcutaneous solution

See Instructions, Inject subcutaneously 30  units every night at  bedtime once a
day, # 30 mL, 2 Refill(s), eRx: [facility], Inject subcutaneously 30  
units every night at  bedtime once a day

Start Date: 7/31/15

Status: Ordered



Melatonin

10 mg, Oral, Bedtime (once a day), as needed for insomnia, 0 Refill(s)

Start Date: 14

Status: Ordered



metFORMIN 500 mg oral tablet

See Instructions, Take 2 tablets by mouth  twice a day, # 360 tabs, 1 Refill(s),
Pharmacy: [facility], Take 2 tablets by mouth  twice a day

Start Date: 9/2/15

Status: Ordered



Multiple Vitamins oral tablet

1 tabs, Oral, Daily, 0 Refill(s)

Start Date: 14

Status: Ordered



Norco 10 mg-325 mg oral tablet

1 tabs, Oral, q6hr, as needed for pain, # 30 tabs, 0 Refill(s)

Start Date: 10/19/15

Status: Ordered



OTC Black Cohash

OTC Black Cohash, See Instructions, 1 tablet oral daily, 0 Refill(s)

Start Date: 2/3/15

Status: Ordered



OTC Calcium

OTC Calcium, See Instructions, 1 tablet oral daily, 0 Refill(s)

Start Date: 2/3/15

Status: Ordered



potassium chloride 20 mEq oral tablet, extended release

See Instructions, Take 1 tablet by mouth  daily, # 90 unknown unit, 1 Refill(s),
eRx: [facility], Take 1 tablet by mouth  daily

Start Date: 10/2/15

Status: Ordered



ProAir HFA 90 mcg/inh inhalation aerosol

2 puffs, Inhalation, QID, as needed for wheezing, # 3 Each, 3 Refill(s), Pharmac
y: [facility]

Start Date: 1/23/15

Status: Ordered



SUMAtriptan 50 mg oral tablet

50 mg 1 tabs, Oral, Daily, as needed for migraine headache, may repeat dose afte
r 2 hours up to a maximum of 200 mg in 24 hours, # 9 tabs, 0 Refill(s), Pharmacy
: [id number], 1 tabs Oral Daily,PRN:as needed for migraine heada
godwin,Instr:m...

Start Date: 9/16/15

Status: Ordered



Symbicort 160 mcg-4.5 mcg/inh inhalation aerosol

See Instructions, Use 2 puffs twice daily, # 10.2 g, 2 Refill(s), [facility]: [facility], Use 2 puffs twice daily

Start Date: 9/9/15

Status: Ordered



valsartan 160 mg oral tablet

See Instructions, 1 TABS ORAL DAILY, # 30 tabs, 1 Refill(s), eRx: [id number], 1 TABS ORAL DAILY

Start Date: 10/12/15

Status: Ordered



Vitamin D3

4000 unit, Oral, Daily, 0 Refill(s)

Start Date: 2/3/15

Status: Ordered



Results





Hematology





 



                           Most recent to            1



                                         oldest [Reference 



                                         Range]: 

 

 



                           WBC [4.8-10.8             12.1 10*3/uL



                           10*3/uL]                  *HI*



                                         (5/18/15 6:33 AM)

 

 



                           RBC [4.00-5.20            4.08 10*6/uL



                           10*6/uL]                  (5/18/15 6:33 AM)

 

 



                           Hgb [12.0-16.0            11.9 gm/dL



                           gm/dL]                    *LOW*



                                         (5/18/15 6:33 AM)

 

 



                           Hct [37.0-47.0 %]         37.9 %



                                         (5/18/15 6:33 AM)

 

 



                           MCV [82.0-99.0 fL]        92.9 fL



                                         (5/18/15 6:33 AM)

 

 



                           MCH [27.0-32.0 pg]        29.2 pg



                                         (5/18/15 6:33 AM)

 

 



                           MCHC [32.0-36.0           31.4 gm/dL



                           gm/dL]                    *LOW*



                                         (5/18/15 6:33 AM)

 

 



                           RDW [11.5-14.5 %]         15.2 %



                                         *HI*



                                         (5/18/15 6:33 AM)

 

 



                           Platelet [150-400         334 10*3/uL



                           10*3/uL]                  (5/18/15 6:33 AM)

 

 



                           MPV [9.4-12.4 fL]         9.3 fL



                                         *LOW*



                                         (5/18/15 6:33 AM)







Coagulation





 



                           Most recent to            1



                                         oldest [Reference 



                                         Range]: 

 

 



                           INR [0.9-1.2]             1.0



                                         (5/18/15 6:33 AM)

 

 



                           PTT [25.0-35.0]           28.7



                                         (5/18/15 6:33 AM)







Chemistry





 



                           Most recent to            1



                                         oldest [Reference 



                                         Range]: 

 

 



                           Sodium Lvl [136-144       136 mEq/L



                           mEq/L]                    (5/18/15 6:33 AM)

 

 



                           Potassium Lvl             4.0 mEq/L



                           [3.6-5.1 mEq/L]           (5/18/15 6:33 AM)

 

 



                           Chloride [          94 mEq/L



                           mEq/L]                    *LOW*



                                         (5/18/15 6:33 AM)

 

 



                           CO2 [22-32 mEq/L]         32 mEq/L



                                         (5/18/15 6:33 AM)

 

 



                           AGAP [3-20]               10



                                         (5/18/15 6:33 AM)

 

 



                           BUN [4-20 mg/dL]          16 mg/dL



                                         (5/18/15 6:33 AM)

 

 



                           Glucose Lvl [       223 mg/dL



                           mg/dL]                    *HI*



                                         (5/18/15 6:33 AM)

 

 



                           Creatinine Lvl            0.69 mg/dL



                           [0.44-1.03 mg/dL]         (5/18/15 6:33 AM)

 

 



                           eGFR [>60]                >60 1



                                         (5/18/15 6:33 AM)

 

 



                           Calcium Lvl               9.5 mg/dL



                           [8.6-10.0 mg/dL]          (5/18/15 6:33 AM)

 

 



                           Blood Glucose,            170 mg/dL



                           Capillary [         *HI*



                           mg/dL]                    (5/19/15 5:16 AM)







1Result Comment: Multiply eGFR results by 1.21 for  race.



Immunizations







  



                     Vaccine             Date                Refusal Reason

 

  



                           tetanus-diphth toxoids (Td) adult/adol     00 







Procedures







   



                 Procedure       Date            Related Diagnosis     Body Site

 

   



                           Insertion of pacing electrode, cardiac venous     5/18/15  



                                         system, for left ventricular pacing, at time   



                                         of insertion of implantable defibrillator or   



                                         pacemaker pulse generator (eg, for upgrade to   



                                         dual chamber system) (List separately in   



                                         addition to code for primary pro   

 

   



                           Insertion or replacement of permanent     5/18/15  



                                         implantable defibrillator system, with   



                                         transvenous lead(s), single or dual chamber   

 

   



                           L knee PMM/ part. synovectomy     13  

 

   



                           lt median nerve decompression     5/10/11  

 

   



                           Tubal ligation            1979  

 

   



                                         Carpal tunnel release rt   

 

   



                                         Cholecystectomy   

 

   



                                         Hysterectomy   

 

   



                                         Hysterectomy and bilateral   



                                         salpingo-oophorectomy sample   

 

   



                                         Laminectomy   

 

   



                                         Pacemaker   







Social History







 



                           Social History Type       Response

 

 



                           Smoking Status            Former smoker; Type: Cigarettes1







1Quit around 



Assessment and Plan





No data available for this section

## 2019-07-31 NOTE — XMS REPORT
Transition of Care/Referral Summary

                             Created on: 10/10/2128



DYLAN KHAN

External Reference #: [id number]

: 1960

Sex: Female



Demographics







                          Address                   [address]

[place]  [address]

 

                          Home Phone                [phone]

 

                          Preferred Language        English

 

                          Marital Status            

 

                          Hindu Affiliation     Uatsdin

 

                          Race                      White

 

                          Ethnic Group              Not  or 





Author







                          Organization              Unknown

 

                          Address                   Unknown

 

                          Phone                     Unavailable







Care Team Providers







                    Care Team Member Name    Role                Phone

 

                    Dhiraj Mijares    PCP                 [phone]







Encounter





VC FIN [id number] Date(s): 1/27/15 - 1/27/15

[address], [facility] [address] [place], [place] [address]
S [phone]

Discharge Diagnosis: Sinusitis

Discharge Diagnosis: Left hip pain

Discharge Diagnosis: Cough

Discharge Disposition: Home or Self Care

Attending Physician: Ember Schwarz

Admitting Physician: Ember Schwarz





Vital Signs







 



                           Most recent to            1



                                         oldest [Reference 



                                         Range]: 

 

 



                           Temperature Tympanic      37.2 degC



                           [36.6-38.1 degC]          (1/27/15 10:29 AM)

 

 



                           Apical Heart Rate         104 bpm



                           [ bpm]              *HI*



                                         (1/27/15 10:29 AM)

 

 



                           Blood Pressure            138/76 mmHg



                           [/60-90 mmHg]       (1/27/15 10:29 AM)









 



                           Most recent to            1



                                         oldest [Reference 



                                         Range]: 

 

 



                           SpO2                      96 %



                                         (1/27/15 10:29 AM)







Problem List







    



              Condition     Effective Dates     Status       Health Status     Informant

 

    



                           Acute                     Active  



                                         pain(Confirmed)    

 

    



                           Arthritis(Confirmed)      Resolved  

 

    



                           Asthma(Confirmed)         Active  

 

    



                           Right Carpal tunnel       Resolved  



                                         release(Confirmed)    

 

    



                           Carpal tunnel             Resolved  



                                         release    



                                         decompression(Confir    



                                         med)    

 

    



                           Cholecystectomy(Conf      Resolved  



                                         irmed)    

 

    



                           CAD (coronary artery      Active  



                                         disease)(Confirmed)    

 

    



                           Decompression(Confir      Resolved  



                                         med)    

 

    



                           Depression(Confirmed      Resolved  



                                         )    

 

    



                           Depression(Confirmed      Active  



                                         )    

 

    



                           Diabetes(Confirmed)       Resolved  

 

    



                           Diabetes(Confirmed)       Active  

 

    



                           Hyperlipidemia(Confi      Resolved  



                                         rmed)    

 

    



                           Hypertension(Confirm      Resolved  



                                         ed)    

 

    



                           Hypertension(Confirm      Resolved  



                                         ed)    

 

    



                           HTN                       Active  



                                         (hypertension)(Confi    



                                         rmed)    

 

    



                           Hysterectomy(Confirm      Resolved  



                                         ed)    

 

    



                           Impaired gas              Active  



                                         exchange(Confirmed)1    

 

    



                           Irritable                 Resolved  



                                         bowel(Confirmed)    

 

    



                           Laminectomy(Confirme      Resolved  



                                         d)    

 

    



                           Migraine(Confirmed)       Resolved  

 

    



                           Myocardial                Resolved  



                                         infarction(Confirmed    



                                         )    

 

    



                           Septicemia                Resolved  



                                         pneumonia(Confirmed)    

 

    



                     L knee              13              Resolved  



                                         Synovectomy(Confirme    



                                         d)    

 

    



                     Tubal               79              Resolved  



                                         ligation(Confirmed)    







1Problem added automatically by system based on initiation of Impaired Gas 
Exchange Plan of Care



Allergies, Adverse Reactions, Alerts







   



                 Substance       Reaction        Severity        Status

 

   



                     barium sulfate      Adverse Reaction      Active



                                         Swelling, hives  

 

   



                     celecoxib           HIVES               Active

 

   



                     morphine            Adverse Reaction      Active

 

   



                     sulfamethoxazole     HIVES               Active



                                         Urticaria (hives)  







Medications





acetaminophen

1,000 mg, Oral, q6hr, as needed for pain, 0 Refill(s)

Start Date: 14

Status: Ordered



albuterol 2.5 mg/3 mL (0.083%) inhalation solution

3 mL, Inhalation, BID, Dx: Asthma, # 25 Each, 3 Refill(s), [facility]acy: [facility]
, 3 mL Inhalation BID,Instr:Dx: Asthma

Special Instructions: Dx: Asthma

Start Date: 14

Status: Ordered



Aspir 81

81 mg, Oral, Daily, 0 Refill(s)

Start Date: 14

Status: Ordered



atorvastatin

20 mg, Oral, Bedtime (once a day), 0 Refill(s)

Start Date: 14

Status: Ordered



Augmentin 875 mg-125 mg oral tablet

1 tabs, Oral, q12hr, X 10 days, # 20 tabs, 0 Refill(s), [id number]

Start Date: 1/27/15

Stop Date: 2/6/15

Status: Ordered



BD Ultra-Fine Needles and Syringes

BD Ultra-Fine Needles and Syringes, See Instructions, Use a needle and syringe t
o inject insuline one time daily Dx 250.00, # 50 Each, 5 Refill(s), [facility], Use a needle and syringe to inject insuline one time daily Dx 250.00

Special Instructions: Use a needle and syringe to inject insuline one time daily
Dx 250.00

Start Date: 14

Status: Ordered



clopidogrel 75 mg oral tablet

1 tabs, Oral, Daily, # 90 tabs, 5 Refill(s), Pharmacy: [facility], 1 tabs Oral 
Daily

Start Date: 14

Status: Ordered



Coreg 3.125 mg oral tablet

1 tabs, Oral, BIDWM, # 180 tabs, 3 Refill(s), Pharmacy: [facility], 1 
tabs Oral BIDWM,x90 days

Start Date: 1/23/15

Stop Date: 16

Status: Ordered



Cymbalta 20 mg oral delayed release capsule

1 caps, Oral, Daily, do not crush or chew, # 90 caps, 0 Refill(s), [facility], 1 caps Oral Daily,Instr:do not crush or chew

Special Instructions: do not crush or chew

Start Date: 1/23/15

Status: Ordered



Diovan  mg-12.5 mg oral tablet

1 tabs, Oral, Daily, 0 Refill(s)

Start Date: 14

Status: Ordered



DULoxetine 60 mg oral delayed release capsule

1 caps, Oral, Daily, take with 20mg tab, 0 Refill(s)

Special Instructions: take with 20mg tab

Start Date: 14

Status: Ordered



glipiZIDE 5 mg oral tablet

1 tabs, Oral, Before BKFT and Dinner, # 60 tabs, 0 Refill(s)

Start Date: 14

Status: Ordered



ibuprofen 800 mg oral tablet

1 tabs, Oral, TID, as needed for pain, # 180 tabs, 0 Refill(s), [facility], 1 tabs Oral TID,PRN:as needed for pain

Start Date: 1/27/15

Status: Ordered



Lantus 100 units/mL subcutaneous solution

30 units, SubCutaneous, Bedtime (once a day), # 10 mL, 0 Refill(s)

Start Date: 14

Stop Date: 1/20/15

Status: Ordered



Lasix 40 mg oral tablet

1 tabs, Oral, Daily, # 90 tabs, 3 Refill(s), [facility], 1 t
abs Oral Daily,x90 days

Start Date: 1/23/15

Stop Date: 16

Status: Ordered



Melatonin

10 mg, Oral, Bedtime (once a day), as needed for insomnia, 0 Refill(s)

Start Date: 14

Status: Ordered



metFORMIN 500 mg oral tablet

2 tabs, Oral, BID, # 120 tabs, 0 Refill(s)

Start Date: 14

Status: Ordered



Multiple Vitamins oral tablet

Oral, Daily, 0 Refill(s)

Start Date: 14

Status: Ordered



omeprazole

20 mg, Oral, BID, 0 Refill(s)

Start Date: 14

Status: Ordered



Once Touch Delica Lancets

Once Touch Delica Lancets, See Instructions, Use a new lancet to check blood sug
ars four times daily dx 250.00, # 100 Each, 5 Refill(s), Pharmacy: [id number], 
Use a new lancet to check blood sugars four times daily dx 250.00

Special Instructions: Use a new lancet to check blood sugars four times daily dx
250.00

Start Date: 14

Status: Ordered



Once touch Test strips

Once touch Test strips, See Instructions, Use a new strip four times daily to ch
aysha blood sugars Dx 250.00, # 100 Each, 5 Refill(s), Pharmacy: [facility], Use 
a new strip four times daily to check blood sugars Dx 250.00

Special Instructions: Use a new strip four times daily to check blood sugars Dx 
250.00

Start Date: 14

Status: Ordered



One Touch glucometer

One Touch glucometer, See Instructions, Use monitor to check blood sugars four t
imes daily dx 250.00, # 1 Each, 0 Refill(s), Pharmacy: [facility], Use monitor 
to check blood sugars four times daily dx 250.00

Special Instructions: Use monitor to check blood sugars four times daily dx 250.
00

Start Date: 14

Status: Ordered



potassium chloride 20 mEq oral tablet, extended release

1 tabs, Oral, Daily, # 90 tabs, 3 Refill(s), [facility]: [facility], 1 t
abs Oral Daily,x90 days

Start Date: 1/23/15

Stop Date: 16

Status: Ordered



ProAir HFA 90 mcg/inh inhalation aerosol

2 puffs, Inhalation, QID, as needed for wheezing, # 3 Each, 3 Refill(s), Pharmac
y: [facility]

Start Date: 1/23/15

Status: Ordered



ProAir HFA 90 mcg/inh inhalation aerosol

2 puffs, Inhalation, QID, as needed for wheezing, # 1 Each, 3 Refill(s), Pharmac
y: [facility]

Start Date: 14

Status: Ordered



SUMAtriptan 50 mg oral tablet

1 tabs, Oral, Daily, as needed for migraine headache, may repeat dose after 2 ho
urs up to a maximum of 200 mg in 24 hours, # 18 tabs, 0 Refill(s)

Special Instructions: may repeat dose after 2 hours up to a maximum of 200 mg in
24 hours

Start Date: 14

Status: Ordered



Symbicort 160 mcg-4.5 mcg/inh inhalation aerosol

2 puffs, Inhalation, BID, # 10.2 g, 3 Refill(s), Pharmacy: [facility]

Start Date: 1/23/15

Status: Ordered



Tessalon 200 mg oral capsule

1 caps, Oral, TID, Cough, X 10 days, # 30 caps, 1 Refill(s), Pharmacy: [facility] [id number], 1 caps Oral TID,x10 days,PRN:Cough

Start Date: 1/27/15

Stop Date: 2/16/15

Status: Ordered



Wellbutrin  mg/12 hours oral tablet, extended release

2 tabs, Oral, Daily, # 180 tabs, 0 Refill(s)

Start Date: 14

Status: Ordered



Results





No data available for this section



Immunizations







  



                     Vaccine             Date                Refusal Reason

 

  



                           tetanus-diphth toxoids (Td) adult/adol     00 







Procedures







   



                 Procedure       Date            Related Diagnosis     Body Site

 

   



                           L knee PMM/ part. synovectomy     13  

 

   



                           lt median nerve decompression     5/10/11  

 

   



                           Tubal ligation            1979  

 

   



                                         Carpal tunnel release rt   

 

   



                                         Cholecystectomy   

 

   



                                         Hysterectomy   

 

   



                                         Hysterectomy and bilateral   



                                         salpingo-oophorectomy sample   

 

   



                                         Laminectomy   







Social History







 



                           Social History Type       Response

 

 



                           Smoking Status            Former smoker; Type: Cigarettes







Assessment and Plan

Extracted from:





  



                     Title: Office Visit Note     Author: Ember Schwarz APRN     Date: 1/27/15









                                         Assessment/Plan

 Cough  tessalon as needed for cough.

  Ordered:

 Office Visit Level 4 Est 62781 

 Left hip pain  She can take ibuprofen but not for more than a few days at a 
time.

  Ordered:

 Office Visit Level 4 Est 15974

 XR Hip Complete Left 

 Sinusitis  Augmentin for 10 days. If she doesn't improve, she will call 
back.

  Ordered:

 Office Visit Level 4 Est 38717 

 Orders:  amoxicillin-clavulanate, 1 tabs, Oral, q12hr, X 10 days, # 20 tabs, 0 
Refill(s), Pharmacy: [facility] [id number]

  benzonatate, 1 caps, Oral, TID, Cough, X 10 days, # 30 caps, 1 Refill(s), 
Pharmacy: [facility] [id number], 1 caps Oral TID,x10 days,PRN:Cough

  ibuprofen, 1 tabs, Oral, TID, as needed for pain, # 180 tabs, 0 Refill(s), 
Pharmacy: [facility], 1 tabs Oral TID,PRN:as needed for pain

## 2019-07-31 NOTE — XMS REPORT
Transition of Care/Referral Summary

                             Created on: 2021



BILL, DYLAN EDITH

External Reference #: [id number]

: 1960

Sex: Female



Demographics







                          Address                   [address]

[place]  [address]

 

                          Home Phone                [phone]

 

                          Preferred Language        English

 

                          Marital Status            

 

                          Congregational Affiliation     Moravian

 

                          Race                      White

 

                          Ethnic Group              Not  or 





Author







                          Author                    [facility]

 

                          Organization              [facility]

 

                          Address                   Unknown

 

                          Phone                     Unavailable







Care Team Providers







                    Care Team Member Name    Role                Phone

 

                    Dhiraj Mijares    PCP                 [phone]







Encounter





VC FIN [id number] Date(s): 8/11/15 - 8/11/15

[facility] [place]  [place] [address] [place] [phone]

Discharge Diagnosis: History of lumbar fusion

Discharge Diagnosis: Acquired spondylolisthesis

Discharge Diagnosis: Lumbago

Discharge Disposition: 01-Home or Self Care

Attending Physician: Juan José Leavitt MD

Admitting Physician: Juan José Leavitt MD

Referring Physician: Dhiraj Mijares MD





Vital Signs





No data available for this section



Problem List







    



              Condition     Effective Dates     Status       Health Status     Informant

 

    



                           Acquired                  Active  



                                         spondylolisthesis(Co    



                                         nfirmed)    

 

    



                           Acute                     Active  



                                         pain(Confirmed)    

 

    



                           Arthritis(Confirmed)      Resolved  

 

    



                           Right Carpal tunnel       Resolved  



                                         release(Confirmed)    

 

    



                           Carpal tunnel             Resolved  



                                         release    



                                         decompression(Confir    



                                         med)    

 

    



                           Cholecystectomy(Conf      Resolved  



                                         irmed)    

 

    



                     CHF (congestive      Active              patient



                                         heart    



                                         failure)(Confirmed)    

 

    



                           CAD (coronary artery      Active  



                                         disease)(Confirmed)    

 

    



                           Decompression(Confir      Resolved  



                                         med)    

 

    



                           Depression(Confirmed      Resolved  



                                         )    

 

    



                           Depression(Confirmed      Active  



                                         )    

 

    



                           Diabetes(Confirmed)       Active  

 

    



                           Diabetes(Confirmed)       Resolved  

 

    



                           GERD                      Active  



                                         (gastroesophageal    



                                         reflux    



                                         disease)(Confirmed)    

 

    



                           Ischemic                  Active  



                                         cardiomyopathy(Confi    



                                         rmed)    

 

    



                           Presence of               Active  



                                         biventricular    



                                         AICD(Confirmed)    

 

    



                           History of lumbar         Active  



                                         fusion(Confirmed)    

 

    



                           Hyperlipidemia(Confi      Resolved  



                                         rmed)    

 

    



                           Hypertension(Confirm      Resolved  



                                         ed)    

 

    



                           Hypertension(Confirm      Resolved  



                                         ed)    

 

    



                           HTN                       Active  



                                         (hypertension)(Confi    



                                         rmed)    

 

    



                           Hysterectomy(Confirm      Resolved  



                                         ed)    

 

    



                           Impaired gas              Active  



                                         exchange(Confirmed)1    

 

    



                           Irritable                 Resolved  



                                         bowel(Confirmed)    

 

    



                           Laminectomy(Confirme      Resolved  



                                         d)    

 

    



                           LBBB (left bundle         Active  



                                         branch    



                                         block)(Confirmed)    

 

    



                           LUQ pain(Confirmed)       Active  

 

    



                           Migraine(Confirmed)       Resolved  

 

    



                           Asthma(Confirmed)         Active  

 

    



                     Morbid              Active              patient



                                         obesity(Confirmed)    

 

    



                           Myocardial                Resolved  



                                         infarction(Confirmed    



                                         )    

 

    



                           Septicemia                Resolved  



                                         pneumonia(Confirmed)    

 

    



                     L knee              13              Resolved  



                                         Synovectomy(Confirme    



                                         d)    

 

    



                           Tissue perfusion          Active  



                                         alteration(Confirmed    



                                         )2    

 

    



                     Tobacco             Active              patient



                                         user(Confirmed)    

 

    



                     Tubal               79              Resolved  



                                         ligation(Confirmed)    







1Problem added automatically by system based on initiation of Impaired Gas 
Exchange Plan of Care



2Problem added automatically by system based on initiation of Tissue Perfusion 
Cerebral Plan of Care



Allergies, Adverse Reactions, Alerts







   



                 Substance       Reaction        Severity        Status

 

   



                     barium sulfate      Adverse Reaction      Active



                                         Swelling, hives  

 

   



                     celecoxib           HIVES               Active

 

   



                           influenza virus vaccine,       Active



                                         inactivated   

 

   



                     morphine            Adverse Reaction      Active

 

   



                     sulfamethoxazole     HIVES               Active



                                         Urticaria (hives)  







Medications





acetaminophen

1,000 mg, Oral, q6hr, as needed for pain, 0 Refill(s)

Start Date: 14

Status: Ordered



AcipHex 20 mg oral delayed release tablet

40 mg 2 tabs, Oral, Daily, # 180 tabs, 2 Refill(s), Pharmacy: [facility], 2 tabs Oral Daily

Start Date: 9/21/15

Status: Ordered



albuterol 2.5 mg/3 mL (0.083%) inhalation solution

3 mL, Inhalation, BID, Dx: Asthma, # 25 Each, 3 Refill(s), [facility]acy: [facility] [id number], 3 mL Inhalation BID,Instr:Dx: Asthma

Start Date: 4/21/15

Status: Ordered



amLODIPine 5 mg oral tablet

See Instructions, Take 1 tablet by mouth  daily, # 90 tabs, [facility], Take 1 tablet by mouth  daily

Start Date: 2/3/16

Status: Ordered



Aspir 81

81 mg, Oral, Daily, 0 Refill(s)

Start Date: 14

Status: Ordered



atorvastatin 10 mg oral tablet

10 mg 1 tabs, Oral, Daily, # 90 tabs, 5 Refill(s), Pharmacy: [facility]
E, 1 tabs Oral Daily

Start Date: 16

Status: Ordered



bumetanide 1 mg oral tablet

See Instructions, Take 2 mg of Bumex in AM and 1 mg PM., # 300 tabs, 5 Refill(s)
, Pharmacy: [facility], Take 2 mg of Bumex in AM and 1 mg PM.

Start Date: 9/22/15

Status: Ordered



buPROPion 150 mg/12 hours (SR) oral tablet, extended release

See Instructions, Take 1 tablet by mouth two  times daily, # 180 unknown unit, 1
Refill(s), eRx: [facility], Take 1 tablet by mouth two  times daily

Start Date: 9/9/15

Status: Ordered



carvedilol 25 mg oral tablet

25 mg 1 tabs, Oral, BID, # 180 tabs, 5 Refill(s), Pharmacy: [facility]
, 1 tabs Oral BID

Start Date: 9/22/15

Status: Ordered



Claritin

10 mg, Oral, Daily, 0 Refill(s)

Start Date: 5/18/15

Status: Ordered



cloNIDine 0.1 mg oral tablet

0.1 mg 1 tabs, Oral, qPM, # 90 tabs, 1 Refill(s), Pharmacy: [facility]
, 1 tabs Oral qPM

Start Date: 10/12/15

Status: Ordered



clopidogrel 75 mg oral tablet

1 tabs, Oral, Daily, # 90 tabs, 3 Refill(s), Pharmacy: [facility], 1 t
abs Oral Daily

Start Date: 4/14/15

Status: Ordered



digoxin 125 mcg (0.125 mg) oral tablet

125 mcg 1 tabs, Oral, Daily, # 90 tabs, 5 Refill(s), [facility], 1 tabs Oral Daily

Start Date: 9/22/15

Status: Ordered



DULoxetine 20 mg oral delayed release capsule

See Instructions, Take 1 capsule by mouth  daily do not crush or chew, # 90 caps
, eRx: [facility], Take 1 capsule by mouth  daily do not crush or chew

Start Date: 16

Status: Ordered



glipiZIDE 5 mg oral tablet

See Instructions, Take 1 tablet by mouth two  times daily, # 180 tabs, [facility], Take 1 tablet by mouth two  times daily

Start Date: 2/3/16

Status: Ordered



Insulin Syringe (DME)

DME Item use to inject insulin once daily     dx: E11.9, See Instructions, # 90 
Each, 4 Refill(s), Pharmacy: [id number], use to inject insulin o
nce daily     dx: E11.9, Supply

Start Date: 11/10/15

Status: Ordered



Lantus 100 units/mL subcutaneous solution

See Instructions, Inject subcutaneously 30  units every night at  bedtime once a
day, # 30 mL, 2 Refill(s), eRx: [facility], Inject subcutaneously 30  
units every night at  bedtime once a day

Start Date: 7/31/15

Status: Ordered



Melatonin

10 mg, Oral, Bedtime (once a day), as needed for insomnia, 0 Refill(s)

Start Date: 14

Status: Ordered



metFORMIN 500 mg oral tablet

See Instructions, Take 2 tablets by mouth  twice a day, # 360 tabs, [facility]: [facility], Take 2 tablets by mouth  twice a day

Start Date: 2/3/16

Status: Ordered



Norco 10 mg-325 mg oral tablet

1 tabs, Oral, q6hr, as needed for pain, # 30 tabs, 0 Refill(s)

Start Date: 16

Status: Ordered



OTC Calcium

OTC Calcium, See Instructions, 1 tablet oral daily, 0 Refill(s)

Start Date: 2/3/15

Status: Ordered



potassium chloride 20 mEq oral tablet, extended release

See Instructions, Take 1 tablet by mouth  daily, # 90 unknown unit, 1 Refill(s),
eRx: [facility], Take 1 tablet by mouth  daily

Start Date: 10/2/15

Status: Ordered



ProAir HFA 90 mcg/inh inhalation aerosol

2 puffs, Inhalation, QID, as needed for wheezing, # 3 Each, 3 Refill(s), Pharmac
y: [facility]

Start Date: 1/23/15

Status: Ordered



SUMAtriptan 50 mg oral tablet

50 mg 1 tabs, Oral, Daily, as needed for migraine headache, may repeat dose afte
r 2 hours up to a maximum of 200 mg in 24 hours, # 9 tabs, 0 Refill(s), Pharmacy
: [facility] [id number], 1 tabs Oral Daily,PRN:as needed for migraine heada
godwin,Instr:m...

Start Date: 9/16/15

Status: Ordered



Symbicort 160 mcg-4.5 mcg/inh inhalation aerosol

See Instructions, Use 2 puffs twice daily, # 10.2 g, 2 Refill(s), [facility], Use 2 puffs twice daily

Start Date: 12/2/15

Status: Ordered



valsartan 160 mg oral tablet

See Instructions, 1 TABS ORAL DAILY, # 30 tabs, 1 Refill(s), eRx: [id number], 1 TABS ORAL DAILY

Start Date: 10/12/15

Status: Ordered



Results





No data available for this section



Immunizations







  



                     Vaccine             Date                Refusal Reason

 

  



                           tetanus-diphth toxoids (Td) adult/adol     00 







Procedures







   



                 Procedure       Date            Related Diagnosis     Body Site

 

   



                           L knee PMM/ part. synovectomy     13  

 

   



                           lt median nerve decompression     5/10/11  

 

   



                           Tubal ligation            1979  

 

   



                                         Carpal tunnel release rt   

 

   



                                         Cholecystectomy   

 

   



                                         Hysterectomy   

 

   



                                         Hysterectomy and bilateral   



                                         salpingo-oophorectomy sample   

 

   



                                         Laminectomy   

 

   



                                         Pacemaker   







Social History







 



                           Social History Type       Response

 

 



                           Smoking Status            Former smoker; Type: Cigarettes1







1Quit around 



Assessment and Plan

Extracted from:





  



                     Title: Office Visit Note     Author: Juan José Leavitt     Date: 8/11/15



                                         MD 









                                         Assessment/Plan

 Acquired spondylolisthesis  Conservative treatment options were discussed 
with the patient length. I think at L4 5 spondylolisthesis is the most likely 
pain generator. She cannot obtain an MRI due to her pacemaker. I recommended
evaluation and treatment by Dr. Lewis and Yun. The injections could provide 
both diagnostic and therapeutic benefit. I'm happy to see her back at anytime 
but certainly following exhausting these further conservative treatment options

  Ordered:

 Office Visit Level 3 New [id number]

 Return to Clinic 

 History of lumbar fusion  Ordered:

 Office Visit Level 3 New 28575

 Return to Clinic 

 Lumbago  Ordered:

 Office Visit Level 3 New 85587

 Return to Clinic 







Referrals to Other Providers





Referred by: Juan José Leavitt MD

## 2019-07-31 NOTE — XMS REPORT
Transition of Care/Referral Summary

                             Created on: 2068



BILLDYLAN VANEGAS EDITH

External Reference #: [id number]

: 1960

Sex: Female



Demographics







                          Address                   [address]

[place]  [address]

 

                          Home Phone                [phone]

 

                          Preferred Language        English

 

                          Marital Status            

 

                          Uatsdin Affiliation     Religion

 

                          Race                      White

 

                          Ethnic Group              Not  or 





Author







                          Author                    [facility]

 

                          Organization              [facility]

 

                          Address                   Unknown

 

                          Phone                     Unavailable







Care Team Providers







                    Care Team Member Name    Role                Phone

 

                    Dhiraj Mijares    PCP                 [phone]







Encounter





[facility] FIN [id number] Date(s): 17 - 17

[facility] [address] [place], [place] [address] [phone]

Discharge Diagnosis: Diabetes

Discharge Diagnosis: Combined hyperlipidemia

Discharge Disposition: 01-Home or Self Care

Attending Physician: Gayla Hook MD

Admitting Physician: Gayla Hook MD

Referring Physician: Dhiraj Mijares MD





Vital Signs







 



                           Most recent to            1



                                         oldest [Reference 



                                         Range]: 

 

 



                           Peripheral Pulse          68 bpm



                           Rate [ bpm]         (17 8:16 AM)

 

 



                           Blood Pressure            150/85 mmHg



                           [/60-90 mmHg]       *HI*



                                         (17 8:16 AM)







Problem List







    



              Condition     Effective Dates     Status       Health Status     Informant

 

    



                           Acquired                  Active  



                                         spondylolisthesis(Co    



                                         nfirmed)    

 

    



                           Acute                     Active  



                                         pain(Confirmed)    

 

    



                           Hay fever(Confirmed)      Active  

 

    



                           Arthritis(Confirmed)      Resolved  

 

    



                           Right Carpal tunnel       Resolved  



                                         release(Confirmed)    

 

    



                           Carpal tunnel             Resolved  



                                         release    



                                         decompression(Confir    



                                         med)    

 

    



                           Cholecystectomy(Conf      Resolved  



                                         irmed)    

 

    



                           IBS (irritable bowel      Active  



                                         syndrome)(Confirmed)    

 

    



                     CHF (congestive      Active              patient



                                         heart    



                                         failure)(Confirmed)    

 

    



                           CAD (coronary artery      Active  



                                         disease)(Confirmed)    

 

    



                           Decompression(Confir      Resolved  



                                         med)    

 

    



                           Depression(Confirmed      Resolved  



                                         )    

 

    



                           Depression(Confirmed      Active  



                                         )    

 

    



                           Diabetes(Confirmed)       Active  

 

    



                           Diabetes(Confirmed)       Resolved  

 

    



                           GERD                      Active  



                                         (gastroesophageal    



                                         reflux    



                                         disease)(Confirmed)    

 

    



                           Ischemic                  Active  



                                         cardiomyopathy(Confi    



                                         rmed)    

 

    



                           Presence of               Active  



                                         biventricular    



                                         AICD(Confirmed)    

 

    



                           History of lumbar         Active  



                                         fusion(Confirmed)    

 

    



                           Hyperlipidemia(Confi      Resolved  



                                         rmed)    

 

    



                           Asthma(Confirmed)         Active  

 

    



                           Hypertension(Confirm      Resolved  



                                         ed)    

 

    



                           Hypertension(Confirm      Resolved  



                                         ed)    

 

    



                           HTN                       Active  



                                         (hypertension)(Confi    



                                         rmed)    

 

    



                           Hysterectomy(Confirm      Resolved  



                                         ed)    

 

    



                           Impaired gas              Active  



                                         exchange(Confirmed)1    

 

    



                           Irritable                 Resolved  



                                         bowel(Confirmed)    

 

    



                           Laminectomy(Confirme      Resolved  



                                         d)    

 

    



                           LBBB (left bundle         Active  



                                         branch    



                                         block)(Confirmed)    

 

    



                           LUQ pain(Confirmed)       Active  

 

    



                           Migraine(Confirmed)       Resolved  

 

    



                           Asthma(Confirmed)         Active  

 

    



                     Morbid              Active              patient



                                         obesity(Confirmed)    

 

    



                           Myocardial                Resolved  



                                         infarction(Confirmed    



                                         )    

 

    



                           Septicemia                Resolved  



                                         pneumonia(Confirmed)    

 

    



                     L knee              13              Resolved  



                                         Synovectomy(Confirme    



                                         d)    

 

    



                           Tissue perfusion          Active  



                                         alteration(Confirmed    



                                         )2    

 

    



                     Tobacco             Active              patient



                                         user(Confirmed)    

 

    



                     Tubal               79              Resolved  



                                         ligation(Confirmed)    

 

    



                           UTI (urinary tract        Active  



                                         infection)(Confirmed    



                                         )    

 

    



                           Chicken                   Active  



                                         pox(Confirmed)    







1Problem added automatically by system based on initiation of Impaired Gas 
Exchange Plan of Care



2Problem added automatically by system based on initiation of Tissue Perfusion 
Cerebral Plan of Care



Allergies, Adverse Reactions, Alerts







   



                 Substance       Reaction        Severity        Status

 

   



                     barium sulfate      Swelling, hives      Active

 

   



                     celecoxib           HIVES               Active

 

   



                     influenza virus vaccine,     Convulsion          Active



                                         inactivated   

 

   



                     morphine            Burning             Active

 

   



                     sulfamethoxazole     HIVES               Active



                                         Urticaria (hives)  







Medications





acetaminophen

1,000 mg, Oral, q6hr, as needed for pain, 0 Refill(s)

Start Date: 14

Status: Ordered



amLODIPine 10 mg oral tablet

10 mg 1 tabs, Oral, Daily, # 90 tabs, 5 Refill(s), Pharmacy: [facility]
E, 1 tabs Oral Daily

Start Date: 16

Status: Ordered



Aspir 81

81 mg, Oral, Daily, 0 Refill(s)

Start Date: 14

Status: Ordered



atorvastatin 10 mg oral tablet

10 mg 1 tabs, Oral, Daily, # 90 tabs, 5 Refill(s), Pharmacy: [facility]
E, 1 tabs Oral Daily

Start Date: 16

Status: Ordered



basaglar

basaglar, See Instructions, inject 30 units at bedtime daily, # 1 boxes, 3 Refil
l(s), Pharmacy: [facility] [id number], inject 30 units at bedtime daily

Start Date: 17

Status: Ordered



bumetanide 1 mg oral tablet

See Instructions, Take 2 tablets by mouth in  the morning and 1 tablet by mouth 
in the evening, # 270 tabs, eRx: [facility], Take 2 tablets by mouth i
n  the morning and 1 tablet by mouth in the evening

Start Date: 16

Status: Ordered



buPROPion 150 mg/12 hours (SR) oral tablet, extended release

See Instructions, Take 1 tablet by mouth two  times daily, # 180 tabs, 1 Refill(
s), eRx: [facility], Take 1 tablet by mouth two  times daily

Start Date: 10/3/16

Status: Ordered



carvedilol 25 mg oral tablet

See Instructions, Take 1 tablet by mouth two  times daily, # 180 tabs, [facility]: [facility], Take 1 tablet by mouth two  times daily

Start Date: 10/3/16

Status: Ordered



cloNIDine 0.1 mg oral tablet

See Instructions, Take 1 tablet by mouth  every evening, # 90 tabs, 2 Refill(s),
eRx: [facility], Take 1 tablet by mouth  every evening

Start Date: 16

Status: Ordered



clopidogrel 75 mg oral tablet

See Instructions, Take 1 tablet by mouth  daily, # 90 tabs, 1 Refill(s), [facility]: [facility], Take 1 tablet by mouth  daily

Start Date: 10/3/16

Status: Ordered



digoxin 125 mcg (0.125 mg) oral tablet

See Instructions, Take 1 tablet by mouth  daily, # 90 tabs, [facility]: [facility]

Start Date: 17

Status: Ordered



DULoxetine 60 mg oral delayed release capsule

See Instructions, Take 1 capsule by mouth  daily . Do not crush or  chew., # 90 
caps, 1 Refill(s), eRx: [facility]

Start Date: 17

Status: Ordered



HumaLOG 100 units/mL subcutaneous solution

5 units, SubCutaneous, TIDAC, DX E11.9, # 30 mL, 2 Refill(s), Pharmacy: [facility], 5 units SubCutaneous TIDAC,Instr:DX E11.9

Start Date: 16

Status: Ordered



Insulin Syringe (DME)

DME Item use to inject insulin once daily     dx: E11.9, See Instructions, # 90 
Each, 4 Refill(s), Pharmacy: [id number], use to inject insulin o
nce daily     dx: E11.9, Supply

Start Date: 11/10/15

Status: Ordered



Melatonin

10 mg, Oral, Bedtime (once a day), as needed for insomnia, 0 Refill(s)

Start Date: 14

Status: Ordered



METFORMIN  500MG  TAB

See Instructions, Take 2 tablets by mouth  twice daily, # 360 tabs, 3 Refill(s),
eRx: [facility], Take 2 tablets by mouth  twice daily

Start Date: 16

Status: Ordered



metFORMIN 500 mg oral tablet

1,000 mg 2 tabs, Oral, BID, # 360 tabs, 2 Refill(s), other reason (Rx)

Start Date: 17

Status: Ordered



Miscellaneous DME

DME Item One Touch verio Test Strips   Use to check blood sugar 3 times daily.  
 DX E11.9, See Instructions, # 300 Each, 6 Refill(s), Pharmacy: [facility] [id number], One Touch verio Test Strips ; Use to check blood sugar 3 times jey
y. DX E11.9...

Start Date: 17

Status: Ordered



Nasonex 50 mcg/inh nasal spray

2 sprays, Nasal, Daily, # 1 Each, 2 Refill(s), Pharmacy: [facility]

Start Date: 17

Status: Ordered



Norco 10 mg-325 mg oral tablet

1 tabs, Oral, q6hr, as needed for pain, # 30 tabs, 0 Refill(s)

Start Date: 17

Status: Ordered



NuLYTELY with Flavor Packs oral powder for reconstitution

240 mL, Oral, q15min, # 4,000 mL, 0 Refill(s), Pharmacy: [id number]
[id number]

Start Date: 17

Status: Ordered



potassium chloride 20 mEq oral tablet, extended release

See Instructions, Take 1 tablet by mouth  daily, # 90 tabs, 1 Refill(s), [facility]: [facility], Take 1 tablet by mouth  daily

Start Date: 16

Status: Ordered



ProAir HFA 90 mcg/inh inhalation aerosol

See Instructions, Inhale 2 puffs 4 times  daily as needed for  wheezing, # 25.5 
g, 5 Refill(s), eRx: [facility]

Start Date: 17

Status: Ordered



RABEprazole 20 mg oral delayed release tablet

See Instructions, Take 2 tablets by mouth  daily, # 180 tabs, 1 Refill(s), eRx: 
[facility], Take 2 tablets by mouth  daily

Start Date: 16

Status: Ordered



SUMAtriptan 50 mg oral tablet

50 mg 1 tabs, Oral, Daily, as needed for migraine headache, may repeat dose afte
r 2 hours up to a maximum of 200 mg in 24 hours, # 9 tabs, 1 Refill(s), Pharmacy
: [id number], 1 tabs Oral Daily,PRN:as needed for migraine heada
godwin,Instr:m...

Start Date: 2/15/17

Status: Ordered



SUMAtriptan 50 mg oral tablet

50 mg 1 tabs, Oral, Daily, as needed for migraine headache, may repeat dose afte
r 2 hours up to a maximum of 200 mg in 24 hours, # 9 tabs, 1 Refill(s), Pharmacy
: [facility] [id number], 1 tabs Oral Daily,PRN:as needed for migraine heada
godwin,Instr:m...

Start Date: 16

Status: Ordered



Symbicort 160 mcg-4.5 mcg/inh inhalation aerosol

See Instructions, Use 2 puffs twice daily, # 30.6 g, 2 Refill(s), [facility], Use 2 puffs twice daily

Start Date: 10/3/16

Status: Ordered



SYRNG   .5CC ULTII 31 G SRT

See Instructions, Use to Inject insulin once  daily, # 90 Each, 3 Refill(s), eRx
: [facility], Use to Inject insulin once  daily

Start Date: 16

Status: Ordered



valsartan 160 mg oral tablet

160 mg 1 tabs, Oral, BID, # 60 tabs, 10 Refill(s), Pharmacy: [facility]
E, 1 tabs Oral BID

Start Date: 17

Status: Ordered



Results





No data available for this section



Immunizations





Given and Recorded





   



                 Vaccine         Date            Status          Refusal Reason

 

   



                     tetanus-diphth toxoids (Td) adult/adol     00             Given 







Procedures







   



                 Procedure       Date            Related Diagnosis     Body Site

 

   



                           L knee PMM/ part. synovectomy     13  

 

   



                           lt median nerve decompression     5/10/11  

 

   



                           Tubal ligation            1979  

 

   



                                         Carpal tunnel release rt   

 

   



                                         Cholecystectomy   

 

   



                                         Hysterectomy   

 

   



                                         Hysterectomy and bilateral   



                                         salpingo-oophorectomy sample   

 

   



                                         Laminectomy   

 

   



                                         Pacemaker   







Social History







 



                           Social History Type       Response

 

 



                           Smoking Status            Former smoker; Type: Cigarettes1







1Quit around 



Assessment and Plan

Extracted from:





  



                     Title: Ambulatory Patient Education     Author: Gayla oHok MD     Date: 

17









                                        Preventive Medicine

Blood Glucose Monitoring, Adult

Monitoring your blood glucose (also know as blood sugar) helps you to manage 
your diabetes. It also helps you and your health care provider monitor your 
diabetes and determine how well your treatment plan is working.



WHY SHOULD YOU MONITOR YOUR BLOOD GLUCOSE?



It can help you understand how food, exercise, and medicine affect your 
blood glucose.



It allows you to know what your blood glucose is at any given moment. You 
can quickly tell if you are having low blood glucose (hypoglycemia) or high 
blood glucose (hyperglycemia).



It can help you and your health care provider know how to adjust your 
medicines.



It can help you understand how to manage an illness or adjust medicine for 
exercise.



WHEN SHOULD YOU TEST?

Your health care provider will help you decide how often you should check your 
blood glucose. This may depend on the type of diabetes you have, your diabetes 
control, or the types of medicines you are taking. Be sure to write down all of 
your blood glucose readings so that this information can be reviewed with your 
health care provider. See below for examples of testing times that your health 
care provider may suggest.

Type 1 Diabetes



Test at least 2 times per day if your diabetes is well controlled, if you 
are using an insulin pump, or if you perform multiple daily injections.



If your diabetes is not well controlled or if you are sick, you may need to
test more often.



It is a good idea to also test:



Before every insulin injection.



Before and after exercise.



Between meals and 2 hours after a meal.



Occasionally between 2:00 a.m. and 3:00 a.m.

Type 2 Diabetes



If you are taking insulin, test at least 2 times per day. However, it is 
best to test before every insulin injection.



If you take medicines by mouth (orally), test 2 times a day.



If you are on a controlled diet, test once a day.



If your diabetes is not well controlled or if you are sick, you may need to
monitor more often. 



HOW TO MONITOR YOUR BLOOD GLUCOSE

Supplies Needed



Blood glucose meter.



Test strips for your meter. Each meter has its own strips. You must use the
strips that go with your own meter.



A pricking needle (lancet).



A device that holds the lancet (lancing device).



A journal or log book to write down your results.

Procedure



Wash your hands with soap and water. Alcohol is not preferred.



Prick the side of your finger (not the tip) with the lancet.



Gently milk the finger until a small drop of blood appears.



Follow the instructions that come with your meter for inserting the test 
strip, applying blood to the strip, and using your blood glucose meter. 

Other Areas to Get Blood for Testing

Some meters allow you to use other areas of your body (other than your finger) 
to test your blood. These areas are called alternative sites. The most common 
alternative sites are:



The forearm.



The thigh.



The back area of the lower leg.



The palm of the hand.

The blood flow in these areas is slower. Therefore, the blood glucose values you
get may be delayed, and the numbers are different from what you would get from 
your fingers. Do not use alternative sites if you think you are having 
hypoglycemia. Your reading will not be accurate. Always use a finger if you are 
having hypoglycemia. Also, if you cannot feel your lows (hypoglycemia 
unawareness), always use your fingers for your blood glucose checks.



ADDITIONAL TIPS FOR GLUCOSE MONITORING



Do not reuse lancets.



Always carry your supplies with you.



All blood glucose meters have a 24-hour "hotline" number to call if you 
have questions or need help.



Adjust (calibrate) your blood glucose meter with a control solution after 
finishing a few boxes of strips. 



BLOOD GLUCOSE RECORD KEEPING

It is a good idea to keep a daily record or log of your blood glucose readings. 
Most glucose meters, if not all, keep your glucose records stored in the meter. 
Some meters come with the ability to download your records to your home 
computer. Keeping a record of your blood glucose readings is especially helpful 
if you are wanting to look for patterns. Make notes to go along with the blood 
glucose readings because you might forget what happened at that exact time. 
Keeping good records helps you and your health care provider to work together to
achieve good diabetes management. 

This information is not intended to replace advice given to you by your health 
care provider. Make sure you discuss any questions you have with your health 
care provider.



Document Released: 2004 Document Revised: 2016 Document Reviewed: 
2014

[employer] Patient Education 2016 [employer]





No follow up information was provided.





Extracted from:





  



                     Title: Consult Note     Author: Gayla Hook MD     Date: 17









                                         Assessment/Plan 

    1-diabetes:

 

  Counseled about long term complications of diabetes.

 

  Recommend eye exam, lipid profile, urine microalbumin yearly.

 

  Recommend hba1c q 3 months.

 

  Encouraged to check3 times daily, before meals.

 

  Change regimen to:

 

  basaglar 30 units qhs

 

  humalog 8units tid ac

 

  Refer to diabetes class

 

  

 

  to email us her sugars [email or web address]

 

  

 

  Hyperlipidemia: LDL at target. Continue statin.

 

  

 

  Obesity: Counseled about diet and exercise.

 

  

 

  

 

  

 

  rtc in6 weeks

 

  

 

        









 



                           Addendum                  





                           by Truong                continue metformin 1000 mg bid





                           jeff Regalado glipizide





                                         Gayla REED 



                                         on 



                                         2017 



                                         08:48:05 



                                         CST

## 2019-07-31 NOTE — XMS REPORT
Transition of Care/Referral Summary

                             Created on: 2097



BILL, DYLAN EDITH

External Reference #: [id number]

: 1960

Sex: Female



Demographics







                          Address                   [address]

[place]  [address]

 

                          Home Phone                [phone]

 

                          Preferred Language        English

 

                          Marital Status            

 

                          [employer] Affiliation     [employer]

 

                          Race                      White

 

                          Ethnic Group              Not  or 





Author







                          Author                    [facility]

 

                          Organization              [facility]

 

                          Address                   Unknown

 

                          Phone                     Unavailable







Care Team Providers







                    Care Team Member Name    Role                Phone

 

                    Dhiraj Mijares    PCP                 [phone]







Encounter





VC FIN [id number] Date(s): 3/3/16 - 3/3/16

[facility] [address], [place] [address] [phone]

Discharge Diagnosis: CAD (coronary artery disease)

Discharge Diagnosis: Ischemic cardiomyopathy

Discharge Diagnosis: Presence of biventricular AICD

Discharge Disposition: 01-Home or Self Care

Attending Physician: JIM Jerry MD

Admitting Physician: JIM Jerry MD

Referring Physician: Dhiraj Mijares MD





Vital Signs







 



                           Most recent to            1



                                         oldest [Reference 



                                         Range]: 

 

 



                           Peripheral Pulse          76 bpm



                           Rate [ bpm]         (3/3/16 1:03 PM)

 

 



                           Blood Pressure            140/90 mmHg



                           [/60-90 mmHg]       (3/3/16 1:03 PM)







Problem List







    



              Condition     Effective Dates     Status       Health Status     Informant

 

    



                           Acquired                  Active  



                                         spondylolisthesis(Co    



                                         nfirmed)    

 

    



                           Acute                     Active  



                                         pain(Confirmed)    

 

    



                           Arthritis(Confirmed)      Resolved  

 

    



                           Right Carpal tunnel       Resolved  



                                         release(Confirmed)    

 

    



                           Carpal tunnel             Resolved  



                                         release    



                                         decompression(Confir    



                                         med)    

 

    



                           Cholecystectomy(Conf      Resolved  



                                         irmed)    

 

    



                     CHF (congestive      Active              patient



                                         heart    



                                         failure)(Confirmed)    

 

    



                           CAD (coronary artery      Active  



                                         disease)(Confirmed)    

 

    



                           Decompression(Confir      Resolved  



                                         med)    

 

    



                           Depression(Confirmed      Resolved  



                                         )    

 

    



                           Depression(Confirmed      Active  



                                         )    

 

    



                           Diabetes(Confirmed)       Active  

 

    



                           Diabetes(Confirmed)       Resolved  

 

    



                           GERD                      Active  



                                         (gastroesophageal    



                                         reflux    



                                         disease)(Confirmed)    

 

    



                           Ischemic                  Active  



                                         cardiomyopathy(Confi    



                                         rmed)    

 

    



                           Presence of               Active  



                                         biventricular    



                                         AICD(Confirmed)    

 

    



                           History of lumbar         Active  



                                         fusion(Confirmed)    

 

    



                           Hyperlipidemia(Confi      Resolved  



                                         rmed)    

 

    



                           Hypertension(Confirm      Resolved  



                                         ed)    

 

    



                           Hypertension(Confirm      Resolved  



                                         ed)    

 

    



                           HTN                       Active  



                                         (hypertension)(Confi    



                                         rmed)    

 

    



                           Hysterectomy(Confirm      Resolved  



                                         ed)    

 

    



                           Impaired gas              Active  



                                         exchange(Confirmed)1    

 

    



                           Irritable                 Resolved  



                                         bowel(Confirmed)    

 

    



                           Laminectomy(Confirme      Resolved  



                                         d)    

 

    



                           LBBB (left bundle         Active  



                                         branch    



                                         block)(Confirmed)    

 

    



                           LUQ pain(Confirmed)       Active  

 

    



                           Migraine(Confirmed)       Resolved  

 

    



                           Asthma(Confirmed)         Active  

 

    



                     Morbid              Active              patient



                                         obesity(Confirmed)    

 

    



                           Myocardial                Resolved  



                                         infarction(Confirmed    



                                         )    

 

    



                           Septicemia                Resolved  



                                         pneumonia(Confirmed)    

 

    



                     L knee              13              Resolved  



                                         Synovectomy(Confirme    



                                         d)    

 

    



                           Tissue perfusion          Active  



                                         alteration(Confirmed    



                                         )2    

 

    



                     Tobacco             Active              patient



                                         user(Confirmed)    

 

    



                     Tubal               79              Resolved  



                                         ligation(Confirmed)    







1Problem added automatically by system based on initiation of Impaired Gas 
Exchange Plan of Care



2Problem added automatically by system based on initiation of Tissue Perfusion 
Cerebral Plan of Care



Allergies, Adverse Reactions, Alerts







   



                 Substance       Reaction        Severity        Status

 

   



                     barium sulfate      Adverse Reaction      Active



                                         Swelling, hives  

 

   



                     celecoxib           HIVES               Active

 

   



                           influenza virus vaccine,       Active



                                         inactivated   

 

   



                     morphine            Adverse Reaction      Active

 

   



                     sulfamethoxazole     HIVES               Active



                                         Urticaria (hives)  







Medications





acetaminophen

1,000 mg, Oral, q6hr, as needed for pain, 0 Refill(s)

Start Date: 14

Status: Ordered



AcipHex 20 mg oral delayed release tablet

40 mg 2 tabs, Oral, Daily, # 180 tabs, 2 Refill(s), Pharmacy: [facility], 2 tabs Oral Daily

Start Date: 9/21/15

Status: Ordered



albuterol 2.5 mg/3 mL (0.083%) inhalation solution

3 mL, Inhalation, BID, Dx: Asthma, # 25 Each, 3 Refill(s), [place] [facility] [place] [id number], 3 mL Inhalation BID,Instr:Dx: Asthma

Start Date: 4/21/15

Status: Ordered



amLODIPine 5 mg oral tablet

See Instructions, Take 1 tablet by mouth  daily, # 90 tabs, eRx: [facility], Take 1 tablet by mouth  daily

Start Date: 2/3/16

Status: Ordered



Aspir 81

81 mg, Oral, Daily, 0 Refill(s)

Start Date: 14

Status: Ordered



atorvastatin 10 mg oral tablet

10 mg 1 tabs, Oral, Daily, # 90 tabs, 5 Refill(s), Pharmacy: [facility]
E, 1 tabs Oral Daily

Start Date: 16

Status: Ordered



bumetanide 1 mg oral tablet

See Instructions, Take 2 mg of Bumex in AM and 1 mg PM., # 300 tabs, 5 Refill(s)
, Pharmacy: [facility], Take 2 mg of Bumex in AM and 1 mg PM.

Start Date: 9/22/15

Status: Ordered



buPROPion 150 mg/12 hours (SR) oral tablet, extended release

See Instructions, Take 1 tablet by mouth two  times daily, # 180 tabs, 2 Refill(
s), eRx: [facility], Take 1 tablet by mouth two  times daily

Start Date: 3/2/16

Status: Ordered



carvedilol 25 mg oral tablet

25 mg 1 tabs, Oral, BID, # 180 tabs, 5 Refill(s), Pharmacy: [facility]
, 1 tabs Oral BID

Start Date: 9/22/15

Status: Ordered



cloNIDine 0.1 mg oral tablet

See Instructions, Take 1 tablet by mouth  every evening, # 90 tabs, 1 Refill(s),
eRx: [facility], Take 1 tablet by mouth  every evening

Start Date: 16

Status: Ordered



clopidogrel 75 mg oral tablet

See Instructions, Take 1 tablet by mouth  daily, # 90 tabs, 2 Refill(s), [facility]: [facility], Take 1 tablet by mouth  daily

Start Date: 16

Status: Ordered



digoxin 125 mcg (0.125 mg) oral tablet

125 mcg 1 tabs, Oral, Daily, # 90 tabs, 5 Refill(s), [facility], 1 tabs Oral Daily

Start Date: 9/22/15

Status: Ordered



DULoxetine 20 mg oral delayed release capsule

See Instructions, Take 1 capsule by mouth  daily do not crush or chew, # 90 caps
, eRx: [facility], Take 1 capsule by mouth  daily do not crush or chew

Start Date: 16

Status: Ordered



glipiZIDE 5 mg oral tablet

See Instructions, Take 1 tablet by mouth two  times daily, # 180 tabs, [facility]: [facility], Take 1 tablet by mouth two  times daily

Start Date: 2/3/16

Status: Ordered



Insulin Syringe (DME)

DME Item use to inject insulin once daily     dx: E11.9, See Instructions, # 90 
Each, 4 Refill(s), Pharmacy: [facility] [id number], use to inject insulin o
nce daily     dx: E11.9, Supply

Start Date: 11/10/15

Status: Ordered



Lantus 100 units/mL subcutaneous solution

See Instructions, Inject subcutaneously 30  units every night at  bedtime once a
day, # 30 mL, 2 Refill(s), eRx: [facility], Inject subcutaneously 30  
units every night at  bedtime once a day

Start Date: 7/31/15

Status: Ordered



Melatonin

10 mg, Oral, Bedtime (once a day), as needed for insomnia, 0 Refill(s)

Start Date: 14

Status: Ordered



metFORMIN 500 mg oral tablet

See Instructions, Take 2 tablets by mouth  twice a day, # 360 tabs, [facility]: [facility], Take 2 tablets by mouth  twice a day

Start Date: 2/3/16

Status: Ordered



Norco 10 mg-325 mg oral tablet

1 tabs, Oral, q6hr, as needed for pain, # 30 tabs, 0 Refill(s)

Start Date: 16

Status: Ordered



OTC Calcium

OTC Calcium, See Instructions, 1 tablet oral daily, 0 Refill(s)

Start Date: 2/3/15

Status: Ordered



potassium chloride 20 mEq oral tablet, extended release

See Instructions, Take 1 tablet by mouth  daily, # 90 unknown unit, 1 Refill(s),
eRx: [facility], Take 1 tablet by mouth  daily

Start Date: 10/2/15

Status: Ordered



ProAir HFA 90 mcg/inh inhalation aerosol

2 puffs, Inhalation, QID, as needed for wheezing, # 3 Each, 3 Refill(s), Pharmac
y: [facility]

Start Date: 1/23/15

Status: Ordered



SUMAtriptan 50 mg oral tablet

50 mg 1 tabs, Oral, Daily, as needed for migraine headache, may repeat dose afte
r 2 hours up to a maximum of 200 mg in 24 hours, # 9 tabs, 1 Refill(s), Pharmacy
: [facility] [id number], 1 tabs Oral Daily,PRN:as needed for migraine heada
godwin,Instr:m...

Start Date: 16

Status: Ordered



Symbicort 160 mcg-4.5 mcg/inh inhalation aerosol

See Instructions, Use 2 puffs twice daily, # 10.2 g, 2 Refill(s), [facility]: [facility], Use 2 puffs twice daily

Start Date: 12/2/15

Status: Ordered



valsartan 160 mg oral tablet

See Instructions, 1 TABS ORAL DAILY, # 30 tabs, 1 Refill(s), eRx: [facility] [id number], 1 TABS ORAL DAILY

Start Date: 10/12/15

Status: Ordered



Results





No data available for this section



Immunizations







  



                     Vaccine             Date                Refusal Reason

 

  



                           tetanus-diphth toxoids (Td) adult/adol     00 







Procedures







   



                 Procedure       Date            Related Diagnosis     Body Site

 

   



                           L knee PMM/ part. synovectomy     13  

 

   



                           lt median nerve decompression     5/10/11  

 

   



                           Tubal ligation            1979  

 

   



                                         Carpal tunnel release rt   

 

   



                                         Cholecystectomy   

 

   



                                         Hysterectomy   

 

   



                                         Hysterectomy and bilateral   



                                         salpingo-oophorectomy sample   

 

   



                                         Laminectomy   

 

   



                                         Pacemaker   







Social History







 



                           Social History Type       Response

 

 



                           Smoking Status            Former smoker; Type: Cigarettes1







1Quit around 



Assessment and Plan





Referrals to Other Providers





Referred by: JIM Jerry MD

## 2019-07-31 NOTE — XMS REPORT
Transition of Care/Referral Summary

                             Created on: 2085



DYLAN KHAN

External Reference #: [id number]

: 1960

Sex: Female



Demographics







                          Address                   [address]

[place]  [address]

 

                          Home Phone                [phone]

 

                          Preferred Language        English

 

                          Marital Status            

 

                          Confucianist Affiliation     Mandaeism

 

                          Race                      White

 

                          Ethnic Group              Not  or 





Author







                          Author                    [facility]

 

                          Organization              [facility]

 

                          Address                   Unknown

 

                          Phone                     Unavailable







Care Team Providers







                    Care Team Member Name    Role                Phone

 

                    Dhiraj Mijares    PCP                 [phone]







Encounter





VC FIN [id number] Date(s): 11/16/15 - 11/16/15

[facility] [address], 
[facility] [phone]

Discharge Disposition: 01-Home or Self Care

Attending Physician: ADI Anaya III, MD

Admitting Physician: ADI Anaya III, MD





Vital Signs







 



                           Most recent to            1



                                         oldest [Reference 



                                         Range]: 

 

 



                           Apical Heart Rate         74 bpm



                           [ bpm]              (11/16/15 9:23 AM)

 

 



                           Blood Pressure            130/86 mmHg



                           [/60-90 mmHg]       (11/16/15 9:23 AM)







Problem List







    



              Condition     Effective Dates     Status       Health Status     Informant

 

    



                           Acquired                  Active  



                                         spondylolisthesis(Co    



                                         nfirmed)    

 

    



                           Acute                     Active  



                                         pain(Confirmed)    

 

    



                           Arthritis(Confirmed)      Resolved  

 

    



                           Right Carpal tunnel       Resolved  



                                         release(Confirmed)    

 

    



                           Carpal tunnel             Resolved  



                                         release    



                                         decompression(Confir    



                                         med)    

 

    



                           Cholecystectomy(Conf      Resolved  



                                         irmed)    

 

    



                     CHF (congestive      Active              patient



                                         heart    



                                         failure)(Confirmed)    

 

    



                           CAD (coronary artery      Active  



                                         disease)(Confirmed)    

 

    



                           Decompression(Confir      Resolved  



                                         med)    

 

    



                           Depression(Confirmed      Resolved  



                                         )    

 

    



                           Depression(Confirmed      Active  



                                         )    

 

    



                           Diabetes(Confirmed)       Resolved  

 

    



                           Diabetes(Confirmed)       Active  

 

    



                           GERD                      Active  



                                         (gastroesophageal    



                                         reflux    



                                         disease)(Confirmed)    

 

    



                           Ischemic                  Active  



                                         cardiomyopathy(Confi    



                                         rmed)    

 

    



                           Presence of               Active  



                                         biventricular    



                                         AICD(Confirmed)    

 

    



                           History of lumbar         Active  



                                         fusion(Confirmed)    

 

    



                           Hyperlipidemia(Confi      Resolved  



                                         rmed)    

 

    



                           Hypertension(Confirm      Resolved  



                                         ed)    

 

    



                           Hypertension(Confirm      Resolved  



                                         ed)    

 

    



                           HTN                       Active  



                                         (hypertension)(Confi    



                                         rmed)    

 

    



                           Hysterectomy(Confirm      Resolved  



                                         ed)    

 

    



                           Impaired gas              Active  



                                         exchange(Confirmed)1    

 

    



                           Irritable                 Resolved  



                                         bowel(Confirmed)    

 

    



                           Laminectomy(Confirme      Resolved  



                                         d)    

 

    



                           LBBB (left bundle         Active  



                                         branch    



                                         block)(Confirmed)    

 

    



                           LUQ pain(Confirmed)       Active  

 

    



                           Migraine(Confirmed)       Resolved  

 

    



                           Asthma(Confirmed)         Active  

 

    



                     Morbid              Active              patient



                                         obesity(Confirmed)    

 

    



                           Myocardial                Resolved  



                                         infarction(Confirmed    



                                         )    

 

    



                           Septicemia                Resolved  



                                         pneumonia(Confirmed)    

 

    



                     L knee              13              Resolved  



                                         Synovectomy(Confirme    



                                         d)    

 

    



                           Tissue perfusion          Active  



                                         alteration(Confirmed    



                                         )2    

 

    



                     Tobacco             Active              patient



                                         user(Confirmed)    

 

    



                     Tubal               79              Resolved  



                                         ligation(Confirmed)    







1Problem added automatically by system based on initiation of Impaired Gas 
Exchange Plan of Care



2Problem added automatically by system based on initiation of Tissue Perfusion 
Cerebral Plan of Care



Allergies, Adverse Reactions, Alerts







   



                 Substance       Reaction        Severity        Status

 

   



                     barium sulfate      Adverse Reaction      Active



                                         Swelling, hives  

 

   



                     celecoxib           HIVES               Active

 

   



                           influenza virus vaccine,       Active



                                         inactivated   

 

   



                     morphine            Adverse Reaction      Active

 

   



                     sulfamethoxazole     HIVES               Active



                                         Urticaria (hives)  







Medications





acetaminophen

1,000 mg, Oral, q6hr, as needed for pain, 0 Refill(s)

Start Date: 14

Status: Ordered



AcipHex 20 mg oral delayed release tablet

40 mg 2 tabs, Oral, Daily, # 180 tabs, 2 Refill(s), Pharmacy: [facility], 2 tabs Oral Daily

Start Date: 9/21/15

Status: Ordered



albuterol 2.5 mg/3 mL (0.083%) inhalation solution

3 mL, Inhalation, BID, Dx: Asthma, # 25 Each, 3 Refill(s), [facility]acy: [facility] [id number], 3 mL Inhalation BID,Instr:Dx: Asthma

Start Date: 4/21/15

Status: Ordered



amLODIPine 5 mg oral tablet

See Instructions, Take 1 tablet by mouth  daily, # 90 tabs, 1 Refill(s), Pharmac
y: [facility], Take 1 tablet by mouth  daily

Start Date: 9/2/15

Status: Ordered



Aspir 81

81 mg, Oral, Daily, 0 Refill(s)

Start Date: 14

Status: Ordered



bumetanide 1 mg oral tablet

See Instructions, Take 2 mg of Bumex in AM and 1 mg PM., # 300 tabs, 5 Refill(s)
, Pharmacy: [facility], Take 2 mg of Bumex in AM and 1 mg PM.

Start Date: 9/22/15

Status: Ordered



buPROPion 150 mg/12 hours (SR) oral tablet, extended release

See Instructions, Take 1 tablet by mouth two  times daily, # 180 unknown unit, 1
Refill(s), eRx: [facility], Take 1 tablet by mouth two  times daily

Start Date: 9/9/15

Status: Ordered



carvedilol 25 mg oral tablet

25 mg 1 tabs, Oral, BID, # 180 tabs, 5 Refill(s), Pharmacy: [facility]
, 1 tabs Oral BID

Start Date: 9/22/15

Status: Ordered



Claritin

10 mg, Oral, Daily, 0 Refill(s)

Start Date: 5/18/15

Status: Ordered



cloNIDine 0.1 mg oral tablet

0.1 mg 1 tabs, Oral, qPM, # 90 tabs, 1 Refill(s), Pharmacy: [facility]
, 1 tabs Oral qPM

Start Date: 10/12/15

Status: Ordered



clopidogrel 75 mg oral tablet

1 tabs, Oral, Daily, # 90 tabs, 3 Refill(s), Pharmacy: [facility], 1 t
abs Oral Daily

Start Date: 4/14/15

Status: Ordered



digoxin 125 mcg (0.125 mg) oral tablet

125 mcg 1 tabs, Oral, Daily, # 90 tabs, 5 Refill(s), [facility], 1 tabs Oral Daily

Start Date: 9/22/15

Status: Ordered



DULoxetine 20 mg oral delayed release capsule

20 mg 1 caps, Oral, BID, 0 Refill(s)

Start Date: 10/19/15

Status: Ordered



DULoxetine 60 mg oral delayed release capsule

See Instructions, Take 1 capsule by mouth  daily do not crush or chew, # 90 unkn
own unit, 1 Refill(s), eRx: [facility], Take 1 capsule by mouth  daily
do not crush or chew

Start Date: 9/30/15

Status: Ordered



glipiZIDE 5 mg oral tablet

5 mg 1 tabs, Oral, BID, X 90 days, # 180 tabs, 1 Refill(s), [facility], 1 tabs Oral BID,x90 days

Start Date: 9/2/15

Stop Date: 16

Status: Ordered



Insulin Syringe (DME)

DME Item use to inject insulin once daily     dx: E11.9, See Instructions, # 90 
Each, 4 Refill(s), Pharmacy: [id number], use to inject insulin o
nce daily     dx: E11.9, Supply

Start Date: 11/10/15

Status: Ordered



Lantus 100 units/mL subcutaneous solution

See Instructions, Inject subcutaneously 30  units every night at  bedtime once a
day, # 30 mL, 2 Refill(s), eRx: [facility], Inject subcutaneously 30  
units every night at  bedtime once a day

Start Date: 7/31/15

Status: Ordered



Melatonin

10 mg, Oral, Bedtime (once a day), as needed for insomnia, 0 Refill(s)

Start Date: 14

Status: Ordered



metFORMIN 500 mg oral tablet

See Instructions, Take 2 tablets by mouth  twice a day, # 360 tabs, 1 Refill(s),
Pharmacy: [facility], Take 2 tablets by mouth  twice a day

Start Date: 9/2/15

Status: Ordered



Multiple Vitamins oral tablet

1 tabs, Oral, Daily, 0 Refill(s)

Start Date: 14

Status: Ordered



Norco 10 mg-325 mg oral tablet

1 tabs, Oral, q6hr, as needed for pain, # 30 tabs, 0 Refill(s)

Start Date: 10/19/15

Status: Ordered



OTC Black Cohash

OTC Black Cohash, See Instructions, 1 tablet oral daily, 0 Refill(s)

Start Date: 2/3/15

Status: Ordered



OTC Calcium

OTC Calcium, See Instructions, 1 tablet oral daily, 0 Refill(s)

Start Date: 2/3/15

Status: Ordered



potassium chloride 20 mEq oral tablet, extended release

See Instructions, Take 1 tablet by mouth  daily, # 90 unknown unit, 1 Refill(s),
eRx: [facility], Take 1 tablet by mouth  daily

Start Date: 10/2/15

Status: Ordered



ProAir HFA 90 mcg/inh inhalation aerosol

2 puffs, Inhalation, QID, as needed for wheezing, # 3 Each, 3 Refill(s), Pharmac
y: [facility]

Start Date: 1/23/15

Status: Ordered



SUMAtriptan 50 mg oral tablet

50 mg 1 tabs, Oral, Daily, as needed for migraine headache, may repeat dose afte
r 2 hours up to a maximum of 200 mg in 24 hours, # 9 tabs, 0 Refill(s), Pharmacy
: [id number], 1 tabs Oral Daily,PRN:as needed for migraine heada
godwin,Instr:m...

Start Date: 9/16/15

Status: Ordered



Symbicort 160 mcg-4.5 mcg/inh inhalation aerosol

See Instructions, Use 2 puffs twice daily, # 10.2 g, 2 Refill(s), [facility], Use 2 puffs twice daily

Start Date: 9/9/15

Status: Ordered



valsartan 160 mg oral tablet

See Instructions, 1 TABS ORAL DAILY, # 30 tabs, 1 Refill(s), [facility]Rx: [id number], 1 TABS ORAL DAILY

Start Date: 10/12/15

Status: Ordered



Vitamin D3

4000 unit, Oral, Daily, 0 Refill(s)

Start Date: 2/3/15

Status: Ordered



Results





No data available for this section



Immunizations







  



                     Vaccine             Date                Refusal Reason

 

  



                           tetanus-diphth toxoids (Td) adult/adol     00 







Procedures







   



                 Procedure       Date            Related Diagnosis     Body Site

 

   



                           L knee PMM/ part. synovectomy     13  

 

   



                           lt median nerve decompression     5/10/11  

 

   



                           Tubal ligation            1979  

 

   



                                         Carpal tunnel release rt   

 

   



                                         Cholecystectomy   

 

   



                                         Hysterectomy   

 

   



                                         Hysterectomy and bilateral   



                                         salpingo-oophorectomy sample   

 

   



                                         Laminectomy   

 

   



                                         Pacemaker   







Social History







 



                           Social History Type       Response

 

 



                           Smoking Status            Former smoker; Type: Cigarettes1







1Quit around 



Assessment and Plan

Extracted from:





  



                     Title: Office Visit Note     Author: ADI Anaya III, MD     Date: 11/16/15









                                         Assessment/Plan Gastroesophageal reflux



improved with rabeprazole twice a day. I reemphasized antireflux measures. 
Because of her heart failureandcardiac events this past year,I don't th
ink an EGD is indicated at this time nor necessary.



 Left upper quadrant painwith a history of functional bowel disorder, IBS. 
She hasn't undergone colonoscopy in the past but againI don't think were in 
his position where it will behelpful to herand could cause decompensation. 
Specifically her cardiac status in my opinion needs to be addressed 
primarilyand she's not anxious to perform a screening or diagnostic 
colonoscopy at this time. I don't think she is a candidate for 19 she's better
served by focusing [id number] mg sodium diet,antireflux measures, and treatment 
of functional bowel disorder. It is true that she could be harboring colon 
polypsor colonic malignancy, but she is due to see her cardiologistlater 
this year. At this time she will return on an as-needed basis and will 
deferred lower GI evaluation and screening at this time. This can be brought 
up in the future with her primary care physicianand re-referral as necessary.

## 2019-07-31 NOTE — XMS REPORT
Transition of Care/Referral Summary

                             Created on: 10/25/2078



DYLAN KHAN

External Reference #: [id number]

: 1960

Sex: Female



Demographics







                          Address                   [address]  [address]

 

                          Home Phone                [phone]

 

                          Preferred Language        English

 

                          Marital Status            

 

                          Taoism Affiliation     Jewish

 

                          Race                      White

 

                          Ethnic Group              Not  or 





Author







                          Author                    [facility]

 

                          Organization              [facility]

 

                          Address                   Unknown

 

                          Phone                     Unavailable







Care Team Providers







                    Care Team Member Name    Role                Phone

 

                    Dhiraj Mijares    PCP                 [phone]







Encounter





[facility] [id number] Date(s): 7/30/15 - 7/30/15

[facility] [address] [place], [place] [address]
S [phone]

Discharge Diagnosis: LUQ abdominal pain

Discharge Diagnosis: LBP (low back pain)

Discharge Disposition: 01-Home or Self Care

Attending Physician: Dhiraj Mijares MD

Admitting Physician: Dhiraj Mijares MD





Vital Signs







 



                           Most recent to            1



                                         oldest [Reference 



                                         Range]: 

 

 



                           Blood Pressure            134/72 mmHg



                           [/60-90 mmHg]       (7/30/15 8:21 AM)







Problem List







    



              Condition     Effective Dates     Status       Health Status     Informant

 

    



                           Acquired                  Active  



                                         spondylolisthesis(Co    



                                         nfirmed)    

 

    



                           Acute                     Active  



                                         pain(Confirmed)    

 

    



                           Arthritis(Confirmed)      Resolved  

 

    



                           Right Carpal tunnel       Resolved  



                                         release(Confirmed)    

 

    



                           Carpal tunnel             Resolved  



                                         release    



                                         decompression(Confir    



                                         med)    

 

    



                           Cholecystectomy(Conf      Resolved  



                                         irmed)    

 

    



                     CHF (congestive      Active              patient



                                         heart    



                                         failure)(Confirmed)    

 

    



                           CAD (coronary artery      Active  



                                         disease)(Confirmed)    

 

    



                           Decompression(Confir      Resolved  



                                         med)    

 

    



                           Depression(Confirmed      Resolved  



                                         )    

 

    



                           Depression(Confirmed      Active  



                                         )    

 

    



                           Diabetes(Confirmed)       Resolved  

 

    



                           Diabetes(Confirmed)       Active  

 

    



                           GERD                      Active  



                                         (gastroesophageal    



                                         reflux    



                                         disease)(Confirmed)    

 

    



                           Ischemic                  Active  



                                         cardiomyopathy(Confi    



                                         rmed)    

 

    



                           Presence of               Active  



                                         biventricular    



                                         AICD(Confirmed)    

 

    



                           History of lumbar         Active  



                                         fusion(Confirmed)    

 

    



                           Hyperlipidemia(Confi      Resolved  



                                         rmed)    

 

    



                           Hypertension(Confirm      Resolved  



                                         ed)    

 

    



                           Hypertension(Confirm      Resolved  



                                         ed)    

 

    



                           HTN                       Active  



                                         (hypertension)(Confi    



                                         rmed)    

 

    



                           Hysterectomy(Confirm      Resolved  



                                         ed)    

 

    



                           Impaired gas              Active  



                                         exchange(Confirmed)1    

 

    



                           Irritable                 Resolved  



                                         bowel(Confirmed)    

 

    



                           Laminectomy(Confirme      Resolved  



                                         d)    

 

    



                           LBBB (left bundle         Active  



                                         branch    



                                         block)(Confirmed)    

 

    



                           LUQ pain(Confirmed)       Active  

 

    



                           Migraine(Confirmed)       Resolved  

 

    



                           Asthma(Confirmed)         Active  

 

    



                     Morbid              Active              patient



                                         obesity(Confirmed)    

 

    



                           Myocardial                Resolved  



                                         infarction(Confirmed    



                                         )    

 

    



                           Septicemia                Resolved  



                                         pneumonia(Confirmed)    

 

    



                     L knee              13              Resolved  



                                         Synovectomy(Confirme    



                                         d)    

 

    



                           Tissue perfusion          Active  



                                         alteration(Confirmed    



                                         )2    

 

    



                     Tobacco             Active              patient



                                         user(Confirmed)    

 

    



                     Tubal               79              Resolved  



                                         ligation(Confirmed)    







1Problem added automatically by system based on initiation of Impaired Gas 
Exchange Plan of Care



2Problem added automatically by system based on initiation of Tissue Perfusion 
Cerebral Plan of Care



Allergies, Adverse Reactions, Alerts







   



                 Substance       Reaction        Severity        Status

 

   



                     barium sulfate      Adverse Reaction      Active



                                         Swelling, hives  

 

   



                     celecoxib           HIVES               Active

 

   



                           influenza virus vaccine,       Active



                                         inactivated   

 

   



                     morphine            Adverse Reaction      Active

 

   



                     sulfamethoxazole     HIVES               Active



                                         Urticaria (hives)  







Medications





acetaminophen

1,000 mg, Oral, q6hr, as needed for pain, 0 Refill(s)

Start Date: 14

Status: Ordered



AcipHex 20 mg oral delayed release tablet

40 mg 2 tabs, Oral, Daily, # 180 tabs, 2 Refill(s), Pharmacy: [facility], 2 tabs Oral Daily

Start Date: 9/21/15

Status: Ordered



albuterol 2.5 mg/3 mL (0.083%) inhalation solution

3 mL, Inhalation, BID, Dx: Asthma, # 25 Each, 3 Refill(s), [facility]: [email or web address] [id number], 3 mL Inhalation BID,Instr:Dx: Asthma

Start Date: 4/21/15

Status: Ordered



amLODIPine 5 mg oral tablet

See Instructions, Take 1 tablet by mouth  daily, # 90 tabs, [facility], Take 1 tablet by mouth  daily

Start Date: 2/3/16

Status: Ordered



Aspir 81

81 mg, Oral, Daily, 0 Refill(s)

Start Date: 14

Status: Ordered



atorvastatin 10 mg oral tablet

10 mg 1 tabs, Oral, Daily, # 90 tabs, 5 Refill(s), Pharmacy: [facility]
E, 1 tabs Oral Daily

Start Date: 16

Status: Ordered



bumetanide 1 mg oral tablet

See Instructions, Take 2 mg of Bumex in AM and 1 mg PM., # 300 tabs, 5 Refill(s)
, Pharmacy: [facility], Take 2 mg of Bumex in AM and 1 mg PM.

Start Date: 9/22/15

Status: Ordered



buPROPion 150 mg/12 hours (SR) oral tablet, extended release

See Instructions, Take 1 tablet by mouth two  times daily, # 180 unknown unit, 1
Refill(s), eRx: [facility], Take 1 tablet by mouth two  times daily

Start Date: 9/9/15

Status: Ordered



carvedilol 25 mg oral tablet

25 mg 1 tabs, Oral, BID, # 180 tabs, 5 Refill(s), Pharmacy: [facility]
, 1 tabs Oral BID

Start Date: 9/22/15

Status: Ordered



Claritin

10 mg, Oral, Daily, 0 Refill(s)

Start Date: 5/18/15

Status: Ordered



cloNIDine 0.1 mg oral tablet

0.1 mg 1 tabs, Oral, qPM, # 90 tabs, 1 Refill(s), Pharmacy: [facility]
, 1 tabs Oral qPM

Start Date: 10/12/15

Status: Ordered



clopidogrel 75 mg oral tablet

1 tabs, Oral, Daily, # 90 tabs, 3 Refill(s), Pharmacy: [facility], 1 t
abs Oral Daily

Start Date: 4/14/15

Status: Ordered



digoxin 125 mcg (0.125 mg) oral tablet

125 mcg 1 tabs, Oral, Daily, # 90 tabs, 5 Refill(s), [facility], 1 tabs Oral Daily

Start Date: 9/22/15

Status: Ordered



DULoxetine 20 mg oral delayed release capsule

See Instructions, Take 1 capsule by mouth  daily do not crush or chew, # 90 caps
, eRx: [facility], Take 1 capsule by mouth  daily do not crush or chew

Start Date: 16

Status: Ordered



glipiZIDE 5 mg oral tablet

See Instructions, Take 1 tablet by mouth two  times daily, # 180 tabs, [facility], Take 1 tablet by mouth two  times daily

Start Date: 2/3/16

Status: Ordered



Insulin Syringe (DME)

DME Item use to inject insulin once daily     dx: E11.9, See Instructions, # 90 
Each, 4 Refill(s), Pharmacy: [id number], use to inject insulin o
nce daily     dx: E11.9, Supply

Start Date: 11/10/15

Status: Ordered



Lantus 100 units/mL subcutaneous solution

See Instructions, Inject subcutaneously 30  units every night at  bedtime once a
day, # 30 mL, 2 Refill(s), eRx: [facility], Inject subcutaneously 30  
units every night at  bedtime once a day

Start Date: 7/31/15

Status: Ordered



Melatonin

10 mg, Oral, Bedtime (once a day), as needed for insomnia, 0 Refill(s)

Start Date: 14

Status: Ordered



metFORMIN 500 mg oral tablet

See Instructions, Take 2 tablets by mouth  twice a day, # 360 tabs, [facility]: [facility], Take 2 tablets by mouth  twice a day

Start Date: 2/3/16

Status: Ordered



Norco 10 mg-325 mg oral tablet

1 tabs, Oral, q6hr, as needed for pain, # 30 tabs, 0 Refill(s)

Start Date: 16

Status: Ordered



OTC Calcium

OTC Calcium, See Instructions, 1 tablet oral daily, 0 Refill(s)

Start Date: 2/3/15

Status: Ordered



potassium chloride 20 mEq oral tablet, extended release

See Instructions, Take 1 tablet by mouth  daily, # 90 unknown unit, 1 Refill(s),
eRx: [facility], Take 1 tablet by mouth  daily

Start Date: 10/2/15

Status: Ordered



ProAir HFA 90 mcg/inh inhalation aerosol

2 puffs, Inhalation, QID, as needed for wheezing, # 3 Each, 3 Refill(s), Pharmac
y: [facility]

Start Date: 1/23/15

Status: Ordered



SUMAtriptan 50 mg oral tablet

50 mg 1 tabs, Oral, Daily, as needed for migraine headache, may repeat dose afte
r 2 hours up to a maximum of 200 mg in 24 hours, # 9 tabs, 0 Refill(s), [id number], 1 tabs Oral Daily,PRN:as needed for migraine heada
godwin,Instr:m...

Start Date: 9/16/15

Status: Ordered



Symbicort 160 mcg-4.5 mcg/inh inhalation aerosol

See Instructions, Use 2 puffs twice daily, # 10.2 g, 2 Refill(s), [facility], Use 2 puffs twice daily

Start Date: 12/2/15

Status: Ordered



valsartan 160 mg oral tablet

See Instructions, 1 TABS ORAL DAILY, # 30 tabs, 1 Refill(s), [facility]Rx: [id number], 1 TABS ORAL DAILY

Start Date: 10/12/15

Status: Ordered



Results





No data available for this section



Immunizations







  



                     Vaccine             Date                Refusal Reason

 

  



                           tetanus-diphth toxoids (Td) adult/adol     00 







Procedures







   



                 Procedure       Date            Related Diagnosis     Body Site

 

   



                           L knee PMM/ part. synovectomy     13  

 

   



                           lt median nerve decompression     5/10/11  

 

   



                           Tubal ligation            1979  

 

   



                                         Carpal tunnel release rt   

 

   



                                         Cholecystectomy   

 

   



                                         Hysterectomy   

 

   



                                         Hysterectomy and bilateral   



                                         salpingo-oophorectomy sample   

 

   



                                         Laminectomy   

 

   



                                         Pacemaker   







Social History







 



                           Social History Type       Response

 

 



                           Smoking Status            Former smoker; Type: Cigarettes1







1Quit around 



Assessment and Plan

Extracted from:





  



                     Title: Office Visit Note     Author: Dhiraj Mijares MD     Date: 7/30/15









                                         Assessment/Plan

 1.LUQ abdominal pain

 2.LBP (low back pain)

 Orders:  HYDROcodone-acetaminophen, 1 tabs, Oral, q6hr, # 30 tabs, 0 Refill(s)



 Referral to surgery for further evaluation on her abdominal pain rule out a 
hernia

 I recommended referral to Dr. Leavitt for further evaluation and options on
her chronic low back pain. She really doesn't think that she wants to have 
surgery again. We discussed the option of physical therapy, but given her 
previous surgical history of not sure she is a good candidate for this.

## 2019-07-31 NOTE — XMS REPORT
Transition of Care/Referral Summary

                             Created on: 2017



DYLAN KHAN

External Reference #: [id number]

: 1960

Sex: Female



Demographics







                          Address                   [address]  [address]

 

                          Home Phone                [phone]

 

                          Preferred Language        English

 

                          Marital Status            

 

                          Confucianism Affiliation     Roman Catholic

 

                          Race                      White

 

                          Ethnic Group              Not  or 





Author







                          Author                    [facility]

 

                          Organization              [facility]

 

                          Address                   Unknown

 

                          Phone                     Unavailable







Care Team Providers







                    Care Team Member Name    Role                Phone

 

                    Dhiraj Mijares    PCP                 [phone]







Encounter





VC FIN [id number] Date(s): 6/15/15 - 6/15/15

[facility] [address] [place], [place] [address]
S [phone]

Discharge Diagnosis: Congestive heart failure

Discharge Diagnosis: Low back pain

Discharge Diagnosis: Leg edema

Discharge Disposition: -Home or Self Care

Attending Physician: Ember Shcwarz

Admitting Physician: Ember Schwarz





Vital Signs







 



                           Most recent to            1



                                         oldest [Reference 



                                         Range]: 

 

 



                           Peripheral Pulse          85 bpm



                           Rate [ bpm]         (6/15/15 1:38 PM)

 

 



                           Blood Pressure            132/80 mmHg



                           [/60-90 mmHg]       (6/15/15 1:38 PM)

 

 



                           SpO2                      93 %



                                         (6/15/15 1:38 PM)







Problem List







    



              Condition     Effective Dates     Status       Health Status     Informant

 

    



                           Acquired                  Active  



                                         spondylolisthesis(Co    



                                         nfirmed)    

 

    



                           Acute                     Active  



                                         pain(Confirmed)    

 

    



                           Arthritis(Confirmed)      Resolved  

 

    



                           Right Carpal tunnel       Resolved  



                                         release(Confirmed)    

 

    



                           Carpal tunnel             Resolved  



                                         release    



                                         decompression(Confir    



                                         med)    

 

    



                           Cholecystectomy(Conf      Resolved  



                                         irmed)    

 

    



                     CHF (congestive      Active              patient



                                         heart    



                                         failure)(Confirmed)    

 

    



                           CAD (coronary artery      Active  



                                         disease)(Confirmed)    

 

    



                           Decompression(Confir      Resolved  



                                         med)    

 

    



                           Depression(Confirmed      Resolved  



                                         )    

 

    



                           Depression(Confirmed      Active  



                                         )    

 

    



                           Diabetes(Confirmed)       Resolved  

 

    



                           Diabetes(Confirmed)       Active  

 

    



                           GERD                      Active  



                                         (gastroesophageal    



                                         reflux    



                                         disease)(Confirmed)    

 

    



                           Ischemic                  Active  



                                         cardiomyopathy(Confi    



                                         rmed)    

 

    



                           Presence of               Active  



                                         biventricular    



                                         AICD(Confirmed)    

 

    



                           History of lumbar         Active  



                                         fusion(Confirmed)    

 

    



                           Hyperlipidemia(Confi      Resolved  



                                         rmed)    

 

    



                           Hypertension(Confirm      Resolved  



                                         ed)    

 

    



                           Hypertension(Confirm      Resolved  



                                         ed)    

 

    



                           HTN                       Active  



                                         (hypertension)(Confi    



                                         rmed)    

 

    



                           Hysterectomy(Confirm      Resolved  



                                         ed)    

 

    



                           Impaired gas              Active  



                                         exchange(Confirmed)1    

 

    



                           Irritable                 Resolved  



                                         bowel(Confirmed)    

 

    



                           Laminectomy(Confirme      Resolved  



                                         d)    

 

    



                           LBBB (left bundle         Active  



                                         branch    



                                         block)(Confirmed)    

 

    



                           LUQ pain(Confirmed)       Active  

 

    



                           Migraine(Confirmed)       Resolved  

 

    



                           Asthma(Confirmed)         Active  

 

    



                     Morbid              Active              patient



                                         obesity(Confirmed)    

 

    



                           Myocardial                Resolved  



                                         infarction(Confirmed    



                                         )    

 

    



                           Septicemia                Resolved  



                                         pneumonia(Confirmed)    

 

    



                     L knee              13              Resolved  



                                         Synovectomy(Confirme    



                                         d)    

 

    



                           Tissue perfusion          Active  



                                         alteration(Confirmed    



                                         )2    

 

    



                     Tobacco             Active              patient



                                         user(Confirmed)    

 

    



                     Tubal               79              Resolved  



                                         ligation(Confirmed)    







1Problem added automatically by system based on initiation of Impaired Gas 
Exchange Plan of Care



2Problem added automatically by system based on initiation of Tissue Perfusion 
Cerebral Plan of Care



Allergies, Adverse Reactions, Alerts







   



                 Substance       Reaction        Severity        Status

 

   



                     barium sulfate      Adverse Reaction      Active



                                         Swelling, hives  

 

   



                     celecoxib           HIVES               Active

 

   



                           influenza virus vaccine,       Active



                                         inactivated   

 

   



                     morphine            Adverse Reaction      Active

 

   



                     sulfamethoxazole     HIVES               Active



                                         Urticaria (hives)  







Medications





acetaminophen

1,000 mg, Oral, q6hr, as needed for pain, 0 Refill(s)

Start Date: 14

Status: Ordered



AcipHex 20 mg oral delayed release tablet

40 mg 2 tabs, Oral, Daily, # 180 tabs, 2 Refill(s), [facility], 2 tabs Oral Daily

Start Date: 9/21/15

Status: Ordered



albuterol 2.5 mg/3 mL (0.083%) inhalation solution

3 mL, Inhalation, BID, Dx: Asthma, # 25 Each, 3 Refill(s), [facility]: [id number], 3 mL Inhalation BID,Instr:Dx: Asthma

Start Date: 4/21/15

Status: Ordered



amLODIPine 5 mg oral tablet

See Instructions, Take 1 tablet by mouth  daily, # 90 tabs, 1 Refill(s), Pharmac
y: [facility], Take 1 tablet by mouth  daily

Start Date: 9/2/15

Status: Ordered



Aspir 81

81 mg, Oral, Daily, 0 Refill(s)

Start Date: 14

Status: Ordered



bumetanide 1 mg oral tablet

See Instructions, Take 2 mg of Bumex in AM and 1 mg PM., # 300 tabs, 5 Refill(s)
, Pharmacy: [facility], Take 2 mg of Bumex in AM and 1 mg PM.

Start Date: 9/22/15

Status: Ordered



buPROPion 150 mg/12 hours (SR) oral tablet, extended release

See Instructions, Take 1 tablet by mouth two  times daily, # 180 unknown unit, 1
Refill(s), eRx: [facility], Take 1 tablet by mouth two  times daily

Start Date: 9/9/15

Status: Ordered



carvedilol 25 mg oral tablet

25 mg 1 tabs, Oral, BID, # 180 tabs, 5 Refill(s), Pharmacy: [facility]
, 1 tabs Oral BID

Start Date: 9/22/15

Status: Ordered



Claritin

10 mg, Oral, Daily, 0 Refill(s)

Start Date: 5/18/15

Status: Ordered



cloNIDine 0.1 mg oral tablet

0.1 mg 1 tabs, Oral, qPM, # 90 tabs, 1 Refill(s), [facility]
, 1 tabs Oral qPM

Start Date: 10/12/15

Status: Ordered



clopidogrel 75 mg oral tablet

1 tabs, Oral, Daily, # 90 tabs, 3 Refill(s), [facility]: [facility], 1 t
abs Oral Daily

Start Date: 4/14/15

Status: Ordered



digoxin 125 mcg (0.125 mg) oral tablet

125 mcg 1 tabs, Oral, Daily, # 90 tabs, 5 Refill(s), [facility], 1 tabs Oral Daily

Start Date: 9/22/15

Status: Ordered



DULoxetine 20 mg oral delayed release capsule

20 mg 1 caps, Oral, BID, 0 Refill(s)

Start Date: 10/19/15

Status: Ordered



DULoxetine 60 mg oral delayed release capsule

See Instructions, Take 1 capsule by mouth  daily do not crush or chew, # 90 unkn
own unit, 1 Refill(s), eRx: [facility], Take 1 capsule by mouth  daily
do not crush or chew

Start Date: 9/30/15

Status: Ordered



glipiZIDE 5 mg oral tablet

5 mg 1 tabs, Oral, BID, X 90 days, # 180 tabs, 1 Refill(s), [facility], 1 tabs Oral BID,x90 days

Start Date: 9/2/15

Stop Date: 16

Status: Ordered



Insulin Syringe (DME)

DME Item use to inject insulin once daily     dx: E11.9, See Instructions, # 90 
Each, 4 Refill(s), Pharmacy: [id number], use to inject insulin o
nce daily     dx: E11.9, Supply

Start Date: 11/10/15

Status: Ordered



Lantus 100 units/mL subcutaneous solution

See Instructions, Inject subcutaneously 30  units every night at  bedtime once a
day, # 30 mL, 2 Refill(s), eRx: [facility], Inject subcutaneously 30  
units every night at  bedtime once a day

Start Date: 7/31/15

Status: Ordered



Melatonin

10 mg, Oral, Bedtime (once a day), as needed for insomnia, 0 Refill(s)

Start Date: 14

Status: Ordered



metFORMIN 500 mg oral tablet

See Instructions, Take 2 tablets by mouth  twice a day, # 360 tabs, 1 Refill(s),
Pharmacy: [facility], Take 2 tablets by mouth  twice a day

Start Date: 9/2/15

Status: Ordered



Multiple Vitamins oral tablet

1 tabs, Oral, Daily, 0 Refill(s)

Start Date: 14

Status: Ordered



Norco 10 mg-325 mg oral tablet

1 tabs, Oral, q6hr, as needed for pain, # 30 tabs, 0 Refill(s)

Start Date: 10/19/15

Status: Ordered



OTC Black Cohash

OTC Black Cohash, See Instructions, 1 tablet oral daily, 0 Refill(s)

Start Date: 2/3/15

Status: Ordered



OTC Calcium

OTC Calcium, See Instructions, 1 tablet oral daily, 0 Refill(s)

Start Date: 2/3/15

Status: Ordered



potassium chloride 20 mEq oral tablet, extended release

See Instructions, Take 1 tablet by mouth  daily, # 90 unknown unit, 1 Refill(s),
eRx: [facility], Take 1 tablet by mouth  daily

Start Date: 10/2/15

Status: Ordered



ProAir HFA 90 mcg/inh inhalation aerosol

2 puffs, Inhalation, QID, as needed for wheezing, # 3 Each, 3 Refill(s), Pharmac
y: [facility]

Start Date: 1/23/15

Status: Ordered



SUMAtriptan 50 mg oral tablet

50 mg 1 tabs, Oral, Daily, as needed for migraine headache, may repeat dose afte
r 2 hours up to a maximum of 200 mg in 24 hours, # 9 tabs, 0 Refill(s), Pharmacy
: [id number], 1 tabs Oral Daily,PRN:as needed for migraine heada
godwin,Instr:m...

Start Date: 9/16/15

Status: Ordered



Symbicort 160 mcg-4.5 mcg/inh inhalation aerosol

See Instructions, Use 2 puffs twice daily, # 10.2 g, 2 Refill(s), [facility]: [facility], Use 2 puffs twice daily

Start Date: 12/2/15

Status: Ordered



valsartan 160 mg oral tablet

See Instructions, 1 TABS ORAL DAILY, # 30 tabs, 1 Refill(s), [facility]: [id number], 1 TABS ORAL DAILY

Start Date: 10/12/15

Status: Ordered



Vitamin D3

4000 unit, Oral, Daily, 0 Refill(s)

Start Date: 2/3/15

Status: Ordered



Results





Chemistry





 



                           Most recent to            1



                                         oldest [Reference 



                                         Range]: 

 

 



                           Sodium Lvl [135-144       140 mEq/L



                           mEq/L]                    (6/15/15 2:34 PM)

 

 



                           Potassium Lvl             4.5 mEq/L



                           [3.5-5.2 mEq/L]           (6/15/15 2:34 PM)

 

 



                           Chloride [          96 mEq/L



                           mEq/L]                    *LOW*



                                         (6/15/15 2:34 PM)

 

 



                           CO2 [22-31 mEq/L]         32 mEq/L



                                         *HI*



                                         (6/15/15 2:34 PM)

 

 



                           AGAP [3-20]               12



                                         (6/15/15 2:34 PM)

 

 



                           BUN [10-20 mg/dL]         15 mg/dL



                                         (6/15/15 2:34 PM)

 

 



                           Glucose Lvl [70-99        185 mg/dL



                           mg/dL]                    *HI*



                                         (6/15/15 2:34 PM)

 

 



                           Creatinine Lvl            0.69 mg/dL



                           [0.57-1.11 mg/dL]         (6/15/15 2:34 PM)

 

 



                           eGFR [>60 mL/min]         >60 mL/min 1



                                         (6/15/15 2:34 PM)

 

 



                           Calcium Lvl               9.9 mg/dL



                           [8.9-10.5 mg/dL]          (6/15/15 2:34 PM)

 

 



                           Albumin Lvl [3.5-5.0      4.3 gm/dL



                           gm/dL]                    (6/15/15 2:34 PM)

 

 



                           Total Protein             7.1 gm/dL



                           [6.4-8.3 gm/dL]           (6/15/15 2:34 PM)

 

 



                           Globulin [1.8-4.0         2.8 gm/dL



                           gm/dL]                    (6/15/15 2:34 PM)

 

 



                           ALT [0-55 U/L]            42 U/L



                                         (6/15/15 2:34 PM)

 

 



                           AST [5-34 U/L]            26 U/L



                                         (6/15/15 2:34 PM)

 

 



                           Alk Phos [          139 U/L



                           U/L]                      (6/15/15 2:34 PM)

 

 



                           Bili Total [0.2-1.2       0.3 mg/dL



                           mg/dL]                    (6/15/15 2:34 PM)

 

 



                           BNP [0-99 pg/mL]          43 pg/mL



                                         (6/15/15 2:34 PM)







1Result Comment: Multiply eGFR results by 1.21 for  race.



Immunizations







  



                     Vaccine             Date                Refusal Reason

 

  



                           tetanus-diphth toxoids (Td) adult/adol     00 







Procedures







   



                 Procedure       Date            Related Diagnosis     Body Site

 

   



                           Collection of venous blood by venipuncture     6/15/15  

 

   



                           L knee PMM/ part. synovectomy     13  

 

   



                           lt median nerve decompression     5/10/11  

 

   



                           Tubal ligation            1979  

 

   



                                         Carpal tunnel release rt   

 

   



                                         Cholecystectomy   

 

   



                                         Hysterectomy   

 

   



                                         Hysterectomy and bilateral   



                                         salpingo-oophorectomy sample   

 

   



                                         Laminectomy   

 

   



                                         Pacemaker   







Social History







 



                           Social History Type       Response

 

 



                           Smoking Status            Former smoker; Type: Cigarettes1







1Quit around 



Assessment and Plan

Extracted from:





  



                     Title: Office Visit Note     Author: Ember Schwarz APRN     Date: 6/15/15









                                         Assessment/Plan

 1.Leg edema  Discussed with Dr. Mijares who looked at her x-ray also. She 
will increase her bumex to 1mg 4 pills a day for 3 days then go back to 3 pills 
a day. CMP and BNP were obtained.

  Ordered:

 B-Type Natriuretic Peptide

 Comprehensive Metabolic Panel

 Office Visit Level 4 Est 43313 

 2.Congestive heart failure  Ordered:

 Office Visit Level 4 Est 34280 

 3.Low back pain  She was given norco 5mg to take and will need to continue 
to limit NSAID's because of her heart problems.

  Ordered:

 Office Visit Level 4 Est 89545 

 Orders:  HYDROcodone-acetaminophen, 1 tabs, Oral, q6hr, as needed for pain, # 
50 tabs, 0 Refill(s)

## 2019-07-31 NOTE — ED LOWER EXTREMITY
General


Chief Complaint:  Lower Extremity


Stated Complaint:  FOOT PAIN


Source:  patient


Exam Limitations:  no limitations





History of Present Illness


Date Seen by Provider:  Jul 31, 2019


Time Seen by Provider:  10:51


Initial Comments


To ER with tenderness and redness to the top of the right foot. This began 

yesterday and progressively got worse throughout the day. She cannot recall any 

injury. She is diabetic.


Onset:  just prior to arrival


Severity:  moderate


Pain/Injury Location:  right foot


Method of Injury:  unknown


Modifying Factors:  Worse With Movement





Allergies and Home Medications


Allergies


Coded Allergies:  


     Sulfa (Sulfonamide Antibiotics) (Unverified  Allergy, Unknown, 7/31/19)


     barium iodide (Unverified  Allergy, Unknown, 7/31/19)


     celecoxib (Unverified  Allergy, Unknown, 7/31/19)


Uncoded Allergies:  


     flu shot (Adverse Reaction, Unknown, 7/31/19)





Patient Home Medication List


Home Medication List Reviewed:  Yes





Review of Systems


Constitutional:  see HPI


EENTM:  see HPI


Respiratory:  no symptoms reported


Cardiovascular:  no symptoms reported


Genitourinary:  no symptoms reported


Musculoskeletal:  see HPI


Skin:  see HPI


Psychiatric/Neurological:  No Symptoms Reported





Physical Exam


Vital Signs





Vital Signs - First Documented








 7/31/19





 10:39


 


Temp 99.4


 


Pulse 102


 


Resp 18


 


B/P (MAP) 132/71 (91)


 


Pulse Ox 93


 


O2 Delivery Room Air





Capillary Refill :


Height, Weight, BMI


Height: '"


Weight: lbs. oz. kg;  BMI


Method:


General Appearance:  WD/WN, no apparent distress


HEENT:  PERRL/EOMI


Respiratory:  no respiratory distress, no accessory muscle use


Legs:  bilateral leg non-tender, bilateral leg normal inspection, bilateral leg 

normal range of motion


Knees:  bilateral knee non-tender, bilateral knee normal inspection, bilateral 

knee normal range of motion


Ankles:  bilateral ankle non-tender, bilateral ankle normal inspection, 

bilateral ankle normal range of motion


Feet:  right foot other (there is a 7 x 5 cm area of erythema and warmth to the 

dorsum of the right foot in the middle. There is no punctum or abrasion or 

obvious nidus of infection. She has strong dorsalis pedis pulse bilaterally and 

posterior tibial pulse. Plantar surface of the foot reveals no puncture wounds 

or erythema.)


Neurologic/Psychiatric:  alert, normal mood/affect, oriented x 3


Skin:  normal color, warm/dry





Progress/Results/Core Measures


Results/Orders


My Orders





Orders - ROBERTO WEBB


Foot, Right, 3 View (7/31/19 10:50)





Vital Signs/I&O











 7/31/19





 10:39


 


Temp 99.4


 


Pulse 102


 


Resp 18


 


B/P (MAP) 132/71 (91)


 


Pulse Ox 93


 


O2 Delivery Room Air











Departure


Communication (Admissions)


She does have a history of gout but this is not the typical location for gout.





Impression





   Primary Impression:  


   Cellulitis of right foot


Disposition:  01 HOME, SELF-CARE


Condition:  Stable





Departure-Patient Inst.


Decision time for Depature:  11:27


Patient Instructions:  Cellulitis (Skin Infection), Adult (DC)





Add. Discharge Instructions:  


1. Antibiotic says directed


2. Return to ER for any worsening


3. Follow-up with your doctor later this week for recheck. All discharge 

instructions reviewed with patient and/or family. Voiced understanding.


Scripts


Amoxicillin/Potassium Clav (Augmentin 875-125 Tablet) 1 Each Tablet


1 EACH PO BID, #14 TAB 0 Refills


   Prov: ROBERTO WEBB         7/31/19





Images


Extremities-Lower











1 - Cellulitis














ROBERTO WEBB             Jul 31, 2019 10:54

## 2019-07-31 NOTE — XMS REPORT
Continuity of Care Document

                             Created on: 2019



BILL DYLAN EDITH

External Reference #: [id number]

: 1960

Sex: Female



Demographics







                          Address                   [address]

[place]  [address]

 

                          Home Phone                [phone] x

 

                          Preferred Language        Unknown

 

                          Marital Status            Unknown

 

                          Sikh Affiliation     Unknown

 

                          Race                      Unknown

 

                          Ethnic Group              Unknown





Author







                          Organization              Unknown

 

                          Address                   Unknown

 

                          Phone                     Unavailable



              



Allergies

      





             Active              Description              Code              Type              Severity

                Reaction              Onset              Reported/Identified              Relationship

 to Patient                             Clinical Status        

 

                Yes              Strawberry C                              egg-containing compound     

           N/A              N/A                                                                

    

 

             Yes              sulfamethoxazole                             NKMA              N/A     

             Urticaria (hives) HIVES                                                           

         

 

             Yes              Barium Iodide                             Drug Allergy              N/A

                Adverse Reaction                              2013                    

                                                 

 

             Yes              barium sulfate                             Drug Allergy              N/A

                Adverse Reaction                              2013                    

                                                 

 

             Yes              morphine                             Drug Allergy              N/A     

                Adverse Reaction                              2013                         

                                                 

 

                Yes              No Known Allergies                              Drug Allergy          

           N/A              N/A                             2013                              

        

 

                Yes              No Known Drug Allergies                              Drug Allergy     

             N/A              N/A                             2013                       

                                                 

 

                Yes              Sulfa (Sulfonamide Antibiotics                              Drug Allergy

                N/A              Urticaria (hives)                              2013  

                                                             

 

                Yes              Influenza Virus Vacc,Specific                              Drug Allergy

                N/A              Adverse Reaction                              2013   

                                                             

 

             Yes              Pneumovax 23                             Drug Allergy              N/A 

                Adverse Reaction                              2013                     

                                                 

 

             Yes              barium sulfate                             NKMA              N/A       

                    Adverse Reaction Swelling, hiv                                  2014           

                                                             

 

           Yes              celecoxib                             NKMA              N/A              

HIVES                              2014                                       

 

           Yes              morphine                             NKMA              N/A              Adverse

 Reaction                              2014                                       

 

             Yes              sulfamethoxazole                             NKMA              N/A     

                HIVES Urticaria (hives)                              2014                  

                                                 

 

                Yes              No Known Food Allergies                              Food Allergy     

             N/A              N/A                             2014                       

                                                 

 

                Yes              influenza virus vaccine, inact                              NKMA      

             N/A              N/A                             2015                        

                                                 

 

             Yes              barium sulfate                             NKMA              N/A       

             Swelling, hives                             2017                              

        

 

                Yes              influenza virus vaccine, inact                              NKMA      

             N/A              [id number]                             2017                  

                                                 

 

           Yes              morphine                             NKMA              N/A              [id number]

                                2017                                       



                                                      



Medications

      





                Medication              Packaging              Start Date              Stop Date      

                    Route               Dosage              Sig        

 

                                                    albuterol(ProAir HFA 90 mcg/inh inhalation aerosol)                 

                            2017                                   

                                                    See Instructions, Inhale 2 puffs 4 times  daily as needed for 

 wheezing, 25.5 g, 5 Refill(s)                  

 

                                      zolpidem(Ambien 10 mg oral tablet)                        1 tabs      

                10/18/2017              2018              Oral              10 mg          

                                                    10 mg=1 tabs, Oral, Bedtime (once a day), for 30 days, Orlando Crowder,

 PRN: as needed for sleep, 30 tabs, 2 Refill(s)                  

 

                                                    albuterol(ProAir HFA 90 mcg/inh inhalation aerosol)                 

                          2018                                                             

          See Instructions, INHALE 2 PUFFS 4 TIMES  DAILY AS NEEDED FOR  
WHEEZING, 25.5 g, 2 Refill(s)                  

 

                                      clopidogrel(clopidogrel 75 mg oral tablet)                            

             2018                                                                      

 See Instructions, TAKE 1 TABLET BY MOUTH  DAILY, 90 tabs, 2 Refill(s)          
       

 

                                                    potassium chloride(potassium chloride 20 mEq oral tablet, extended release)

                                        2018                                         

                                                                See Instructions, TAKE 1 TABLET BY MOUTH  DAILY, 90 tabs,

 2 Refill(s)                  

 

                                                    budesonide-formoterol(Symbicort 160 mcg-4.5 mcg/inh inhalation aerosol)

                                        2018                                         

                                                                See Instructions, INHALE 2 PUFFS TWICE DAILY, 30.6 g, 

2 Refill(s)                  

 

                                      zolpidem(Ambien 10 mg oral tablet)                        1 tabs      

                2018              Oral              10 mg          

                                                    10 mg=1 tabs, Oral, Bedtime (once a day), for 30 days, Orlando Crowder,

 PRN: as needed for sleep, 30 tabs, 0 Refill(s)                  

 

                                      zolpidem(Ambien 10 mg oral tablet)                        1 tabs      

                2018              Oral              10 mg          

                                                    10 mg=1 tabs, Oral, Bedtime (once a day), for 30 days, [facility] [phone],

 PRN: as needed for sleep, 30 tabs, 0 Refill(s)                  

 

                                                    HYDROcodone-acetaminophen(Norco 10 mg-325 mg oral tablet)           

             1 tabs              2018                             Oral               

                                                    1 tabs, Oral, q6hr, PRN: as needed for pain, 30 tabs, 0 Refill(s)

                  

 

                                      dicyclomine(Bentyl 20 mg oral tablet)                        1 tabs   

             2018                             Oral              20 mg                  

        20 mg=1 tabs, Oral, q8hr, PRN: Abdominal Cramping, 15 tabs, 1 Refill(s) 
                

 

                                      metFORMIN(metFORMIN 500 mg oral tablet)                               

             04/10/2018                                                                       See

 Instructions, TAKE 2 TABLETS BY MOUTH  TWICE DAILY, 360 tabs                  

 

                                                    budesonide-formoterol(Symbicort 160 mcg-4.5 mcg/inh inhalation aerosol)

                                        2018                                         

                                                                See Instructions, INHALE 2 PUFFS TWICE DAILY, 30.6 g, 

2 Refill(s)                  

 

                                      traMADol(traMADol 50 mg oral tablet)                                  

             2018                                                    

    See Instructions, 1-2 tabs po q 6hrs prn pain, 50 tabs, 1 Refill(s)         
        

 

                                      gabapentin(gabapentin 100 mg oral capsule)                            

             2018                                                                      

 See Instructions, 1-3 caps up to 3 times daily, 100 caps, 3 Refill(s)          
       

 

                                                    HYDROcodone-acetaminophen(Norco 10 mg-325 mg oral tablet)           

             1 tabs              2018                             Oral               

                                                    1 tabs, Oral, q6hr, PRN: as needed for pain, 30 tabs, 0 Refill(s)

                  

 

                                                    albuterol(ProAir HFA 90 mcg/inh inhalation aerosol)                 

                          2018                                                             

          See Instructions, INHALE 2 PUFFS 4 TIMES  DAILY AS NEEDED FOR  
WHEEZING, 25.5 g, 5 Refill(s)                  

 

                                      metFORMIN(metFORMIN 500 mg oral tablet)                               

             2018                                                                       See

 Instructions, TAKE 2 TABLETS BY MOUTH  TWICE DAILY, 360 tabs                  

 

                                                    triamcinolone(triamcinolone acetonide 40 mg/mL injectable suspension)

                        1 mL                2018           

                    IntraARTICULAR                                              1 mL, IntraARTICULAR, Once   

               

 

                                                    buPROPion(buPROPion 150 mg/12 hours (SR) oral tablet, extended release)

                                        2018                                         

                                                                See Instructions, TAKE 1 TABLET BY MOUTH TWO  TIMES DAILY,

 180 tabs                  

 

                                      metFORMIN(metFORMIN 500 mg oral tablet)                               

             2018                                                                       See

 Instructions, TAKE 2 TABLETS BY MOUTH  TWICE DAILY, 360 tabs                  

 

                                                    DULoxetine(DULoxetine 60 mg oral delayed release capsule)           

                            2018                                                     

                                                    See Instructions, TAKE 1 CAPSULE BY MOUTH  DAILY. DO NOT CRUSH OR

  CHEW. (TAKE WITH DULOXETINE 20MG), 90 caps                  

 

                                                    DULoxetine(DULoxetine 20 mg oral delayed release capsule)           

                            2018                                                     

                                                    See Instructions, TAKE 1 CAPSULE BY MOUTH  DAILY.  DO NOT CRUSH OR

  CHEW.  (TAKE WITH  DULOXETINE 60MG), 90 caps                  

 

                                                    mometasone nasal(mometasone 50 mcg/inh nasal spray)                 

                          2018                                                             

          See Instructions, USE 2 SPRAYS NASALLY DAILY, 51 g                  

 

                                                    budesonide-formoterol(Symbicort 160 mcg-4.5 mcg/inh inhalation aerosol)

                                        2018                                         

                                                                See Instructions, INHALE 2 PUFFS TWICE DAILY, 30.6 g  

                

 

                                      traMADol(traMADol 50 mg oral tablet)                         tabs     

             2018                             Oral               mg                      

    mg=tabs, Oral, q4hr, 0 Refill(s)                  

 

                                      losartan(losartan 50 mg oral tablet)                        1 tabs    

             2018                             Oral              50 mg                   

       50 mg=1 tabs, Oral, BID, 60 tabs, 3 Refill(s)                  

 

                                      atorvastatin(atorvastatin 20 mg oral tablet)                        1 

tabs              2018                              Oral              20 mg         

                                                    20 mg=1 tabs, Oral, Daily, 30 tabs, 3 Refill(s)                  

 

                                      zolpidem(Ambien 10 mg oral tablet)                        1 tabs      

                2018              10/24/2018              Oral              10 mg          

                                                    10 mg=1 tabs, Oral, Bedtime (once a day), for 90 days, [phone],

 PRN: as needed for sleep, 90 tabs, 0 Refill(s)                  

 

                                      clopidogrel(clopidogrel 75 mg oral tablet)                        1 tabs

              2018                             Oral              75 mg               

           75 mg=1 tabs, Oral, Daily, 90 tabs, 0 Refill(s)                  

 

                                                    potassium chloride(potassium chloride 20 mEq oral tablet, extended release)

                        1 tabs              2018                              Oral  

                          20 mEq                                20 mEq=1 tabs, Oral, Daily, 30 tabs, 0 Refill(s)

                  

 

                                                    buPROPion(buPROPion 150 mg/12 hours (SR) oral tablet, extended release)

                                        10/12/2018                                         

                                                                See Instructions, TAKE 1 TABLET BY MOUTH TWO  TIMES DAILY,

 180 tabs                  

 

                                                    DULoxetine(DULoxetine 60 mg oral delayed release capsule)           

                            10/12/2018                                                     

                                                    See Instructions, TAKE 1 CAPSULE BY MOUTH  DAILY. DO NOT CRUSH OR

  CHEW. (TAKE WITH DULOXETINE 20MG), 90 caps                  

 

                                                    DULoxetine(DULoxetine 20 mg oral delayed release capsule)           

                            10/12/2018                                                     

                                                    See Instructions, TAKE 1 CAPSULE BY MOUTH  DAILY.  DO NOT CRUSH OR

  CHEW.  (TAKE WITH  DULOXETINE 60MG), 90 caps                  

 

                                      atorvastatin(atorvastatin 20 mg oral tablet)                        1 

tabs              10/15/2018                              Oral              20 mg         

                                                    20 mg=1 tabs, Oral, Daily, 90 tabs, 1 Refill(s)                  

 

                                                    insulin lispro(HumaLOG 100 units/mL injectable solution)            

                            10/25/2018                                                      

                                                    See Instructions, INJECT 14 units at breakfast and lunch, and 17 units

 at dinner, 40 mL, 4 Refill(s)                  

 

                                                    insulin glargine(Basaglar KwikPen 100 units/mL subcutaneous solution)

                                        10/25/2018                                         

                                                                See Instructions, INJECT SUBCUTANEOUSLY 52 units at hs,

 15 mL, 3 Refill(s)                  

 

                                      dicyclomine(Bentyl 20 mg oral tablet)                        1 tabs   

             10/30/2018                             Oral              20 mg                  

        20 mg=1 tabs, Oral, q8hr, PRN: Abdominal Cramping, 15 tabs, 1 Refill(s) 
                

 

                                      clopidogrel(clopidogrel 75 mg oral tablet)                            

             2018                                                                      

 See Instructions, TAKE 1 TABLET BY MOUTH  DAILY, 90 tabs                  

 

                                                    HYDROcodone-acetaminophen(Norco 7.5 mg-325 mg oral tablet)          

              2 tabs              2018                             Oral              

                                                    2 tabs, Oral, q8hr, PRN: as needed for pain, 50 tabs, 0 

Refill(s)                  

 

                                                    RABEprazole(RABEprazole 20 mg oral delayed release tablet)          

                             12/10/2018                                                    

                                                    See Instructions, TAKE 2 TABLETS BY MOUTH  DAILY, 180 tabs, 2 Refill(s)

                  

 

                                      metFORMIN(metFORMIN 500 mg oral tablet)                               

             2018                                                                       See

 Instructions, TAKE 2 TABLETS BY MOUTH  TWICE DAILY, 360 tabs, 1 Refill(s)      
           

 

                                                    methylPREDNISolone(Medrol Dosepak 4 mg oral tablet)                 

                1 packets              2018              Oral       

                                                                1 packets, Oral, Once, as directed on package labeling,

 21 tabs, 0 Refill(s)                  

 

                                      azithromycin(Zithromax 250 mg oral tablet)                        1 tabs

                2018              Oral              250 mg   

                                                    250 mg=1 tabs, Oral, Daily, for 10 days, 10 tabs, 0 Refill(s)

                  

 

                                      fluconazole(Diflucan 150 mg oral tablet)                              

             2018                                                

        See Instructions, 1 po one time.  May repeat after 3 days if needed., 1 
tabs, 1 Refill(s)                  

 

                                      clopidogrel(clopidogrel 75 mg oral tablet)                            

             2019                                                                      

 See Instructions, TAKE 1 TABLET BY MOUTH  DAILY, 90 tabs                  

 

                                      zolpidem(Ambien 10 mg oral tablet)                        1 tabs      

                2019              Oral              10 mg          

                                                    10 mg=1 tabs, Oral, Bedtime (once a day), for 90 days, [phone],

 PRN: as needed for sleep, 90 tabs, 0 Refill(s)                  

 

                                      zolpidem(Ambien 10 mg oral tablet)                        1 tabs      

                2019              Oral              10 mg          

                                                    10 mg=1 tabs, Oral, Bedtime (once a day), for 90 days, [phone],

 PRN: as needed for sleep, 90 tabs, 0 Refill(s)                  

 

                                      dicyclomine(Bentyl 20 mg oral tablet)                        1 tabs   

             2019                             Oral              20 mg                  

        20 mg=1 tabs, Oral, q8hr, PRN: Abdominal Cramping, 15 tabs, 0 Refill(s) 
                

 

                                      colchicine(colchicine 0.6 mg oral tablet)                             

             2019                                                                       

See Instructions, first sign of gout flare then 1 tab 1 hr later.  Max 1.8 mg in
24 hours.  Must wait 3 days before next dose, PRN: as needed for gout pain, 10 
tabs, 0 Refill(s)                  

 

                                                    doxycycline(doxycycline hyclate 100 mg oral capsule)                

                1 caps              2019              Oral         

                          100 mg                                100 mg=1 caps, Oral, BID, for 10 days, 20 caps, 

0 Refill(s)                  



                                                                                
                                   



Problems

      





             Date Dx Coded              Attending              Type              Code              Diagnosis

                                        Diagnosed By        

 

                2013              Chadwick Rodriguez MD              Final              038.41   

                          H. INFLUENZAE SEPTICEMIA                       

 

                2013              Chadwick Rodriguez MD              Admitting              038.9

                          SEPTICEMIA NOS                       

 

                2013              Chadwick Rodriguez MD              Final              276.1    

                          HYPOSMOLALITY                       

 

                2013              Chadwick Rodriguez MD              Final              276.8    

                          HYPOPOTASSEMIA                       

 

                2013              Chadwick Rodriguez MD              Final              311      

                          DEPRESSIVE DISORDER NEC                       

 

                2013              Chadwick Rodriguez MD              Final              486      

                          PNEUMONIA ORGANISM NOS                       

 

                2013              Chadwick Rodriguez MD              Final              780.52   

                          INSOMNIA NOS                       

 

                2013              Chadwick Rodriguez MD              Final              995.91   

                          SEPSIS                             

 

                2014              Chato Reyes MD              Final              250.00      

                          DM2/NOS UNCOMP NSU                       

 

                2014              Chato Reyes MD              Final              272.4       

                          HYPERLIPIDEMIA NEC   NOS                       

 

                2014              Chato Reyes MD              Final              278.00      

                          OBESITY NOS                        

 

                2014              Chato Reyes MD              Final              311         

                          DEPRESSIVE DISORDER NEC                       

 

                2014              Chato Reyes MD              Final              401.9       

                          HYPERTENSION NOS                       

 

                2014              Chato Reyes MD              Final              410.71      

                          SUBEND INFARCT-INITIAL                       

 

                2014              Chato Reyes MD              Final              414.01      

                          COR AS-NATIVE VESSEL                       

 

                2014              Chato Reyes MD              Final              426.3       

                          LEFT BB BLOCK NEC                       

 

                2014              Chato Reyes MD              Final              493.90      

                          ASTHMA NOS                         

 

                2014              Chato Reyes MD              Final              530.10      

                          ESOPHAGITIS NOS                       

 

                2014              Chato Reyes MD              Final              535.50      

                          GASTRODUODENITIS NOS                       

 

                2014              Chato Reyes MD              Admitting              789.06  

                          EPIGASTRIC ABD PAIN                       

 

                2016              Linda Kruger              Final              I10    

                          Essential (primary) hypertension                       

 

                2016              Linda Kruger              Final              I25.10 

                                        Atherosclerotic heart disease of native coronary artery without angina

 pectoris                                        

 

                2016              Linda Kruger              Final              I42.9  

                          Cardiomyopathy, unspecified                       

 

                2016              Linda Kruger              Final              I50.22 

                          Chronic systolic (congestive) heart failure                       

 

                2016              Linda Kruger              Final              Z95.810

                          Presence of automatic (implantable) cardiac defibrillator              

         

 

                2016              Dhiraj Mijares              Final              I25.10       

                                        Atherosclerotic heart disease of native coronary artery without angina pectoris

                                                 

 

                2016              Dhiraj Mijares              Final              Z00.00       

                                        Encounter for general adult medical examination without abnormal findings   

                                                 

 

                2016              Dhiraj Mijares              Final              E11.9        

                          Type 2 diabetes mellitus without complications                       

 

                2016              Dhiraj Mijares              Final              E78.5        

                          Hyperlipidemia, unspecified                       

 

                2016              Dhiraj Mijares              Final              I10          

                          Essential (primary) hypertension                       

 

                2016              JIM Jerry              Final              I25.5       

                          Ischemic cardiomyopathy                       

 

                2016              JIM Jerry              Final              I44.7       

                          Left bundle-branch block, unspecified                       

 

                2016              JIM Jerry              Final              Z95.810     

                          Presence of automatic (implantable) cardiac defibrillator                   

    

 

                10/04/2016              Dhiraj Mijares              Final              G47.00       

                          Insomnia, unspecified                       

 

                10/04/2016              Dhiraj Mijares              Final              J30.9        

                          Allergic rhinitis, unspecified                       

 

                10/04/2016              Dhiraj Mijares              Final              J32.9        

                          Chronic sinusitis, unspecified                       

 

                2016              Dhiraj Mijares              Final              I25.10       

                                        Atherosclerotic heart disease of native coronary artery without angina pectoris

                                                 

 

                2016              Dhiraj Mijares              Final              I50.9        

                          Heart failure, unspecified                       

 

                2016              Dhiraj Mijares              Final              R07.9        

                          Chest pain, unspecified                       

 

                2016              Dhiraj Mijares              Final              I10          

                          Essential (primary) hypertension                       

 

                2016              Dhiraj Mijares              Final              K21.9        

                          Gastro-esophageal reflux disease without esophagitis                       

 

                2017              Kanlea Somsupha              Final              I10    

                          Essential (primary) hypertension                       

 

                2017              Slick Somsupha              Final              I25.10 

                                        Atherosclerotic heart disease of native coronary artery without angina

 pectoris                                        

 

                2017              Kanlea Somsupha              Final              I42.9  

                          Cardiomyopathy, unspecified                       

 

                2017              Kanlea Somsupha              Final              I50.22 

                          Chronic systolic (congestive) heart failure                       

 

                2017              Slick Somsudane              Final              Z95.810

                          Presence of automatic (implantable) cardiac defibrillator              

         

 

                2017              Dhiraj Mijares              Final              Z00.00       

                                        Encounter for general adult medical examination without abnormal findings   

                                                 

 

                2017              Dhiraj Mijares              Final              E78.5        

                          Hyperlipidemia, unspecified                       

 

                2017              Dhiraj Mijares              Final              F32.9        

                          Major depressive disorder, single episode, unspecified                       

 

                2017              Dhiraj Mijares              Final              I50.9        

                          Heart failure, unspecified                       

 

                2017              Dhiraj Mijares              Final              K21.9        

                          Gastro-esophageal reflux disease without esophagitis                       

 

                2017              Gayla Hook              Final              E11.9   

                          Type 2 diabetes mellitus without complications                       

 

                2017              Gayla Hook              Final              E78.2   

                          Mixed hyperlipidemia                       

 

                2017              Ember Schwarz              Final              J01.01        

                          Acute recurrent maxillary sinusitis                       

 

                2017              Ember Schwarz              Final              N95.1         

                          Menopausal and female climacteric states                       

 

                2017              Ember Schwarz              Final              R05           

                          Cough                              

 

                2017              JIM Jerry              Final              I25.5       

                          Ischemic cardiomyopathy                       

 

                2017              JIM Jerry              Final              Z95.810     

                          Presence of automatic (implantable) cardiac defibrillator                   

    

 

                2017              Dhiraj Mijares              Final              J45.20       

                          Mild intermittent asthma, uncomplicated                       

 

             2017              Selene Nova              Final              E11.9              Type

 2 diabetes mellitus without complications                       

 

                2017              Linda Kruger              Final              I42.8  

                          Other cardiomyopathies                       

 

                2017              Linda Kruger              Final              I50.22 

                          Chronic systolic (congestive) heart failure                       

 

                2017              Linda Kruger              Final              I10    

                          Essential (primary) hypertension                       

 

                2017              Linda Kruger              Final              I25.10 

                                        Atherosclerotic heart disease of native coronary artery without angina

 pectoris                                        

 

                2017              Linda Kruger              Final              Z95.810

                          Presence of automatic (implantable) cardiac defibrillator              

         

 

                2017              Dhiraj Mijares              Final              I25.10       

                                        Atherosclerotic heart disease of native coronary artery without angina pectoris

                                                 

 

                2017              Dhiraj Mijares              Final              M25.569      

                          Pain in unspecified knee                       

 

                2017              Dhiraj Mijares              Final              Z00.00       

                                        Encounter for general adult medical examination without abnormal findings   

                                                 

 

                2017              Dhiraj Mijares              Final              E11.9        

                          Type 2 diabetes mellitus without complications                       

 

                2017              Dhiraj Mijares              Final              I10          

                          Essential (primary) hypertension                       

 

                2017              Dhiraj Mijares              Final              K21.9        

                          Gastro-esophageal reflux disease without esophagitis                       

 

                2017              Dhiraj Mijares              Final              K58.9        

                          Irritable bowel syndrome without diarrhea                       

 

                2017              JIM Jerry              Final              I25.5       

                          Ischemic cardiomyopathy                       

 

                2017              JIM Jerry              Final              I44.7       

                          Left bundle-branch block, unspecified                       

 

                2017              JIM Jerry              Final              Z95.810     

                          Presence of automatic (implantable) cardiac defibrillator                   

    

 

                    2018              Duane Mccarty              Final          

                          I25.10                    Atherosclerotic heart disease of native coronary artery without

 angina pectoris                                 

 

                    2018              Duane Mccarty              Final          

                    I50.9               Heart failure, unspecified                       

 

                    2018              Duane Mccarty              Final          

                    I10                 Essential (primary) hypertension                       

 

                    2018              Duane Mccarty              Final          

                    E78.5               Hyperlipidemia, unspecified                       

 

                [facility]/2018              [facility]              E11.65  

                          Type 2 diabetes mellitus with hyperglycemia                       

 

                02/15/2018              [facility]              Final              E78.2   

                          Mixed hyperlipidemia                       

 

                02/15/2018              [facility] Fabricio Gayla              Final              R74.8   

                          Abnormal levels of other serum enzymes                       

 

                2018              Dhiraj Mijares              Final              E11.40       

                          Type 2 diabetes mellitus with diabetic neuropathy, unspecified                

       

 

                2018              Dhiraj Mijares              Final              M25.561      

                          Pain in right knee                       

 

                2018              [facility] FabricioMendozama              Final              E11.65  

                          Type 2 diabetes mellitus with hyperglycemia                       

 

                2018              [facility]              Final              E78.2   

                          Mixed hyperlipidemia                       

 

                2018              [facility] Kierandaniel Gayla              Final              R79.89  

                          Other specified abnormal findings of blood chemistry                     

  

 

                2018              JIM Jerry              Final              I25.10      

                                        Atherosclerotic heart disease of native coronary artery without angina pectoris

                                                 

 

                2018              JIM Jerry              Final              I44.7       

                          Left bundle-branch block, unspecified                       

 

                2018              JIM Jerry              Final              Z95.810     

                          Presence of automatic (implantable) cardiac defibrillator                   

    

 

                10/24/2018              [facility]              Final              E11.65  

                          Type 2 diabetes mellitus with hyperglycemia                       

 

                10/24/2018              [facility] KieranMendoza sanchezma              Final              E78.2   

                          Mixed hyperlipidemia                       



                                                                                
                                                                                
                                      



Procedures

      





                Code              Description              Performed By              Performed On     

   

 

                                      37.22                                    LEFT HEART CARDIAC CATH      

                          Faustino Perez MD I              2014        

 

                                      88.53                                    LT HEART ANGIOCARDIOGRAM     

                          Ana REED, Faustino I              2014        

 

                                      88.56                                    COR ARTERIOGRAM-2 CATH       

                          Faustino Perez MD I              2014        

 

                                      45.16                                    EGD WITH CLOSED BIOPSY       

                          Ana REED, Faustino I              2014        

 

                                      01619                                    Office or other outpatient visit

 for the evaluation and management of an established patient, which requires at 
least 2 of these 3 key components: A detailed history; A detailed examination; 
Medical d                                                  2016        

 

                                      60111                                    Echocardiography, transthoracic,

 real-time with image documentation (2D), includes M-mode recording, when 
performed, complete, with spectral Doppler echocardiography, and with color flow
Doppler echoc                                                  2016        

 

                                      79980                                    Periodic comprehensive preventive

 medicine reevaluation and management of an individual including an age and 
gender appropriate history, examination, counseling/anticipatory guidance/risk 
factor reduc                                                  2016        

 

                                      70488                                    Programming device evaluation

 (in person) with iterative adjustment of the implantable device to test the 
function of the device and select optimal permanent programmed values with 
analysis, review an                                                  2016        

 

                                      89377                                    Office or other outpatient visit

 for the evaluation and management of an established patient, which requires at 
least 2 of these 3 key components: An expanded problem focused history; An 
expanded prob                                                  2016        

 

                                      93426                                    Office or other outpatient visit

 for the evaluation and management of an established patient, which requires at 
least 2 of these 3 key components: An expanded problem focused history; An 
expanded prob                                                  10/03/2016        

 

                                      36111                                    Office or other outpatient visit

 for the evaluation and management of an established patient, which requires at 
least 2 of these 3 key components: A detailed history; A detailed examination; 
Medical d                                                  2016        

 

                                      55271                                    Office or other outpatient visit

 for the evaluation and management of an established patient, which requires at 
least 2 of these 3 key components: A detailed history; A detailed examination; 
Medical d                                                  2017        

 

                                      36927                                    Collection of venous blood by

 venipuncture                                                  2017        

 

                                      87629                                    Periodic comprehensive preventive

 medicine reevaluation and management of an individual including an age and 
gender appropriate history, examination, counseling/anticipatory guidance/risk 
factor reduc                                                  2017        

 

                                      32676                                    Office or other outpatient visit

 for the evaluation and management of a new patient, which requires these 3 key 
components: A comprehensive history; A comprehensive examination; Medical d
tong leos                                                  2017        

 

                                      08933                                    Office or other outpatient visit

 for the evaluation and management of an established patient, which requires at 
least 2 of these 3 key components: A detailed history; A detailed examination; 
Medical d                                                  2017        

 

                                      42195                                    Programming device evaluation

 (in person) with iterative adjustment of the implantable device to test the 
function of the device and select optimal permanent programmed values with 
analysis, review an                                                  2017        

 

                                      38965                                    Office or other outpatient visit

 for the evaluation and management of an established patient, which requires at 
least 2 of these 3 key components: An expanded problem focused history; An 
expanded prob                                                  2017        

 

                                      72926                                    Office or other outpatient visit

 for the evaluation and management of an established patient, which requires at 
least 2 of these 3 key components: An expanded problem focused history; An 
expanded prob                                                  2017        

 

                                      63434                                    Office or other outpatient visit

 for the evaluation and management of an established patient, which requires at 
least 2 of these 3 key components: A detailed history; A detailed examination; 
Medical d                                                  2017        

 

                                      20046                                    Office or other outpatient visit

 for the evaluation and management of an established patient, which requires at 
least 2 of these 3 key components: An expanded problem focused history; An 
expanded prob                                                  2017        

 

                                      67214                                    Periodic comprehensive preventive

 medicine reevaluation and management of an individual including an age and 
gender appropriate history, examination, counseling/anticipatory guidance/risk 
factor reduc                                                  08/15/2017        

 

                                      69948                                    Duplex scan of extracranial arteries;

 complete bilateral study                                                  2017        

 

                                      48089                                    Programming device evaluation

 (in person) with iterative adjustment of the implantable device to test the 
function of the device and select optimal permanent programmed values with 
analysis, review an                                                  2017        

 

                                      97703                                    Office or other outpatient visit

 for the evaluation and management of an established patient, which requires at 
least 2 of these 3 key components: A detailed history; A detailed examination; 
Medical d                                                  2017        

 

                                      [id number]                                    Office or other outpatient visit

 for the evaluation and management of a new patient, which requires these 3 key 
components: A comprehensive history; A comprehensive examination; Medical d
tong leos                                                  2018        

 

                                      11279                                    Ultrasound, abdominal, real time

 with image documentation; complete..                                                  2018

        

 

                                      91183                                    Programming device evaluation

 (in person) with iterative adjustment of the implantable device to test the 
function of the device and select optimal permanent programmed values with 
analysis, review an                                                  2018        

 

                                      26603                                    Office or other outpatient visit

 for the evaluation and management of an established patient, which requires at 
least 2 of these 3 key components: A detailed history; A detailed examination; 
Medical d                                                  2018        

 

                                      90746                                    Arthrocentesis, aspiration and/or

 injection, major joint or bursa (eg, shoulder, hip, knee, subacromial bursa); 
without ultrasound guidance                                                  2018        

 

                                      47878                                    Radiologic examination, knee;

 complete, 4 or more views                                                  2018        

 

                                      38213                                    Office or other outpatient visit

 for the evaluation and management of a new patient, which requires these 3 key 
components: A detailed history; A detailed examination; Medical decision making 
of low c                                                  2018        

 

                                                                          TRIAMCINOLONE ACET INJ N     

                                                    2018        

 

                                      27136                                    Programming device evaluation

 (in person) with iterative adjustment of the implantable device to test the 
function of the device and select optimal permanent programmed values with 
analysis, review an                                                  2018        

 

                                      02930                                    Office or other outpatient visit

 for the evaluation and management of an established patient, which requires at 
least 2 of these 3 key components: A detailed history; A detailed examination; 
Medical d                                                  2018        

 

                                      78833                                    Office or other outpatient visit

 for the evaluation and management of an established patient, which requires at 
least 2 of these 3 key components: A detailed history; A detailed examination; 
Medical d                                                  10/24/2018        



                                                                                
       



Results

      





                    Test                Result              Range        









                                        Urinalysis with reflex microscopic - 17 09:20         









                    Appearance              Clear NA                       

 

                    Bilirubin              Negative NA              Negative        

 

                    Blood               Negative NA              Negative        

 

                    Color               Yellow NA                       

 

                    Glucose, Urine              Negative               Negative        

 

                    Ketones              Negative               Negative        

 

                    Leukocyte Esterase              Negative NA              Negative        

 

                    Nitrites              Negative NA              Negative        

 

                    pH                  5.0 NA              5.0-8.0        

 

                    Protein              Negative               Negative        

 

                    Specific Gravity              1.006 NA              1.003-1.030        

 

                    UA Collection type              Voided NA                       

 

                    Urobilinogen              0.2 mg/dL              <1.0        









                                        CBC With Platelet and Differential - 17 09:20         









                    Absolute Basophils              0.04 10*3/uL              0.00-0.20        

 

                    Absolute Eosinophils              0.26 10*3/uL              0.00-0.50        

 

                    Absolute Lymphocytes              2.64 10*3/uL              0.80-3.30        

 

                    Absolute Monocytes              0.62 10*3/uL              0.30-1.00        

 

                    Absolute Neutrophils              8.41 10*3/uL              1.90-7.00        

 

                    Basophils              0 %                 0-2        

 

                    Eosinophils              2 %                 0-4        

 

                    HCT                 41.2 %              37.0-47.0        

 

                    HGB                 12.3 g/dL              12.0-16.0        

 

                    Immature Granulocytes              0.6 %               0.0-1.0        

 

                    Lymphocytes              22 %                20-46        

 

                    MCH                 28.0 pg              27.0-32.0        

 

                    MCHC                29.9 g/dL              32.0-36.0        

 

                    MCV                 93.8 fL              82.0-99.0        

 

                    Monocytes              5 %                 4-11        

 

                    MPV                 10.3 fL              8.8-14.8        

 

                    Neutrophils              70 %                51-75        

 

                    Platelet Count              315 K/uL              150-400        

 

                    RBC                 4.39 10*6/uL              4.00-5.20        

 

                    RDW                 14.7 %              11.5-14.5        

 

                    WBC                 12.0 K/uL              4.8-10.8        









                                        Comprehensive Metabolic Panel (CMP) - 17 09:20         









                    Albumin              4.2 g/dL              3.5-5.0        

 

                    Alkaline Phosphatase              140 U/L                      

 

                    ALT (SGPT)              68 U/L              0-55        

 

                    Anion Gap              13 NA               3-20        

 

                    AST (SGOT)              45 U/L              5-34        

 

                    Bilirubin Total              0.4 mg/dL              0.2-1.2        

 

                    BUN                 14 mg/dL              10-20        

 

                    Calcium              9.6 mg/dL              8.4-10.2        

 

                    Chloride              98 mEq/L                      

 

                    CO2                 29 mEq/L              22-31        

 

                    Creatinine              0.74 mg/dL              0.57-1.11        

 

                    Globulin              3.0 g/dL              1.8-4.0        

 

                    Glucose              191 mg/dL              70-99        

 

                    Potassium              5.0 mEq/L              3.5-5.2        

 

                    Protein              7.2 g/dL              6.1-7.7        

 

                    Sodium              140 mEq/L              135-144        









                                        eGFR - 17 09:20         









                    eGFR                >60 mL/min              >60        









                                        TSH with Reflex Free T4 - 17 09:20         









                    TSH with Reflex Free T4              1.19 uIU/mL              0.35-4.94        









                                        Hemoglobin A1C - 17 09:20         









                    Hemoglobin A1C              11.5 %              4.1-5.6        









                                        Estimated Average Glucose - 17 09:20         









                    Estimated Average Glucose              283.4 mg/dL                       









                                        Albumin Random Urine - 17 10:40         









                    Albumin Random Urine              <0.5 mg/dL              0.0-1.7        









                                        CBC With Platelet and Differential - 18 00:00         









                    Absolute Basophils              0.05 10*3/uL              0.00-0.20        

 

                    Absolute Eosinophils              0.19 10*3/uL              0.00-0.50        

 

                    Absolute Lymphocytes              2.95 10*3/uL              0.80-3.30        

 

                    Absolute Monocytes              0.53 10*3/uL              0.30-1.00        

 

                    Absolute Neutrophils              7.54 10*3/uL              1.90-7.00        

 

                    Basophils              0 %                 0-2        

 

                    Eosinophils              2 %                 0-4        

 

                    HCT                 40.5 %              37.0-47.0        

 

                    HGB                 12.3 g/dL              12.0-16.0        

 

                    Immature Granulocytes              0.7 %               0.0-1.0        

 

                    Lymphocytes              26 %                20-46        

 

                    MCH                 28.7 pg              27.0-32.0        

 

                    MCHC                30.4 g/dL              32.0-36.0        

 

                    MCV                 94.6 fL              82.0-99.0        

 

                    Monocytes              5 %                 4-11        

 

                    MPV                 9.9 fL              8.8-14.8        

 

                    Neutrophils              67 %                51-75        

 

                    Nucleated RBC Automated              0.0 /100 WBC                       

 

                    Platelet Count              315 K/uL              150-400        

 

                    RBC                 4.28 10*6/uL              4.00-5.20        

 

                    RDW                 13.8 %              11.5-14.5        

 

                    WBC                 11.3 K/uL              4.8-10.8        









                                        Urinalysis with reflex microscopic - 18 00:00         









                    Appearance              Clear NA                       

 

                    Bilirubin              Negative NA              Negative        

 

                    Blood               Negative NA              Negative        

 

                    Color               Yellow NA                       

 

                    Glucose, Urine              Trace               Negative        

 

                    Ketones              Negative               Negative        

 

                    Leukocyte Esterase              Negative NA              Negative        

 

                    Nitrites              Negative NA              Negative        

 

                    pH                  5.0 NA              5.0-8.0        

 

                    Protein              Negative               Negative        

 

                    Specific Gravity              1.008 NA              1.003-1.030        

 

                    UA Collection type              Clean Catch NA                       

 

                    Urobilinogen              0.2 mg/dL              <1.0        









                                        Comprehensive Metabolic Panel (CMP) - 18 00:00         









                    Albumin              4.3 g/dL              3.5-5.0        

 

                    Alkaline Phosphatase              157 U/L                      

 

                    ALT (SGPT)              56 U/L              0-55        

 

                    Anion Gap              11 mEq/L              3-20        

 

                    AST (SGOT)              33 U/L              5-34        

 

                    Bilirubin Total              0.4 mg/dL              0.2-1.2        

 

                    BUN                 17 mg/dL              10-20        

 

                    Calcium              10.3 mg/dL              8.4-10.2        

 

                    Chloride              97 mEq/L                      

 

                    CO2                 31 mEq/L              22-31        

 

                    Creatinine              0.84 mg/dL              0.57-1.11        

 

                    Globulin              3.0 g/dL              1.8-4.0        

 

                    Glucose              338 mg/dL              70-99        

 

                    Potassium              5.4 mEq/L              3.5-5.2        

 

                    Protein              7.3 g/dL              6.0-7.6        

 

                    Sodium              139 mEq/L              135-144        









                                        Lipid Panel - 18 00:00         









                    Cardiac Risk              4.9                 0.0-5.0        

 

                    Cholesterol              201 mg/dL              0-199        

 

                    HDL Cholesterol              41 mg/dL              40-84        

 

                    LDL Cholesterol              90 mg/dL              0-130        

 

                    Triglycerides              349 mg/dL              0-149        

 

                    VLDL Cholesterol              70 mg/dL              0-28        









                                        eGFR - 18 00:00         









                    eGFR                >60 mL/min              >60        









                                        Albumin/Creatinine Ratio, Urine - 18 00:00         









                    Alb/Creat Ratio, Urine              NA mg/g              0.0-29.0        

 

                    Albumin  mg/dL, Urine              <0.5 mg/dL              0.0-1.7        

 

                    Creatinine mg/dL, Urine              17 mg/dL                       









                                        TSH with Reflex Free T4 - 18 00:00         









                    TSH with Reflex Free T4              1.38 uIU/mL              0.35-4.94        









                                        FSH - 18 00:00         









                    FSH                 35.2 mIU/mL                       









                                        Hemoglobin A1C - 18 00:00         









                    Hemoglobin A1C              12.7 %              4.1-5.6        









                                        Estimated Average Glucose - 18 00:00         









                    Estimated Average Glucose              317.8 mg/dL                       









                                        Hemoglobin A1C - 18 00:00         









                    Hemoglobin A1C              9.1 %               4.1-5.6        









                                        Basic Metabolic Panel (BMP) - 18 00:00         









                    Anion Gap              10 mEq/L              3-20        

 

                    BUN                 19 mg/dL              10-20        

 

                    Calcium              9.9 mg/dL              8.4-10.2        

 

                    Chloride              97 mEq/L                      

 

                    CO2                 34 mEq/L              22-31        

 

                    Creatinine              0.81 mg/dL              0.57-1.11        

 

                    Glucose              111 mg/dL              70-99        

 

                    Potassium              4.7 mEq/L              3.5-5.2        

 

                    Sodium              141 mEq/L              135-144        









                                        eGFR - 18 00:00         









                    eGFR                >60 mL/min              >60        









                                        Estimated Average Glucose - 18 00:00         









                    Estimated Average Glucose              214.5 mg/dL                       









                                        Hemoglobin A1C - 10/24/18 14:36         









                    Hemoglobin A1C              7.5 %               4.1-5.6        









                                        Estimated Average Glucose - 10/24/18 14:36         









                    Estimated Average Glucose              168.6 mg/dL                       









                                        Basic Metabolic Panel (BMP) - 10/24/18 14:36         









                    Anion Gap              9 mEq/L              3-20        

 

                    BUN                 24 mg/dL              10-20        

 

                    Calcium              10.0 mg/dL              8.4-10.2        

 

                    Chloride              96 mEq/L                      

 

                    CO2                 33 mEq/L              22-31        

 

                    Creatinine              0.91 mg/dL              0.57-1.11        

 

                    Glucose              222 mg/dL              70-99        

 

                    Potassium              4.8 mEq/L              3.5-5.2        

 

                    Sodium              138 mEq/L              135-144        









                                        eGFR - 10/24/18 14:36         









                    eGFR                >60 mL/min              >60        









                                        TSH with Reflex Free T4 - 18 00:00         









                    TSH with Reflex Free T4              0.73 uIU/mL              0.35-4.94        









                                        T3 Total - 18 00:00         









                    T3 Total              119 ng/dL                      









                                        Pain Management Drug Match - 01/15/19 16:35         









                    Prescription Meds              See list NA                       









                                        Amphetamine Confirmation - 01/15/19 16:35         









                    Amphetamine              Not Detected ng/mL                       

 

                    MDA                 Not Detected ng/mL                       

 

                    MDEA                Not Detected ng/mL                       

 

                    MDMA                Not Detected ng/mL                       

 

                    Methamphetamine              Not Detected ng/mL                       









                                        Basic Metabolic Panel (BMP) - 19 13:37         









                    Anion Gap              11 mEq/L              3-20        

 

                    BUN                 15 mg/dL              10-20        

 

                    Calcium              10.1 mg/dL              8.4-10.2        

 

                    Chloride              98 mEq/L                      

 

                    CO2                 32 mEq/L              22-31        

 

                    Creatinine              0.94 mg/dL              0.57-1.11        

 

                    Glucose              69 mg/dL              70-99        

 

                    Potassium              5.2 mEq/L              3.5-5.2        

 

                    Sodium              141 mEq/L              135-144        









                                        eGFR - 19 13:37         









                    eGFR                >60 mL/min              >60        









                                        Hemoglobin A1C - 19 13:37         









                    Hemoglobin A1C              8.1 %               4.1-5.6        









                                        Estimated Average Glucose - 19 13:37         









                    Estimated Average Glucose              185.8 mg/dL                       



    





                                        Radiology Report from  [id number] on 2013 07:41:00        

 

                                        ***Final Report***ADMITTING DIAGNOSIS:   Pneumonia, Failed outpatient t F/U pneumoniaCHEST

 [facility]  - 2013    HOSP ON [facility] RESULT: INDICATION: 
Pneumonia.PA and lateral chest obtained at 7:20 a.m. and compared 
with12/3/13.FINDINGS:  There is mild cardiomegaly. Extensive alveolarinfiltrate 
in the left upper lobe is again noted compatible withpneumonia and unchanged. 
Right lung is clear. There is nopneumothorax or pleural fluid.IMPRESSION: 
Unchanged extensive upper lobe pneumonia on the left side.Dictated on 
workstation # [id number] BY:     JANES MCKEON M.D., RADIOLO
GISTELECTRONICALLY SIGNED BY:     JANES MCKEON M.D., RADIOLOGISTD     Dec 
5 2013  7:30AT     SA2:  Dec  5 2013  7:32AS     Dec  5 2013  7:38A        



    





                                        Radiology Report from  [id number] on 2014 12:05:00        

 

                                        ***Final Report***ADMITTING DIAGNOSIS:    Chest Pain painEXT LOWER VENOUS DUPLEX

 [facility]  - 2014   [facility] RESULT: INDICATION: Chest 
pain. Possible pulmonary embolusBoth legs were examinedFINDINGS: Color Doppler 
examination shows normal compression,augmentation and color Doppler flow within 
the deep venoussystem. There is no evidence for deep venous 
thrombosis.IMPRESSION: No evidence of DVT.Dictated by:Dictated on workstation # 
[id number] RADIOLOGIST:         PERLA ROBERTS M.D., 
RADIOLOGISTELECTRONICALLY SIGNED BY:         PERLA ROBERTS M.D., RADIOLOGISTD 
        May 30 2014 11:29AT          JADEN:  May 30 2014 12:02PS          May 30 
2014 12:02P        



    





                                        Radiology Report from  [id number] on 2017 12:18:00        

 

                                        Reason For ExamOther, (Free text in Reason for Exam field)REPORTPROCEDURE: US Carotid

 Duplex Bilateral.TECHNIQUE: Multiple real-time grayscale images were obtained 
over the carotidarteries in various projections bilaterally. Additional duplex 
Doppler and colorDoppler images were also obtained.INDICATION: Hypertension, 
diabetes, and dizziness.FINDINGS: There are no focally elevated velocities in 
either internal carotidartery. The ICA/CCA ratios are within normal limits, 
bilaterally. There isantegrade flow in the vertebral arteries, bilaterally. 
Grayscale imagesdemonstrate minimal carotid plaque, bilaterally.IMPRESSION: 
Minimal bilateral carotid plaque however spectral analysis shows noevidence of a
hemodynamically significant stenosis in either internal carotidartery.Parameters
based on the consensus panel Gray-Scale and Doppler ultrasoundcriteria 
publishedNov2003, Radiology, Volume 229.DOPPLER (peak systolic velocity 
M/SRight    LeftCCA  .73       .80ICA  1.19       1.19RATIO  1.63       1.50ECA 
1.01       1.23VERT  .49       .69Dictated on workstation:[id number] 
Line***** PRELIMINARY *****DICTATED BY:    CALISTA DUTTON II MDDICTATED DT/TM: 
2017 11:51        



    





                                        Radiology Report from  [id number] on 2018 11:09:00        

 

                                        Reason For ExamAbnl lft's or liver enzymesREPORTPROCEDURE: US abdomen complete.TECHNIQUE:

 Multiple real-time grayscale images were obtained over the abdomen invarious 
projections.INDICATION:  Elevated liver enzymesCOMPARISON: NoneFINDINGS: There 
is diffuse hepatic steatosis. No focal hepatic mass is seen.Doppler imaging 
demonstrates normal hepatopedal flow in the main portal vein.The common bile d
uct measures about 9 mm which is slightly prominent. Nointrahepatic biliary 
dilatation is seen. No stone or mass is appreciated. Thepancreas appears 
unremarkable. The gallbladder is absent. The spleen zkecybzc27.9 cm in length 
and appears normal. The right kidney measures 12.5 cm inlength and the left 
kidney measures 12.8 cm in length. Both appear unremarkable.The abdominal aorta 
and inferior vena cava are unremarkable as visualized. Thereis no 
ascites.IMPRESSION:1. Status post cholecystectomy2. Diffuse hepatic steatosis.3.
Prominent common bile duct is probably due to prior cholecystectomy.Dictated on 
workstation:[id number] Line***** FINAL *****DICTATED BY:    RADHIKA MARROQUIN DODICTATED DT/TM: 2018 10:03 AMSIGNED BY:  RADHIKA MARROQUIN 
DOSIGNED (ELECTRONIC SIGNATURE):  2018 10:20 AMTECHNOLOGIST:  SHEBA KELLEY RDMS (OB,GYN),RDCS (A        



    





                                        Radiology Report from  [id number] on 2018 10:13:00        

 

                                        Reason For ExamPain in joint, kneeREPORTINDICATION: Bilateral knee pain. No known

 injury.EXAMINATION: Five views of the bilateral knees were obtained.FINDINGS: 
There is no fracture, dislocation or other acute bony abnormality seenon either 
side. There is some mild joint space narrowing of the medialcompartment on the 
left. There is no effusion seen on either side. There arecalcifications and soft
tissue inferomedial to the left joint which may bemultiple calcified 
phleboliths.IMPRESSION: There is mild degenerative change of the medial 
compartment of leftknee. No significant abnormality on the right is 
seen.Dictated on workstation:[id number] Line***** PRELIMINARY 
*****DICTATED BY:    PERLA SORIANO MDDICTATED DT/TM: 2018 8:59        



    





                                        Radiology Report from  [id number] on 2019 14:18:00        

 

                                        Reason For ExamROUTINE SCREENINGREPORT#[id number] - MG MAMMOGRAM SCREENING SCOOBY

 W CADBILATERAL DIGITAL SCREENING MAMMOGRAM TOMOSYNTHESIS WITH CAD: 
3/27/2019Routine screening.Comparison is made to exams dated:  2017 
mammogram, 2011 mammogram, 2007 mammogram, 2011 mammogram, 
2006 mammogram, and 1999 mammogram - [facility].There 
are scattered fibroglandular elements in both breasts.3-D tomographic images 
obtained and reviewed. Current study was also evaluated with a Computer Aided 
Detection (CAD) system.There are grouped pleomorphic calcifications in the right
breast at 1 o'clock in the retroareolar region.No other significant masses, 
calcifications, or other findings are seen in either breast.IMPRESSION: 
INCOMPLETE: NEEDS ADDITIONAL IMAGING EVALUATIONThe grouped pleomorphic 
calcifications in the right breast at 1 o'clock in the retroareolar region 
appear indeterminate.  Magnification and lateral views are recommended.The 
patient will receive results of this exam by mail.Selene Riggs/hugh:3/27/2019 14:52:17letter sent: Additional Imaging NeededMammogram
BI-RADS: 0 Incomplete: needs additional imaging evaluationSignature Line***** 
FINAL *****DICTATED BY:    SELENE MONTES MDDICTATED DT/TM: 2019 1:41 
PMSIGNED BY:  SELENE MONTES MDSIGNED (ELECTRONIC SIGNATURE):  2019 2:52
PMTRANSCRIBED BY: JOHNNYTECHNOLOGIST:  MELLO DUEÑAS(M)        



    





                                        Radiology Report from  [id number] on 04/10/2019 18:28:00        

 

                                        Reason For ExamAbnormal Right MammogramREPORTINDICATION: Indeterminate calcifications

 persist on prior sonogram.EXAMINATION: Right breast digital diagnostic 
mammogram with CAD.The current study was also evaluated with a Computer Aided 
Detection (CAD)system.COMPARISON: Prior mammograms including 3/27/2019, 
2017 and 2011.FINDINGS: The questioned calcifications were further 
evaluated with spotmagnification imaging and a true lateral view. These areas 
are suspicious inappearance and therefore stereotactic biopsy is recommended. 
This was discussedwith the patient. [facility]IMPRESSION: Suspicious calcifications in the right breast.
Biopsy recommended.ACR BI-RADS Category 4: Suspicious abnormality.Imaging Follow
Up Interval: NowResult letter will be mailed to the patient.Note: At least 10% 
of breast cancer is not imaged by mammography.Dictated on 
workstation:[id number] Line***** PRELIMINARY *****DICTATED BY:    
BRITNEY LEE MDDICTATED DT/TM: 04/10/2019 1:59        



    





                                        Radiology Report from  [id number] on 2019 08:24:00        

 

                                        Reason For ExamAbnormal Right MammogramREPORT#[id number] - MG STEREOTACTIC LOCALIZATION

 RIGHTSTEREOTACTIC GUIDED BIOPSY RIGHT BREAST USING VACUUM DEVICE WITH MARKING 
DEVICE INSERTED AND POST DIGITAL MAMMOGRAPHIC IMAGING AND RADIOGRAPHIC SPECIMEN 
IMAGIN2019Right Breast Stereotactic Biopsy.Correlation is made to exams 
dated:  4/10/2019 mammogram and 3/27/2019 mammogram - [facility].A stereotactic guided biopsy was performed for the concerning area of 
pleomorphic calcifications located in the right breast at 1 o'clock anterior 
depth.  This was described on the previous mammography report.  The skin was 
prepped in the usual manner.  Local anesthetic was administered to the access 
site.  A small incision was made in the breast.  The abnormality was approached 
from the medial aspect using a prone table.  A 10 gauge biopsy needle was placed
adjacent to the abnormality under computer guidance and confirmatory 
stereotactic mammography images were obtained to document needle placement.  
Once the needle was documented to be in the correct location, six specimens were
obtained using the [employer] system.  The patient received additional local 
anesthetic during the procedure.  A MammoTome Ribbon clip was inserted into the 
biopsy cavity.  A skin adhesive was applied to the access site.  As a separate 
procedure in a separate room with a dedicated machine, post procedure digital 
mammographic imaging demonstrates the clip at the targeted area.  The specimens 
were sent to the laboratory for pathological analysis.IMPRESSION: STEREOTACTIC 
GUIDED BIOPSY HIGH RISK BENIGNStereotactic guided biopsy of the area of 
pleomorphic calcifications in the right breast at 1 o'clock anterior depth was 
successful with no apparent post procedure complications.  The imaged specimens 
includes the calcifications.  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 
ductal hyperplasia (ADH) with calcifications present.  Pathology results are 
concordant with mammography findings.  A surgical consult is recommended.Michael Greenwood/hugh:2019 15:27:54Signature Line***** FINAL *****DICTATED 
BY:    MICHAEL US MDDICTATED DT/TM: 2019 11:09 AMSIGNED BY:  MICHAEL US MDSIGNED (ELECTRONIC SIGNATURE):  2019 3:27 PMTRANSCRIBED BY: 
BHARATHIECHNOLOGIST:  YIN DUBOIS ARRT        



                                                                                
                 



Encounters

      





                ACCT No.              Visit Date/Time              Discharge              Status      

                Pt. Type              Provider              Facility              Loc./Unit      

                                        Complaint        

 

                    [id number]              2019 11:01:00              2019 23:59:00      

                DIS              Outpatient              Lexi Velazquez              [facility]               PO LUMPECTOMY        

 

                    [id number]              2019 10:13:00              2019 23:59:00      

                DIS              Outpatient              Jillian Boyd              [facility]              4MO DM        

 

                    [id number]              2019 12:10:00              2019 23:59:00      

                    DIS                 Outpatient              Duane Mccarty       

                    [facility]              TCPA CARDIAC CLEARANCE

 FOR BREAT LUMPECTOMY        

 

                    [id number]              2019 10:28:00              2019 23:59:00      

                DIS              Outpatient              Dhiraj Mijares              [facility]                F/U ON CHRONICC PAIN        

 

                    [id number]              2019 09:45:00              2019 23:59:00      

                DIS              Outpatient              Lexi Velazquez              [facility] S[facility]               ATYPICAL DUCTAL EPITHELIUM        

 

                    [id number]              2019 12:24:00              2019 23:59:00      

                DIS              Outpatient              JIM Jerry              [facility]              REMOTE MONITOR ICD MEDTRONIC I25.5    

    

 

                    [id number]              2019 07:15:00              2019 23:59:00      

                DIS              Outpatient              Mojgan Huff              [facility] Lab                PATHOLOGY        

 

                    [id number]              2019 13:06:00              2019 23:59:00      

                DIS              Outpatient              Cesar Peter              [facility]                SWOLLEN FOOT UNABLE TO BARE WEIGHT      

  

 

                    [id number]              2019 10:01:00              2019 23:59:00      

                DIS              Outpatient              Dhiraj Mijares              [facility]               Abnormal Right Mammogram        

 

                    [id number]              04/10/2019 13:02:00              04/10/2019 23:59:00      

                DIS              Outpatient              Dhiraj Mijares              [facility]               Abnormal Right Mammogram        

 

                    [id number]              2019 13:38:00              2019 23:59:00      

                DIS              Outpatient              Dhiraj Mijares              [facility]               ROUTINE SCREENING        

 

                    [id number]              2019 13:00:00              2019 23:59:00      

                DIS              Outpatient              JIM Jerry              [facility]              6 MO RCK  [employer]        

 

                    [id number]              2019 13:27:00              2019 23:59:00      

                DIS              Outpatient              Gayla Hook              [facility]               LAB        

 

                    [id number]              2019 10:08:00              2019 23:59:00      

                DIS              Outpatient              Gayla Hook              [facility]              4MO DM        

 

                    [id number]              2019 09:17:00              2019 23:59:00      

                DIS              Outpatient              JIM Jerry              [facility]              REMOTE MONITOR ICD MEDTRONIC I25.5    

    

 

                    [id number]              2019 13:28:00              2019 23:59:00      

                    DIS                 Outpatient              Duane Mccarty       

                    [facility]              TCPA ECHO GANNON [facility] 

MEDICARE        

 

                    [id number]              01/15/2019 15:44:00              01/15/2019 23:59:00      

                DIS              Outpatient              Dhiraj Mijares              [facility]               lab        

 

                    [id number]              2018 10:03:00              2018 23:59:00      

                DIS              Outpatient              Dhiraj Mijares              [facility]                COUGH COLD CHEST CONJESTIONS X 1 WEEK NOT

 BETTER        

 

                    [id number]              2018 12:31:00              2018 23:59:00      

                DIS              Outpatient              Dhiraj Mijares              [facility]                6 month check        

 

                    [id number]              2018 13:40:00              2018 23:59:00      

                DIS              Outpatient              JIM Jerry              [facility]              Remote Monitoring ICD        

 

                    [id number]              10/24/2018 13:40:00              10/24/2018 23:59:00      

                DIS              Outpatient              Gayla Hook              [facility]              4MO DM FU        

 

                    [id number]              2018 14:01:00              2018 23:59:00      

                DIS              Outpatient              JIM Jerry              [facility]              6 MO RCK  [employer]        

 

                    [id number]              2018 08:35:00              2018 23:59:00      

                DIS              Outpatient              Strange Christopher              [facility]ia 

[facility]              BILATERAL KNEES        

 

                    [id number]              2018 13:06:00              2018 23:59:00      

                DIS              Outpatient              Gayla Hook              [facility]              4MO DM FU        

 

                    [id number]              2018 13:05:00              2018 23:59:00      

                DIS              Outpatient              Dhiraj Mijares              [facility]                BILATERAL FOOT AND KNEE PAIN NO KNOWN INJURY

        

 

                    [id number]              2018 12:30:00              2018 23:59:00      

                DIS              Outpatient              Emelia Gonzalez              [facility]              6 MO RCK  [employer]        

 

                    [id number]              2018 09:57:00              2018 23:59:00      

                DIS              Outpatient              JIM Jerry              [facility]              DEVICE EVALUATION ICD [employer]IC       

 

 

                    [id number]              2018 08:22:00              2018 23:59:00      

                DIS              Outpatient              Gayla Hook              [facility]               Abnl lfts or liver enzyems        

 

                    [id number]              02/15/2018 10:45:00              02/15/2018 23:59:00      

                DIS              Outpatient              Gayla Hook              [facility]              DM FU        

 

                    [id number]              2018 08:39:00              2018 23:59:00      

                DIS              Outpatient              Dhiraj Mijares              [facility]                WWE NO PAP        

 

                    [id number]              2018 13:24:00              2018 23:59:00      

                    DIS                 Outpatient              Duane Mccarty       

                    [facility]              TRANSFER FROM [facility]

 6 MO        

 

                    [id number]              2017 13:37:00              2017 23:59:00      

                DIS              Outpatient              JIM Jerry              [facility]              6 MO RCK  [employer]        

 

                    [id number]              2017 10:06:00              2017 23:59:00      

                DIS              Outpatient              Dhiraj Mijares              [facility]               US CAROTID BRUIT        

 

                    [id number]              08/15/2017 13:23:00              08/15/2017 23:59:00      

                DIS              Outpatient              Dhiraj Mijares              [facility]                WWE NO PAP        

 

                    [id number]              2017 14:14:00              2017 23:59:00      

                DIS              Outpatient              SlickLinda              [facility]ia 

[facility]              6MO CARLOTTA        

 

                    [id number]              2017 09:14:00              2017 23:59:00      

                DIS              Outpatient              Selene Nova              [facility]              6WK DM FU        

 

                    [id number]              2017 08:36:00              2017 23:59:00      

                DIS              Outpatient              Dhiraj Mijares              [facility]                ER FOLLOW UP URI        

 

                    [id number]              2017 13:24:00              2017 16:32:00      

                DIS              Outpatient              Elvie Carmona              [facility]              COLONP        

 

                    [id number]              2017 13:31:00              2017 23:59:00      

                DIS              Outpatient              JIM Jerry              [facility]              6 MO RCK [employer]        

 

                    [id number]              2017 08:35:00              2017 23:59:00      

                DIS              Outpatient              Ember Schwarz              [facility]                COUGH        

 

                    [id number]              2017 07:50:00              2017 23:59:00      

                DIS              Outpatient              Gayla Hook              [facility]              NP DM        

 

                    [id number]              2017 08:43:00              2017 23:59:00      

                DIS              Outpatient              Dhiraj Mijares              [facility]                NEEDS DM REFERRAL        

 

                    [id number]              2017 12:43:00              2017 23:59:00      

                DIS              Outpatient              Kanjanauthai, Somsupha              [facility]ia 

[facility]              6 MO RECHCK        

 

                    [id number]              2016 13:24:00              2016 23:59:00      

                DIS              Outpatient              Dhiraj Mijares              [facility]                 TCPA FOLLOW UP CHEST PAINS ER VISIT     

   

 

                    [id number]              10/03/2016 13:00:00              10/03/2016 23:59:00      

                DIS              Outpatient              Dhiraj Mijares              [facility]                 TCPA COUGHING HEAD CONGESTION        

 

                    [id number]              2016 14:13:00              2016 23:59:00      

                DIS              Outpatient              JIM Jerry              [facility]              6 MO RCK [employer]   tcpa        

 

                    [id number]              2016 07:54:00              2016 23:59:00      

                DIS              Outpatient              Dhiraj Mijares              [facility]                 GENERAL PHYSICAL        

 

                    [id number]              2016 14:41:00              2016 23:59:00      

                DIS              Outpatient              Kanjanauthai, Somsupha              [facility]ia 

[facility]              ECHO   KANJA[place]UTHAI     [facility]    

    

 

                    [id number]              2016 14:08:00              2016 23:59:00      

                DIS              Outpatient              Kanjanauthai, Somsupha              [facility] 

[facility]              5 MO RCK        

 

                    [id number]              2016 12:34:00              2016 23:59:00      

                DIS              Outpatient              JIM Jerry              [facility]              3 MO PAC RCK/[employer]TR[employer]        

 

                    [id number]              2015 09:02:00              2015 23:59:00      

                DIS              Outpatient              Héctor, W Rose[place]              [facility]              recheck 4 weeks        

 

                    [id number]              10/19/2015 09:02:00              10/19/2015 23:59:00      

                DIS              Outpatient              ADI Anaya              [facility]              PH -gerd ibs upper gastric pain      

  

 

                    [id number]              10/02/2015 07:35:00              10/02/2015 23:59:00      

                DIS              Outpatient              Kanjanauthai, Somsupha              [facility]ia 

[facility]              ECHO/KANJANAUTHAI        

 

                    [id number]              2015 14:21:00              2015 23:59:00      

                DIS              Outpatient              Kanjanauthai, Somsupha              [facility]ia 

[facility]              4 MO RCK        

 

                    [id number]              2015 08:27:00              2015 23:59:00      

                DIS              Outpatient              Ember Schwarz              [facility]                ONGOING STOMACH ISSUES        

 

                    [id number]              2015 08:39:00              2015 23:59:00      

                DIS              Outpatient              Dhiraj Mijares              [facility]                YEARLY PHYSICAL        

 

                    [id number]              2015 13:38:00              2015 23:59:00      

                DIS              Outpatient              JIM Jerry              [facility]              3 MO RCK/[employer]TR[employer]        

 

                    [id number]              2015 14:16:00              2015 23:59:00      

                DIS              Outpatient              Dhiraj Mijares              [facility]                XRAY FOLLOW UP        

 

                    [id number]              2015 10:09:00              2015 23:59:00      

                DIS              Outpatient              Juan José Leavitt              [facility]

 [facility]              LOW BACK PAIN        

 

                    [id number]              2015 07:58:00              2015 23:59:00      

                DIS              Outpatient              Tang Dailey              [facility]               abdomanil pain possible hernia no films

        

 

                    [id number]              2015 08:13:00              2015 23:59:00      

                DIS              Outpatient              Dhiraj Mijares              [facility]                SHE WOULD LIKE REFILL ON NORCO. DISCUSS 

PAIN IN INTESTINES        

 

                    [id number]              2015 10:18:00              2015 23:59:00      

                DIS              Outpatient              Love, Ember S              [facility]                 URI/COUGH        

 

                    [id number]              2015 09:00:00              2015 23:59:00      

                DIS              Outpatient              Love, Ember S              [facility]                 FLU LIKE SYMPTOMS        

 

                    [id number]              2015 12:17:00              2015 23:59:00      

                DIS              Outpatient              Kanjanauthai, Somsupha              [facility] 

[facility]              3 MO RCK        

 

                    [id number]              2014 15:59:00              2014 23:59:00      

                DIS              Outpatient              Love, Ember S              [facility]                 HOSPITAL FOLLOW UP/PATIENT WANTS CLEARANCE

 TO GO BACK TO [place]        

 

                    [id number]              2014 10:06:00              2014 23:59:00      

                DIS              Outpatient              Love, Ember S              [facility]              Chest Pain        

 

                    [id number]              2014 08:33:00              2014 23:59:00      

                DIS              Outpatient              Love, Ember S              [facility]                 FEELING SOB-REQUESTING INHALER        

 

                [id number]              2019 05:16:52                                        

             Document Registration                                                                 

   

 

                [id number]              2019 05:20:15                                        

             Document Registration                                                                 

   

 

                [id number]              2019 05:18:52                                        

             Document Registration                                                                 

   

 

                [id number]              2019 05:18:42                                        

             Document Registration                                                                 

   

 

                [id number]              2019 05:17:36                                        

             Document Registration                                                                 

   

 

                [id number]              2019 05:17:22                                        

             Document Registration                                                                 

   

 

                [id number]              2018 05:19:44                                        

             Document Registration                                                                 

   

 

                [id number]              2018 05:17:40                                        

             Document Registration                                                                 

   

 

                [id number]              2018 05:20:09                                        

             Document Registration                                                                 

   

 

                [id number]              2018 05:18:18                                        

             Document Registration                                                                 

   

 

                [id number]              2018 05:19:28                                        

             Document Registration                                                                 

   

 

                [id number]              10/31/2018 05:17:46                                        

             Document Registration                                                                 

   

 

                [id number]              10/26/2018 05:16:52                                        

             Document Registration                                                                 

   

 

                [id number]              10/16/2018 05:17:39                                        

             Document Registration                                                                 

   

 

                [id number]              10/13/2018 05:17:16                                        

             Document Registration                                                                 

   

 

                [id number]              2018 05:17:06                                        

             Document Registration                                                                 

   

 

                [id number]              2018 05:17:32                                        

             Document Registration                                                                 

   

 

                [id number]              2018 05:18:03                                        

             Document Registration                                                                 

   

 

                [id number]              2018 05:19:02                                        

             Document Registration                                                                 

   

 

                [id number]              2018 05:16:56                                        

             Document Registration                                                                 

   

 

                [id number]              2018 05:17:06                                        

             Document Registration                                                                 

   

 

                [id number]              2018 05:16:34                                        

             Document Registration                                                                 

   

 

                [id number]              2018 05:16:14                                        

             Document Registration                                                                 

   

 

                [id number]              2018 05:17:17                                        

             Document Registration                                                                 

   

 

                [id number]              2018 05:17:35                                        

             Document Registration                                                                 

   

 

                [id number]              2018 05:17:06                                        

             Document Registration                                                                 

   

 

                [id number]              2018 05:16:34                                        

             Document Registration                                                                 

   

 

                [id number]              2018 05:17:28                                        

             Document Registration                                                                 

   

 

                [id number]              10/19/2017 05:17:55                                        

             Document Registration                                                                 

   

 

                [id number]              2017 05:17:10                                        

             Document Registration                                                                 

   

 

                [id number]              06/15/2015 13:43:00                                          

             Document Registration                                                                   

 

 

                [id number]              2015 14:33:00                                          

             Document Registration                                                                   

 

 

                [id number]              2015 09:12:00                                          

             Document Registration                                                                   

 

 

                [id number]              2015 07:47:00                                          

             Document Registration                                                                   

 

 

                [id number]              2015 12:55:00                                          

             Document Registration                                                                   

 

 

                [id number]              04/10/2015 12:13:00                                          

             Document Registration                                                                   

 

 

                [id number]              2015 12:27:00                                          

             Document Registration                                                                   

 

 

                [id number]              2015 08:37:00                                          

             Document Registration                                                                   

 

 

                [id number]              2015 12:33:00                                          

             Document Registration                                                                   

 

 

                [id number]              2015 09:39:00                                          

             Document Registration                                                                   

 

 

                    [id number]              2014 14:17:00              2014 23:59:59           

           [facility]                                                               

     

 

                    [id number]              2013 16:25:00              2013 23:59:59           

           [facility]              Outpatient                                                               

     

 

                    [id number]              2014 15:45:00              2014 12:48:00       

                DIS              Inpatient              Amy REED, Chato TEMPLE              [address]                               

 

                    [id number]              2013 17:51:00              2013 17:39:00       

                DIS              Inpatient              Michael REED, Chadwick CANAS              [address]

## 2019-07-31 NOTE — XMS REPORT
Transition of Care/Referral Summary

                             Created on: 2111



BILLDYLAN VANEGAS EDITH

External Reference #: [id number]

: 1960

Sex: Female



Demographics







                          Address                   [address]

[place]  [address]

 

                          Home Phone                [phone]

 

                          Preferred Language        English

 

                          Marital Status            

 

                          Jewish Affiliation     Episcopal

 

                          Race                      White

 

                          Ethnic Group              Not  or 





Author







                          Author                    [facility]

 

                          Organization              [facility]

 

                          Address                   Unknown

 

                          Phone                     Unavailable







Care Team Providers







                    Care Team Member Name    Role                Phone

 

                    Dhiraj Mijares    PCP                 [phone]







Encounter





VC FIN [id number] Date(s): 10/19/15 - 10/19/15

[facility] [address], 
[facility] [phone]

Discharge Diagnosis: GERD (gastroesophageal reflux disease)

Discharge Diagnosis: LUQ pain

Discharge Disposition: -Home or Self Care

Attending Physician: ADI Anaya III, MD

Admitting Physician: ADI Anaya III, MD

Referring Physician: Dhiraj Mijares MD





Vital Signs







 



                           Most recent to            1



                                         oldest [Reference 



                                         Range]: 

 

 



                           Peripheral Pulse          72 bpm



                           Rate [ bpm]         (10/19/15 9:33 AM)

 

 



                           Blood Pressure            120/80 mmHg



                           [/60-90 mmHg]       (10/19/15 9:33 AM)







Problem List







    



              Condition     Effective Dates     Status       Health Status     Informant

 

    



                           Acquired                  Active  



                                         spondylolisthesis(Co    



                                         nfirmed)    

 

    



                           Acute                     Active  



                                         pain(Confirmed)    

 

    



                           Arthritis(Confirmed)      Resolved  

 

    



                           Right Carpal tunnel       Resolved  



                                         release(Confirmed)    

 

    



                           Carpal tunnel             Resolved  



                                         release    



                                         decompression(Confir    



                                         med)    

 

    



                           Cholecystectomy(Conf      Resolved  



                                         irmed)    

 

    



                     CHF (congestive      Active              patient



                                         heart    



                                         failure)(Confirmed)    

 

    



                           CAD (coronary artery      Active  



                                         disease)(Confirmed)    

 

    



                           Decompression(Confir      Resolved  



                                         med)    

 

    



                           Depression(Confirmed      Resolved  



                                         )    

 

    



                           Depression(Confirmed      Active  



                                         )    

 

    



                           Diabetes(Confirmed)       Active  

 

    



                           Diabetes(Confirmed)       Resolved  

 

    



                           GERD                      Active  



                                         (gastroesophageal    



                                         reflux    



                                         disease)(Confirmed)    

 

    



                           Ischemic                  Active  



                                         cardiomyopathy(Confi    



                                         rmed)    

 

    



                           Presence of               Active  



                                         biventricular    



                                         AICD(Confirmed)    

 

    



                           History of lumbar         Active  



                                         fusion(Confirmed)    

 

    



                           Hyperlipidemia(Confi      Resolved  



                                         rmed)    

 

    



                           Hypertension(Confirm      Resolved  



                                         ed)    

 

    



                           Hypertension(Confirm      Resolved  



                                         ed)    

 

    



                           HTN                       Active  



                                         (hypertension)(Confi    



                                         rmed)    

 

    



                           Hysterectomy(Confirm      Resolved  



                                         ed)    

 

    



                           Impaired gas              Active  



                                         exchange(Confirmed)1    

 

    



                           Irritable                 Resolved  



                                         bowel(Confirmed)    

 

    



                           Laminectomy(Confirme      Resolved  



                                         d)    

 

    



                           LBBB (left bundle         Active  



                                         branch    



                                         block)(Confirmed)    

 

    



                           LUQ pain(Confirmed)       Active  

 

    



                           Migraine(Confirmed)       Resolved  

 

    



                           Asthma(Confirmed)         Active  

 

    



                     Morbid              Active              patient



                                         obesity(Confirmed)    

 

    



                           Myocardial                Resolved  



                                         infarction(Confirmed    



                                         )    

 

    



                           Septicemia                Resolved  



                                         pneumonia(Confirmed)    

 

    



                     L knee              13              Resolved  



                                         Synovectomy(Confirme    



                                         d)    

 

    



                           Tissue perfusion          Active  



                                         alteration(Confirmed    



                                         )2    

 

    



                     Tobacco             Active              patient



                                         user(Confirmed)    

 

    



                     Tubal               79              Resolved  



                                         ligation(Confirmed)    







1Problem added automatically by system based on initiation of Impaired Gas 
Exchange Plan of Care



2Problem added automatically by system based on initiation of Tissue Perfusion 
Cerebral Plan of Care



Allergies, Adverse Reactions, Alerts







   



                 Substance       Reaction        Severity        Status

 

   



                     barium sulfate      Adverse Reaction      Active



                                         Swelling, hives  

 

   



                     celecoxib           HIVES               Active

 

   



                           influenza virus vaccine,       Active



                                         inactivated   

 

   



                     morphine            Adverse Reaction      Active

 

   



                     sulfamethoxazole     HIVES               Active



                                         Urticaria (hives)  







Medications





acetaminophen

1,000 mg, Oral, q6hr, as needed for pain, 0 Refill(s)

Start Date: 14

Status: Ordered



AcipHex 20 mg oral delayed release tablet

40 mg 2 tabs, Oral, Daily, # 180 tabs, 2 Refill(s), Pharmacy: [facility], 2 tabs Oral Daily

Start Date: 9/21/15

Status: Ordered



albuterol 2.5 mg/3 mL (0.083%) inhalation solution

3 mL, Inhalation, BID, Dx: Asthma, # 25 Each, 3 Refill(s), Pharmacy: [facility] [id number], 3 mL Inhalation BID,Instr:Dx: Asthma

Start Date: 4/21/15

Status: Ordered



amLODIPine 5 mg oral tablet

See Instructions, Take 1 tablet by mouth  daily, # 90 tabs, eRx: [facility], Take 1 tablet by mouth  daily

Start Date: 2/3/16

Status: Ordered



Aspir 81

81 mg, Oral, Daily, 0 Refill(s)

Start Date: 14

Status: Ordered



atorvastatin 10 mg oral tablet

10 mg 1 tabs, Oral, Daily, # 90 tabs, 5 Refill(s), Pharmacy: [facility]
E, 1 tabs Oral Daily

Start Date: 16

Status: Ordered



bumetanide 1 mg oral tablet

See Instructions, Take 2 mg of Bumex in AM and 1 mg PM., # 300 tabs, 5 Refill(s)
, Pharmacy: [facility], Take 2 mg of Bumex in AM and 1 mg PM.

Start Date: 9/22/15

Status: Ordered



buPROPion 150 mg/12 hours (SR) oral tablet, extended release

See Instructions, Take 1 tablet by mouth two  times daily, # 180 tabs, 2 Refill(
s), eRx: [facility], Take 1 tablet by mouth two  times daily

Start Date: 3/2/16

Status: Ordered



carvedilol 25 mg oral tablet

25 mg 1 tabs, Oral, BID, # 180 tabs, 5 Refill(s), Pharmacy: [facility]
, 1 tabs Oral BID

Start Date: 9/22/15

Status: Ordered



cloNIDine 0.1 mg oral tablet

See Instructions, Take 1 tablet by mouth  every evening, # 90 tabs, 1 Refill(s),
eRx: [facility], Take 1 tablet by mouth  every evening

Start Date: 16

Status: Ordered



clopidogrel 75 mg oral tablet

See Instructions, Take 1 tablet by mouth  daily, # 90 tabs, 2 Refill(s), [facility]: [facility], Take 1 tablet by mouth  daily

Start Date: 16

Status: Ordered



digoxin 125 mcg (0.125 mg) oral tablet

125 mcg 1 tabs, Oral, Daily, # 90 tabs, 5 Refill(s), [facility], 1 tabs Oral Daily

Start Date: 9/22/15

Status: Ordered



DULoxetine 20 mg oral delayed release capsule

See Instructions, Take 1 capsule by mouth  daily do not crush or chew, # 90 caps
, eRx: [facility], Take 1 capsule by mouth  daily do not crush or chew

Start Date: 16

Status: Ordered



glipiZIDE 5 mg oral tablet

See Instructions, Take 1 tablet by mouth two  times daily, # 180 tabs, [facility]: [facility], Take 1 tablet by mouth two  times daily

Start Date: 2/3/16

Status: Ordered



Insulin Syringe (DME)

DME Item use to inject insulin once daily     dx: E11.9, See Instructions, # 90 
Each, 4 Refill(s), Pharmacy: [id number], use to inject insulin o
nce daily     dx: E11.9, Supply

Start Date: 11/10/15

Status: Ordered



Lantus 100 units/mL subcutaneous solution

See Instructions, Inject subcutaneously 30  units every night at  bedtime once a
day, # 30 mL, 1 Refill(s), eRx: [facility], Inject subcutaneously 30  
units every night at  bedtime once a day

Start Date: 16

Status: Ordered



Melatonin

10 mg, Oral, Bedtime (once a day), as needed for insomnia, 0 Refill(s)

Start Date: 14

Status: Ordered



metFORMIN 500 mg oral tablet

See Instructions, Take 2 tablets by mouth  twice a day, # 360 tabs, [facility]: [facility], Take 2 tablets by mouth  twice a day

Start Date: 2/3/16

Status: Ordered



Norco 10 mg-325 mg oral tablet

1 tabs, Oral, q6hr, as needed for pain, # 30 tabs, 0 Refill(s)

Start Date: 16

Status: Ordered



OTC Calcium

OTC Calcium, See Instructions, 1 tablet oral daily, 0 Refill(s)

Start Date: 2/3/15

Status: Ordered



potassium chloride 20 mEq oral tablet, extended release

See Instructions, Take 1 tablet by mouth  daily, # 90 unknown unit, 1 Refill(s),
eRx: [facility], Take 1 tablet by mouth  daily

Start Date: 10/2/15

Status: Ordered



ProAir HFA 90 mcg/inh inhalation aerosol

2 puffs, Inhalation, QID, as needed for wheezing, # 3 Each, 3 Refill(s), Pharmac
y: [facility]

Start Date: 1/23/15

Status: Ordered



SUMAtriptan 50 mg oral tablet

50 mg 1 tabs, Oral, Daily, as needed for migraine headache, may repeat dose afte
r 2 hours up to a maximum of 200 mg in 24 hours, # 9 tabs, 1 Refill(s), Pharmacy
: [id number], 1 tabs Oral Daily,PRN:as needed for migraine heada
godwin,Instr:m...

Start Date: 16

Status: Ordered



Symbicort 160 mcg-4.5 mcg/inh inhalation aerosol

See Instructions, Use 2 puffs twice daily, # 10.2 g, 2 Refill(s), [facility]: [facility], Use 2 puffs twice daily

Start Date: 12/2/15

Status: Ordered



valsartan 160 mg oral tablet

See Instructions, 1 TABS ORAL DAILY, # 30 tabs, 1 Refill(s), eRx: [facility] [id number], 1 TABS ORAL DAILY

Start Date: 10/12/15

Status: Ordered



Results





No data available for this section



Immunizations







  



                     Vaccine             Date                Refusal Reason

 

  



                           tetanus-diphth toxoids (Td) adult/adol     00 







Procedures







   



                 Procedure       Date            Related Diagnosis     Body Site

 

   



                           L knee PMM/ part. synovectomy     13  

 

   



                           lt median nerve decompression     5/10/11  

 

   



                           Tubal ligation            1979  

 

   



                                         Carpal tunnel release rt   

 

   



                                         Cholecystectomy   

 

   



                                         Hysterectomy   

 

   



                                         Hysterectomy and bilateral   



                                         salpingo-oophorectomy sample   

 

   



                                         Laminectomy   

 

   



                                         Pacemaker   







Social History







 



                           Social History Type       Response

 

 



                           Smoking Status            Former smoker; Type: Cigarettes1







1Quit around 



Assessment and Plan





Referrals to Other Providers





Referred by: ADI Anaya III, MD

## 2019-07-31 NOTE — XMS REPORT
Transition of Care/Referral Summary

                             Created on: 2105



BILLDYLAN VANEGAS

External Reference #: [id number]

: 1960

Sex: Female



Demographics







                          Address                   [address] [facility] [address]

[place]  [address]

 

                          Home Phone                [phone]

 

                          Preferred Language        English

 

                          Marital Status            

 

                          M[employer]on Affiliation     Bahai

 

                          Race                      White

 

                          Ethnic Group              Not  or 





Author







                          Author                    [facility]

 

                          Organization              [facility]

 

                          Address                   Unknown

 

                          Phone                     Unavailable







Care Team Providers







                    Care Team Member Name    Role                Phone

 

                    Dhiraj Mijares    PCP                 [phone]







Encounter





VC FIN [id number] Date(s): 6/1/15 - 6/1/15

[facility] [address] [place], [place] [address] [phone]

Discharge Diagnosis: LBBB (left bundle branch block)

Discharge Diagnosis: Presence of cardiac resynchronization therapy defibrillator

Discharge Diagnosis: Ischemic cardiomyopathy

Discharge Diagnosis: Cardiomyopathy, nonischemic

Discharge Disposition: 01-Home or Self Care

Attending Physician: JIM Jerry MD

Admitting Physician: JIM Jerry MD





Vital Signs







 



                           Most recent to            1



                                         oldest [Reference 



                                         Range]: 

 

 



                           Peripheral Pulse          84 bpm



                           Rate [ bpm]         (6/1/15 2:35 PM)

 

 



                           Blood Pressure            102/70 mmHg



                           [/60-90 mmHg]       (6/1/15 2:35 PM)







Problem List







    



              Condition     Effective Dates     Status       Health Status     Informant

 

    



                           Acquired                  Active  



                                         spondylolisthesis(Co    



                                         nfirmed)    

 

    



                           Acute                     Active  



                                         pain(Confirmed)    

 

    



                           Arthritis(Confirmed)      Resolved  

 

    



                           Right Carpal tunnel       Resolved  



                                         release(Confirmed)    

 

    



                           Carpal tunnel             Resolved  



                                         release    



                                         decompression(Confir    



                                         med)    

 

    



                           Cholecystectomy(Conf      Resolved  



                                         irmed)    

 

    



                     CHF (congestive      Active              patient



                                         heart    



                                         failure)(Confirmed)    

 

    



                           CAD (coronary artery      Active  



                                         disease)(Confirmed)    

 

    



                           Decompression(Confir      Resolved  



                                         med)    

 

    



                           Depression(Confirmed      Resolved  



                                         )    

 

    



                           Depression(Confirmed      Active  



                                         )    

 

    



                           Diabetes(Confirmed)       Resolved  

 

    



                           Diabetes(Confirmed)       Active  

 

    



                           GERD                      Active  



                                         (gastroesophageal    



                                         reflux    



                                         disease)(Confirmed)    

 

    



                           Ischemic                  Active  



                                         cardiomyopathy(Confi    



                                         rmed)    

 

    



                           Presence of               Active  



                                         biventricular    



                                         AICD(Confirmed)    

 

    



                           History of lumbar         Active  



                                         fusion(Confirmed)    

 

    



                           Hyperlipidemia(Confi      Resolved  



                                         rmed)    

 

    



                           Hypertension(Confirm      Resolved  



                                         ed)    

 

    



                           Hypertension(Confirm      Resolved  



                                         ed)    

 

    



                           HTN                       Active  



                                         (hypertension)(Confi    



                                         rmed)    

 

    



                           Hysterectomy(Confirm      Resolved  



                                         ed)    

 

    



                           Impaired gas              Active  



                                         exchange(Confirmed)1    

 

    



                           Irritable                 Resolved  



                                         bowel(Confirmed)    

 

    



                           Laminectomy(Confirme      Resolved  



                                         d)    

 

    



                           LBBB (left bundle         Active  



                                         branch    



                                         block)(Confirmed)    

 

    



                           LUQ pain(Confirmed)       Active  

 

    



                           Migraine(Confirmed)       Resolved  

 

    



                           Asthma(Confirmed)         Active  

 

    



                     Morbid              Active              patient



                                         obesity(Confirmed)    

 

    



                           Myocardial                Resolved  



                                         infarction(Confirmed    



                                         )    

 

    



                           Septicemia                Resolved  



                                         pneumonia(Confirmed)    

 

    



                     L knee              13              Resolved  



                                         Synovectomy(Confirme    



                                         d)    

 

    



                           Tissue perfusion          Active  



                                         alteration(Confirmed    



                                         )2    

 

    



                     Tobacco             Active              patient



                                         user(Confirmed)    

 

    



                     Tubal               79              Resolved  



                                         ligation(Confirmed)    







1Problem added automatically by system based on initiation of Impaired Gas 
Exchange Plan of Care



2Problem added automatically by system based on initiation of Tissue Perfusion 
Cerebral Plan of Care



Allergies, Adverse Reactions, Alerts







   



                 Substance       Reaction        Severity        Status

 

   



                     barium sulfate      Adverse Reaction      Active



                                         Swelling, hives  

 

   



                     celecoxib           HIVES               Active

 

   



                           influenza virus vaccine,       Active



                                         inactivated   

 

   



                     morphine            Adverse Reaction      Active

 

   



                     sulfamethoxazole     HIVES               Active



                                         Urticaria (hives)  







Medications





acetaminophen

1,000 mg, Oral, q6hr, as needed for pain, 0 Refill(s)

Start Date: 14

Status: Ordered



AcipHex 20 mg oral delayed release tablet

40 mg 2 tabs, Oral, Daily, # 180 tabs, 2 Refill(s), Pharmacy: [facility], 2 tabs Oral Daily

Start Date: 9/21/15

Status: Ordered



albuterol 2.5 mg/3 mL (0.083%) inhalation solution

3 mL, Inhalation, BID, Dx: Asthma, # 25 Each, 3 Refill(s), [id number], 3 mL Inhalation BID,Instr:Dx: Asthma

Start Date: 4/21/15

Status: Ordered



amLODIPine 5 mg oral tablet

See Instructions, Take 1 tablet by mouth  daily, # 90 tabs, 1 Refill(s), Pharmac
y: [facility], Take 1 tablet by mouth  daily

Start Date: 9/2/15

Status: Ordered



Aspir 81

81 mg, Oral, Daily, 0 Refill(s)

Start Date: 14

Status: Ordered



bumetanide 1 mg oral tablet

See Instructions, Take 2 mg of Bumex in AM and 1 mg PM., # 300 tabs, 5 Refill(s)
, Pharmacy: [facility], Take 2 mg of Bumex in AM and 1 mg PM.

Start Date: 9/22/15

Status: Ordered



buPROPion 150 mg/12 hours (SR) oral tablet, extended release

See Instructions, Take 1 tablet by mouth two  times daily, # 180 unknown unit, 1
Refill(s), eRx: [facility], Take 1 tablet by mouth two  times daily

Start Date: 9/9/15

Status: Ordered



carvedilol 25 mg oral tablet

25 mg 1 tabs, Oral, BID, # 180 tabs, 5 Refill(s), Pharmacy: [facility]
, 1 tabs Oral BID

Start Date: 9/22/15

Status: Ordered



Claritin

10 mg, Oral, Daily, 0 Refill(s)

Start Date: 5/18/15

Status: Ordered



cloNIDine 0.1 mg oral tablet

0.1 mg 1 tabs, Oral, qPM, # 90 tabs, 1 Refill(s), [facility]
, 1 tabs Oral qPM

Start Date: 10/12/15

Status: Ordered



clopidogrel 75 mg oral tablet

1 tabs, Oral, Daily, # 90 tabs, 3 Refill(s), Pharmacy: [facility], 1 t
abs Oral Daily

Start Date: 4/14/15

Status: Ordered



digoxin 125 mcg (0.125 mg) oral tablet

125 mcg 1 tabs, Oral, Daily, # 90 tabs, 5 Refill(s), [facility], 1 tabs Oral Daily

Start Date: 9/22/15

Status: Ordered



DULoxetine 20 mg oral delayed release capsule

20 mg 1 caps, Oral, BID, 0 Refill(s)

Start Date: 10/19/15

Status: Ordered



DULoxetine 60 mg oral delayed release capsule

See Instructions, Take 1 capsule by mouth  daily do not crush or chew, # 90 unkn
own unit, 1 Refill(s), eRx: [facility], Take 1 capsule by mouth  daily
do not crush or chew

Start Date: 9/30/15

Status: Ordered



glipiZIDE 5 mg oral tablet

5 mg 1 tabs, Oral, BID, X 90 days, # 180 tabs, 1 Refill(s), [facility], 1 tabs Oral BID,x90 days

Start Date: 9/2/15

Stop Date: 16

Status: Ordered



Insulin Syringe (DME)

DME Item use to inject insulin once daily     dx: E11.9, See Instructions, # 90 
Each, 4 Refill(s), Pharmacy: [id number], use to inject insulin o
nce daily     dx: E11.9, Supply

Start Date: 11/10/15

Status: Ordered



Lantus 100 units/mL subcutaneous solution

See Instructions, Inject subcutaneously 30  units every night at  bedtime once a
day, # 30 mL, 2 Refill(s), eRx: [facility], Inject subcutaneously 30  
units every night at  bedtime once a day

Start Date: 7/31/15

Status: Ordered



Melatonin

10 mg, Oral, Bedtime (once a day), as needed for insomnia, 0 Refill(s)

Start Date: 14

Status: Ordered



metFORMIN 500 mg oral tablet

See Instructions, Take 2 tablets by mouth  twice a day, # 360 tabs, 1 Refill(s),
Pharmacy: [facility], Take 2 tablets by mouth  twice a day

Start Date: 9/2/15

Status: Ordered



Multiple Vitamins oral tablet

1 tabs, Oral, Daily, 0 Refill(s)

Start Date: 14

Status: Ordered



Norco 10 mg-325 mg oral tablet

1 tabs, Oral, q6hr, as needed for pain, # 30 tabs, 0 Refill(s)

Start Date: 10/19/15

Status: Ordered



OTC Black Cohash

OTC Black Cohash, See Instructions, 1 tablet oral daily, 0 Refill(s)

Start Date: 2/3/15

Status: Ordered



OTC Calcium

OTC Calcium, See Instructions, 1 tablet oral daily, 0 Refill(s)

Start Date: 2/3/15

Status: Ordered



potassium chloride 20 mEq oral tablet, extended release

See Instructions, Take 1 tablet by mouth  daily, # 90 unknown unit, 1 Refill(s),
eRx: [facility], Take 1 tablet by mouth  daily

Start Date: 10/2/15

Status: Ordered



ProAir HFA 90 mcg/inh inhalation aerosol

2 puffs, Inhalation, QID, as needed for wheezing, # 3 Each, 3 Refill(s), Pharmac
y: [facility]

Start Date: 1/23/15

Status: Ordered



SUMAtriptan 50 mg oral tablet

50 mg 1 tabs, Oral, Daily, as needed for migraine headache, may repeat dose afte
r 2 hours up to a maximum of 200 mg in 24 hours, # 9 tabs, 0 Refill(s), Pharmacy
: [id number], 1 tabs Oral Daily,PRN:as needed for migraine heada
godwin,Instr:m...

Start Date: 9/16/15

Status: Ordered



Symbicort 160 mcg-4.5 mcg/inh inhalation aerosol

See Instructions, Use 2 puffs twice daily, # 10.2 g, 2 Refill(s), [facility], Use 2 puffs twice daily

Start Date: 12/2/15

Status: Ordered



valsartan 160 mg oral tablet

See Instructions, 1 TABS ORAL DAILY, # 30 tabs, 1 Refill(s), [facility]Rx: [id number], 1 TABS ORAL DAILY

Start Date: 10/12/15

Status: Ordered



Vitamin D3

4000 unit, Oral, Daily, 0 Refill(s)

Start Date: 2/3/15

Status: Ordered



Results





No data available for this section



Immunizations







  



                     Vaccine             Date                Refusal Reason

 

  



                           tetanus-diphth toxoids (Td) adult/adol     00 







Procedures







   



                 Procedure       Date            Related Diagnosis     Body Site

 

   



                           L knee PMM/ part. synovectomy     13  

 

   



                           lt median nerve decompression     5/10/11  

 

   



                           Tubal ligation            1979  

 

   



                                         Carpal tunnel release rt   

 

   



                                         Cholecystectomy   

 

   



                                         Hysterectomy   

 

   



                                         Hysterectomy and bilateral   



                                         salpingo-oophorectomy sample   

 

   



                                         Laminectomy   

 

   



                                         Pacemaker   







Social History







 



                           Social History Type       Response

 

 



                           Smoking Status            Former smoker; Type: Cigarettes1







1Quit around 



Assessment and Plan

Extracted from:





  



                     Title: Office Visit Note     Author: JIM Jerry MD     Date: 6/1/15









                                         Assessment/Plan

 Cardiomyopathy, nonischemic 

 Ischemic cardiomyopathy 

 LBBB (left bundle branch block) 

 Presence of cardiac resynchronization therapy defibrillator  Ordered:

 Icd Device Progr Eval Mult [id number]

 Postoperative Est [id number]

 Return to Clinic 







Referrals to Other Providers





Referred by: JIM Jerry MD

## 2019-07-31 NOTE — XMS REPORT
Transition of Care/Referral Summary

                             Created on: 2066



DYLAN KHAN

External Reference #: [id number]

: 1960

Sex: Female



Demographics







                          Address                   [address]  [address]

 

                          Home Phone                [phone]

 

                          Preferred Language        English

 

                          Marital Status            

 

                          Gnosticism Affiliation     Congregation

 

                          Race                      White

 

                                        Additional Race(s)  

 

                          Ethnic Group              Not  or 





Author







                          Author                    [facility]

 

                          Organization              [facility]

 

                          Address                   Unknown

 

                          Phone                     Unavailable







Care Team Providers







                    Care Team Member Name    Role                Phone

 

                    Dhiraj Mijares    PCP                 [phone]







Encounter





VC FIN [id number] Date(s): 19 - 19

[facility]  [place], [place] [id number] [phone]

Encounter Diagnosis

Atypical hyperplasia of right breast (Discharge Diagnosis) - 19

Discharge Disposition: 01-Home or Self Care

Attending Physician: Lexi Velazquez MD





Vital Signs





No data available for this section



Problem List







    



              Condition     Effective Dates     Status       Health Status     Informant

 

    



                           Acquired                  Active  



                                         spondylolisthesis(Co    



                                         nfirmed)    

 

    



                           Hay fever(Confirmed)      Active  

 

    



                           Arthritis(Confirmed)      Resolved  

 

    



                           Carpal tunnel             Resolved  



                                         release    



                                         decompression(Confir    



                                         med)    

 

    



                           Right Carpal tunnel       Resolved  



                                         release(Confirmed)    

 

    



                           Cholecystectomy(Conf      Resolved  



                                         irmed)    

 

    



                           IBS (irritable bowel      Active  



                                         syndrome)(Confirmed)    

 

    



                     CHF (congestive      Active              patient



                                         heart    



                                         failure)(Confirmed)    

 

    



                           CAD (coronary artery      Active  



                                         disease)(Confirmed)    

 

    



                           Decompression(Confir      Resolved  



                                         med)    

 

    



                           Depression(Confirmed      Resolved  



                                         )    

 

    



                           Depression(Confirmed      Active  



                                         )    

 

    



                           Diabetes(Confirmed)       Active  

 

    



                           Diabetes(Confirmed)       Resolved  

 

    



                           Diabetic                  Active  



                                         neuropathy(Confirmed    



                                         )    

 

    



                           Diabetic peripheral       Active  



                                         neuropathy(Confirmed    



                                         )    

 

    



                           GERD                      Active  



                                         (gastroesophageal    



                                         reflux    



                                         disease)(Confirmed)    

 

    



                     Ischemic            < 5/8/15            Resolved  



                                         cardiomyopathy(Confi    



                                         rmed)    

 

    



                           Presence of               Active  



                                         biventricular    



                                         AICD(Confirmed)    

 

    



                           History of lumbar         Active  



                                         fusion(Confirmed)    

 

    



                           Hyperlipidemia(Confi      Active  



                                         rmed)    

 

    



                     Asthma(Confirmed)     < 17           Resolved  

 

    



                           Hypertension(Confirm      Resolved  



                                         ed)    

 

    



                           Hypertension(Confirm      Resolved  



                                         ed)    

 

    



                           HTN                       Active  



                                         (hypertension)(Confi    



                                         rmed)    

 

    



                           Hysterectomy(Confirm      Resolved  



                                         ed)    

 

    



                           Irritable                 Resolved  



                                         bowel(Confirmed)    

 

    



                           Laminectomy(Confirme      Resolved  



                                         d)    

 

    



                           LBBB (left bundle         Active  



                                         branch    



                                         block)(Confirmed)    

 

    



                           LUQ pain(Confirmed)       Active  

 

    



                           Long term current         Active  



                                         use of    



                                         insulin(Confirmed)    

 

    



                           Migraine(Confirmed)       Resolved  

 

    



                           Asthma(Confirmed)         Active  

 

    



                     Morbid              Active              patient



                                         obesity(Confirmed)    

 

    



                           Myocardial                Resolved  



                                         infarction(Confirmed    



                                         )    

 

    



                           Septicemia                Resolved  



                                         pneumonia(Confirmed)    

 

    



                     L knee              13              Resolved  



                                         Synovectomy(Confirme    



                                         d)    

 

    



                     Tobacco             Active              patient



                                         user(Confirmed)    

 

    



                     Tubal               79              Resolved  



                                         ligation(Confirmed)    

 

    



                           Type 2 diabetes           Active  



                                         mellitus(Confirmed)    

 

    



                           UTI (urinary tract        Active  



                                         infection)(Confirmed    



                                         )    

 

    



                     Chicken             < 17           Resolved  



                                         pox(Confirmed)    







Allergies, Adverse Reactions, Alerts







   



                 Substance       Reaction        Severity        Status

 

   



                     sulfamethoxazole     Urticaria (hives)      Active



                                         HIVES  

 

   



                     morphine            Burning             Active

 

   



                     barium sulfate      Swelling, hives      Active

 

   



                     celecoxib           HIVES               Active

 

   



                     influenza virus vaccine,     Convulsion          Active



                                         inactivated   

 

   



                           Strawberry C              Active







Medications





acetaminophen

1,000 mg, Oral, q6hr, as needed for pain, 0 Refill(s)

Start Date: 14

Status: Ordered



Ambien 10 mg oral tablet

10 mg 1 tabs, Oral, Bedtime (once a day), as needed for sleep, [phone], #
90 tabs, 0 Refill(s)

Start Date: 19

Stop Date: 19

Status: Ordered



Aspir 81

81 mg, Oral, Daily, 0 Refill(s)

Start Date: 14

Status: Ordered



atorvastatin 20 mg oral tablet

20 mg 1 tabs, Oral, Daily, # 90 tabs, 1 Refill(s), Pharmacy: [facility]
E, 1 tabs Oral Daily

Start Date: 19

Status: Ordered



Basaglar KwikPen 100 units/mL subcutaneous solution

See Instructions, INJECT SUBCUTANEOUSLY 56 units at hs, # 15 mL, 3 Refill(s), Ph
armacy: [facility], INJECT SUBCUTANEOUSLY 56 units at hs

Start Date: 3/11/19

Status: Ordered



Bentyl 20 mg oral tablet

20 mg 1 tabs, Oral, q8hr, Abdominal Cramping, # 15 tabs, 0 Refill(s), Pharmacy: 
[id number], 1 tabs Oral q8hr,PRN:Abdominal Cramping

Start Date: 3/28/19

Status: Ordered



bumetanide 1 mg oral tablet

1 mg 1 tabs, Oral, After Lunch, pm, 0 Refill(s)

Start Date: 19

Status: Ordered



bumetanide 2 mg oral tablet

2 mg 1 tabs, Oral, Daily, am, 0 Refill(s)

Start Date: 19

Status: Ordered



buPROPion 150 mg/12 hours (SR) oral tablet, extended release

150 mg 1 tabs, Oral, BID, 0 Refill(s)

Start Date: 19

Status: Ordered



carvedilol

25 mg, Oral, BID, 0 Refill(s)

Start Date: 19

Status: Ordered



cloNIDine 0.2 mg oral tablet

0.2 mg 1 tabs, Oral, BID, 0 Refill(s)

Start Date: 19

Status: Ordered



clopidogrel 75 mg oral tablet

75 mg 1 tabs, Oral, Daily, 0 Refill(s)

Start Date: 19

Status: Ordered



DULoxetine

60 mg, Oral, Daily, take with duloxetine 20mg, 0 Refill(s)

Start Date: 19

Status: Ordered



DULoxetine 20 mg oral delayed release capsule

20 mg 1 caps, Oral, Daily, take with duloxetine 60 mg, 0 Refill(s)

Start Date: 19

Status: Ordered



gabapentin 100 mg oral capsule

See Instructions, 1-3 caps up to 3 times daily, # 100 caps, 3 Refill(s)

Start Date: 18

Status: Ordered



HumaLOG 100 units/mL injectable solution

See Instructions, INJECT 16 units for breakfast and lunch and 19 units for dinne
r., # 40 mL, 4 Refill(s), Pharmacy: [facility], INJECT 16 units for br
eakfast and lunch and 19 units for dinner.

Start Date: 3/11/19

Status: Ordered



losartan 50 mg oral tablet

50 mg 1 tabs, Oral, BID, # 180 tabs, 1 Refill(s), Pharmacy: [facility]

Start Date: 19

Status: Ordered



Melatonin

10 mg, Oral, Bedtime (once a day), as needed for insomnia, 0 Refill(s)

Start Date: 14

Status: Ordered



metFORMIN

1,000 mg, Oral, BID, 0 Refill(s)

Start Date: 19

Status: Ordered



mometasone 50 mcg/inh nasal spray

2 sprays, Nasal, Daily, # 3 Each, 3 Refill(s), Pharmacy: [facility]

Start Date: 19

Status: Ordered



Norco 7.5 mg-325 mg oral tablet

2 tabs, Oral, q8hr, as needed for pain, # 50 tabs, 0 Refill(s)

Start Date: 19

Status: Ordered



potassium chloride 20 mEq oral tablet, extended release

20 mEq 1 tabs, Oral, Daily, 0 Refill(s)

Start Date: 19

Status: Ordered



ProAir HFA 90 mcg/inh inhalation aerosol

See Instructions, INHALE 2 PUFFS 4 TIMES  DAILY AS NEEDED FOR  WHEEZING, # 25.5 
g, 5 Refill(s), eRx: [facility]

Start Date: 18

Status: Ordered



RABEprazole 20 mg oral delayed release tablet

20 mg 1 tabs, Oral, BID, 0 Refill(s)

Start Date: 19

Status: Ordered



SUMAtriptan

50 mg, Oral, Once, as needed for migraine headache, 0 Refill(s)

Start Date: 19

Status: Ordered



Symbicort 160 mcg-4.5 mcg/inh inhalation aerosol

See Instructions, INHALE 2 PUFFS TWICE DAILY, # 30.6 g, 3 Refill(s), [facility]acy: [facility]

Start Date: 19

Status: Ordered



Results





No data available for this section



Immunizations





Given and Recorded





   



                 Vaccine         Date            Status          Refusal Reason

 

   



                     tetanus/diphth/pertuss (Tdap) adult/adol     19              Given 

 

   



                     tetanus-diphth toxoids (Td) adult/adol     00             Given 







Procedures







    



              Procedure     Date         Related Diagnosis     Body Site     Status

 

    



                     Lumpectomy Breast1     19             Completed

 

    



                     L knee PMM/ part. synovectomy     13              Completed

 

    



                     lt median nerve decompression     5/10/11             Completed

 

    



                     Tubal ligation                      Completed

 

    



                           Carpal tunnel release rt        Completed

 

    



                           Cholecystectomy           Completed

 

    



                           Hysterectomy              Completed

 

    



                           Hysterectomy and bilateral        Completed



                                         salpingo-oophorectomy sample    

 

    



                           Laminectomy               Completed

 

    



                           Pacemaker                 Completed







1auto-populated from documented surgical case



Social History







 



                           Social History Type       Response

 

 



                           Smoking Status            Former smoker; Type: Cigarettes1



                                         entered on: 5/8/15







1Quit around 



Assessment and Plan

Extracted from:





  



                     Title: BREAST CLINIC NOTE     Author: Lexi Velazquez MD     Date: 19









                                                  1.Atypical hyperplasia of right breast 

 1. The patient is a 59-year-old [facility] atypical ductal hyperplasia. FortunatelynoDCIS or invasive 
cancer was identifiedwith her lumpectomy. The pathology report did 
showatypical ductal hyperplasia. The margins were negative. A copy of 
the pathology report was given to the patient.

 2.I had a long discussion with the patient regardingher increased risk of 
breast cancer secondary toADH.Her family history is insignificant. The 
patient would be considered to have a high-risk for the development of breast 
cancer.

 3. We discussed lifestyle methods to lower her risk which would includenot 
smoking;decreasing or eliminating alcohol;weight lossand exercise. These
have been shown to lower the risk of breast cancer. I alsoexplained that 
screening mammograms are extremely importantso that if cancer is diagnosed, it
can be diagnosed at the earliest possible stage.

 4.We also discussed chemoprevention in the form of tamoxifen or raloxifene.
I did give her a handout regarding these 2 medications. We discussed the risks
and benefits. The risks of the medication includes an elevation of uterine 
cancer and DVT, PE or blood clots. Both tamoxifen and raloxifene can increase 
that risk of clotting and tamoxifen increases the risk of uterine cancer. The 
benefit would be an approximate 50% reduction in the incidence of breast cancer,
but no survival advantage.She cansee a medical oncologist/hematologist to 
discuss the risks versus benefits prior to starting the medication. She can 
also speak to her physician regarding the risks and benefits. At this time, 
the patient was not interested in Tamoxifen or Raloxifene; and is going to be 
followed carefullywithout the medication.

 5.We discussed follow-up. I offered her to follow in our high risk breast 
clinic but she was not interested because she is moving out of town.  

   Ordered: 

   Postoperative Est [id number]   

            







Extracted from:





  



                     Title: Ambulatory Patient Education     Author: Lexi Velazquez MD     Date: 19











                                        The following Patient Education Materials have been given to the patient:

Obstetrics and Gynecology

Atypical Ductal Hyperplasia

Atypical ductal hyperplasia is a condition in which some of the cells in your 
breast are unusual or abnormal. Compared to normal breast cells, the cells 
seen in atypical ductal hyperplasia have:



Faster growth.



Abnormal organization.



Increased numbers of cells.



Unusual sizes and shapes.

Atypical ductal hyperplasia is not a form of cancer. However, if it is not 
treated, it may turn into cancer.

CAUSES

It is not known what causes atypical ductal hyperplasia.

RISK FACTORS

Risk factors for atypical ductal hyperplasia include:



Age. Your risk increases as you get older.



Family history of breast cancer.



Having prior radiation treatments to your breasts or chest area.



Being overweight.



Using or having used hormones, such as estrogen.



Prior history of:



Breast cancer.



Noncancerous breast conditions.



Dense breasts.



Drinking more than one alcoholic beverage per day.



Starting menstruation before the age of 12.



Never having given birth.



Giving birth to your first child when you were over the age of 35.



Not breastfeeding, if you have given birth.



Not exercising consistently.

SIGNS AND SYMPTOMS

Atypical ductal hyperplasia does not cause any symptoms.

DIAGNOSIS

Atypical ductal hyperplasia is usually discovered during a routine X-ray study 
of the breasts (mammogram). If something concerning shows up on a mammogram, a
biopsy is performed. This means that a small sample of breast tissue is removed 
and sent to a laboratory. In most cases, breast cells can be removed through a 
needle. Sometimes, a small cut (incision) will need to be made in the breast to 
remove a sample of breast tissue. Atypical ductal hyperplasia can then be 
diagnosed by examining the breast cells under a microscope.

TREATMENT

Atypical ductal hyperplasia treatment may include:



More frequent monitoring of breast health. Methods of breast health monitoring 
include breast exams, mammograms, ultrasounds, and MRIs.



Medicines to keep the abnormal cells from spreading and becoming cancerous.



A lumpectomy. This is the removal of the area of abnormal cells, along with a 
ring of normal tissue. This may also be called breast-conserving surgery.



A simple mastectomy. This is the removal of breast tissue, the nipple, and the 
[place] of colored tissue around the nipple (areola). Sometimes, one or more 
lymph nodes from under the arm are also removed.



Preventative mastectomy. This is the removal of both breasts. This is usually 
only done if you have a very high risk of developing breast cancer.

HOME CARE INSTRUCTIONS



Take medicines only as directed by your health care provider.



Keep all follow-up appointments as directed by your health care provider. This 
is important.



Limit alcohol intake to no more than 1 drink per day for nonpregnant women. One
drink equals 12 ounces of beer, 5 ounces of wine, or 1 ounces of hard liquor.

SEEK MEDICAL CARE IF:

You have a fever.

SEEK IMMEDIATE MEDICAL CARE IF:

You have difficulty breathing.

This information is not intended to replace advice given to you by your health 
care provider. Make sure you discuss any questions you have with your health 
care provider.

Document Released: 07/15/2015 Document Reviewed: 07/15/2015

[employer] Patient Education 2017 [employer]





No follow up information was provided.

## 2019-07-31 NOTE — XMS REPORT
Transition of Care/Referral Summary

                             Created on: 2029



BILLDYLAN EDITH

External Reference #: [id number]

: 1960

Sex: Female



Demographics







                          Address                   [address]

[place]  [address]

 

                          Home Phone                [phone]

 

                          Preferred Language        English

 

                          Marital Status            

 

                          Pentecostal Affiliation     Buddhist

 

                          Race                      White

 

                          Ethnic Group              Not  or 





Author







                          Organization              Unknown

 

                          Address                   Unknown

 

                          Phone                     Unavailable







Care Team Providers







                    Care Team Member Name    Role                Phone

 

                    Dhiraj Mijares    PCP                 [phone]







Encounter





[facility] [id number] Date(s): 4/16/15 - 4/16/15

[facility] [address], [place] [address] [phone]

Discharge Diagnosis: Hyperlipidemia

Discharge Diagnosis: Chronic combined systolic and diastolic CHF (congestive hea
rt failure)

Discharge Diagnosis: Benign essential HTN

Discharge Diagnosis: Old non-ST elevation myocardial infarction (NSTEMI)

Discharge Diagnosis: CAD (coronary artery disease)

Discharge Disposition: Home or Self Care

Attending Physician: Linda Kruger MD

Admitting Physician: Linda Kruger MD





Vital Signs







 



                           Most recent to            1



                                         oldest [Reference 



                                         Range]: 

 

 



                           Peripheral Pulse          96 bpm



                           Rate [ bpm]         (4/16/15 1:03 PM)

 

 



                           Blood Pressure            138/74 mmHg



                           [/60-90 mmHg]       (4/16/15 1:03 PM)







Problem List







    



              Condition     Effective Dates     Status       Health Status     Informant

 

    



                           Acute                     Active  



                                         pain(Confirmed)    

 

    



                           Arthritis(Confirmed)      Resolved  

 

    



                           Asthma(Confirmed)         Active  

 

    



                           Right Carpal tunnel       Resolved  



                                         release(Confirmed)    

 

    



                           Carpal tunnel             Resolved  



                                         release    



                                         decompression(Confir    



                                         med)    

 

    



                           Cholecystectomy(Conf      Resolved  



                                         irmed)    

 

    



                     CHF (congestive      Active              patient



                                         heart    



                                         failure)(Confirmed)    

 

    



                           CAD (coronary artery      Active  



                                         disease)(Confirmed)    

 

    



                           Decompression(Confir      Resolved  



                                         med)    

 

    



                           Depression(Confirmed      Resolved  



                                         )    

 

    



                           Depression(Confirmed      Active  



                                         )    

 

    



                           Diabetes(Confirmed)       Resolved  

 

    



                           Diabetes(Confirmed)       Active  

 

    



                           Hyperlipidemia(Confi      Resolved  



                                         rmed)    

 

    



                           Hypertension(Confirm      Resolved  



                                         ed)    

 

    



                           Hypertension(Confirm      Resolved  



                                         ed)    

 

    



                           HTN                       Active  



                                         (hypertension)(Confi    



                                         rmed)    

 

    



                           Hysterectomy(Confirm      Resolved  



                                         ed)    

 

    



                           Impaired gas              Active  



                                         exchange(Confirmed)1    

 

    



                           Irritable                 Resolved  



                                         bowel(Confirmed)    

 

    



                           Laminectomy(Confirme      Resolved  



                                         d)    

 

    



                           Migraine(Confirmed)       Resolved  

 

    



                           Myocardial                Resolved  



                                         infarction(Confirmed    



                                         )    

 

    



                           Septicemia                Resolved  



                                         pneumonia(Confirmed)    

 

    



                     L knee              13              Resolved  



                                         Synovectomy(Confirme    



                                         d)    

 

    



                           Tissue perfusion          Active  



                                         alteration(Confirmed    



                                         )2    

 

    



                     Tubal               79              Resolved  



                                         ligation(Confirmed)    







1Problem added automatically by system based on initiation of Impaired Gas 
Exchange Plan of Care



2Problem added automatically by system based on initiation of Tissue Perfusion 
Cerebral Plan of Care



Allergies, Adverse Reactions, Alerts







   



                 Substance       Reaction        Severity        Status

 

   



                     barium sulfate      Adverse Reaction      Active



                                         Swelling, hives  

 

   



                     celecoxib           HIVES               Active

 

   



                           influenza virus vaccine,       Active



                                         inactivated   

 

   



                     morphine            Adverse Reaction      Active

 

   



                     sulfamethoxazole     HIVES               Active



                                         Urticaria (hives)  







Medications





acetaminophen

1,000 mg, Oral, q6hr, as needed for pain, 0 Refill(s)

Start Date: 14

Status: Ordered



albuterol 2.5 mg/3 mL (0.083%) inhalation solution

3 mL, Inhalation, BID, Dx: Asthma, # 25 Each, 3 Refill(s), Pharmacy: [facility]
, 3 mL Inhalation BID,Instr:Dx: Asthma

Special Instructions: Dx: Asthma

Start Date: 14

Status: Ordered



amLODIPine 5 mg oral tablet

1 tabs, Oral, Daily, # 90 tabs, 1 Refill(s), Pharmacy: [facility], 1 t
abs Oral Daily

Start Date: 2/3/15

Status: Ordered



Aspir 81

81 mg, Oral, Daily, 0 Refill(s)

Start Date: 14

Status: Ordered



atorvastatin

20 mg, Oral, Bedtime (once a day), 0 Refill(s)

Start Date: 14

Status: Ordered



bumetanide 1 mg oral tablet

See Instructions, Take 2 mg Bumex 2015.  Then 2 mg Bumex in AM, 1 mg Bumex i
n PM start 2015., # 90 tabs, 3 Refill(s), Pharmacy: [id number]
[id number], Take 2 mg Bumex 2015. Then 2 mg Bumex in AM, 1 mg Bumex in PM start 2015.

Special Instructions: Take 2 mg Bumex 2015.

Then 2 mg Bumex in AM, 1 mg Bumex in PM start 2015.

Start Date: 4/8/15

Status: Ordered



carvedilol 12.5 mg oral tablet

1 tabs, Oral, BID, # 180 tabs, 5 Refill(s), Pharmacy: [facility], 1 ta
bs Oral BID

Start Date: 2/17/15

Status: Ordered



clopidogrel 75 mg oral tablet

1 tabs, Oral, Daily, # 90 tabs, 3 Refill(s), Pharmacy: [facility], 1 t
abs Oral Daily

Start Date: 4/14/15

Status: Ordered



digoxin 125 mcg (0.125 mg) oral tablet

1 tabs, Oral, Daily, # 30 tabs, 2 Refill(s), Pharmacy: [id number]
0, 1 tabs Oral Daily

Start Date: 4/10/15

Status: Ordered



DULoxetine 20 mg oral delayed release capsule

1 caps, Oral, Daily, do not crush or chew, # 90 caps, 3 Refill(s), [facility], 1 caps Oral Daily,Instr:do not crush or chew

Special Instructions: do not crush or chew

Start Date: 2/24/15

Status: Ordered



DULoxetine 60 mg oral delayed release capsule

1 caps, Oral, Daily, do not crush or chew, # 90 caps, 3 Refill(s), Pharmacy: [facility], 1 caps Oral Daily,Instr:do not crush or chew

Special Instructions: do not crush or chew

Start Date: 2/24/15

Status: Ordered



glipiZIDE 5 mg oral tablet

1 tabs, Oral, BID, # 180 tabs, 1 Refill(s), Pharmacy: [facility], 1 ta
bs Oral BID

Start Date: 2/3/15

Status: Ordered



Lantus 100 units/mL subcutaneous solution

30 units, SubCutaneous, Bedtime (once a day), # 3 vials, 0 Refill(s), Pharmacy: 
[facility] [id number], 30 units SubCutaneous Bedtime (once a day),x90 days

Start Date: 2/17/15

Stop Date: 5/18/15

Status: Ordered



Melatonin

10 mg, Oral, Bedtime (once a day), as needed for insomnia, 0 Refill(s)

Start Date: 14

Status: Ordered



metFORMIN 500 mg oral tablet

2 tabs, Oral, BID, # 360 tabs, 1 Refill(s), Pharmacy: [facility], 2 ta
bs Oral BID,x90 days

Start Date: 2/3/15

Stop Date: 8/2/15

Status: Ordered



Multiple Vitamins oral tablet

1 tabs, Oral, Daily, 0 Refill(s)

Start Date: 14

Status: Ordered



omeprazole 20 mg oral delayed release capsule

1 caps, Oral, BID, # 180 caps, 1 Refill(s), [facility]acy: [facility], 1 ca
ps Oral BID,x90 days

Start Date: 2/3/15

Stop Date: 8/2/15

Status: Ordered



OTC Black Cohash

OTC Black Cohash, See Instructions, 1 tablet oral daily, 0 Refill(s)

Special Instructions: 1 tablet oral daily

Start Date: 2/3/15

Status: Ordered



OTC Calcium

OTC Calcium, See Instructions, 1 tablet oral daily, 0 Refill(s)

Special Instructions: 1 tablet oral daily

Start Date: 2/3/15

Status: Ordered



potassium chloride 20 mEq oral tablet, extended release

1 tabs, Oral, Bedtime (once a day), 0 Refill(s)

Start Date: 2/3/15

Status: Ordered



ProAir HFA 90 mcg/inh inhalation aerosol

2 puffs, Inhalation, QID, as needed for wheezing, # 3 Each, 3 Refill(s), Pharmac
y: [facility]

Start Date: 1/23/15

Status: Ordered



SUMAtriptan 50 mg oral tablet

1 tabs, Oral, Daily, as needed for migraine headache, may repeat dose after 2 ho
urs up to a maximum of 200 mg in 24 hours, # 9 tabs, 0 Refill(s), Pharmacy: [id number], 1 tabs Oral Daily,PRN:as needed for migraine headache,In
str:may rep...

Special Instructions: may repeat dose after 2 hours up to a maximum of 200 mg in
24 hours

Start Date: 3/11/15

Status: Ordered



Symbicort 160 mcg-4.5 mcg/inh inhalation aerosol

See Instructions, Inhale 2 puffs twice daily, # 10.2 g, 1 Refill(s), [facility], Inhale 2 puffs twice daily

Special Instructions: Inhale 2 puffs twice daily

Start Date: 3/27/15

Status: Ordered



valsartan 160 mg oral tablet

1 tabs, Oral, Daily, # 30 tabs, 5 Refill(s), Pharmacy: [id number]
0, 1 tabs Oral Daily

Start Date: 4/16/15

Status: Ordered



Vitamin D3

4000 unit, Oral, Daily, 0 Refill(s)

Start Date: 2/3/15

Status: Ordered



Wellbutrin  mg/12 hours oral tablet, extended release

2 tabs, Oral, Bedtime (once a day), 0 Refill(s)

Start Date: 14

Status: Ordered



Results





Chemistry





 



                           Most recent to            1



                                         oldest [Reference 



                                         Range]: 

 

 



                           Sodium Lvl [135-144       142 mEq/L



                           mEq/L]                    (4/16/15 2:00 PM)

 

 



                           Potassium Lvl             4.1 mEq/L



                           [3.5-5.2 mEq/L]           (4/16/15 2:00 PM)

 

 



                           Chloride [          95 mEq/L



                           mEq/L]                    *LOW*



                                         (4/16/15 2:00 PM)

 

 



                           CO2 [22-31 mEq/L]         36 mEq/L



                                         *HI*



                                         (4/16/15 2:00 PM)

 

 



                           AGAP [3-20]               11



                                         (4/16/15 2:00 PM)

 

 



                           BUN [10-20 mg/dL]         17 mg/dL



                                         (4/16/15 2:00 PM)

 

 



                           Glucose Lvl [70-99        240 mg/dL



                           mg/dL]                    *HI*



                                         (4/16/15 2:00 PM)

 

 



                           Creatinine Lvl            0.74 mg/dL



                           [0.57-1.11 mg/dL]         (4/16/15 2:00 PM)

 

 



                           eGFR [>60 mL/min]         >60 mL/min 1



                                         (4/16/15 2:00 PM)

 

 



                           Calcium Lvl               10.3 mg/dL



                           [8.9-10.5 mg/dL]          (4/16/15 2:00 PM)

 

 



                           BNP [0-99 pg/mL]          28 pg/mL



                                         (4/16/15 2:00 PM)







1Result Comment: Multiply eGFR results by 1.21 for  race.



Immunizations







  



                     Vaccine             Date                Refusal Reason

 

  



                           tetanus-diphth toxoids (Td) adult/adol     00 







Procedures







   



                 Procedure       Date            Related Diagnosis     Body Site

 

   



                           L knee PMM/ part. synovectomy     13  

 

   



                           lt median nerve decompression     5/10/11  

 

   



                           Tubal ligation            1979  

 

   



                                         Carpal tunnel release rt   

 

   



                                         Cholecystectomy   

 

   



                                         Hysterectomy   

 

   



                                         Hysterectomy and bilateral   



                                         salpingo-oophorectomy sample   

 

   



                                         Laminectomy   







Social History







 



                           Social History Type       Response

 

 



                           Smoking Status            Former smoker; Type: Cigarettes







Assessment and Plan

Extracted from:





  



                     Title: Office Visit Note     Author: Linda Kruger MD     Date: 4/16/15









                                         Assessment/Plan

 Benign essential HTN  Improved. Continue current meds. Switch Valsartan-HCTZ 
to Valsartan 160 mg daily.

 CAD (coronary artery disease)  Non angina pectoris. Continue aspirin, Plavix,
Coreg, Atorvastatin.

 Chronic combined systolic and diastolic CHF (congestive heart failure)  LVEF 
30-35%, LBBB, NYHA FC III. Improving but still volume overloaded. Continue 
Bumex 2 mg AM, 1 mg PM. Continue Coreg. Switch Valsartan-HCTZ to Valsartan 160 
mg daily. Will recheck BNP, BMP, and CXR today. Will refer to Dr. Jerry for
Biv-ICD.

 Hyperlipidemia  Continue Atorvastatin.

 Old non-ST elevation myocardial infarction (NSTEMI)







Referrals to Other Providers





Referred by: Linda Kruger MD



Biv-ICD implantation, referred to: JIM Jerry MD

Referred by: Linda Kruger MD

## 2019-07-31 NOTE — XMS REPORT
Transition of Care/Referral Summary

                             Created on: 05/10/2117



BILLDYLAN EDITH

External Reference #: [id number]

: 1960

Sex: Female



Demographics







                          Address                   [address]

[place]  [address]

 

                          Home Phone                [phone]

 

                          Preferred Language        English

 

                          Marital Status            

 

                          Ad[employer]ist Affiliation     Restoration

 

                          Race                      White

 

                          Ethnic Group              Not  or 





Author







                          Author                    [facility]

 

                          Organization              [facility]

 

                          Address                   Unknown

 

                          Phone                     Unavailable







Care Team Providers







                    Care Team Member Name    Role                Phone

 

                    Dhiraj Mijares    PCP                 [phone]







Encounter





VC FIN [id number] Date(s): 16 - 16

[facility] [address], [place] [address] [phone]

Discharge Diagnosis: Chronic systolic CHF (congestive heart failure)

Discharge Diagnosis: Nonischemic cardiomyopathy

Discharge Diagnosis: Benign essential HTN

Discharge Diagnosis: Biventricular ICD (implantable cardioverter-defibrillator) 
in place

Discharge Diagnosis: CAD (coronary artery disease)

Discharge Disposition: 01-Home or Self Care

Attending Physician: Linda Kruger MD

Admitting Physician: Linda Kruger MD

Referring Physician: Dhiraj Mijares MD





Vital Signs







 



                           Most recent to            1



                                         oldest [Reference 



                                         Range]: 

 

 



                           Peripheral Pulse          96 bpm



                           Rate [ bpm]         (16 2:17 PM)

 

 



                           Blood Pressure            164/90 mmHg



                           [/60-90 mmHg]       *HI*



                                         (16 2:17 PM)







Problem List







    



              Condition     Effective Dates     Status       Health Status     Informant

 

    



                           Acquired                  Active  



                                         spondylolisthesis(Co    



                                         nfirmed)    

 

    



                           Acute                     Active  



                                         pain(Confirmed)    

 

    



                           Arthritis(Confirmed)      Resolved  

 

    



                           Right Carpal tunnel       Resolved  



                                         release(Confirmed)    

 

    



                           Carpal tunnel             Resolved  



                                         release    



                                         decompression(Confir    



                                         med)    

 

    



                           Cholecystectomy(Conf      Resolved  



                                         irmed)    

 

    



                     CHF (congestive      Active              patient



                                         heart    



                                         failure)(Confirmed)    

 

    



                           CAD (coronary artery      Active  



                                         disease)(Confirmed)    

 

    



                           Decompression(Confir      Resolved  



                                         med)    

 

    



                           Depression(Confirmed      Resolved  



                                         )    

 

    



                           Depression(Confirmed      Active  



                                         )    

 

    



                           Diabetes(Confirmed)       Active  

 

    



                           Diabetes(Confirmed)       Resolved  

 

    



                           GERD                      Active  



                                         (gastroesophageal    



                                         reflux    



                                         disease)(Confirmed)    

 

    



                           Ischemic                  Active  



                                         cardiomyopathy(Confi    



                                         rmed)    

 

    



                           Presence of               Active  



                                         biventricular    



                                         AICD(Confirmed)    

 

    



                           History of lumbar         Active  



                                         fusion(Confirmed)    

 

    



                           Hyperlipidemia(Confi      Resolved  



                                         rmed)    

 

    



                           Hypertension(Confirm      Resolved  



                                         ed)    

 

    



                           Hypertension(Confirm      Resolved  



                                         ed)    

 

    



                           HTN                       Active  



                                         (hypertension)(Confi    



                                         rmed)    

 

    



                           Hysterectomy(Confirm      Resolved  



                                         ed)    

 

    



                           Impaired gas              Active  



                                         exchange(Confirmed)1    

 

    



                           Irritable                 Resolved  



                                         bowel(Confirmed)    

 

    



                           Laminectomy(Confirme      Resolved  



                                         d)    

 

    



                           LBBB (left bundle         Active  



                                         branch    



                                         block)(Confirmed)    

 

    



                           LUQ pain(Confirmed)       Active  

 

    



                           Migraine(Confirmed)       Resolved  

 

    



                           Asthma(Confirmed)         Active  

 

    



                     Morbid              Active              patient



                                         obesity(Confirmed)    

 

    



                           Myocardial                Resolved  



                                         infarction(Confirmed    



                                         )    

 

    



                           Septicemia                Resolved  



                                         pneumonia(Confirmed)    

 

    



                     L knee              13              Resolved  



                                         Synovectomy(Confirme    



                                         d)    

 

    



                           Tissue perfusion          Active  



                                         alteration(Confirmed    



                                         )2    

 

    



                     Tobacco             Active              patient



                                         user(Confirmed)    

 

    



                     Tubal               79              Resolved  



                                         ligation(Confirmed)    







1Problem added automatically by system based on initiation of Impaired Gas 
Exchange Plan of Care



2Problem added automatically by system based on initiation of Tissue Perfusion 
Cerebral Plan of Care



Allergies, Adverse Reactions, Alerts







   



                 Substance       Reaction        Severity        Status

 

   



                     barium sulfate      Adverse Reaction      Active



                                         Swelling, hives  

 

   



                     celecoxib           HIVES               Active

 

   



                           influenza virus vaccine,       Active



                                         inactivated   

 

   



                     morphine            Adverse Reaction      Active

 

   



                     sulfamethoxazole     HIVES               Active



                                         Urticaria (hives)  







Medications





acetaminophen

1,000 mg, Oral, q6hr, as needed for pain, 0 Refill(s)

Start Date: 14

Status: Ordered



albuterol 2.5 mg/3 mL (0.083%) inhalation solution

3 mL, Inhalation, BID, Dx: Asthma, # 25 Each, 3 Refill(s), Pharmacy: [id number], 3 mL Inhalation BID,Instr:Dx: Asthma

Start Date: 4/21/15

Status: Ordered



amLODIPine 10 mg oral tablet

10 mg 1 tabs, Oral, Daily, # 90 tabs, 5 Refill(s), Pharmacy: [facility]
E, 1 tabs Oral Daily

Start Date: 16

Status: Ordered



Aspir 81

81 mg, Oral, Daily, 0 Refill(s)

Start Date: 14

Status: Ordered



atorvastatin 10 mg oral tablet

10 mg 1 tabs, Oral, Daily, # 90 tabs, 5 Refill(s), Pharmacy: [facility]
E, 1 tabs Oral Daily

Start Date: 16

Status: Ordered



bumetanide 1 mg oral tablet

See Instructions, Take 2 mg of Bumex in AM and 1 mg PM., # 300 tabs, 5 Refill(s)
, Pharmacy: [facility], Take 2 mg of Bumex in AM and 1 mg PM.

Start Date: 9/22/15

Status: Ordered



buPROPion 150 mg/12 hours (SR) oral tablet, extended release

See Instructions, Take 1 tablet by mouth two  times daily, # 180 tabs, 2 Refill(
s), eRx: [facility], Take 1 tablet by mouth two  times daily

Start Date: 3/2/16

Status: Ordered



carvedilol 25 mg oral tablet

25 mg 1 tabs, Oral, BID, # 180 tabs, 5 Refill(s), Pharmacy: [facility]
, 1 tabs Oral BID

Start Date: 9/22/15

Status: Ordered



cloNIDine 0.1 mg oral tablet

See Instructions, Take 1 tablet by mouth  every evening, # 90 tabs, 1 Refill(s),
eRx: [facility], Take 1 tablet by mouth  every evening

Start Date: 16

Status: Ordered



clopidogrel 75 mg oral tablet

See Instructions, Take 1 tablet by mouth  daily, # 90 tabs, 2 Refill(s), [facility]: [facility], Take 1 tablet by mouth  daily

Start Date: 16

Status: Ordered



digoxin 125 mcg (0.125 mg) oral tablet

125 mcg 1 tabs, Oral, Daily, # 90 tabs, 5 Refill(s), [facility]: [facility], 1 tabs Oral Daily

Start Date: 9/22/15

Status: Ordered



DULoxetine 20 mg oral delayed release capsule

See Instructions, Take 1 capsule by mouth  daily . Do not crush or  chew, # 90 c
aps, eRx: [facility], Take 1 capsule by mouth  daily . Do not crush or
 chew

Start Date: 16

Status: Ordered



DULoxetine 60 mg oral delayed release capsule

60 mg 1 caps, Oral, Daily, takes along with 1 capsule of 20mg, 0 Refill(s)

Start Date: 16

Status: Ordered



glipiZIDE 5 mg oral tablet

See Instructions, Take 1 tablet by mouth two  times daily, # 180 tabs, [facility]: [facility], Take 1 tablet by mouth two  times daily

Start Date: 5/3/16

Status: Ordered



Insulin Syringe (DME)

DME Item use to inject insulin once daily     dx: E11.9, See Instructions, # 90 
Each, 4 Refill(s), Pharmacy: [facility] [id number], use to inject insulin o
nce daily     dx: E11.9, Supply

Start Date: 11/10/15

Status: Ordered



Lantus 100 units/mL subcutaneous solution

See Instructions, Inject subcutaneously 30  units every night at  bedtime once a
day, # 30 mL, 1 Refill(s), eRx: [facility], Inject subcutaneously 30  
units every night at  bedtime once a day

Start Date: 16

Status: Ordered



Melatonin

10 mg, Oral, Bedtime (once a day), as needed for insomnia, 0 Refill(s)

Start Date: 14

Status: Ordered



metFORMIN 500 mg oral tablet

See Instructions, Take 2 tablets by mouth  twice a day, # 360 tabs, [facility]: [facility], Take 2 tablets by mouth  twice a day

Start Date: 2/3/16

Status: Ordered



Norco 10 mg-325 mg oral tablet

1 tabs, Oral, q6hr, as needed for pain, # 30 tabs, 0 Refill(s)

Start Date: 16

Status: Ordered



potassium chloride 20 mEq oral tablet, extended release

See Instructions, Take 1 tablet by mouth  daily, # 90 tabs, [facility]: [facility], Take 1 tablet by mouth  daily

Start Date: 16

Status: Ordered



ProAir HFA 90 mcg/inh inhalation aerosol

2 puffs, Inhalation, QID, as needed for wheezing, # 3 Each, 3 Refill(s), Pharmac
y: [facility]

Start Date: 1/23/15

Status: Ordered



RABEprazole 20 mg oral delayed release tablet

See Instructions, Take 2 tablets by mouth  daily, # 180 tabs, [facility]: [facility], Take 2 tablets by mouth  daily

Start Date: 16

Status: Ordered



SUMAtriptan 50 mg oral tablet

50 mg 1 tabs, Oral, Daily, as needed for migraine headache, may repeat dose afte
r 2 hours up to a maximum of 200 mg in 24 hours, # 9 tabs, 1 Refill(s), Pharmacy
: [facility] [id number], 1 tabs Oral Daily,PRN:as needed for migraine heada
godwin,Instr:m...

Start Date: 16

Status: Ordered



Symbicort 160 mcg-4.5 mcg/inh inhalation aerosol

See Instructions, Use 2 puffs twice daily, # 30.6 g, 2 Refill(s), [facility]: [facility], Use 2 puffs twice daily

Start Date: 16

Status: Ordered



valsartan 160 mg oral tablet

See Instructions, 1 TABS ORAL DAILY, # 30 tabs, 1 Refill(s), [facility]: [id number], 1 TABS ORAL DAILY

Start Date: 10/12/15

Status: Ordered



Results





No data available for this section



Immunizations







  



                     Vaccine             Date                Refusal Reason

 

  



                           tetanus-diphth toxoids (Td) adult/adol     00 







Procedures







   



                 Procedure       Date            Related Diagnosis     Body Site

 

   



                           L knee PMM/ part. synovectomy     13  

 

   



                           lt median nerve decompression     5/10/11  

 

   



                           Tubal ligation            1979  

 

   



                                         Carpal tunnel release rt   

 

   



                                         Cholecystectomy   

 

   



                                         Hysterectomy   

 

   



                                         Hysterectomy and bilateral   



                                         salpingo-oophorectomy sample   

 

   



                                         Laminectomy   

 

   



                                         Pacemaker   







Social History







 



                           Social History Type       Response

 

 



                           Smoking Status            Former smoker; Type: Cigarettes1







1Quit around 



Assessment and Plan

Extracted from:





  



                     Title: Ambulatory Patient Education     Author: Linda Kruger MD     Date:

 16









                                        Cardiovascular

Atherosclerosis

Atherosclerosis, or hardening of the arteries, is the buildup of plaque within 
the major arteries in the body. Plaque is made up of fats (lipids), cholesterol,
calcium, and fibrous tissue. Plaque can narrow or block blood flow within an 
artery. Plaque can break off and cause damage to the affected organ. Plaque can 
also "rupture." When plaque ruptures within an artery, a clot can form, causing 
a sudden (acute) blockage of the artery. Untreated atherosclerosis can cause 
serious health problems or death.





RISK FACTORS



High cholesterol levels. 



Smoking.



Obesity.



Lack of activity or exercise.



Eating a diet high in saturated fat. 



Family history.



Diabetes.



SIGNS AND SYMPTOMS

Symptoms of atherosclerosis can occur when blood flow to an artery is slowed or 
blocked. Severity and onset of symptoms depends on how extensive the narrowing 
or blockage is. A sudden plaque rupture can bring immediate, life-threatening 
symptoms. Atherosclerosis can affect different arteries in the body, for 
example:



Coronary arteries. The coronary arteries supply the heart with blood. When 
the coronary arteries are narrowed or blocked from atherosclerosis, this is 
known as coronary artery disease (CAD). CAD can cause a heart attack. Common 
heart attack symptoms include:



Chest pain or pain that radiates to the neck, arm, jaw, or in the upper, 
middle back (mid-scapular pain).



Shortness of breath without cause. 



Profuse sweating while at rest. 



Irregular heartbeats. 



Nausea or gastrointestinal upset. 



Carotid arteries. The carotid arteries supply the brain with blood. They 
are located on each side of your neck. When blood flow to these arteries is 
slowed or blocked, a transient ischemic attack (TIA) or stroke can occur. A TIA 
is considered a "mini-stroke" or "warning stroke." TIA symptoms are the same as 
stroke symptoms, but they are temporary and last less than 24 hours. A stroke 
can cause permanent damage or death. Common TIA and stroke symptoms include:



Sudden numbness or weakness to one side of your body, such as the face, 
arm, or leg. 



Sudden confusion or trouble speaking or understanding. 



Sudden trouble seeing out of one or both eyes.



Sudden trouble walking, loss of balance, or dizziness. 



Sudden, severe headache with no known cause. 



Arteries in the legs. When arteries in the lower legs become narrowed or 
blocked, this is known as peripheral vascular disease (PVD). PVD can cause a 
symptom called claudication. Claudication is pain or a burning feeling in your 
legs when walking or exercising and usually goes away with rest. Very severe PVD
can cause pain in your legs while at rest. 



Renal arteries. The renal arteries supply the kidneys with blood. Blockage 
of the renal arteries can cause a decline in kidney function or high blood 
pressure (hypertension). 



Gastrointestinal arteries (mesenteric circulation). Abdominal pain may 
occur after eating. 



DIAGNOSIS

Your health care provider may perform the following tests to diagnose 
atherosclerosis:



Blood tests.



Stress test. 



Echocardiogram. 



Nuclear scan. 



Ankle/brachial index. 



Ultrasonography. 



Computed tomography (CT) scan. 



Angiography. 



TREATMENT

Atherosclerosis treatment includes the following:



Lifestyle changes such as:



Quitting smoking. Your health care provider can help you with smoking 
cessation. 



Eating a diet low in saturated fat. A registered dietitian can educate you 
on healthy food options, such as helping you understand the difference between 
good fat and bad fat. 



Following an exercise program approved by your health care provider. 



Maintaining a healthy weight. Lose weight as approved by your health care 
provider. 



Have your cholesterol levels checked as directed by your health care 
provider. 



Medicines. Cholesterol medicines can help slow or stop the progression of 
atherosclerosis. 



Different procedural or surgical interventions to treat atherosclerosis 
include:



Balloon angioplasty. The technical name for balloon angioplasty is 
percutaneous transluminal angioplasty (PTA). In this procedure, a catheter with 
a small balloon at the tip is inserted through the blocked or narrowed artery. 
The balloon is then inflated. When the balloon is inflated, the fatty plaque is 
compressed against the artery wall, allowing better blood flow within the 
artery.



Balloon angioplasty and stenting. In this procedure, balloon angioplasty is
combined with a stenting procedure. A stent is a small, metal mesh tube that 
keeps the artery open. After the artery is opened up by the balloon technique, 
the stent is then deployed. The stent is permanent. 



Open heart surgery or bypass surgery. To perform this type of surgery, a 
healthy vessel is first "harvested" from either the leg or arm. The harvested 
vessel is then used to "bypass" the blocked atherosclerotic vessel so new blood 
flow can be established. 



Atherectomy. Atherectomy is a procedure that uses a catheter with a sharp 
blade to remove plaque from an artery. A chamber in the catheter collects the 
plaque. 



Endarterectomy. An endarterectomy is a surgical procedure where a surgeon 
removes plaque from an artery.



Amputation. When blockages in the lower legs are very severe and 
circulation cannot be restored, amputation may be required. 



SEEK IMMEDIATE MEDICAL CARE IF:



You are having heart attack symptoms, such as:



Chest pain or pain that radiates to the neck, arm, jaw, or in the upper, 
middle back (mid-scapular pain).



Shortness of breath without cause. 



Profuse sweating while at rest. 



Irregular heartbeats. 



Nausea or gastrointestinal upset. 



You are having stroke symptoms, such as sudden:



Numbness or weakness to one side of your body, such as the face, arm, or 
leg. 



Confusion or trouble speaking or understanding. 



Trouble seeing out of one or both eyes.



Trouble walking, loss of balance, or dizziness. 



Severe headache with no known cause. 



Your hands or feet are bluish, cold, or you have pain in them.



You have bad abdominal pain after eating. 

Symptoms of heart attack or stroke may represent a serious problem that is an 
emergency. Do not wait to see if the symptoms will go away. Get medical help 
right away. Call your local emergency services (911 in the [place] Do not drive 
yourself to the hospital.

This information is not intended to replace advice given to you by your health 
care provider. Make sure you discuss any questions you have with your health 
care provider.



Document Released: 2005 Document Revised: 2016 Document Reviewed: 
2013

[facility] Patient Information 2016 [employer].





No follow up information was provided.





Extracted from:





  



                     Title: Office Visit Note     Author: Linda Kruger MD     Date: 16









                                         Assessment/Plan

 1.Chronic systolic CHF (congestive heart failure)    Euvolemic on the exam.
LVEF 50% in 10/2015. S/p BiV ICD in 2015. Continue Bumex, Valsartan, Coreg and
Digoxin. Recheck echo to assess LVEF. I anticipate her LVEF to be normalized
since elevated BP.

 2.Nonischemic cardiomyopathy    LVEF improved to 50%. See above.

 3.CAD (coronary artery disease)  No angina.  Continue aspirin, Plavix, 
statin, Coreg.

 4.Biventricular ICD (implantable cardioverter-defibrillator) in place

 5.Benign essential HTN  Elevated. Increase Amlodipine to 10 mg daily.







Referrals to Other Providers





Referred by: Linda Kruger MD

## 2019-07-31 NOTE — XMS REPORT
Transition of Care/Referral Summary

                             Created on: 2092



BILL DYLAN EDITH

External Reference #: [id number]

: 1960

Sex: Female



Demographics







                          Address                   [address]

[place]  [address]

 

                          Home Phone                [phone]

 

                          Preferred Language        English

 

                          Marital Status            

 

                          Congregational Affiliation     Islam

 

                          Race                      White

 

                          Ethnic Group              Not  or 





Author







                          Author                    [facility]

 

                          Organization              [facility]

 

                          Address                   Unknown

 

                          Phone                     Unavailable







Care Team Providers







                    Care Team Member Name    Role                Phone

 

                    Dhiraj Mijares    PCP                 [phone]







Encounter





VC FIN [id number] Date(s): 17 - 17

[facility] [address] [place], [place] [address] [phone]

Discharge Diagnosis: CAD (coronary artery disease)

Discharge Diagnosis: Presence of biventricular AICD

Discharge Diagnosis: Chronic systolic heart failure

Discharge Diagnosis: NICM (nonischemic cardiomyopathy)

Discharge Diagnosis: HTN (hypertension)

Discharge Disposition: 01-Home or Self Care

Attending Physician: Linda Kruger MD

Admitting Physician: Linda Kruger MD





Vital Signs







 



                           Most recent to            1



                                         oldest [Reference 



                                         Range]: 

 

 



                           Peripheral Pulse          96 bpm



                           Rate [ bpm]         (17 3:08 PM)

 

 



                           Blood Pressure            134/78 mmHg



                           [/60-90 mmHg]       (17 3:08 PM)







Problem List







    



              Condition     Effective Dates     Status       Health Status     Informant

 

    



                           Acquired                  Active  



                                         spondylolisthesis(Co    



                                         nfirmed)    

 

    



                           Acute                     Active  



                                         pain(Confirmed)    

 

    



                           Hay fever(Confirmed)      Active  

 

    



                           Arthritis(Confirmed)      Resolved  

 

    



                           Right Carpal tunnel       Resolved  



                                         release(Confirmed)    

 

    



                           Carpal tunnel             Resolved  



                                         release    



                                         decompression(Confir    



                                         med)    

 

    



                           Cholecystectomy(Conf      Resolved  



                                         irmed)    

 

    



                           IBS (irritable bowel      Active  



                                         syndrome)(Confirmed)    

 

    



                     CHF (congestive      Active              patient



                                         heart    



                                         failure)(Confirmed)    

 

    



                           CAD (coronary artery      Active  



                                         disease)(Confirmed)    

 

    



                           Decompression(Confir      Resolved  



                                         med)    

 

    



                           Depression(Confirmed      Resolved  



                                         )    

 

    



                           Depression(Confirmed      Active  



                                         )    

 

    



                           Diabetes(Confirmed)       Active  

 

    



                           Diabetes(Confirmed)       Resolved  

 

    



                           GERD                      Active  



                                         (gastroesophageal    



                                         reflux    



                                         disease)(Confirmed)    

 

    



                           Ischemic                  Active  



                                         cardiomyopathy(Confi    



                                         rmed)    

 

    



                           Presence of               Active  



                                         biventricular    



                                         AICD(Confirmed)    

 

    



                           History of lumbar         Active  



                                         fusion(Confirmed)    

 

    



                           Hyperlipidemia(Confi      Resolved  



                                         rmed)    

 

    



                           Asthma(Confirmed)         Active  

 

    



                           Hypertension(Confirm      Resolved  



                                         ed)    

 

    



                           Hypertension(Confirm      Resolved  



                                         ed)    

 

    



                           HTN                       Active  



                                         (hypertension)(Confi    



                                         rmed)    

 

    



                           Hysterectomy(Confirm      Resolved  



                                         ed)    

 

    



                           Impaired gas              Active  



                                         exchange(Confirmed)1    

 

    



                           Irritable                 Resolved  



                                         bowel(Confirmed)    

 

    



                           Laminectomy(Confirme      Resolved  



                                         d)    

 

    



                           LBBB (left bundle         Active  



                                         branch    



                                         block)(Confirmed)    

 

    



                           LUQ pain(Confirmed)       Active  

 

    



                           Migraine(Confirmed)       Resolved  

 

    



                           Asthma(Confirmed)         Active  

 

    



                     Morbid              Active              patient



                                         obesity(Confirmed)    

 

    



                           Myocardial                Resolved  



                                         infarction(Confirmed    



                                         )    

 

    



                           Septicemia                Resolved  



                                         pneumonia(Confirmed)    

 

    



                     L knee              13              Resolved  



                                         Synovectomy(Confirme    



                                         d)    

 

    



                           Tissue perfusion          Active  



                                         alteration(Confirmed    



                                         )2    

 

    



                     Tobacco             Active              patient



                                         user(Confirmed)    

 

    



                     Tubal               79              Resolved  



                                         ligation(Confirmed)    

 

    



                           UTI (urinary tract        Active  



                                         infection)(Confirmed    



                                         )    

 

    



                           Chicken                   Active  



                                         pox(Confirmed)    







1Problem added automatically by system based on initiation of Impaired Gas 
Exchange Plan of Care



2Problem added automatically by system based on initiation of Tissue Perfusion 
Cerebral Plan of Care



Allergies, Adverse Reactions, Alerts







   



                 Substance       Reaction        Severity        Status

 

   



                     barium sulfate      Swelling, hives      Active

 

   



                     celecoxib           HIVES               Active

 

   



                     influenza virus vaccine,     Convulsion          Active



                                         inactivated   

 

   



                     morphine            Burning             Active

 

   



                     sulfamethoxazole     HIVES               Active



                                         Urticaria (hives)  







Medications





acetaminophen

1,000 mg, Oral, q6hr, as needed for pain, 0 Refill(s)

Start Date: 14

Status: Ordered



albuterol 2.5 mg/3 mL (0.083%) inhalation solution

2.5 mg 3 mL, Inhalation, q6hr (scheduled), # 25 Each, 0 Refill(s)

Start Date: 4/3/17

Status: Ordered



Ambien 10 mg oral tablet

10 mg 1 tabs, Oral, Bedtime (once a day), as needed for sleep, X 30 days, # 30 t
abs, 2 Refill(s)

Start Date: 17

Stop Date: 17

Status: Ordered



amLODIPine 10 mg oral tablet

10 mg 1 tabs, Oral, Daily, # 90 tabs, 5 Refill(s), [facility], 1 tabs Oral Daily

Start Date: 16

Status: Ordered



Aspir 81

81 mg, Oral, Daily, 0 Refill(s)

Start Date: 14

Status: Ordered



atorvastatin 10 mg oral tablet

See Instructions, Take 1 tablet by mouth  daily, # 90 tabs, 1 Refill(s), [facility]Rx: [facility]

Start Date: 17

Status: Ordered



basaglar

basaglar, See Instructions, inject 30 units at bedtime daily, # 1 boxes, 3 Refil
l(s), Pharmacy: [id number], inject 30 units at bedtime daily

Start Date: 17

Status: Ordered



bumetanide 1 mg oral tablet

See Instructions, Take 2 tablets by mouth in  the morning and 1 tablet by mouth 
in the evening, # 270 tabs, eRx: [facility]

Start Date: 17

Status: Ordered



buPROPion 150 mg/12 hours (SR) oral tablet, extended release

See Instructions, Take 1 tablet by mouth two  times daily, # 180 tabs, 1 Refill(
s), eRx: [facility], Take 1 tablet by mouth two  times daily

Start Date: 10/3/16

Status: Ordered



carvedilol 25 mg oral tablet

See Instructions, Take 1 tablet by mouth two  times daily, # 180 tabs, [facility]: [facility]

Start Date: 17

Status: Ordered



cloNIDine 0.1 mg oral tablet

See Instructions, Take 1 tablet by mouth  every evening, # 90 tabs, [facility]: [facility]

Start Date: 17

Status: Ordered



clopidogrel 75 mg oral tablet

See Instructions, Take 1 tablet by mouth  daily, # 90 tabs, [facility]: [facility]

Start Date: 17

Status: Ordered



digoxin 125 mcg (0.125 mg) oral tablet

See Instructions, Take 1 tablet by mouth  daily, # 90 tabs, [facility]: [facility]

Start Date: 17

Status: Ordered



DULoxetine 60 mg oral delayed release capsule

See Instructions, Take 1 capsule by mouth  daily . Do not crush or  chew., # 90 
caps, 1 Refill(s), eRx: [facility]

Start Date: 17

Status: Ordered



Glucometer Lancets (DME)

DME Item one touch delica lancets  use to test bg x3/daily  dx:e11.65, See Instr
uctions, # 300 Each, 3 Refill(s), Pharmacy: [id number], one touc
h delica lancets; use to test bg x3/daily; dx:e11.65, Supply

Start Date: 3/13/17

Status: Ordered



glucosamine

Oral, 0 Refill(s)

Start Date: 17

Status: Ordered



HumaLOG 100 units/mL subcutaneous solution

See Instructions, 10 units SubCutaneous TIDAC, # 3 boxes, 2 Refill(s), Pharmacy:
[facility], 10 units SubCutaneous TIDAC

Start Date: 17

Status: Ordered



Insulin Pin Needles (DME)

DME Item 32g x4mm (jacob needles)  use to inject insulin   dx:e11.65, See Instruc
tions, # 100 Each, 3 Refill(s), Pharmacy: [id number], 32g x4mm (
jacob needles); use to inject insulin ; dx:e11.65, Supply

Start Date: 17

Status: Ordered



Insulin Syringe (DME)

DME Item use to inject insulin once daily     dx: E11.9, See Instructions, # 90 
Each, 4 Refill(s), Pharmacy: [id number], use to inject insulin o
nce daily     dx: E11.9, Supply

Start Date: 11/10/15

Status: Ordered



Melatonin

10 mg, Oral, Bedtime (once a day), as needed for insomnia, 0 Refill(s)

Start Date: 14

Status: Ordered



metFORMIN 500 mg oral tablet

1,000 mg 2 tabs, Oral, BID, # 360 tabs, 2 Refill(s), other reason (Rx)

Start Date: 17

Status: Ordered



mometasone 50 mcg/inh nasal spray

See Instructions, Use 2 sprays nasally daily, # 51 g, eRx: [facility]

Start Date: 17

Status: Ordered



Norco 10 mg-325 mg oral tablet

1 tabs, Oral, q6hr, as needed for pain, # 30 tabs, 0 Refill(s)

Start Date: 17

Status: Ordered



potassium chloride 20 mEq oral tablet, extended release

See Instructions, Take 1 tablet by mouth  daily, # 90 tabs, 1 Refill(s), [facility]Rx: [facility]

Start Date: 17

Status: Ordered



ProAir HFA 90 mcg/inh inhalation aerosol

See Instructions, Inhale 2 puffs 4 times  daily as needed for  wheezing, # 25.5 
g, 5 Refill(s), eRx: [facility]

Start Date: 17

Status: Ordered



RABEprazole 20 mg oral delayed release tablet

See Instructions, Take 2 tablets by mouth  daily, # 180 tabs, [facility]: [facility]

Start Date: 17

Status: Ordered



SUMAtriptan 50 mg oral tablet

50 mg 1 tabs, Oral, Daily, as needed for migraine headache, may repeat dose afte
r 2 hours up to a maximum of 200 mg in 24 hours, # 9 tabs, 1 Refill(s), Pharmacy
: [id number], 1 tabs Oral Daily,PRN:as needed for migraine heada
godwin,Instr:m...

Start Date: 2/15/17

Status: Ordered



Symbicort 160 mcg-4.5 mcg/inh inhalation aerosol

See Instructions, Use 2 puffs twice daily, # 30.6 g, 2 Refill(s), [facility]: [facility]

Start Date: 17

Status: Ordered



SYRNG   .5CC ULTII 31 G SRT

See Instructions, Use to Inject insulin once  daily, # 90 Each, 3 Refill(s), eRx
: [facility], Use to Inject insulin once  daily

Start Date: 16

Status: Ordered



SYRNG   .5CC ULTII 31 G SRT

See Instructions, Use to Inject insulin once  daily, # 90 Each, [facility]: [facility], Use to Inject insulin once  daily

Start Date: 17

Status: Ordered



valsartan 160 mg oral tablet

160 mg 1 tabs, Oral, BID, X 90 days, # 180 tabs, 3 Refill(s), [facility]: [facility], 1 tabs Oral BID,x90 days

Start Date: 4/3/17

Stop Date: 3/29/18

Status: Ordered



Results





No data available for this section



Immunizations





Given and Recorded





   



                 Vaccine         Date            Status          Refusal Reason

 

   



                     tetanus-diphth toxoids (Td) adult/adol     00             Given 







Procedures







   



                 Procedure       Date            Related Diagnosis     Body Site

 

   



                           L knee PMM/ part. synovectomy     13  

 

   



                           lt median nerve decompression     5/10/11  

 

   



                           Tubal ligation            1979  

 

   



                                         Carpal tunnel release rt   

 

   



                                         Cholecystectomy   

 

   



                                         Hysterectomy   

 

   



                                         Hysterectomy and bilateral   



                                         salpingo-oophorectomy sample   

 

   



                                         Laminectomy   

 

   



                                         Pacemaker   







Social History







 



                           Social History Type       Response

 

 



                           Smoking Status            Former smoker; Type: Cigarettes1







1Quit around 



Assessment and Plan

Extracted from:





  



                     Title: Ambulatory Patient Education     Author: Linda Kruger MD     Date:

 17









                                        Cardiovascular

Atherosclerosis

Atherosclerosis, or hardening of the arteries, is the buildup of plaque within 
the major arteries in the body. Plaque is made up of fats (lipids), cholesterol,
calcium, and fibrous tissue. Plaque can narrow or block blood flow within an 
artery. Plaque can break off and cause damage to the affected organ. Plaque can 
also "rupture." When plaque ruptures within an artery, a clot can form, causing 
a sudden (acute) blockage of the artery. Untreated atherosclerosis can cause 
serious health problems or death.

RISK FACTORS



High cholesterol levels.



Smoking.



Obesity.



Lack of activity or exercise.



Eating a diet high in saturated fat.



Family history.



Diabetes.

SIGNS AND SYMPTOMS

Symptoms of atherosclerosis can occur when blood flow to an artery is slowed or 
blocked. Severity and onset of symptoms depends on how extensive the narrowing 
or blockage is. A sudden plaque rupture can bring immediate, life-threatening 
symptoms. Atherosclerosis can affect different arteries in the body, for 
example:



Coronary arteries. The coronary arteries supply the heart with blood. When the 
coronary arteries are narrowed or blocked from atherosclerosis, this is known as
coronary artery disease (CAD). CAD can cause a heart attack. Common heart attack
symptoms include:



Chest pain or pain that radiates to the neck, arm, jaw, or in the upper, middle
back (mid-scapular pain).



Shortness of breath without cause.



Profuse sweating while at rest.



Irregular heartbeats.



Nausea or gastrointestinal upset.



Carotid arteries. The carotid arteries supply the brain with blood. They are 
located on each side of your neck. When blood flow to these arteries is slowed 
or blocked, a transient ischemic attack (TIA) or stroke can occur. A TIA is 
considered a "mini-stroke" or "warning stroke." TIA symptoms are the same as 
stroke symptoms, but they are temporary and last less than 24 hours. A stroke 
can cause permanent damage or death. Common TIA and stroke symptoms include:



Sudden numbness or weakness to one side of your body, such as the face, arm, or
leg.



Sudden confusion or trouble speaking or understanding.



Sudden trouble seeing out of one or both eyes.



Sudden trouble walking, loss of balance, or dizziness.



Sudden, severe headache with no known cause.



Arteries in the legs. When arteries in the lower legs become narrowed or 
blocked, this is known as peripheral vascular disease (PVD). PVD can cause a 
symptom called claudication. Claudication is pain or a burning feeling in your 
legs when walking or exercising and usually goes away with rest. Very severe PVD
can cause pain in your legs while at rest.



Renal arteries. The renal arteries supply the kidneys with blood. Blockage of 
the renal arteries can cause a decline in kidney function or high blood pressure
(hypertension).



Gastrointestinal arteries (mesenteric circulation). Abdominal pain may occur 
after eating.

DIAGNOSIS

Your health care provider may perform the following tests to diagnose 
atherosclerosis:



Blood tests.



Stress test.



Echocardiogram.



Nuclear scan.



Ankle/brachial index.



Ultrasonography.



Computed tomography (CT) scan.



Angiography.

TREATMENT

Atherosclerosis treatment includes the following:



Lifestyle changes such as:



Quitting smoking. Your health care provider can help you with smoking 
cessation.



Eating a diet low in saturated fat. A registered dietitian can educate you on 
healthy food options, such as helping you understand the difference between good
fat and bad fat.



Following an exercise program approved by your health care provider.



Maintaining a healthy weight. Lose weight as approved by your health care 
provider.



Have your cholesterol levels checked as directed by your health care provider.



Medicines. Cholesterol medicines can help slow or stop the progression of 
atherosclerosis.



Different procedural or surgical interventions to treat atherosclerosis 
include:



Balloon angioplasty. The technical name for balloon angioplasty is percutaneous
transluminal angioplasty (PTA). In this procedure, a catheter with a small 
balloon at the tip is inserted through the blocked or narrowed artery. The 
balloon is then inflated. When the balloon is inflated, the fatty plaque is 
compressed against the artery wall, allowing better blood flow within the 
artery.



Balloon angioplasty and stenting. In this procedure, balloon angioplasty is 
combined with a stenting procedure. A stent is a small, metal mesh tube that 
keeps the artery open. After the artery is opened up by the balloon technique, 
the stent is then deployed. The stent is permanent.



Open heart surgery or bypass surgery. To perform this type of surgery, a 
healthy vessel is first "harvested" from either the leg or arm. The harvested 
vessel is then used to "bypass" the blocked atherosclerotic vessel so new blood 
flow can be established.



Atherectomy. Atherectomy is a procedure that uses a catheter with a sharp blade
to remove plaque from an artery. A chamber in the catheter collects the plaque.



Endarterectomy. An endarterectomy is a surgical procedure where a surgeon 
removes plaque from an artery.



Amputation. When blockages in the lower legs are very severe and circulation 
cannot be restored, amputation may be required.

SEEK IMMEDIATE MEDICAL CARE IF:



You are having heart attack symptoms, such as:



Chest pain or pain that radiates to the neck, arm, jaw, or in the upper, middle
back (mid-scapular pain).



Shortness of breath without cause.



Profuse sweating while at rest.



Irregular heartbeats.



Nausea or gastrointestinal upset.



You are having stroke symptoms, such as sudden:



Numbness or weakness to one side of your body, such as the face, arm, or leg.



Confusion or trouble speaking or understanding.



Trouble seeing out of one or both eyes.



Trouble walking, loss of balance, or dizziness.



Severe headache with no known cause.



Your hands or feet are bluish, cold, or you have pain in them.



You have bad abdominal pain after eating.

Symptoms of heart attack or stroke may represent a serious problem that is an 
emergency. Do not wait to see if the symptoms will go away. Get medical help 
right away. Call your local emergency services (911 in the [place] Do not drive 
yourself to the hospital.

This information is not intended to replace advice given to you by your health 
care provider. Make sure you discuss any questions you have with your health 
care provider.

Document Released: 2005 Document Revised: 2016 Document Reviewed: 
2016

[employer] Patient Education 2016 [employer]





No follow up information was provided.





Extracted from:





  



                     Title: Office Visit Note     Author: Linda Kruger MD     Date: 17









                                         Assessment/Plan 

      1.Chronic systolic heart failure 

    Normalized LVEF per echo in 2016. Euvolemic on the exam. Continue Coreg,
Valsartan.  

  2.NICM (nonischemic cardiomyopathy) 

    See #1  

  3.CAD (coronary artery disease) 

    No angina. Continue aspirin, Plavix, statin, Coreg.  

  4.Presence of biventricular AICD   

  5.HTN (hypertension) 

    Controlled. Continue current meds.

  

    F/u with Dr. Lagos in 6 months        







Referrals to Other Providers



, F/u with Dr. Lagos 6 months

Referred by: Linda Kruger MD

## 2019-07-31 NOTE — XMS REPORT
Transition of Care/Referral Summary

                             Created on: 2056



BILLDYLAN EDITH

External Reference #: [id number]

: 1960

Sex: Female



Demographics







                          Address                   [address]

[place]  [address]

 

                          Home Phone                [phone]

 

                          Preferred Language        English

 

                          Marital Status            

 

                          Anabaptism Affiliation     Pentecostalism

 

                          Race                      White

 

                                        Additional Race(s)  

 

                          Ethnic Group              Not  or 





Author







                          Author                    [facility]

 

                          Organization              [facility]

 

                          Address                   Unknown

 

                          Phone                     Unavailable







Care Team Providers







                    Care Team Member Name    Role                Phone

 

                    Dhiraj Mijares    PCP                 [phone]







Encounter





VC FIN [id number] Date(s): 18 - 18

[facility] [address], [place] [address] [phone]

Discharge Diagnosis: HTN (hypertension)

Discharge Diagnosis: Hyperlipidemia

Discharge Diagnosis: Coronary artery disease

Discharge Diagnosis: Chronic systolic heart failure

Discharge Disposition: 01-Home or Self Care

Attending Physician: Duane Mccarty MD

Admitting Physician: Duane Mccarty MD





Vital Signs







 



                           Most recent to            1



                                         oldest [Reference 



                                         Range]: 

 

 



                           Peripheral Pulse          86 bpm



                           Rate [ bpm]         (18 1:33 PM)

 

 



                           Blood Pressure            146/86 mmHg



                           [/60-90 mmHg]       *HI*



                                         (18 1:33 PM)







Problem List







    



              Condition     Effective Dates     Status       Health Status     Informant

 

    



                           Acquired                  Active  



                                         spondylolisthesis(Co    



                                         nfirmed)    

 

    



                           Acute                     Active  



                                         pain(Confirmed)    

 

    



                           Hay fever(Confirmed)      Active  

 

    



                           Arthritis(Confirmed)      Resolved  

 

    



                           Right Carpal tunnel       Resolved  



                                         release(Confirmed)    

 

    



                           Carpal tunnel             Resolved  



                                         release    



                                         decompression(Confir    



                                         med)    

 

    



                           Cholecystectomy(Conf      Resolved  



                                         irmed)    

 

    



                           IBS (irritable bowel      Active  



                                         syndrome)(Confirmed)    

 

    



                     CHF (congestive      Active              patient



                                         heart    



                                         failure)(Confirmed)    

 

    



                           CAD (coronary artery      Active  



                                         disease)(Confirmed)    

 

    



                           Decompression(Confir      Resolved  



                                         med)    

 

    



                           Depression(Confirmed      Resolved  



                                         )    

 

    



                           Depression(Confirmed      Active  



                                         )    

 

    



                           Diabetes(Confirmed)       Active  

 

    



                           Diabetes(Confirmed)       Resolved  

 

    



                           GERD                      Active  



                                         (gastroesophageal    



                                         reflux    



                                         disease)(Confirmed)    

 

    



                     Ischemic            < 5/8/15            Resolved  



                                         cardiomyopathy(Confi    



                                         rmed)    

 

    



                           Presence of               Active  



                                         biventricular    



                                         AICD(Confirmed)    

 

    



                           History of lumbar         Active  



                                         fusion(Confirmed)    

 

    



                           Hyperlipidemia(Confi      Active  



                                         rmed)    

 

    



                     Asthma(Confirmed)     < 17           Resolved  

 

    



                           Hypertension(Confirm      Resolved  



                                         ed)    

 

    



                           Hypertension(Confirm      Resolved  



                                         ed)    

 

    



                           HTN                       Active  



                                         (hypertension)(Confi    



                                         rmed)    

 

    



                           Hysterectomy(Confirm      Resolved  



                                         ed)    

 

    



                           Impaired gas              Active  



                                         exchange(Confirmed)1    

 

    



                           Irritable                 Resolved  



                                         bowel(Confirmed)    

 

    



                           Laminectomy(Confirme      Resolved  



                                         d)    

 

    



                           LBBB (left bundle         Active  



                                         branch    



                                         block)(Confirmed)    

 

    



                           LUQ pain(Confirmed)       Active  

 

    



                           Migraine(Confirmed)       Resolved  

 

    



                           Asthma(Confirmed)         Active  

 

    



                     Morbid              Active              patient



                                         obesity(Confirmed)    

 

    



                           Myocardial                Resolved  



                                         infarction(Confirmed    



                                         )    

 

    



                           Septicemia                Resolved  



                                         pneumonia(Confirmed)    

 

    



                     L knee              13              Resolved  



                                         Synovectomy(Confirme    



                                         d)    

 

    



                           Tissue perfusion          Active  



                                         alteration(Confirmed    



                                         )2    

 

    



                     Tobacco             Active              patient



                                         user(Confirmed)    

 

    



                     Tubal               79              Resolved  



                                         ligation(Confirmed)    

 

    



                           UTI (urinary tract        Active  



                                         infection)(Confirmed    



                                         )    

 

    



                     Chicken             < 17           Resolved  



                                         pox(Confirmed)    







1Problem added automatically by system based on initiation of Impaired Gas 
Exchange Plan of Care



2Problem added automatically by system based on initiation of Tissue Perfusion 
Cerebral Plan of Care



Allergies, Adverse Reactions, Alerts







   



                 Substance       Reaction        Severity        Status

 

   



                     sulfamethoxazole     Urticaria (hives)      Active



                                         HIVES  

 

   



                     morphine            Burning             Active

 

   



                     barium sulfate      Swelling, hives      Active

 

   



                     celecoxib           HIVES               Active

 

   



                     influenza virus vaccine,     Convulsion          Active



                                         inactivated   







Medications





acetaminophen

1,000 mg, Oral, q6hr, as needed for pain, 0 Refill(s)

Start Date: 14

Status: Ordered



Ambien 10 mg oral tablet

10 mg 1 tabs, Oral, Bedtime (once a day), as needed for sleep, [place] Crwoder, 
X 30 days, # 30 tabs, 0 Refill(s)

Start Date: 18

Stop Date: 18

Status: Ordered



amLODIPine 10 mg oral tablet

10 mg 1 tabs, Oral, Daily, # 90 tabs, 5 Refill(s), [facility]: [facility]
E, 1 tabs Oral Daily

Start Date: 16

Status: Ordered



Aspir 81

81 mg, Oral, Daily, 0 Refill(s)

Start Date: 14

Status: Ordered



atorvastatin 10 mg oral tablet

See Instructions, TAKE 1 TABLET BY MOUTH  DAILY, # 90 tabs, eRx: [facility]

Start Date: 17

Status: Ordered



Basaglar KwikPen

34 units, SubCutaneous, Bedtime (once a day), 0 Refill(s)

Start Date: 18

Status: Ordered



Basaglar KwikPen 100 units/mL subcutaneous solution

See Instructions, 34 units at hs SubCutaneous, # 1 boxes, 3 Refill(s), Pharmacy:
[facility], 34 units at hs SubCutaneous

Start Date: 17

Status: Ordered



bumetanide 1 mg oral tablet

See Instructions, Take 2 tablets by mouth in  the morning and 1 tablet by mouth 
in the evening, # 270 tabs, 1 Refill(s), Pharmacy: [facility], Take 2 
tablets by mouth in  the morning and 1 tablet by mouth in the evening

Start Date: 17

Status: Ordered



buPROPion 150 mg/12 hours (SR) oral tablet, extended release

See Instructions, Take 1 tablet by mouth two  times daily, # 180 tabs, 3 Refill(
s), eRx: [facility]

Start Date: 17

Status: Ordered



carvedilol 25 mg oral tablet

25 mg 1 tabs, Oral, BID, # 180 tabs, 1 Refill(s), Pharmacy: [facility]
, 1 tabs Oral BID

Start Date: 17

Status: Ordered



cloNIDine 0.1 mg oral tablet

See Instructions, Take 1 tablet by mouth  every evening, # 90 tabs, 1 Refill(s),
eRx: [facility]

Start Date: 17

Status: Ordered



clopidogrel 75 mg oral tablet

See Instructions, TAKE 1 TABLET BY MOUTH  DAILY, # 90 tabs, 2 Refill(s), [facility]: [facility]

Start Date: 18

Status: Ordered



diclofenac 3% topical gel

0.5 g, Topical, BID, # 50 g, 1 Refill(s), Pharmacy: [facility] [id number]

Start Date: 8/15/17

Status: Ordered



Digox 125 mcg (0.125 mg) oral tablet

See Instructions, TAKE 1 TABLET BY MOUTH  DAILY, # 90 tabs, 2 Refill(s), [facility]: [facility]

Start Date: 18

Status: Ordered



DULoxetine 20 mg oral delayed release capsule

20 mg 1 caps, Oral, Daily, do not crush or chew    (Take one capsule by mouth da
kervin with Duloxetine 60mg), # 90 caps, 1 Refill(s), [facility]
E, 1 caps Oral Daily,x90 days,Instr:do not crush or chew; (Take one capsule by m
outh daily...

Start Date: 17

Stop Date: 3/4/18

Status: Ordered



DULoxetine 60 mg oral delayed release capsule

See Instructions, Take 1 capsule by mouth  daily . Do not crush or  chew.    (Ta
ke 1 tablet daily with Duloxetine 20mg), # 90 caps, 1 Refill(s), [facility], Take 1 capsule by mouth  daily . Do not crush or  chew. (Take 1
tablet porsche...

Start Date: 17

Status: Ordered



Glucometer Lancets (DME)

DME Item one touch delica lancets  use to test bg x3/daily  dx:e11.65, See Instr
uctions, # 300 Each, 3 Refill(s), Pharmacy: [id number], one touc
h delica lancets; use to test bg x3/daily; dx:e11.65, Supply

Start Date: 3/13/17

Status: Ordered



glucosamine

Oral, 0 Refill(s)

Start Date: 17

Status: Ordered



HumaLOG 100 units/mL subcutaneous solution

See Instructions, INJECT SUBCUTANEOUSLY 10  UNITS 3 TIMES DAILY BEFORE  MEALS, #
27 mL, 3 Refill(s), eRx: [facility]

Start Date: 17

Status: Ordered



Insulin Pin Needles (DME)

DME Item 32g x4mm (jacob needles)  use to inject insulin   dx:e11.65, See Instruc
tions, # 100 Each, 3 Refill(s), Pharmacy: [id number], 32g x4mm (
jacob needles); use to inject insulin ; dx:e11.65, Supply

Start Date: 17

Status: Ordered



Melatonin

10 mg, Oral, Bedtime (once a day), as needed for insomnia, 0 Refill(s)

Start Date: 14

Status: Ordered



metFORMIN 500 mg oral tablet

See Instructions, Take 2 tablets by mouth  twice daily, # 360 tabs, 1 Refill(s),
eRx: [facility], Take 2 tablets by mouth  twice daily

Start Date: 17

Status: Ordered



mometasone 50 mcg/inh nasal spray

See Instructions, Use 2 sprays nasally daily, # 17 g, 5 Refill(s), eRx: [facility]

Start Date: 17

Status: Ordered



Norco 10 mg-325 mg oral tablet

1 tabs, Oral, q6hr, as needed for pain, # 30 tabs, 0 Refill(s)

Start Date: 17

Status: Ordered



potassium chloride 20 mEq oral tablet, extended release

See Instructions, TAKE 1 TABLET BY MOUTH  DAILY, # 90 tabs, 2 Refill(s), [facility]: [facility]

Start Date: 18

Status: Ordered



ProAir HFA 90 mcg/inh inhalation aerosol

See Instructions, INHALE 2 PUFFS 4 TIMES  DAILY AS NEEDED FOR  WHEEZING, # 25.5 
g, 2 Refill(s), eRx: [facility]

Start Date: 18

Status: Ordered



RABEprazole 20 mg oral delayed release tablet

See Instructions, Take 2 tablets by mouth  daily, # 180 tabs, 1 Refill(s), eRx: 
[facility]

Start Date: 17

Status: Ordered



SUMAtriptan 50 mg oral tablet

See Instructions, TAKE 1 TABLET BY MOUTH DAILY AS NEEDED FOR MIGRAINE HEADACHE. 
MAY REPEAT DOSE AFTER 2 HOURS UP TO A. MAXIMUM  MG IN 24 HOURS, # 9 tabs, 
eRx: [facility] [id number]

Start Date: 18

Status: Ordered



Symbicort 160 mcg-4.5 mcg/inh inhalation aerosol

See Instructions, INHALE 2 PUFFS TWICE DAILY, # 30.6 g, 2 Refill(s), [facility]: [facility]

Start Date: 18

Status: Ordered



SYRNG   .5CC ULTII 31 G SRT

See Instructions, Use to Inject insulin once  daily, # 90 Each, 2 Refill(s), eRx
: [facility], Use to Inject insulin once  daily

Start Date: 17

Status: Ordered



valsartan 160 mg oral tablet

160 mg 1 tabs, Oral, BID, X 90 days, # 180 tabs, 3 Refill(s), Pharmacy: [facility], 1 tabs Oral BID,x90 days

Start Date: 4/3/17

Stop Date: 3/29/18

Status: Ordered



Results





No data available for this section



Immunizations





Given and Recorded





   



                 Vaccine         Date            Status          Refusal Reason

 

   



                     tetanus-diphth toxoids (Td) adult/adol     00             Given 







Procedures







   



                 Procedure       Date            Related Diagnosis     Body Site

 

   



                           L knee PMM/ part. synovectomy     13  

 

   



                           lt median nerve decompression     5/10/11  

 

   



                           Tubal ligation            1979  

 

   



                                         Carpal tunnel release rt   

 

   



                                         Cholecystectomy   

 

   



                                         Hysterectomy   

 

   



                                         Hysterectomy and bilateral   



                                         salpingo-oophorectomy sample   

 

   



                                         Laminectomy   

 

   



                                         Pacemaker   







Social History







 



                           Social History Type       Response

 

 



                           Smoking Status            Former smoker; Type: Cigarettes1



                                         entered on: 5/8/15







1Quit around 



Assessment and Plan

Extracted from:





  



                     Title: Office Visit Note     Author: Duane Mccarty     Date: 18



                                         Gualberto REED 









                                                  1.Chronic systolic heart failure 

  ACC/AHA Stage chronic systolic heart failure with recoveredLVEF based on 
echocardiogram fromJu2016 (LVEF=55-60%), secondary to nonischemic 
cardiomyopathybased on coronary angiogram fromMay 2014. She 
underwentCRT-D, given underlying LBBB on May 2015. She currently reports NYHA 
Class I symptoms, looks euvolemic and well perfused on today's examination with 
a slightly elevated blood pressure. However, patient reports that her blood 
pressure at home runs lower than today. Most recent echocardiogram from 2016 revealed a preserved left ventricular systolic function(LVEF=55-60%), 
grade 1 diastolic dysfunction, no significant valvular abnormality and mildly 
elevated pulmonary artery systolic pressure (47 mmHg). She will continue current
therapy with current doses of diuretics, beta blockers and valsartan.Patient 
is instructed to follow a low caloric, low-fat, low-salt diet, to become more 
physically active and to lose weight. I will see patient back in clinic within 6
months or sooner if any problem arises.  

    

  2.Coronary artery disease 

  Single vessel obstructive coronary artery disease with prior NSTEMI in May 
2014 secondary to obstructive disease of ramus intermedius, which was unableto
be percutaneously revascularizedsincewire was notable to cross the lesion,
and evidence of nonobstructive coronary artery disease on remaining epicardial 
coronary arteries. She currently reports no chest pain, angina or significant 
dyspnea. She continues to be active, able to perform all her activity of daily
living with no limitations. She will continue appropriate medical therapy for 
her coronary artery disease with dual anti-islet therapy, including aspirin and 
clopidogrel,dualantianginal therapy, including calcium channel blockersand
beta blockers,and moderate intensity statin therapy with atorvastatin 10 mg 
daily.I'll see patient back in clinic within 6 months or sooner if any 
problem arises.  

   Ordered: 

   Lipid Panel   

    

  3.HTN (hypertension) 

  Blood pressure slightly elevated on today's office visit; however, patient 
reports that her blood pressure runs lower at home. I instructed patient to 
start checking her blood pressure at home, every day at the same time, to write 
the numbers down and to contact my clinic within 2 weeks to report her blood 
pressure readings in order to decide if more aggressive antihypertensive therapy
is required. I'll now, she will continue on current doses of amlodipine, 
carvedilol, clonidine, diuretics and valsartan. Patient is advised to follow a 
low salt diet, to become more physically active and to lose weight.  

    

  4.Hyperlipidemia 

  Most recent lipid profile from 2016 revealed DEH=354 and CR=466. I'm 
repeating a fasting lipid profileto evaluate if more aggressive lipid-lowering
therapy is required. For now, she will continuemoderate intensity statin 
therapy with atorvastatin 10 mg daily. Patient is advised to follow a low 
caloric, low-fat, low carbohydrate diet, to become more physically active and to
lose weight.

## 2019-07-31 NOTE — XMS REPORT
Transition of Care/Referral Summary

                             Created on: 2033



BILLDYLAN

External Reference #: [id number]

: 1960

Sex: Female



Demographics







                          Address                   [address]

[place]  [address]

 

                          Home Phone                [phone]

 

                          Preferred Language        English

 

                          Marital Status            

 

                          Congregational Affiliation     [place]

 

                          Race                      White

 

                          Ethnic Group              Not  or 





Author







                          Author                    [facility]

 

                          Organization              [facility]

 

                          Address                   Unknown

 

                          Phone                     Unavailable







Care Team Providers







                    Care Team Member Name    Role                Phone

 

                    Dhiraj Mijares    PCP                 [phone]







Encounter





[facility] FIN [id number] Date(s): 5/8/15 - 5/8/15

[facility] [address], [place] [address] [phone]

Discharge Diagnosis: Ischemic cardiomyopathy

Discharge Diagnosis: CAD (coronary artery disease)

Discharge Diagnosis: LBBB (left bundle branch block)

Discharge Diagnosis: Asthma

Discharge Diagnosis: Diabetes

Discharge Diagnosis: Congestive heart failure

Discharge Disposition: 01-Home or Self Care

Attending Physician: JIM Jerry MD

Admitting Physician: JIM Jerry MD

Referring Physician: Dhiraj Mijares MD





Vital Signs







 



                           Most recent to            1



                                         oldest [Reference 



                                         Range]: 

 

 



                           Peripheral Pulse          80 bpm



                           Rate [ bpm]         (5/8/15 8:10 AM)

 

 



                           Blood Pressure            132/90 mmHg



                           [/60-90 mmHg]       (5/8/15 8:10 AM)







Problem List







    



              Condition     Effective Dates     Status       Health Status     Informant

 

    



                           Acquired                  Active  



                                         spondylolisthesis(Co    



                                         nfirmed)    

 

    



                           Acute                     Active  



                                         pain(Confirmed)    

 

    



                           Arthritis(Confirmed)      Resolved  

 

    



                           Right Carpal tunnel       Resolved  



                                         release(Confirmed)    

 

    



                           Carpal tunnel             Resolved  



                                         release    



                                         decompression(Confir    



                                         med)    

 

    



                           Cholecystectomy(Conf      Resolved  



                                         irmed)    

 

    



                     CHF (congestive      Active              patient



                                         heart    



                                         failure)(Confirmed)    

 

    



                           CAD (coronary artery      Active  



                                         disease)(Confirmed)    

 

    



                           Decompression(Confir      Resolved  



                                         med)    

 

    



                           Depression(Confirmed      Resolved  



                                         )    

 

    



                           Depression(Confirmed      Active  



                                         )    

 

    



                           Diabetes(Confirmed)       Resolved  

 

    



                           Diabetes(Confirmed)       Active  

 

    



                           GERD                      Active  



                                         (gastroesophageal    



                                         reflux    



                                         disease)(Confirmed)    

 

    



                           Ischemic                  Active  



                                         cardiomyopathy(Confi    



                                         rmed)    

 

    



                           Presence of               Active  



                                         biventricular    



                                         AICD(Confirmed)    

 

    



                           History of lumbar         Active  



                                         fusion(Confirmed)    

 

    



                           Hyperlipidemia(Confi      Resolved  



                                         rmed)    

 

    



                           Hypertension(Confirm      Resolved  



                                         ed)    

 

    



                           Hypertension(Confirm      Resolved  



                                         ed)    

 

    



                           HTN                       Active  



                                         (hypertension)(Confi    



                                         rmed)    

 

    



                           Hysterectomy(Confirm      Resolved  



                                         ed)    

 

    



                           Impaired gas              Active  



                                         exchange(Confirmed)1    

 

    



                           Irritable                 Resolved  



                                         bowel(Confirmed)    

 

    



                           Laminectomy(Confirme      Resolved  



                                         d)    

 

    



                           LBBB (left bundle         Active  



                                         branch    



                                         block)(Confirmed)    

 

    



                           LUQ pain(Confirmed)       Active  

 

    



                           Migraine(Confirmed)       Resolved  

 

    



                           Asthma(Confirmed)         Active  

 

    



                     Morbid              Active              patient



                                         obesity(Confirmed)    

 

    



                           Myocardial                Resolved  



                                         infarction(Confirmed    



                                         )    

 

    



                           Septicemia                Resolved  



                                         pneumonia(Confirmed)    

 

    



                     L knee              13              Resolved  



                                         Synovectomy(Confirme    



                                         d)    

 

    



                           Tissue perfusion          Active  



                                         alteration(Confirmed    



                                         )2    

 

    



                     Tobacco             Active              patient



                                         user(Confirmed)    

 

    



                     Tubal               79              Resolved  



                                         ligation(Confirmed)    







1Problem added automatically by system based on initiation of Impaired Gas 
Exchange Plan of Care



2Problem added automatically by system based on initiation of Tissue Perfusion 
Cerebral Plan of Care



Allergies, Adverse Reactions, Alerts







   



                 Substance       Reaction        Severity        Status

 

   



                     barium sulfate      Adverse Reaction      Active



                                         Swelling, hives  

 

   



                     celecoxib           HIVES               Active

 

   



                           influenza virus vaccine,       Active



                                         inactivated   

 

   



                     morphine            Adverse Reaction      Active

 

   



                     sulfamethoxazole     HIVES               Active



                                         Urticaria (hives)  







Medications





acetaminophen

1,000 mg, Oral, q6hr, as needed for pain, 0 Refill(s)

Start Date: 14

Status: Ordered



AcipHex 20 mg oral delayed release tablet

40 mg 2 tabs, Oral, Daily, # 180 tabs, 2 Refill(s), [facility]acy: [facility], 2 tabs Oral Daily

Start Date: 9/21/15

Status: Ordered



albuterol 2.5 mg/3 mL (0.083%) inhalation solution

3 mL, Inhalation, BID, Dx: Asthma, # 25 Each, 3 Refill(s), [facility]acy: [facility] [place] [id number], 3 mL Inhalation BID,Instr:Dx: Asthma

Start Date: 4/21/15

Status: Ordered



amLODIPine 5 mg oral tablet

See Instructions, Take 1 tablet by mouth  daily, # 90 tabs, 1 Refill(s), Pharmac
y: [facility], Take 1 tablet by mouth  daily

Start Date: 9/2/15

Status: Ordered



Aspir 81

81 mg, Oral, Daily, 0 Refill(s)

Start Date: 14

Status: Ordered



bumetanide 1 mg oral tablet

See Instructions, Take 2 mg of Bumex in AM and 1 mg PM., # 300 tabs, 5 Refill(s)
, Pharmacy: [facility], Take 2 mg of Bumex in AM and 1 mg PM.

Start Date: 9/22/15

Status: Ordered



buPROPion 150 mg/12 hours (SR) oral tablet, extended release

See Instructions, Take 1 tablet by mouth two  times daily, # 180 unknown unit, 1
Refill(s), eRx: [facility], Take 1 tablet by mouth two  times daily

Start Date: 9/9/15

Status: Ordered



carvedilol 25 mg oral tablet

25 mg 1 tabs, Oral, BID, # 180 tabs, 5 Refill(s), Pharmacy: [facility]
, 1 tabs Oral BID

Start Date: 9/22/15

Status: Ordered



Claritin

10 mg, Oral, Daily, 0 Refill(s)

Start Date: 5/18/15

Status: Ordered



cloNIDine 0.1 mg oral tablet

0.1 mg 1 tabs, Oral, qPM, # 90 tabs, 1 Refill(s), Pharmacy: [facility]
, 1 tabs Oral qPM

Start Date: 10/12/15

Status: Ordered



clopidogrel 75 mg oral tablet

1 tabs, Oral, Daily, # 90 tabs, 3 Refill(s), Pharmacy: [facility], 1 t
abs Oral Daily

Start Date: 4/14/15

Status: Ordered



digoxin 125 mcg (0.125 mg) oral tablet

125 mcg 1 tabs, Oral, Daily, # 90 tabs, 5 Refill(s), [facility], 1 tabs Oral Daily

Start Date: 9/22/15

Status: Ordered



DULoxetine 20 mg oral delayed release capsule

20 mg 1 caps, Oral, BID, 0 Refill(s)

Start Date: 10/19/15

Status: Ordered



DULoxetine 60 mg oral delayed release capsule

See Instructions, Take 1 capsule by mouth  daily do not crush or chew, # 90 unkn
own unit, 1 Refill(s), eRx: [facility], Take 1 capsule by mouth  daily
do not crush or chew

Start Date: 9/30/15

Status: Ordered



glipiZIDE 5 mg oral tablet

5 mg 1 tabs, Oral, BID, X 90 days, # 180 tabs, 1 Refill(s), [facility]acy: [facility], 1 tabs Oral BID,x90 days

Start Date: 9/2/15

Stop Date: 16

Status: Ordered



Insulin Syringe (DME)

DME Item use to inject insulin once daily     dx: 250.00, See Instructions, # 90
Each, 4 Refill(s), Pharmacy: [facility], use to inject insulin once d
aily     dx: 250.00, Supply

Start Date: 5/8/15

Status: Ordered



Lantus 100 units/mL subcutaneous solution

See Instructions, Inject subcutaneously 30  units every night at  bedtime once a
day, # 30 mL, 2 Refill(s), eRx: [facility], Inject subcutaneously 30  
units every night at  bedtime once a day

Start Date: 7/31/15

Status: Ordered



Melatonin

10 mg, Oral, Bedtime (once a day), as needed for insomnia, 0 Refill(s)

Start Date: 14

Status: Ordered



metFORMIN 500 mg oral tablet

See Instructions, Take 2 tablets by mouth  twice a day, # 360 tabs, 1 Refill(s),
Pharmacy: [facility], Take 2 tablets by mouth  twice a day

Start Date: 9/2/15

Status: Ordered



Multiple Vitamins oral tablet

1 tabs, Oral, Daily, 0 Refill(s)

Start Date: 14

Status: Ordered



Norco 10 mg-325 mg oral tablet

1 tabs, Oral, q6hr, as needed for pain, # 30 tabs, 0 Refill(s)

Start Date: 10/19/15

Status: Ordered



OTC Black Cohash

OTC Black Cohash, See Instructions, 1 tablet oral daily, 0 Refill(s)

Start Date: 2/3/15

Status: Ordered



OTC Calcium

OTC Calcium, See Instructions, 1 tablet oral daily, 0 Refill(s)

Start Date: 2/3/15

Status: Ordered



potassium chloride 20 mEq oral tablet, extended release

See Instructions, Take 1 tablet by mouth  daily, # 90 unknown unit, 1 Refill(s),
eRx: [facility], Take 1 tablet by mouth  daily

Start Date: 10/2/15

Status: Ordered



ProAir HFA 90 mcg/inh inhalation aerosol

2 puffs, Inhalation, QID, as needed for wheezing, # 3 Each, 3 Refill(s), Pharmac
y: [facility]

Start Date: 1/23/15

Status: Ordered



SUMAtriptan 50 mg oral tablet

50 mg 1 tabs, Oral, Daily, as needed for migraine headache, may repeat dose afte
r 2 hours up to a maximum of 200 mg in 24 hours, # 9 tabs, 0 Refill(s), Pharmacy
: [id number], 1 tabs Oral Daily,PRN:as needed for migraine heada
godwin,Instr:m...

Start Date: 9/16/15

Status: Ordered



Symbicort 160 mcg-4.5 mcg/inh inhalation aerosol

See Instructions, Use 2 puffs twice daily, # 10.2 g, 2 Refill(s), [facility]: [facility], Use 2 puffs twice daily

Start Date: 9/9/15

Status: Ordered



valsartan 160 mg oral tablet

See Instructions, 1 TABS ORAL DAILY, # 30 tabs, 1 Refill(s), eRx: [id number], 1 TABS ORAL DAILY

Start Date: 10/12/15

Status: Ordered



Vitamin D3

4000 unit, Oral, Daily, 0 Refill(s)

Start Date: 2/3/15

Status: Ordered



Results





No data available for this section



Immunizations







  



                     Vaccine             Date                Refusal Reason

 

  



                           tetanus-diphth toxoids (Td) adult/adol     00 







Procedures







   



                 Procedure       Date            Related Diagnosis     Body Site

 

   



                           L knee PMM/ part. synovectomy     13  

 

   



                           lt median nerve decompression     5/10/11  

 

   



                           Tubal ligation            1979  

 

   



                                         Carpal tunnel release rt   

 

   



                                         Cholecystectomy   

 

   



                                         Hysterectomy   

 

   



                                         Hysterectomy and bilateral   



                                         salpingo-oophorectomy sample   

 

   



                                         Laminectomy   

 

   



                                         Pacemaker   







Social History







 



                           Social History Type       Response

 

 



                           Smoking Status            Former smoker; Type: Cigarettes1







1Quit around 



Assessment and Plan





No data available for this section

## 2019-07-31 NOTE — XMS REPORT
Transition of Care/Referral Summary

                             Created on: 09/10/2127



DYLAN KHAN

External Reference #: [id number]

: 1960

Sex: Female



Demographics







                          Address                   [address]  [address]

 

                          Home Phone                [phone]

 

                          Preferred Language        English

 

                          Marital Status            

 

                          Worship Affiliation     Faith

 

                          Race                      White

 

                          Ethnic Group              Not  or 





Author







                          Author                    [facility]

 

                          Organization              [facility]

 

                          Address                   Unknown

 

                          Phone                     Unavailable







Care Team Providers







                    Care Team Member Name    Role                Phone

 

                    Dhiraj Mijares    PCP                 [phone]







Encounter





VC FIN [id number] Date(s): 5/26/15 - 5/26/15

[facility] [address] [place], [place] [address]
S [phone]

Discharge Diagnosis: CHF (congestive heart failure)

Discharge Diagnosis: CAD (coronary artery disease)

Discharge Disposition: 01-Home or Self Care

Attending Physician: Dhiraj Mijares MD

Admitting Physician: Dhiraj Mijares MD





Vital Signs







 



                           Most recent to            1



                                         oldest [Reference 



                                         Range]: 

 

 



                           Peripheral Pulse          97 bpm



                           Rate [ bpm]         (5/26/15 9:17 AM)

 

 



                           Blood Pressure            134/80 mmHg



                           [/60-90 mmHg]       (5/26/15 9:17 AM)

 

 



                           SpO2                      90 %



                                         (5/26/15 9:17 AM)







Problem List







    



              Condition     Effective Dates     Status       Health Status     Informant

 

    



                           Acquired                  Active  



                                         spondylolisthesis(Co    



                                         nfirmed)    

 

    



                           Acute                     Active  



                                         pain(Confirmed)    

 

    



                           Arthritis(Confirmed)      Resolved  

 

    



                           Right Carpal tunnel       Resolved  



                                         release(Confirmed)    

 

    



                           Carpal tunnel             Resolved  



                                         release    



                                         decompression(Confir    



                                         med)    

 

    



                           Cholecystectomy(Conf      Resolved  



                                         irmed)    

 

    



                     CHF (congestive      Active              patient



                                         heart    



                                         failure)(Confirmed)    

 

    



                           CAD (coronary artery      Active  



                                         disease)(Confirmed)    

 

    



                           Decompression(Confir      Resolved  



                                         med)    

 

    



                           Depression(Confirmed      Resolved  



                                         )    

 

    



                           Depression(Confirmed      Active  



                                         )    

 

    



                           Diabetes(Confirmed)       Resolved  

 

    



                           Diabetes(Confirmed)       Active  

 

    



                           GERD                      Active  



                                         (gastroesophageal    



                                         reflux    



                                         disease)(Confirmed)    

 

    



                           Ischemic                  Active  



                                         cardiomyopathy(Confi    



                                         rmed)    

 

    



                           Presence of               Active  



                                         biventricular    



                                         AICD(Confirmed)    

 

    



                           History of lumbar         Active  



                                         fusion(Confirmed)    

 

    



                           Hyperlipidemia(Confi      Resolved  



                                         rmed)    

 

    



                           Hypertension(Confirm      Resolved  



                                         ed)    

 

    



                           Hypertension(Confirm      Resolved  



                                         ed)    

 

    



                           HTN                       Active  



                                         (hypertension)(Confi    



                                         rmed)    

 

    



                           Hysterectomy(Confirm      Resolved  



                                         ed)    

 

    



                           Impaired gas              Active  



                                         exchange(Confirmed)1    

 

    



                           Irritable                 Resolved  



                                         bowel(Confirmed)    

 

    



                           Laminectomy(Confirme      Resolved  



                                         d)    

 

    



                           LBBB (left bundle         Active  



                                         branch    



                                         block)(Confirmed)    

 

    



                           LUQ pain(Confirmed)       Active  

 

    



                           Migraine(Confirmed)       Resolved  

 

    



                           Asthma(Confirmed)         Active  

 

    



                     Morbid              Active              patient



                                         obesity(Confirmed)    

 

    



                           Myocardial                Resolved  



                                         infarction(Confirmed    



                                         )    

 

    



                           Septicemia                Resolved  



                                         pneumonia(Confirmed)    

 

    



                     L knee              13              Resolved  



                                         Synovectomy(Confirme    



                                         d)    

 

    



                           Tissue perfusion          Active  



                                         alteration(Confirmed    



                                         )2    

 

    



                     Tobacco             Active              patient



                                         user(Confirmed)    

 

    



                     Tubal               79              Resolved  



                                         ligation(Confirmed)    







1Problem added automatically by system based on initiation of Impaired Gas 
Exchange Plan of Care



2Problem added automatically by system based on initiation of Tissue Perfusion 
Cerebral Plan of Care



Allergies, Adverse Reactions, Alerts







   



                 Substance       Reaction        Severity        Status

 

   



                     barium sulfate      Adverse Reaction      Active



                                         Swelling, hives  

 

   



                     celecoxib           HIVES               Active

 

   



                           influenza virus vaccine,       Active



                                         inactivated   

 

   



                     morphine            Adverse Reaction      Active

 

   



                     sulfamethoxazole     HIVES               Active



                                         Urticaria (hives)  







Medications





acetaminophen

1,000 mg, Oral, q6hr, as needed for pain, 0 Refill(s)

Start Date: 14

Status: Ordered



AcipHex 20 mg oral delayed release tablet

40 mg 2 tabs, Oral, Daily, # 180 tabs, 2 Refill(s), [facility], 2 tabs Oral Daily

Start Date: 9/21/15

Status: Ordered



albuterol 2.5 mg/3 mL (0.083%) inhalation solution

3 mL, Inhalation, BID, Dx: Asthma, # 25 Each, 3 Refill(s), Pharmacy: [facility] [id number], 3 mL Inhalation BID,Instr:Dx: Asthma

Start Date: 4/21/15

Status: Ordered



amLODIPine 5 mg oral tablet

See Instructions, Take 1 tablet by mouth  daily, # 90 tabs, 1 Refill(s), Pharmac
y: [facility], Take 1 tablet by mouth  daily

Start Date: 9/2/15

Status: Ordered



Aspir 81

81 mg, Oral, Daily, 0 Refill(s)

Start Date: 14

Status: Ordered



bumetanide 1 mg oral tablet

See Instructions, Take 2 mg of Bumex in AM and 1 mg PM., # 300 tabs, 5 Refill(s)
, Pharmacy: [facility], Take 2 mg of Bumex in AM and 1 mg PM.

Start Date: 9/22/15

Status: Ordered



buPROPion 150 mg/12 hours (SR) oral tablet, extended release

See Instructions, Take 1 tablet by mouth two  times daily, # 180 unknown unit, 1
Refill(s), eRx: [facility], Take 1 tablet by mouth two  times daily

Start Date: 9/9/15

Status: Ordered



carvedilol 25 mg oral tablet

25 mg 1 tabs, Oral, BID, # 180 tabs, 5 Refill(s), Pharmacy: [facility]
, 1 tabs Oral BID

Start Date: 9/22/15

Status: Ordered



Claritin

10 mg, Oral, Daily, 0 Refill(s)

Start Date: 5/18/15

Status: Ordered



cloNIDine 0.1 mg oral tablet

0.1 mg 1 tabs, Oral, qPM, # 90 tabs, 1 Refill(s), [facility]
, 1 tabs Oral qPM

Start Date: 10/12/15

Status: Ordered



clopidogrel 75 mg oral tablet

1 tabs, Oral, Daily, # 90 tabs, 3 Refill(s), [facility]: [facility], 1 t
abs Oral Daily

Start Date: 4/14/15

Status: Ordered



digoxin 125 mcg (0.125 mg) oral tablet

125 mcg 1 tabs, Oral, Daily, # 90 tabs, 5 Refill(s), [facility], 1 tabs Oral Daily

Start Date: 9/22/15

Status: Ordered



DULoxetine 20 mg oral delayed release capsule

20 mg 1 caps, Oral, BID, 0 Refill(s)

Start Date: 10/19/15

Status: Ordered



DULoxetine 60 mg oral delayed release capsule

See Instructions, Take 1 capsule by mouth  daily do not crush or chew, # 90 unkn
own unit, 1 Refill(s), eRx: [facility], Take 1 capsule by mouth  daily
do not crush or chew

Start Date: 9/30/15

Status: Ordered



glipiZIDE 5 mg oral tablet

5 mg 1 tabs, Oral, BID, X 90 days, # 180 tabs, 1 Refill(s), [facility], 1 tabs Oral BID,x90 days

Start Date: 9/2/15

Stop Date: 16

Status: Ordered



Insulin Syringe (DME)

DME Item use to inject insulin once daily     dx: E11.9, See Instructions, # 90 
Each, 4 Refill(s), Pharmacy: [id number], use to inject insulin o
nce daily     dx: E11.9, Supply

Start Date: 11/10/15

Status: Ordered



Lantus 100 units/mL subcutaneous solution

See Instructions, Inject subcutaneously 30  units every night at  bedtime once a
day, # 30 mL, 2 Refill(s), eRx: [facility], Inject subcutaneously 30  
units every night at  bedtime once a day

Start Date: 7/31/15

Status: Ordered



Melatonin

10 mg, Oral, Bedtime (once a day), as needed for insomnia, 0 Refill(s)

Start Date: 14

Status: Ordered



metFORMIN 500 mg oral tablet

See Instructions, Take 2 tablets by mouth  twice a day, # 360 tabs, 1 Refill(s),
Pharmacy: [facility], Take 2 tablets by mouth  twice a day

Start Date: 9/2/15

Status: Ordered



Multiple Vitamins oral tablet

1 tabs, Oral, Daily, 0 Refill(s)

Start Date: 14

Status: Ordered



Norco 10 mg-325 mg oral tablet

1 tabs, Oral, q6hr, as needed for pain, # 30 tabs, 0 Refill(s)

Start Date: 10/19/15

Status: Ordered



OTC Black Cohash

OTC Black Cohash, See Instructions, 1 tablet oral daily, 0 Refill(s)

Start Date: 2/3/15

Status: Ordered



OTC Calcium

OTC Calcium, See Instructions, 1 tablet oral daily, 0 Refill(s)

Start Date: 2/3/15

Status: Ordered



potassium chloride 20 mEq oral tablet, extended release

See Instructions, Take 1 tablet by mouth  daily, # 90 unknown unit, 1 Refill(s),
eRx: [facility], Take 1 tablet by mouth  daily

Start Date: 10/2/15

Status: Ordered



ProAir HFA 90 mcg/inh inhalation aerosol

2 puffs, Inhalation, QID, as needed for wheezing, # 3 Each, 3 Refill(s), Pharmac
y: [facility]

Start Date: 1/23/15

Status: Ordered



SUMAtriptan 50 mg oral tablet

50 mg 1 tabs, Oral, Daily, as needed for migraine headache, may repeat dose afte
r 2 hours up to a maximum of 200 mg in 24 hours, # 9 tabs, 0 Refill(s), Pharmacy
: [id number], 1 tabs Oral Daily,PRN:as needed for migraine heada
godwin,Instr:m...

Start Date: 9/16/15

Status: Ordered



Symbicort 160 mcg-4.5 mcg/inh inhalation aerosol

See Instructions, Use 2 puffs twice daily, # 10.2 g, 2 Refill(s), [facility], Use 2 puffs twice daily

Start Date: 12/2/15

Status: Ordered



valsartan 160 mg oral tablet

See Instructions, 1 TABS ORAL DAILY, # 30 tabs, 1 Refill(s), [facility]Rx: [facility], 1 TABS ORAL DAILY

Start Date: 10/12/15

Status: Ordered



Vitamin D3

4000 unit, Oral, Daily, 0 Refill(s)

Start Date: 2/3/15

Status: Ordered



Results





No data available for this section



Immunizations







  



                     Vaccine             Date                Refusal Reason

 

  



                           tetanus-diphth toxoids (Td) adult/adol     00 







Procedures







   



                 Procedure       Date            Related Diagnosis     Body Site

 

   



                           L knee PMM/ part. synovectomy     13  

 

   



                           lt median nerve decompression     5/10/11  

 

   



                           Tubal ligation            1979  

 

   



                                         Carpal tunnel release rt   

 

   



                                         Cholecystectomy   

 

   



                                         Hysterectomy   

 

   



                                         Hysterectomy and bilateral   



                                         salpingo-oophorectomy sample   

 

   



                                         Laminectomy   

 

   



                                         Pacemaker   







Social History







 



                           Social History Type       Response

 

 



                           Smoking Status            Former smoker; Type: Cigarettes1







1Quit around 



Assessment and Plan

Extracted from:





  



                     Title: Office Visit Note     Author: Dhiraj Mijares MD     Date: 5/26/15









                                         Assessment/Plan

 1.CAD (coronary artery disease) 

 2.CHF (congestive heart failure) 

 Diabetes  Ordered:

 Hemoglobin A1c 

 No other changes are needed at this time. Recommend patient continue the same
dose of their current medications, and otherwise followup as needed. 
Medication refills will be provided as needed. Anticipate seeing them back at 
their next scheduled appointment.

 Follow-up on lab results when available

 Follow-up with cardiology as scheduled

## 2019-07-31 NOTE — XMS REPORT
Transition of Care/Referral Summary

                             Created on: 10/18/2054



DYLAN KHAN

External Reference #: [id number]

: 1960

Sex: Female



Demographics







                          Address                   [address]

[place]  [address]

 

                          Home Phone                [phone]

 

                          Preferred Language        English

 

                          Marital Status            

 

                          Anabaptist Affiliation     Amish

 

                          Race                      White

 

                          Ethnic Group              Not  or 





Author







                          Organization              Unknown

 

                          Address                   Unknown

 

                          Phone                     Unavailable







Care Team Providers







                    Care Team Member Name    Role                Phone

 

                    Dhiraj Mijares    PCP                 [phone]







Encounter





VC FIN [id number] Date(s): 2/17/15 - 2/17/15

[facility], [facility], Cardiology [address] [place], [place] [address] [phone]

Discharge Diagnosis: Benign essential HTN

Discharge Diagnosis: Acute on chronic combined systolic and diastolic CHF (conge
stive heart failure)

Discharge Diagnosis: CAD (coronary artery disease), native coronary artery

Discharge Diagnosis: Diabetes mellitus type II, uncontrolled

Discharge Diagnosis: Old non-ST elevation myocardial infarction (NSTEMI)

Discharge Diagnosis: Hyperlipidemia LDL goal < 70

Discharge Disposition: Home or Self Care

Attending Physician: Linda Kruger MD

Admitting Physician: Linda Kruger MD





Vital Signs







 



                           Most recent to            1



                                         oldest [Reference 



                                         Range]: 

 

 



                           Peripheral Pulse          92 bpm



                           Rate [ bpm]         (2/17/15 1:02 PM)

 

 



                           Blood Pressure            120/74 mmHg



                           [/60-90 mmHg]       (2/17/15 1:02 PM)







Problem List







    



              Condition     Effective Dates     Status       Health Status     Informant

 

    



                           Acute                     Active  



                                         pain(Confirmed)    

 

    



                           Arthritis(Confirmed)      Resolved  

 

    



                           Asthma(Confirmed)         Active  

 

    



                           Right Carpal tunnel       Resolved  



                                         release(Confirmed)    

 

    



                           Carpal tunnel             Resolved  



                                         release    



                                         decompression(Confir    



                                         med)    

 

    



                           Cholecystectomy(Conf      Resolved  



                                         irmed)    

 

    



                     CHF (congestive      Active              patient



                                         heart    



                                         failure)(Confirmed)    

 

    



                           CAD (coronary artery      Active  



                                         disease)(Confirmed)    

 

    



                           Decompression(Confir      Resolved  



                                         med)    

 

    



                           Depression(Confirmed      Resolved  



                                         )    

 

    



                           Depression(Confirmed      Active  



                                         )    

 

    



                           Diabetes(Confirmed)       Resolved  

 

    



                           Diabetes(Confirmed)       Active  

 

    



                           Hyperlipidemia(Confi      Resolved  



                                         rmed)    

 

    



                           Hypertension(Confirm      Resolved  



                                         ed)    

 

    



                           Hypertension(Confirm      Resolved  



                                         ed)    

 

    



                           HTN                       Active  



                                         (hypertension)(Confi    



                                         rmed)    

 

    



                           Hysterectomy(Confirm      Resolved  



                                         ed)    

 

    



                           Impaired gas              Active  



                                         exchange(Confirmed)1    

 

    



                           Irritable                 Resolved  



                                         bowel(Confirmed)    

 

    



                           Laminectomy(Confirme      Resolved  



                                         d)    

 

    



                           Migraine(Confirmed)       Resolved  

 

    



                           Myocardial                Resolved  



                                         infarction(Confirmed    



                                         )    

 

    



                           Septicemia                Resolved  



                                         pneumonia(Confirmed)    

 

    



                     L knee              13              Resolved  



                                         Synovectomy(Confirme    



                                         d)    

 

    



                           Tissue perfusion          Active  



                                         alteration(Confirmed    



                                         )2    

 

    



                     Tubal               79              Resolved  



                                         ligation(Confirmed)    







1Problem added automatically by system based on initiation of Impaired Gas 
Exchange Plan of Care



2Problem added automatically by system based on initiation of Tissue Perfusion 
Cerebral Plan of Care



Allergies, Adverse Reactions, Alerts







   



                 Substance       Reaction        Severity        Status

 

   



                     barium sulfate      Adverse Reaction      Active



                                         Swelling, hives  

 

   



                     celecoxib           HIVES               Active

 

   



                           influenza virus vaccine,       Active



                                         inactivated   

 

   



                     morphine            Adverse Reaction      Active

 

   



                     sulfamethoxazole     HIVES               Active



                                         Urticaria (hives)  







Medications





acetaminophen

1,000 mg, Oral, q6hr, as needed for pain, 0 Refill(s)

Start Date: 14

Status: Ordered



albuterol 2.5 mg/3 mL (0.083%) inhalation solution

3 mL, Inhalation, BID, Dx: Asthma, # 25 Each, 3 Refill(s), Pharmacy: [facility]
, 3 mL Inhalation BID,Instr:Dx: Asthma

Special Instructions: Dx: Asthma

Start Date: 14

Status: Ordered



amLODIPine 5 mg oral tablet

1 tabs, Oral, Daily, # 90 tabs, 1 Refill(s), Pharmacy: [facility], 1 t
abs Oral Daily

Start Date: 2/3/15

Status: Ordered



Aspir 81

81 mg, Oral, Daily, 0 Refill(s)

Start Date: 14

Status: Ordered



atorvastatin

20 mg, Oral, Bedtime (once a day), 0 Refill(s)

Start Date: 14

Status: Ordered



carvedilol 12.5 mg oral tablet

1 tabs, Oral, BID, # 180 tabs, 5 Refill(s), Pharmacy: [facility], 1 ta
bs Oral BID

Start Date: 2/17/15

Status: Ordered



clopidogrel 75 mg oral tablet

1 tabs, Oral, Daily, # 90 tabs, 5 Refill(s), Pharmacy: [facility], 1 tabs Oral 
Daily

Start Date: 14

Status: Ordered



Cymbalta

80 mg, Oral, Daily, 20mg + 60mg=80mg total dose, 0 Refill(s)

Special Instructions: 20mg + 60mg=80mg total dose

Start Date: 2/3/15

Status: Ordered



Diovan  mg-12.5 mg oral tablet

1 tabs, Oral, Daily, 0 Refill(s)

Start Date: 14

Status: Ordered



furosemide 40 mg oral tablet

1 tabs, Oral, BID, # 180 tabs, 3 Refill(s), Pharmacy: [facility], 1 ta
bs Oral BID

Start Date: 2/5/15

Status: Ordered



glipiZIDE 5 mg oral tablet

1 tabs, Oral, BID, # 180 tabs, 1 Refill(s), Pharmacy: [facility], 1 ta
bs Oral BID

Start Date: 2/3/15

Status: Ordered



 mg oral tablet

See Instructions, Take 1 tablet by mouth 3  times a day as needed for  pain, # 1
80 unknown unit, 1 Refill(s), eRx: [facility], Take 1 tablet by mouth 
3  times a day as needed for  pain

Special Instructions: Take 1 tablet by mouth 3  times a day as needed for  pain

Start Date: 2/2/15

Status: Ordered



Lantus 100 units/mL subcutaneous solution

30 units, SubCutaneous, Bedtime (once a day), # 3 vials, 0 Refill(s), Pharmacy: 
[facility] [id number], 30 units SubCutaneous Bedtime (once a day),x90 days

Start Date: 2/17/15

Stop Date: 5/18/15

Status: Ordered



Melatonin

10 mg, Oral, Bedtime (once a day), as needed for insomnia, 0 Refill(s)

Start Date: 14

Status: Ordered



metFORMIN 500 mg oral tablet

2 tabs, Oral, BID, # 360 tabs, 1 Refill(s), Pharmacy: [facility], 2 ta
bs Oral BID,x90 days

Start Date: 2/3/15

Stop Date: 8/2/15

Status: Ordered



Multiple Vitamins oral tablet

1 tabs, Oral, Daily, 0 Refill(s)

Start Date: 14

Status: Ordered



omeprazole 20 mg oral delayed release capsule

1 caps, Oral, BID, # 180 caps, 1 Refill(s), Pharmacy: [facility], 1 ca
ps Oral BID,x90 days

Start Date: 2/3/15

Stop Date: 8/2/15

Status: Ordered



OTC Black Cohash

OTC Black Cohash, See Instructions, 1 tablet oral daily, 0 Refill(s)

Special Instructions: 1 tablet oral daily

Start Date: 2/3/15

Status: Ordered



OTC Calcium

OTC Calcium, See Instructions, 1 tablet oral daily, 0 Refill(s)

Special Instructions: 1 tablet oral daily

Start Date: 2/3/15

Status: Ordered



potassium chloride 20 mEq oral tablet, extended release

1 tabs, Oral, Bedtime (once a day), 0 Refill(s)

Start Date: 2/3/15

Status: Ordered



ProAir HFA 90 mcg/inh inhalation aerosol

2 puffs, Inhalation, QID, as needed for wheezing, # 3 Each, 3 Refill(s), Pharmac
y: [facility]

Start Date: 1/23/15

Status: Ordered



SUMAtriptan 50 mg oral tablet

1 tabs, Oral, Daily, as needed for migraine headache, may repeat dose after 2 ho
urs up to a maximum of 200 mg in 24 hours, 0 Refill(s)

Special Instructions: may repeat dose after 2 hours up to a maximum of 200 mg in
24 hours

Start Date: 14

Status: Ordered



Symbicort 160 mcg-4.5 mcg/inh inhalation aerosol

2 puffs, Inhalation, BID, # 10.2 g, 3 Refill(s), Pharmacy: [facility]

Start Date: 1/23/15

Status: Ordered



Vitamin D3

4000 unit, Oral, Daily, 0 Refill(s)

Start Date: 2/3/15

Status: Ordered



Wellbutrin  mg/12 hours oral tablet, extended release

2 tabs, Oral, Bedtime (once a day), 0 Refill(s)

Start Date: 14

Status: Ordered



Results





Chemistry





 



                           Most recent to            1



                                         oldest [Reference 



                                         Range]: 

 

 



                           Sodium Lvl [135-144       140 mEq/L



                           mEq/L]                    (2/17/15 2:15 PM)

 

 



                           Potassium Lvl             3.8 mEq/L



                           [3.5-5.2 mEq/L]           (2/17/15 2:15 PM)

 

 



                           Chloride [          98 mEq/L



                           mEq/L]                    *LOW*



                                         (2/17/15 2:15 PM)

 

 



                           CO2 [22-31 mEq/L]         30 mEq/L



                                         (2/17/15 2:15 PM)

 

 



                           AGAP [3-20]               12



                                         (2/17/15 2:15 PM)

 

 



                           BUN [10-20 mg/dL]         13 mg/dL



                                         (2/17/15 2:15 PM)

 

 



                           Glucose Lvl [70-99        169 mg/dL



                           mg/dL]                    *HI*



                                         (2/17/15 2:15 PM)

 

 



                           Creatinine Lvl            0.63 mg/dL



                           [0.57-1.11 mg/dL]         (2/17/15 2:15 PM)

 

 



                           eGFR [>60 mL/min]         >60 mL/min 1



                                         (2/17/15 2:15 PM)

 

 



                           Calcium Lvl               9.9 mg/dL



                           [8.9-10.5 mg/dL]          (2/17/15 2:15 PM)

 

 



                           BNP [0-99 pg/mL]          110 pg/mL



                                         *HI*



                                         (2/17/15 2:15 PM)







1Result Comment: Multiply eGFR results by 1.21 for  race.



Immunizations







  



                     Vaccine             Date                Refusal Reason

 

  



                           tetanus-diphth toxoids (Td) adult/adol     00 







Procedures







   



                 Procedure       Date            Related Diagnosis     Body Site

 

   



                           Collection of venous blood by venipuncture     2/17/15  

 

   



                           L knee PMM/ part. synovectomy     13  

 

   



                           lt median nerve decompression     5/10/11  

 

   



                           Tubal ligation            1979  

 

   



                                         Carpal tunnel release rt   

 

   



                                         Cholecystectomy   

 

   



                                         Hysterectomy   

 

   



                                         Hysterectomy and bilateral   



                                         salpingo-oophorectomy sample   

 

   



                                         Laminectomy   







Social History







 



                           Social History Type       Response

 

 



                           Smoking Status            Former smoker; Type: Cigarettes







Assessment and Plan

Extracted from:





  



                     Title: Office Visit Note     Author: Linda Kruger MD     Date: 2/17/15









                                         Assessment/Plan

 Acute on chronic combined systolic and diastolic CHF (congestive heart failure)
 NYHA FC III. Euvolemic on the exam. Increase Coreg to 12.5 mg bid. Continue 
lasix, Divoan (ARB). Check BMP, BNP. Plan repeat echo in 6 months. If LVEF < 
35%, will need BiV-ICD.

 Benign essential HTN  Controlled. Continue current meds. Except increasing 
Coreg for CHF.

 CAD (coronary artery disease), native coronary artery  Cath in 2014 showed 
nonobstructive with branch vessel disease ( I have reviewed that angiogram).

  Continue aspirin, Plavix, Coreg, statin.

  

 Diabetes mellitus type II, uncontrolled  On insulin

 Hyperlipidemia LDL goal < 70  Continue statin.

 Old non-ST elevation myocardial infarction (NSTEMI)  

  F/u in three months.







Referrals to Other Providers





Referred by: Linda Kruger MD

## 2019-07-31 NOTE — XMS REPORT
Continuity of Care Document (Encounter date: 2014 02:17 PM)

                             Created on: 2014



DYLAN KHAN

External Reference #: [id number]

: 1960

Sex: Female



Demographics







                          Address                   [address]

[place]  [address]

 

                          Home Phone                [phone]

 

                          Preferred Language        Unknown

 

                          Marital Status            

 

                          Synagogue Affiliation     Unknown

 

                          Race                      White

 

                          Ethnic Group              Not  or 





Author







                          Author                    [facility]

 

                          Address                   [address]

[place]  [address]



 

                          Phone                     [phone]







Care Team Providers







                    Care Team Member Name    Role                Phone

 

                    Boston Hein PP                  Unavailable

 

                    Boston Hein RP                  Unavailable







Payers







                Payer name      Insurance type    Covered party ID    Authorization(s)

 

                                        Unknown 







Problems







                    Condition           Effective Dates (start - stop)    Clinical Status

 

                    Cough               Mar- -       *Acute

 

                    Acute frontal sinusitis    Mar- -       *Acute

 

                    Asthma              Mar- -       *Chronic

 

                    Depression          Mar- -       *Chronic

 

                    DMII WO CMP NT ST UNCNTR     -        

 

                    PURE HYPERCHOLESTEROLEM     -        

 

                    311 - DEPRESSIVE DISORDER NEC     -        

 

                    BENIGN HYPERTENSION     -        

 

                    Type I diabetes mellitus    Mar- -       *Acute

 

                    Weight gain         Mar- -       *Acute

 

                    Menopausal symptoms    Mar- -       *Acute

 

                    Upper Respiratory Infection, Acute    Sep- -       *Acute

 

                    Pharyngitis, Acute    Sep- -       *Acute

 

                    Osteoarthritis      Apr- -       *Chronic

 

                    Left foot drop      Apr- -       *Chronic

 

                    Pain in joint involving lower leg    Apr- -       *Acute

 

                    Hypertension, Benign     -       *Controlled

 

                    Diabetes Mellitus Type 2, Uncomplicated     -       *Controlled

 

                    Follow-up examination, following other surgery    May- -       *Acute

 

                    Follow-up examination, following other surgery    May- -       *Acute

 

                    Whiplash            Sep- -       *Acute

 

                    Injury of shoulder    Sep- -       *Acute

 

                    Pain in joint involving shoulder region    Oct- -       *Acute

 

                          Nonallopathic lesions of cervical region, not elsewhere classified    Oct- 

-                                       *Acute

 

                          Nonallopathic lesions of thoracic region, not elsewhere classified    Oct- 

-                                       *Acute

 

                    Nonallopathic lesions of rib cage, not elsewhere classified    Oct- -       *Acute



 

                    Pneumonia           Dec- -       *Acute

 

                    Upper Respiratory Infection, Acute    Dec- -       *Acute

 

                    Pneumonia           Dec- -       *Acute

 

                    Injury of face and neck    Sep- -       *Acute

 

                    Injury, other and unspecified, shoulder and upper    Sep- -       *Acute

 

                    Sprain of neck      Sep- -       *Acute

 

                    Pneumonia           Dec- -       *Acute

 

                    Migraine NOS/not intrcbl     -       *Acute







Family History







                Family Member    Diagnosis       Age At Onset    Status

 

                Mother (Unknown)    Diabetes                        Yes

 

                Father (Unknown)    Heart disease                    Yes

 

                Father (Unknown)    Hypertension                    Yes

 

                Mother (Unknown)    CVA (Stroke)                    Yes







Social History







                    Social History Element    Description         Quantity

 

                    alcohol             beer                 







Allergies, Adverse Reactions, Alerts







                Substance       Reaction        Severity        Status









                    SULFA (SULFONAMIDE ANTIBIOTICS)    HIVES               Unknown

 

                    CELECOXIB           HIVES               Unknown

 

                    INFLUENZA VIRUS VACC,SPECIFIC    Convulsions         Unknown

 

                    BARIUM SULFATE      Swelling, hives     Unknown







Medications







             Medication    Instructions    Dosage       Effective Dates (start - stop)    Status

 

                Cymbalta 20 mg capsule,delayed release    Take 1 tablet by mouth daily                    Mar-

 -                                      Active

 

                          Dulera 200 mcg-5 mcg/actuation HFA aerosol inhaler    inhale 1 puff by inhalation 

route 2 times every day in the morning and evening    0                   Mar- -       Active

 

                    cefdinir 300 mg capsule    take 1 capsule (300MG)  by oral route  every 12 hours    

300 MG                    Mar- -             Active

 

                          Wellbutrin  mg tablet,sustained-release    Take 1 tablet by mouth twice a day.

                                        Mar- -       Active

 

                          albuterol sulfate HFA 90 mcg/actuation aerosol inhaler    inhale 2 puff by inhalation

 route  every 4 - 6 hours as needed    0                   Mar- -       Active

 

                          One Touch Ultra System Kit    USE MONITOR TO TEST BLOOD SUGARS ONE TO THREE TIMES 

DAILY .00                          -       Active

 

                          One Touch Delica Lancets 33 gauge    USE ONE LANCET THREE TIMES DAILY OR AS NEEDED

 .00                               -       Active

 

                    Advair Diskus 250 mcg-50 mcg/dose powder for inhalation    1 puff  twice a day.     

                          Oct- -             Active

 

             multivitamin capsule                               -     Active

 

             BLACK COHOSH (unknown strength)                               -     Active

 

                          One Touch Ultra Test strips    USE ONE TEST STRIP THREE TIMES DAILY OR AS NEEDED DX

 250.00                                 May- -       Active

 

                Imitrex 50 mg tablet    TAKE ONE TO TWO NOW. MAY REPEAT IN 2 HRS                    Sep- -

                                        Active

 

                Cymbalta 20 mg capsule,delayed release    Take 1 tablet by mouth daily                    

 -                                      Active

 

                Cymbalta 60 mg capsule,delayed release    Take 1 tablet by mouth every day.                    Mar-

 -                                      Active







Immunizations







                Vaccine         Date            Status          Comments

 

                Td (adult)           completed       - Completed reason: source unspecified 







Results







          Test Name    Date and Time    Measure    Units     Reference Range    Abnormal Flag    Comments



 

                                        Unknown 







Vital Signs







           Date / Time:    Height     Weight     Pulse Rate    Blood Pressure    Temperature

 

           Mar-/:18:00    65.00 in    202.20 lbs    99 /min    152/96 mm[Hg]     







Procedures







                          Procedure                 Date

 

                                        Unknown 







Encounters







                    Encounter           Location            Date

 

                    Patient Visit       [facility]        Mar-

 

                    Patient Visit       Conversion          

 

                    Patient Visit       [address]          Oct-

 

                    Patient Visit       [facility]        

 

                    Patient Visit       [address]          May-

 

                    Patient Visit       [address]          Sep-

 

                    Patient Visit       [address]          

 

                    Patient Visit       [facility]        Mar-

 

                    Patient Visit       [address]          Mar-

 

                    Patient Visit       [address]          Sep-

 

                    Patient Visit       [address]          Apr-

 

                    Patient Visit       [address]          

 

                    Patient Visit       [facility]        May-

 

                    Patient Visit       [facility]        May-

 

                    Patient Visit       [address]          Sep-

 

                    Patient Visit       [address]          Oct-

 

                    Patient Visit       [address]          Dec-

 

                    Patient Visit       [address]          Dec-

 

                    Patient Visit       [address]          Sep-

 

                    Patient Visit       [address]          Dec-

 

                    Patient Visit       [address]          

 

                    Patient Visit       Conversion          Dec-







Advance Directives







                          Directive                 Effective Date

 

                                        Unknown

## 2019-07-31 NOTE — XMS REPORT
Transition of Care/Referral Summary

                             Created on: 2107



DYLAN KHAN

External Reference #: [id number]

: 1960

Sex: Female



Demographics







                          Address                   [address] [facility] [address]

[place]  [address]

 

                          Home Phone                [phone]

 

                          Preferred Language        English

 

                          Marital Status            

 

                          [employer] Affiliation     [employer]

 

                          Race                      White

 

                                        Additional Race(s)  

 

                          Ethnic Group              Not  or 





Author







                          Author                    [facility]

 

                          Organization              [facility]

 

                          Address                   Unknown

 

                          Phone                     Unavailable







Care Team Providers







                    Care Team Member Name    Role                Phone

 

                    Dhiraj Mijares    PCP                 [phone]

 

                    Boston Hein      PCP                 [phone]







Encounter





VC FIN [id number] Date(s): 3/21/18 - 3/21/18

[facility] [address] [place], [place] [address] [phone]

Discharge Disposition: 01-Home or Self Care

Attending Physician: Emelia Gonzalez, NP-C

Admitting Physician: Emelia Gonzalez, NP-C

Referring Physician: JIM Jerry MD





Vital Signs







 



                           Most recent to            1



                                         oldest [Reference 



                                         Range]: 

 

 



                           Peripheral Pulse          72 bpm



                           Rate [ bpm]         (3/21/18 12:47 PM)

 

 



                           Blood Pressure            110/70 mmHg



                           [/60-90 mmHg]       (3/21/18 12:47 PM)







Problem List







    



              Condition     Effective Dates     Status       Health Status     Informant

 

    



                           Acquired                  Active  



                                         spondylolisthesis(Co    



                                         nfirmed)    

 

    



                           Acute                     Active  



                                         pain(Confirmed)    

 

    



                           Hay fever(Confirmed)      Active  

 

    



                           Arthritis(Confirmed)      Resolved  

 

    



                           Right Carpal tunnel       Resolved  



                                         release(Confirmed)    

 

    



                           Carpal tunnel             Resolved  



                                         release    



                                         decompression(Confir    



                                         med)    

 

    



                           Cholecystectomy(Conf      Resolved  



                                         irmed)    

 

    



                           IBS (irritable bowel      Active  



                                         syndrome)(Confirmed)    

 

    



                     CHF (congestive      Active              patient



                                         heart    



                                         failure)(Confirmed)    

 

    



                           CAD (coronary artery      Active  



                                         disease)(Confirmed)    

 

    



                           Decompression(Confir      Resolved  



                                         med)    

 

    



                           Depression(Confirmed      Resolved  



                                         )    

 

    



                           Depression(Confirmed      Active  



                                         )    

 

    



                           Diabetes(Confirmed)       Active  

 

    



                           Diabetes(Confirmed)       Resolved  

 

    



                           GERD                      Active  



                                         (gastroesophageal    



                                         reflux    



                                         disease)(Confirmed)    

 

    



                     Ischemic            < 5/8/15            Resolved  



                                         cardiomyopathy(Confi    



                                         rmed)    

 

    



                           Presence of               Active  



                                         biventricular    



                                         AICD(Confirmed)    

 

    



                           History of lumbar         Active  



                                         fusion(Confirmed)    

 

    



                           Hyperlipidemia(Confi      Active  



                                         rmed)    

 

    



                     Asthma(Confirmed)     < 17           Resolved  

 

    



                           Hypertension(Confirm      Resolved  



                                         ed)    

 

    



                           Hypertension(Confirm      Resolved  



                                         ed)    

 

    



                           HTN                       Active  



                                         (hypertension)(Confi    



                                         rmed)    

 

    



                           Hysterectomy(Confirm      Resolved  



                                         ed)    

 

    



                           Impaired gas              Active  



                                         exchange(Confirmed)1    

 

    



                           Irritable                 Resolved  



                                         bowel(Confirmed)    

 

    



                           Laminectomy(Confirme      Resolved  



                                         d)    

 

    



                           LBBB (left bundle         Active  



                                         branch    



                                         block)(Confirmed)    

 

    



                           LUQ pain(Confirmed)       Active  

 

    



                           Migraine(Confirmed)       Resolved  

 

    



                           Asthma(Confirmed)         Active  

 

    



                     Morbid              Active              patient



                                         obesity(Confirmed)    

 

    



                           Myocardial                Resolved  



                                         infarction(Confirmed    



                                         )    

 

    



                           Septicemia                Resolved  



                                         pneumonia(Confirmed)    

 

    



                     L knee              13              Resolved  



                                         Synovectomy(Confirme    



                                         d)    

 

    



                           Tissue perfusion          Active  



                                         alteration(Confirmed    



                                         )2    

 

    



                     Tobacco             Active              patient



                                         user(Confirmed)    

 

    



                     Tubal               79              Resolved  



                                         ligation(Confirmed)    

 

    



                           UTI (urinary tract        Active  



                                         infection)(Confirmed    



                                         )    

 

    



                     Chicken             < 17           Resolved  



                                         pox(Confirmed)    







1Problem added automatically by system based on initiation of Impaired Gas 
Exchange Plan of Care



2Problem added automatically by system based on initiation of Tissue Perfusion 
Cerebral Plan of Care



Allergies, Adverse Reactions, Alerts







   



                 Substance       Reaction        Severity        Status

 

   



                     sulfamethoxazole     Urticaria (hives)      Active



                                         HIVES  

 

   



                     morphine            Burning             Active

 

   



                     barium sulfate      Swelling, hives      Active

 

   



                     celecoxib           HIVES               Active

 

   



                     influenza virus vaccine,     Convulsion          Active



                                         inactivated   







Medications





100ML 3ML SOLUTION  VIAL HUMALOG U

See Instructions, INJECT SUBCUTANEOUSLY 10  UNITS 3 TIMES DAILY BEFORE  MEALS, #
27 mL, 4 Refill(s), eRx: [facility], INJECT SUBCUTANEOUSLY 10  UNITS 3
TIMES DAILY BEFORE  MEALS

Start Date: 18

Status: Ordered



acetaminophen

1,000 mg, Oral, q6hr, as needed for pain, 0 Refill(s)

Start Date: 14

Status: Ordered



amLODIPine 10 mg oral tablet

10 mg 1 tabs, Oral, Daily, # 90 tabs, 5 Refill(s), Pharmacy: [facility]
E, 1 tabs Oral Daily

Start Date: 16

Status: Ordered



Aspir 81

81 mg, Oral, Daily, 0 Refill(s)

Start Date: 14

Status: Ordered



atorvastatin 10 mg oral tablet

See Instructions, TAKE 1 TABLET BY MOUTH  DAILY, # 90 tabs, 3 Refill(s), eRx: [facility]

Start Date: 18

Status: Ordered



Basaglar KwikPen 100 units/mL subcutaneous solution

See Instructions, 42 units at hs SubCutaneous, # 1 boxes, 3 Refill(s), Pharmacy:
[facility], 42 units at hs SubCutaneous

Start Date: 18

Status: Ordered



Bentyl 20 mg oral tablet

20 mg 1 tabs, Oral, q8hr, Abdominal Cramping, # 15 tabs, 1 Refill(s), Pharmacy: 
[facility] [id number], 1 tabs Oral q8hr,PRN:Abdominal Cramping

Start Date: 18

Status: Ordered



bumetanide 1 mg oral tablet

See Instructions, Take 2 tablets by mouth in  the morning and 1 tablet by mouth 
in the evening, # 270 tabs, 1 Refill(s), Pharmacy: [facility], Take 2 
tablets by mouth in  the morning and 1 tablet by mouth in the evening

Start Date: 17

Status: Ordered



bumetanide 1 mg oral tablet

See Instructions, TAKE 2 TABLETS BY MOUTH IN  THE MORNING AND 1 TABLET BY MOUTH 
IN THE EVENING, # 270 tabs, eRx: [facility], TAKE 2 TABLETS BY MOUTH I
N  THE MORNING AND 1 TABLET BY MOUTH IN THE EVENING

Start Date: 18

Status: Ordered



buPROPion 150 mg/12 hours (SR) oral tablet, extended release

See Instructions, Take 1 tablet by mouth two  times daily, # 180 tabs, 3 Refill(
s), eRx: [facility]

Start Date: 17

Status: Ordered



carvedilol 25 mg oral tablet

25 mg 1 tabs, Oral, BID, # 180 tabs, 1 Refill(s), Pharmacy: [facility]
, 1 tabs Oral BID

Start Date: 17

Status: Ordered



cloNIDine 0.2 mg oral tablet

0.2 mg 1 tabs, Oral, BID, # 180 tabs, 2 Refill(s), Pharmacy: [facility]
E, 1 tabs Oral BID

Start Date: 18

Status: Ordered



clopidogrel 75 mg oral tablet

See Instructions, TAKE 1 TABLET BY MOUTH  DAILY, # 90 tabs, 2 Refill(s), eRx: [facility]

Start Date: 18

Status: Ordered



diclofenac 3% topical gel

0.5 g, Topical, BID, # 50 g, 1 Refill(s), Pharmacy: [facility] [id number]

Start Date: 8/15/17

Status: Ordered



Digox 125 mcg (0.125 mg) oral tablet

See Instructions, TAKE 1 TABLET BY MOUTH  DAILY, # 90 tabs, 2 Refill(s), eRx: [facility]

Start Date: 18

Status: Ordered



DULoxetine 20 mg oral delayed release capsule

See Instructions, TAKE 1 CAP BY MOUTH DAILY.  DO NOT CRUSH OR CHEW; (TAKE WITH D
ULOXETINE 60MG), # 90 caps, 1 Refill(s), eRx: [facility]

Start Date: 18

Status: Ordered



DULoxetine 60 mg oral delayed release capsule

See Instructions, TAKE 1 CAPSULE BY MOUTH  DAILY . DO NOT CRUSH OR  CHEW. (TAKE 
WITH DULOXETINE 20MG), # 90 caps, 1 Refill(s), eRx: [facility]

Start Date: 18

Status: Ordered



Glucometer Lancets (DME)

DME Item one touch delica lancets  use to test bg x3/daily  dx:e11.65, See Instr
uctions, # 300 Each, 3 Refill(s), Pharmacy: [facility] [id number], one touc
h delica lancets; use to test bg x3/daily; dx:e11.65, Supply

Start Date: 3/13/17

Status: Ordered



HumaLOG 100 units/mL injectable solution

See Instructions, 12 units SubCutaneous TIDAC, # 3 vials, 1 Refill(s), Pharmacy:
[facility], 12 units SubCutaneous TIDAC

Start Date: 18

Status: Ordered



Insulin Pin Needles (DME)

DME Item 32g x4mm (jacob needles)  use to inject insulin   dx:e11.65, See Instruc
tions, # 100 Each, 3 Refill(s), Pharmacy: [facility] [id number], 32g x4mm (
jacob needles); use to inject insulin ; dx:e11.65, Supply

Start Date: 17

Status: Ordered



Melatonin

10 mg, Oral, Bedtime (once a day), as needed for insomnia, 0 Refill(s)

Start Date: 14

Status: Ordered



metFORMIN 500 mg oral tablet

See Instructions, Take 2 tablets by mouth  twice daily, # 360 tabs, 1 Refill(s),
eRx: [facility], Take 2 tablets by mouth  twice daily

Start Date: 17

Status: Ordered



mometasone 50 mcg/inh nasal spray

See Instructions, Use 2 sprays nasally daily, # 17 g, 5 Refill(s), eRx: [facility]

Start Date: 17

Status: Ordered



Norco 10 mg-325 mg oral tablet

1 tabs, Oral, q6hr, as needed for pain, # 30 tabs, 0 Refill(s)

Start Date: 18

Status: Ordered



potassium chloride 20 mEq oral tablet, extended release

See Instructions, TAKE 1 TABLET BY MOUTH  DAILY, # 90 tabs, 2 Refill(s), eRx: [facility]

Start Date: 18

Status: Ordered



ProAir HFA 90 mcg/inh inhalation aerosol

See Instructions, INHALE 2 PUFFS 4 TIMES  DAILY AS NEEDED FOR  WHEEZING, # 25.5 
g, 2 Refill(s), eRx: [facility]

Start Date: 18

Status: Ordered



RABEprazole 20 mg oral delayed release tablet

See Instructions, Take 2 tablets by mouth  daily, # 180 tabs, 1 Refill(s), eRx: 
[facility]

Start Date: 17

Status: Ordered



SUMAtriptan 50 mg oral tablet

See Instructions, TAKE 1 TABLET BY MOUTH DAILY AS NEEDED FOR MIGRAINE HEADACHE. 
MAY REPEAT DOSE AFTER 2 HOURS UP TO A. MAXIMUM  MG IN 24 HOURS, # 9 tabs, 
eRx: [facility] [id number]

Start Date: 18

Status: Ordered



Symbicort 160 mcg-4.5 mcg/inh inhalation aerosol

See Instructions, INHALE 2 PUFFS TWICE DAILY, # 30.6 g, 2 Refill(s), [facility]: [facility]

Start Date: 18

Status: Ordered



SYRNG   .5CC ULTII 31 G SRT

See Instructions, Use to Inject insulin once  daily, # 90 Each, 2 Refill(s), eRx
: [facility], Use to Inject insulin once  daily

Start Date: 17

Status: Ordered



valsartan 160 mg oral tablet

160 mg 1 tabs, Oral, BID, X 90 days, # 180 tabs, 3 Refill(s), Pharmacy: [facility], 1 tabs Oral BID,x90 days

Start Date: 4/3/17

Stop Date: 3/29/18

Status: Ordered



Results





No data available for this section



Immunizations





Given and Recorded





   



                 Vaccine         Date            Status          Refusal Reason

 

   



                     tetanus-diphth toxoids (Td) adult/adol     00             Given 







Procedures







    



              Procedure     Date         Related Diagnosis     Body Site     Status

 

    



                     L knee PMM/ part. synovectomy     13              Completed

 

    



                     lt median nerve decompression     5/10/11             Completed

 

    



                     Tubal ligation                      Completed

 

    



                           Carpal tunnel release rt        Completed

 

    



                           Cholecystectomy           Completed

 

    



                           Hysterectomy              Completed

 

    



                           Hysterectomy and bilateral        Completed



                                         salpingo-oophorectomy sample    

 

    



                           Laminectomy               Completed

 

    



                           Pacemaker                 Completed







Social History







 



                           Social History Type       Response

 

 



                           Smoking Status            Former smoker; Type: Cigarettes1



                                         entered on: 5/8/15







1Quit around 



Assessment and Plan





No data available for this section

## 2019-07-31 NOTE — XMS REPORT
Transition of Care/Referral Summary

                             Created on: 10/24/2123



DYLAN KHAN

External Reference #: [id number]

: 1960

Sex: Female



Demographics







                          Address                   [address]

[place]  [address]

 

                          Home Phone                [phone]

 

                          Preferred Language        English

 

                          Marital Status            

 

                          Anabaptism Affiliation     Jain

 

                          Race                      White

 

                          Ethnic Group              Not  or 





Author







                          Organization              Unknown

 

                          Address                   Unknown

 

                          Phone                     Unavailable







Care Team Providers







                    Care Team Member Name    Role                Phone

 

                    Dhiraj Mijares    PCP                 [phone]







Encounter





VC FIN [id number] Date(s): 2/17/15 - 2/17/15

[address], [facility] [address] [place], [place] [address]
S [phone]

Discharge Diagnosis: CHF (congestive heart failure)

Discharge Disposition: Home or Self Care

Attending Physician: Ember Schwarz

Admitting Physician: Ember Schwarz





Vital Signs







 



                           Most recent to            1



                                         oldest [Reference 



                                         Range]: 

 

 



                           Peripheral Pulse          100 bpm



                           Rate [ bpm]         (2/17/15 9:46 AM)

 

 



                           Blood Pressure            146/86 mmHg



                           [/60-90 mmHg]       *HI*



                                         (2/17/15 9:46 AM)









 



                           Most recent to            1



                                         oldest [Reference 



                                         Range]: 

 

 



                           SpO2                      95 %



                                         (2/17/15 9:46 AM)







Problem List







    



              Condition     Effective Dates     Status       Health Status     Informant

 

    



                           Acute                     Active  



                                         pain(Confirmed)    

 

    



                           Arthritis(Confirmed)      Resolved  

 

    



                           Asthma(Confirmed)         Active  

 

    



                           Right Carpal tunnel       Resolved  



                                         release(Confirmed)    

 

    



                           Carpal tunnel             Resolved  



                                         release    



                                         decompression(Confir    



                                         med)    

 

    



                           Cholecystectomy(Conf      Resolved  



                                         irmed)    

 

    



                     CHF (congestive      Active              patient



                                         heart    



                                         failure)(Confirmed)    

 

    



                           CAD (coronary artery      Active  



                                         disease)(Confirmed)    

 

    



                           Decompression(Confir      Resolved  



                                         med)    

 

    



                           Depression(Confirmed      Resolved  



                                         )    

 

    



                           Depression(Confirmed      Active  



                                         )    

 

    



                           Diabetes(Confirmed)       Resolved  

 

    



                           Diabetes(Confirmed)       Active  

 

    



                           Hyperlipidemia(Confi      Resolved  



                                         rmed)    

 

    



                           Hypertension(Confirm      Resolved  



                                         ed)    

 

    



                           Hypertension(Confirm      Resolved  



                                         ed)    

 

    



                           HTN                       Active  



                                         (hypertension)(Confi    



                                         rmed)    

 

    



                           Hysterectomy(Confirm      Resolved  



                                         ed)    

 

    



                           Impaired gas              Active  



                                         exchange(Confirmed)1    

 

    



                           Irritable                 Resolved  



                                         bowel(Confirmed)    

 

    



                           Laminectomy(Confirme      Resolved  



                                         d)    

 

    



                           Migraine(Confirmed)       Resolved  

 

    



                           Myocardial                Resolved  



                                         infarction(Confirmed    



                                         )    

 

    



                           Septicemia                Resolved  



                                         pneumonia(Confirmed)    

 

    



                     L knee              13              Resolved  



                                         Synovectomy(Confirme    



                                         d)    

 

    



                           Tissue perfusion          Active  



                                         alteration(Confirmed    



                                         )2    

 

    



                     Tubal               79              Resolved  



                                         ligation(Confirmed)    







1Problem added automatically by system based on initiation of Impaired Gas 
Exchange Plan of Care



2Problem added automatically by system based on initiation of Tissue Perfusion 
Cerebral Plan of Care



Allergies, Adverse Reactions, Alerts







   



                 Substance       Reaction        Severity        Status

 

   



                     barium sulfate      Adverse Reaction      Active



                                         Swelling, hives  

 

   



                     celecoxib           HIVES               Active

 

   



                           influenza virus vaccine,       Active



                                         inactivated   

 

   



                     morphine            Adverse Reaction      Active

 

   



                     sulfamethoxazole     HIVES               Active



                                         Urticaria (hives)  







Medications





acetaminophen

1,000 mg, Oral, q6hr, as needed for pain, 0 Refill(s)

Start Date: 14

Status: Ordered



albuterol 2.5 mg/3 mL (0.083%) inhalation solution

3 mL, Inhalation, BID, Dx: Asthma, # 25 Each, 3 Refill(s), Pharmacy: [facility]
, 3 mL Inhalation BID,Instr:Dx: Asthma

Special Instructions: Dx: Asthma

Start Date: 14

Status: Ordered



amLODIPine 5 mg oral tablet

1 tabs, Oral, Daily, # 90 tabs, 1 Refill(s), Pharmacy: [facility], 1 t
abs Oral Daily

Start Date: 2/3/15

Status: Ordered



Aspir 81

81 mg, Oral, Daily, 0 Refill(s)

Start Date: 14

Status: Ordered



atorvastatin

20 mg, Oral, Bedtime (once a day), 0 Refill(s)

Start Date: 14

Status: Ordered



carvedilol 12.5 mg oral tablet

1 tabs, Oral, BID, # 180 tabs, 5 Refill(s), Pharmacy: [facility], 1 ta
bs Oral BID

Start Date: 2/17/15

Status: Ordered



clopidogrel 75 mg oral tablet

1 tabs, Oral, Daily, # 90 tabs, 5 Refill(s), Pharmacy: [facility], 1 tabs Oral 
Daily

Start Date: 14

Status: Ordered



Cymbalta

80 mg, Oral, Daily, 20mg + 60mg=80mg total dose, 0 Refill(s)

Special Instructions: 20mg + 60mg=80mg total dose

Start Date: 2/3/15

Status: Ordered



Diovan  mg-12.5 mg oral tablet

1 tabs, Oral, Daily, 0 Refill(s)

Start Date: 14

Status: Ordered



furosemide 40 mg oral tablet

1 tabs, Oral, BID, # 180 tabs, 3 Refill(s), Pharmacy: [facility], 1 ta
bs Oral BID

Start Date: 2/5/15

Status: Ordered



glipiZIDE 5 mg oral tablet

1 tabs, Oral, BID, # 180 tabs, 1 Refill(s), Pharmacy: [facility], 1 ta
bs Oral BID

Start Date: 2/3/15

Status: Ordered



 mg oral tablet

See Instructions, Take 1 tablet by mouth 3  times a day as needed for  pain, # 1
80 unknown unit, 1 Refill(s), [facility]Rx: [facility], Take 1 tablet by mouth 
3  times a day as needed for  pain

Special Instructions: Take 1 tablet by mouth 3  times a day as needed for  pain

Start Date: 2/2/15

Status: Ordered



Lantus 100 units/mL subcutaneous solution

30 units, SubCutaneous, Bedtime (once a day), # 3 vials, 0 Refill(s), Pharmacy: 
[facility] [id number], 30 units SubCutaneous Bedtime (once a day),x90 days

Start Date: 2/17/15

Stop Date: 5/18/15

Status: Ordered



Melatonin

10 mg, Oral, Bedtime (once a day), as needed for insomnia, 0 Refill(s)

Start Date: 14

Status: Ordered



metFORMIN 500 mg oral tablet

2 tabs, Oral, BID, # 360 tabs, 1 Refill(s), Pharmacy: [facility], 2 ta
bs Oral BID,x90 days

Start Date: 2/3/15

Stop Date: 8/2/15

Status: Ordered



Multiple Vitamins oral tablet

1 tabs, Oral, Daily, 0 Refill(s)

Start Date: 14

Status: Ordered



omeprazole 20 mg oral delayed release capsule

1 caps, Oral, BID, # 180 caps, 1 Refill(s), Pharmacy: [facility], 1 ca
ps Oral BID,x90 days

Start Date: 2/3/15

Stop Date: 8/2/15

Status: Ordered



OTC Black Cohash

OTC Black Cohash, See Instructions, 1 tablet oral daily, 0 Refill(s)

Special Instructions: 1 tablet oral daily

Start Date: 2/3/15

Status: Ordered



OTC Calcium

OTC Calcium, See Instructions, 1 tablet oral daily, 0 Refill(s)

Special Instructions: 1 tablet oral daily

Start Date: 2/3/15

Status: Ordered



potassium chloride 20 mEq oral tablet, extended release

1 tabs, Oral, Bedtime (once a day), 0 Refill(s)

Start Date: 2/3/15

Status: Ordered



ProAir HFA 90 mcg/inh inhalation aerosol

2 puffs, Inhalation, QID, as needed for wheezing, # 3 Each, 3 Refill(s), Pharmac
y: [facility]

Start Date: 1/23/15

Status: Ordered



SUMAtriptan 50 mg oral tablet

1 tabs, Oral, Daily, as needed for migraine headache, may repeat dose after 2 ho
urs up to a maximum of 200 mg in 24 hours, 0 Refill(s)

Special Instructions: may repeat dose after 2 hours up to a maximum of 200 mg in
24 hours

Start Date: 14

Status: Ordered



Symbicort 160 mcg-4.5 mcg/inh inhalation aerosol

2 puffs, Inhalation, BID, # 10.2 g, 3 Refill(s), Pharmacy: [facility]

Start Date: 1/23/15

Status: Ordered



Vitamin D3

4000 unit, Oral, Daily, 0 Refill(s)

Start Date: 2/3/15

Status: Ordered



Wellbutrin  mg/12 hours oral tablet, extended release

2 tabs, Oral, Bedtime (once a day), 0 Refill(s)

Start Date: 14

Status: Ordered



Results





No data available for this section



Immunizations







  



                     Vaccine             Date                Refusal Reason

 

  



                           tetanus-diphth toxoids (Td) adult/adol     00 







Procedures







   



                 Procedure       Date            Related Diagnosis     Body Site

 

   



                           L knee PMM/ part. synovectomy     13  

 

   



                           lt median nerve decompression     5/10/11  

 

   



                           Tubal ligation            1979  

 

   



                                         Carpal tunnel release rt   

 

   



                                         Cholecystectomy   

 

   



                                         Hysterectomy   

 

   



                                         Hysterectomy and bilateral   



                                         salpingo-oophorectomy sample   

 

   



                                         Laminectomy   







Social History







 



                           Social History Type       Response

 

 



                           Smoking Status            Former smoker; Type: Cigarettes







Assessment and Plan

Extracted from:





  



                     Title: Office Visit Note     Author: Dhiraj Mijares MD     Date: 2/17/15









                                         Assessment/Plan

 1.CHF (congestive heart failure)

 No change in medication at this time

 Follow-up with cardiology as scheduled

 Anticipate lab work, but wait for cardiology appointment for the time being

## 2019-07-31 NOTE — XMS REPORT
Transition of Care/Referral Summary

                             Created on: 2080



DYLAN KHAN EDITH

External Reference #: [id number]

: 1960

Sex: Female



Demographics







                          Address                   [address]

[place]  [address]

 

                          Home Phone                [phone]

 

                          Preferred Language        English

 

                          Marital Status            

 

                          Denominational Affiliation     [employer]

 

                          Race                      White

 

                          Ethnic Group              Not  or 





Author







                          Author                    [facility]

 

                          Organization              [facility]

 

                          Address                   Unknown

 

                          Phone                     Unavailable







Care Team Providers







                    Care Team Member Name    Role                Phone

 

                    Dhiraj Mijares    PCP                 [phone]







Encounter





VC FIN [id number] Date(s): 4/3/17 - 4/3/17

[facility] [address] [place] [address]  [phone]
[phone]

Discharge Diagnosis: Exacerbation of RAD (reactive airway disease)

Discharge Disposition: 01-Home or Self Care

Attending Physician: Xavier Serna MD

Admitting Physician: Xavier Serna MD





Vital Signs







 



                           Most recent to            1



                                         oldest [Reference 



                                         Range]: 

 

 



                           Temperature Oral          37 degC



                           [35.8-37.3 degC]          (4/3/17 8:52 AM)

 

 



                           Peripheral Pulse          88 bpm



                           Rate [ bpm]         (4/3/17 12:09 PM)

 

 



                           Heart Rate Monitored      89 bpm



                           [ bpm]              (4/3/17 11:10 AM)

 

 



                           Respiratory Rate          18 br/min



                           [14-20 br/min]            (4/3/17 12:09 PM)

 

 



                           Blood Pressure            148/72 mmHg



                           [/60-90 mmHg]       *HI*



                                         (4/3/17 12:09 PM)

 

 



                           Mean Arterial             102 mmHg



                           Pressure, Cuff            (4/3/17 11:10 AM)

 

 



                           Pulse Rate [        86 bpm



                           bpm]                      (4/3/17 9:50 AM)

 

 



                           SpO2                      98 %



                                         (4/3/17 12:09 PM)







Problem List







    



              Condition     Effective Dates     Status       Health Status     Informant

 

    



                           Acquired                  Active  



                                         spondylolisthesis(Co    



                                         nfirmed)    

 

    



                           Acute                     Active  



                                         pain(Confirmed)    

 

    



                           Hay fever(Confirmed)      Active  

 

    



                           Arthritis(Confirmed)      Resolved  

 

    



                           Right Carpal tunnel       Resolved  



                                         release(Confirmed)    

 

    



                           Carpal tunnel             Resolved  



                                         release    



                                         decompression(Confir    



                                         med)    

 

    



                           Cholecystectomy(Conf      Resolved  



                                         irmed)    

 

    



                           IBS (irritable bowel      Active  



                                         syndrome)(Confirmed)    

 

    



                     CHF (congestive      Active              patient



                                         heart    



                                         failure)(Confirmed)    

 

    



                           CAD (coronary artery      Active  



                                         disease)(Confirmed)    

 

    



                           Decompression(Confir      Resolved  



                                         med)    

 

    



                           Depression(Confirmed      Resolved  



                                         )    

 

    



                           Depression(Confirmed      Active  



                                         )    

 

    



                           Diabetes(Confirmed)       Active  

 

    



                           Diabetes(Confirmed)       Resolved  

 

    



                           GERD                      Active  



                                         (gastroesophageal    



                                         reflux    



                                         disease)(Confirmed)    

 

    



                           Ischemic                  Active  



                                         cardiomyopathy(Confi    



                                         rmed)    

 

    



                           Presence of               Active  



                                         biventricular    



                                         AICD(Confirmed)    

 

    



                           History of lumbar         Active  



                                         fusion(Confirmed)    

 

    



                           Hyperlipidemia(Confi      Resolved  



                                         rmed)    

 

    



                           Asthma(Confirmed)         Active  

 

    



                           Hypertension(Confirm      Resolved  



                                         ed)    

 

    



                           Hypertension(Confirm      Resolved  



                                         ed)    

 

    



                           HTN                       Active  



                                         (hypertension)(Confi    



                                         rmed)    

 

    



                           Hysterectomy(Confirm      Resolved  



                                         ed)    

 

    



                           Impaired gas              Active  



                                         exchange(Confirmed)1    

 

    



                           Irritable                 Resolved  



                                         bowel(Confirmed)    

 

    



                           Laminectomy(Confirme      Resolved  



                                         d)    

 

    



                           LBBB (left bundle         Active  



                                         branch    



                                         block)(Confirmed)    

 

    



                           LUQ pain(Confirmed)       Active  

 

    



                           Migraine(Confirmed)       Resolved  

 

    



                           Asthma(Confirmed)         Active  

 

    



                     Morbid              Active              patient



                                         obesity(Confirmed)    

 

    



                           Myocardial                Resolved  



                                         infarction(Confirmed    



                                         )    

 

    



                           Septicemia                Resolved  



                                         pneumonia(Confirmed)    

 

    



                     L knee              13              Resolved  



                                         Synovectomy(Confirme    



                                         d)    

 

    



                           Tissue perfusion          Active  



                                         alteration(Confirmed    



                                         )2    

 

    



                     Tobacco             Active              patient



                                         user(Confirmed)    

 

    



                     Tubal               79              Resolved  



                                         ligation(Confirmed)    

 

    



                           UTI (urinary tract        Active  



                                         infection)(Confirmed    



                                         )    

 

    



                           Chicken                   Active  



                                         pox(Confirmed)    







1Problem added automatically by system based on initiation of Impaired Gas 
Exchange Plan of Care



2Problem added automatically by system based on initiation of Tissue Perfusion 
Cerebral Plan of Care



Allergies, Adverse Reactions, Alerts







   



                 Substance       Reaction        Severity        Status

 

   



                     barium sulfate      Swelling, hives      Active

 

   



                     celecoxib           HIVES               Active

 

   



                     influenza virus vaccine,     Convulsion          Active



                                         inactivated   

 

   



                     morphine            Burning             Active

 

   



                     sulfamethoxazole     HIVES               Active



                                         Urticaria (hives)  







Medications





acetaminophen

1,000 mg, Oral, q6hr, as needed for pain, 0 Refill(s)

Start Date: 14

Status: Ordered



albuterol 2.5 mg/3 mL (0.083%) inhalation solution

2.5 mg 3 mL, Inhalation, q6hr (scheduled), # 25 Each, 0 Refill(s)

Start Date: 4/3/17

Status: Ordered



Ambien 10 mg oral tablet

10 mg 1 tabs, Oral, Bedtime (once a day), as needed for sleep, X 30 days, # 30 t
abs, 2 Refill(s)

Start Date: 17

Stop Date: 17

Status: Ordered



amLODIPine 10 mg oral tablet

10 mg 1 tabs, Oral, Daily, # 90 tabs, 5 Refill(s), Pharmacy: [facility]
E, 1 tabs Oral Daily

Start Date: 16

Status: Ordered



Aspir 81

81 mg, Oral, Daily, 0 Refill(s)

Start Date: 14

Status: Ordered



atorvastatin 10 mg oral tablet

10 mg 1 tabs, Oral, Daily, # 90 tabs, 5 Refill(s), Pharmacy: [facility]
E, 1 tabs Oral Daily

Start Date: 16

Status: Ordered



basaglar

basaglar, See Instructions, inject 30 units at bedtime daily, # 1 boxes, 3 Refil
l(s), Pharmacy: [facility] [id number], inject 30 units at bedtime daily

Start Date: 17

Status: Ordered



bumetanide 1 mg oral tablet

See Instructions, Take 2 tablets by mouth in  the morning and 1 tablet by mouth 
in the evening, # 270 tabs, eRx: [facility]

Start Date: 3/6/17

Status: Ordered



buPROPion 150 mg/12 hours (SR) oral tablet, extended release

See Instructions, Take 1 tablet by mouth two  times daily, # 180 tabs, 1 Refill(
s), eRx: [facility], Take 1 tablet by mouth two  times daily

Start Date: 10/3/16

Status: Ordered



carvedilol 25 mg oral tablet

See Instructions, Take 1 tablet by mouth two  times daily, # 180 tabs, [facility], Take 1 tablet by mouth two  times daily

Start Date: 10/3/16

Status: Ordered



cloNIDine 0.1 mg oral tablet

See Instructions, Take 1 tablet by mouth  every evening, # 90 tabs, 2 Refill(s),
eRx: [facility], Take 1 tablet by mouth  every evening

Start Date: 16

Status: Ordered



clopidogrel 75 mg oral tablet

See Instructions, Take 1 tablet by mouth  daily, # 90 tabs, 1 Refill(s), [facility]Rx: [facility], Take 1 tablet by mouth  daily

Start Date: 10/3/16

Status: Ordered



digoxin 125 mcg (0.125 mg) oral tablet

See Instructions, Take 1 tablet by mouth  daily, # 90 tabs, [facility]: [facility]

Start Date: 17

Status: Ordered



DULoxetine 60 mg oral delayed release capsule

See Instructions, Take 1 capsule by mouth  daily . Do not crush or  chew., # 90 
caps, 1 Refill(s), eRx: [facility]

Start Date: 17

Status: Ordered



Glucometer Lancets (DME)

DME Item one touch delica lancets  use to test bg x3/daily  dx:e11.65, See Instr
uctions, # 300 Each, 3 Refill(s), Pharmacy: [facility] [id number], one touc
h delica lancets; use to test bg x3/daily; dx:e11.65, Supply

Start Date: 3/13/17

Status: Ordered



HumaLOG 100 units/mL subcutaneous solution

5 units, SubCutaneous, TIDAC, DX E11.9, # 30 mL, 2 Refill(s), [facility], 5 units SubCutaneous TIDAC,Instr:DX E11.9

Start Date: 16

Status: Ordered



Insulin Pin Needles (DME)

DME Item 32g x4mm (jacob needles)  use to inject insulin   dx:e11.65, See Instruc
tions, # 100 Each, 3 Refill(s), Pharmacy: [facility] [id number], 32g x4mm (
jacob needles); use to inject insulin ; dx:e11.65, Supply

Start Date: 17

Status: Ordered



Insulin Syringe (DME)

DME Item use to inject insulin once daily     dx: E11.9, See Instructions, # 90 
Each, 4 Refill(s), Pharmacy: [facility] [id number], use to inject insulin o
nce daily     dx: E11.9, Supply

Start Date: 11/10/15

Status: Ordered



Melatonin

10 mg, Oral, Bedtime (once a day), as needed for insomnia, 0 Refill(s)

Start Date: 14

Status: Ordered



metFORMIN 500 mg oral tablet

1,000 mg 2 tabs, Oral, BID, # 360 tabs, 2 Refill(s), other reason (Rx)

Start Date: 17

Status: Ordered



Miscellaneous DME

DME Item One Touch verio Test Strips   Use to check blood sugar 3 times daily.  
 DX E11.9, See Instructions, # 300 Each, 6 Refill(s), Pharmacy: [facility] [id number], One Touch verio Test Strips ; Use to check blood sugar 3 times jey
y. DX E11.9...

Start Date: 17

Status: Ordered



Nasonex 50 mcg/inh nasal spray

2 sprays, Nasal, Daily, # 1 Each, 2 Refill(s), Pharmacy: [facility]

Start Date: 17

Status: Ordered



Norco 10 mg-325 mg oral tablet

1 tabs, Oral, q6hr, as needed for pain, # 30 tabs, 0 Refill(s)

Start Date: 17

Status: Ordered



potassium chloride 20 mEq oral tablet, extended release

See Instructions, Take 1 tablet by mouth  daily, # 90 tabs, 1 Refill(s), [facility]Rx: [facility], Take 1 tablet by mouth  daily

Start Date: 16

Status: Ordered



ProAir HFA 90 mcg/inh inhalation aerosol

See Instructions, Inhale 2 puffs 4 times  daily as needed for  wheezing, # 25.5 
g, 5 Refill(s), eRx: [facility]

Start Date: 17

Status: Ordered



RABEprazole 20 mg oral delayed release tablet

See Instructions, Take 2 tablets by mouth  daily, # 180 tabs, 1 Refill(s), eRx: 
[facility], Take 2 tablets by mouth  daily

Start Date: 16

Status: Ordered



SUMAtriptan 50 mg oral tablet

50 mg 1 tabs, Oral, Daily, as needed for migraine headache, may repeat dose afte
r 2 hours up to a maximum of 200 mg in 24 hours, # 9 tabs, 1 Refill(s), Pharmacy
: [facility] [id number], 1 tabs Oral Daily,PRN:as needed for migraine heada
godwin,Instr:m...

Start Date: 2/15/17

Status: Ordered



Symbicort 160 mcg-4.5 mcg/inh inhalation aerosol

See Instructions, Use 2 puffs twice daily, # 30.6 g, 2 Refill(s), [facility], Use 2 puffs twice daily

Start Date: 10/3/16

Status: Ordered



SYRNG   .5CC ULTII 31 G SRT

See Instructions, Use to Inject insulin once  daily, # 90 Each, 3 Refill(s), eRx
: [facility], Use to Inject insulin once  daily

Start Date: 16

Status: Ordered



valsartan 160 mg oral tablet

160 mg 1 tabs, Oral, BID, X 90 days, # 180 tabs, 3 Refill(s), Pharmacy: [facility], 1 tabs Oral BID,x90 days

Start Date: 4/3/17

Stop Date: 3/29/18

Status: Ordered



Results





Hematology





 



                           Most recent to            1



                                         oldest [Reference 



                                         Range]: 

 

 



                           WBC [4.8-10.8             6.1 10*3/uL



                           10*3/uL]                  (4/3/17 9:31 AM)

 

 



                           RBC [4.00-5.20]           4.33



                                         (4/3/17 9:31 AM)

 

 



                           Hgb [12.0-16.0            12.5 gm/dL



                           gm/dL]                    (4/3/17 9:31 AM)

 

 



                           Hct [37.0-47.0 %]         39.3 %



                                         (4/3/17 9:31 AM)

 

 



                           MCV [82.0-99.0 fL]        90.8 fL



                                         (4/3/17 9:31 AM)

 

 



                           MCH [27.0-32.0 pg]        28.9 pg



                                         (4/3/17 9:31 AM)

 

 



                           MCHC [32.0-36.0           31.8 gm/dL



                           gm/dL]                    *LOW*



                                         (4/3/17 9:31 AM)

 

 



                           RDW [11.5-14.5 %]         14.4 %



                                         (4/3/17 9:31 AM)

 

 



                           Platelet [150-400         247 10*3/uL



                           10*3/uL]                  (4/3/17 9:31 AM)

 

 



                           MPV [9.4-12.4 fL]         9.4 fL



                                         (4/3/17 9:31 AM)

 

 



                           Immature                  0.7 %



                           Granulocytes              (4/3/17 9:31 AM)



                                         [0.0-1.0 %] 

 

 



                           Neutrophils [51-75        44 %



                           %]                        *LOW*



                                         (4/3/17 9:31 AM)

 

 



                           Lymphocytes [20-46        46 %



                           %]                        (4/3/17 9:31 AM)

 

 



                           Monocytes [4-11 %]        7 %



                                         (4/3/17 9:31 AM)

 

 



                           Eosinophils [0-4 %]       2 %



                                         (4/3/17 9:31 AM)

 

 



                           Basophils [0-2 %]         0 %



                                         (4/3/17 9:31 AM)

 

 



                           Neutro Absolute           2.69



                           [1.90-7.00]               (4/3/17 9:31 AM)

 

 



                           Lymph Absolute            2.80



                           [0.80-3.30]               (4/3/17 9:31 AM)

 

 



                           Mono Absolute             0.44



                           [0.30-1.00]               (4/3/17 9:31 AM)

 

 



                           Eos Absolute              0.12



                           [0.00-0.50]               (4/3/17 9:31 AM)

 

 



                           Baso Absolute             0.02



                           [0.00-0.20]               (4/3/17 9:31 AM)

 

 



                           Nucleated RBC             0.0 /100 WBC



                           Automated [0 /100         (4/3/17 9:31 AM)



                                         WBC] 







Chemistry





 



                           Most recent to            1



                                         oldest [Reference 



                                         Range]: 

 

 



                           Sodium Lvl [136-144       137 mEq/L



                           mEq/L]                    (4/3/17 9:32 AM)

 

 



                           Potassium Lvl             3.4 mEq/L



                           [3.6-5.1 mEq/L]           *LOW*



                                         (4/3/17 9:32 AM)

 

 



                           Chloride [          96 mEq/L



                           mEq/L]                    *LOW*



                                         (4/3/17 9:32 AM)

 

 



                           CO2 [22-32 mEq/L]         29 mEq/L



                                         (4/3/17 9:32 AM)

 

 



                           AGAP [3-20]               12



                                         (4/3/17 9:32 AM)

 

 



                           BUN [4-20 mg/dL]          10 mg/dL



                                         (4/3/17 9:32 AM)

 

 



                           Glucose Lvl [       293 mg/dL



                           mg/dL]                    *HI*



                                         (4/3/17 9:32 AM)

 

 



                           Creatinine Lvl            0.62 mg/dL



                           [0.44-1.03 mg/dL]         (4/3/17 9:32 AM)

 

 



                           eGFR [>60]                >60 1



                                         (4/3/17 9:32 AM)

 

 



                           Calcium Lvl               9.2 mg/dL



                           [8.6-10.0 mg/dL]          (4/3/17 9:32 AM)

 

 



                           Albumin Lvl [3.5-4.8      3.5 gm/dL



                           gm/dL]                    (4/3/17 9:32 AM)

 

 



                           Total Protein             7.1 gm/dL



                           [6.1-7.9 gm/dL]           (4/3/17 9:32 AM)

 

 



                           Globulin [1.9-4.3         3.6 gm/dL



                           gm/dL]                    (4/3/17 9:32 AM)

 

 



                           ALT [14-54 U/L]           65 U/L



                                         *HI*



                                         (4/3/17 9:32 AM)

 

 



                           AST [15-41 U/L]           36 U/L



                                         (4/3/17 9:32 AM)

 

 



                           Alk Phos [          174 U/L



                           U/L]                      *HI*



                                         (4/3/17 9:32 AM)

 

 



                           Bili Total [0.2-1.2       0.5 mg/dL 2



                           mg/dL]                    (4/3/17 9:32 AM)

 

 



                           BNP [0-99 pg/mL]          16 pg/mL



                                         (4/3/17 9:31 AM)

 

 



                           Troponin [<0.06           <0.05 ng/mL



                           ng/mL]                    (4/3/17 9:32 AM)







1Result Comment: Multiply eGFR results by 1.21 for  race.



2Result Comment: Naproxen, specifically the metabolite O-desmethylnaproxen,

may cause spurious elevation in Total Bilirubin levels.



Therapeutic Drug Monitoring





 



                           Most recent to            1



                                         oldest [Reference 



                                         Range]: 

 

 



                           Digoxin Lvl [0.8-2.0      0.4 ng/mL



                           ng/mL]                    *LOW*



                                         (4/3/17 9:31 AM)







Urinalysis





 



                           Most recent to            1



                                         oldest [Reference 



                                         Range]: 

 

 



                           Type                      Venous



                                         (4/3/17 9:30 AM)







Immunizations





Given and Recorded





   



                 Vaccine         Date            Status          Refusal Reason

 

   



                     tetanus-diphth toxoids (Td) adult/adol     00             Given 







Procedures







   



                 Procedure       Date            Related Diagnosis     Body Site

 

   



                           L knee PMM/ part. synovectomy     13  

 

   



                           lt median nerve decompression     5/10/11  

 

   



                           Tubal ligation            1979  

 

   



                                         Carpal tunnel release rt   

 

   



                                         Cholecystectomy   

 

   



                                         Hysterectomy   

 

   



                                         Hysterectomy and bilateral   



                                         salpingo-oophorectomy sample   

 

   



                                         Laminectomy   

 

   



                                         Pacemaker   







Social History







 



                           Social History Type       Response

 

 



                           Smoking Status            Former smoker; Type: Cigarettes1







1Quit around 



Assessment and Plan





No data available for this section

## 2019-07-31 NOTE — XMS REPORT
Transition of Care/Referral Summary

                             Created on: 10/09/2064



DYLAN KHAN

External Reference #: [id number]

: 1960

Sex: Female



Demographics







                          Address                   [address]  [address]

 

                          Home Phone                [phone]

 

                          Preferred Language        English

 

                          Marital Status            

 

                          [employer]iliation     [employer]

 

                          Race                      White

 

                          Ethnic Group              Not  or 





Author







                          Author                    [facility]

 

                          Organization              [facility]

 

                          Address                   Unknown

 

                          Phone                     Unavailable







Care Team Providers







                    Care Team Member Name    Role                Phone

 

                    Dhiraj Mijares    PCP                 [phone]







Encounter





VC FIN [id number] Date(s): 17 - 17

[facility] [address] [place], [place] [address]
S [phone]

Discharge Diagnosis: Depression

Discharge Diagnosis: Physical exam

Discharge Diagnosis: Hyperlipidemia

Discharge Diagnosis: CHF (congestive heart failure)

Discharge Diagnosis: GERD (gastroesophageal reflux disease)

Discharge Disposition: 01-Home or Self Care

Attending Physician: Dhiraj Mijares MD

Admitting Physician: Dhiraj Mijares MD





Vital Signs







 



                           Most recent to            1



                                         oldest [Reference 



                                         Range]: 

 

 



                           Peripheral Pulse          77 bpm



                           Rate [ bpm]         (17 8:47 AM)

 

 



                           Blood Pressure            124/62 mmHg



                           [/60-90 mmHg]       (17 8:47 AM)

 

 



                           SpO2                      94 %



                                         (17 8:47 AM)







Problem List







    



              Condition     Effective Dates     Status       Health Status     Informant

 

    



                           Acquired                  Active  



                                         spondylolisthesis(Co    



                                         nfirmed)    

 

    



                           Acute                     Active  



                                         pain(Confirmed)    

 

    



                           Arthritis(Confirmed)      Resolved  

 

    



                           Right Carpal tunnel       Resolved  



                                         release(Confirmed)    

 

    



                           Carpal tunnel             Resolved  



                                         release    



                                         decompression(Confir    



                                         med)    

 

    



                           Cholecystectomy(Conf      Resolved  



                                         irmed)    

 

    



                     CHF (congestive      Active              patient



                                         heart    



                                         failure)(Confirmed)    

 

    



                           CAD (coronary artery      Active  



                                         disease)(Confirmed)    

 

    



                           Decompression(Confir      Resolved  



                                         med)    

 

    



                           Depression(Confirmed      Resolved  



                                         )    

 

    



                           Depression(Confirmed      Active  



                                         )    

 

    



                           Diabetes(Confirmed)       Active  

 

    



                           Diabetes(Confirmed)       Resolved  

 

    



                           GERD                      Active  



                                         (gastroesophageal    



                                         reflux    



                                         disease)(Confirmed)    

 

    



                           Ischemic                  Active  



                                         cardiomyopathy(Confi    



                                         rmed)    

 

    



                           Presence of               Active  



                                         biventricular    



                                         AICD(Confirmed)    

 

    



                           History of lumbar         Active  



                                         fusion(Confirmed)    

 

    



                           Hyperlipidemia(Confi      Resolved  



                                         rmed)    

 

    



                           Hypertension(Confirm      Resolved  



                                         ed)    

 

    



                           Hypertension(Confirm      Resolved  



                                         ed)    

 

    



                           HTN                       Active  



                                         (hypertension)(Confi    



                                         rmed)    

 

    



                           Hysterectomy(Confirm      Resolved  



                                         ed)    

 

    



                           Impaired gas              Active  



                                         exchange(Confirmed)1    

 

    



                           Irritable                 Resolved  



                                         bowel(Confirmed)    

 

    



                           Laminectomy(Confirme      Resolved  



                                         d)    

 

    



                           LBBB (left bundle         Active  



                                         branch    



                                         block)(Confirmed)    

 

    



                           LUQ pain(Confirmed)       Active  

 

    



                           Migraine(Confirmed)       Resolved  

 

    



                           Asthma(Confirmed)         Active  

 

    



                     Morbid              Active              patient



                                         obesity(Confirmed)    

 

    



                           Myocardial                Resolved  



                                         infarction(Confirmed    



                                         )    

 

    



                           Septicemia                Resolved  



                                         pneumonia(Confirmed)    

 

    



                     L knee              13              Resolved  



                                         Synovectomy(Confirme    



                                         d)    

 

    



                           Tissue perfusion          Active  



                                         alteration(Confirmed    



                                         )2    

 

    



                     Tobacco             Active              patient



                                         user(Confirmed)    

 

    



                     Tubal               79              Resolved  



                                         ligation(Confirmed)    







1Problem added automatically by system based on initiation of Impaired Gas 
Exchange Plan of Care



2Problem added automatically by system based on initiation of Tissue Perfusion 
Cerebral Plan of Care



Allergies, Adverse Reactions, Alerts







   



                 Substance       Reaction        Severity        Status

 

   



                     barium sulfate      Swelling, hives      Active

 

   



                     celecoxib           HIVES               Active

 

   



                     influenza virus vaccine,     Convulsion          Active



                                         inactivated   

 

   



                     morphine            Burning             Active

 

   



                     sulfamethoxazole     HIVES               Active



                                         Urticaria (hives)  







Medications





acetaminophen

1,000 mg, Oral, q6hr, as needed for pain, 0 Refill(s)

Start Date: 14

Status: Ordered



amLODIPine 10 mg oral tablet

10 mg 1 tabs, Oral, Daily, # 90 tabs, 5 Refill(s), Pharmacy: [facility]
E, 1 tabs Oral Daily

Start Date: 16

Status: Ordered



Aspir 81

81 mg, Oral, Daily, 0 Refill(s)

Start Date: 14

Status: Ordered



atorvastatin 10 mg oral tablet

10 mg 1 tabs, Oral, Daily, # 90 tabs, 5 Refill(s), Pharmacy: [facility]
E, 1 tabs Oral Daily

Start Date: 16

Status: Ordered



bumetanide 1 mg oral tablet

See Instructions, Take 2 tablets by mouth in  the morning and 1 tablet by mouth 
in the evening, # 270 tabs, eRx: [facility], Take 2 tablets by mouth i
n  the morning and 1 tablet by mouth in the evening

Start Date: 16

Status: Ordered



buPROPion 150 mg/12 hours (SR) oral tablet, extended release

See Instructions, Take 1 tablet by mouth two  times daily, # 180 tabs, 1 Refill(
s), eRx: [facility], Take 1 tablet by mouth two  times daily

Start Date: 10/3/16

Status: Ordered



carvedilol 25 mg oral tablet

See Instructions, Take 1 tablet by mouth two  times daily, # 180 tabs, [facility], Take 1 tablet by mouth two  times daily

Start Date: 10/3/16

Status: Ordered



cloNIDine 0.1 mg oral tablet

See Instructions, Take 1 tablet by mouth  every evening, # 90 tabs, 2 Refill(s),
eRx: [facility], Take 1 tablet by mouth  every evening

Start Date: 16

Status: Ordered



clopidogrel 75 mg oral tablet

See Instructions, Take 1 tablet by mouth  daily, # 90 tabs, 1 Refill(s), [facility]: [facility], Take 1 tablet by mouth  daily

Start Date: 10/3/16

Status: Ordered



digoxin 125 mcg (0.125 mg) oral tablet

See Instructions, Take 1 tablet by mouth  daily, # 90 tabs, [facility]Rx: [facility]

Start Date: 17

Status: Ordered



DULoxetine 60 mg oral delayed release capsule

See Instructions, Take 1 capsule by mouth  daily . Do not crush or  chew., # 90 
caps, 1 Refill(s), eRx: [facility]

Start Date: 17

Status: Ordered



glipiZIDE 5 mg oral tablet

See Instructions, Take 1 tablet by mouth two  times daily, # 180 tabs, 3 Refill(
s), eRx: [facility], Take 1 tablet by mouth two  times daily

Start Date: 16

Status: Ordered



HumaLOG 100 units/mL subcutaneous solution

5 units, SubCutaneous, TIDAC, DX E11.9, # 30 mL, 2 Refill(s), Pharmacy: [facility], 5 units SubCutaneous TIDAC,Instr:DX E11.9

Start Date: 16

Status: Ordered



Insulin Syringe (DME)

DME Item use to inject insulin once daily     dx: E11.9, See Instructions, # 90 
Each, 4 Refill(s), [id number], use to inject insulin o
nce daily     dx: E11.9, Supply

Start Date: 11/10/15

Status: Ordered



Lantus 100 units/mL subcutaneous solution

See Instructions, Inject subcutaneously 30  units every night at  bedtime, # 30 
mL, 1 Refill(s), eRx: [facility], Inject subcutaneously 30  units ever
y night at  bedtime

Start Date: 10/3/16

Status: Ordered



Melatonin

10 mg, Oral, Bedtime (once a day), as needed for insomnia, 0 Refill(s)

Start Date: 14

Status: Ordered



METFORMIN  500MG  TAB

See Instructions, Take 2 tablets by mouth  twice daily, # 360 tabs, 3 Refill(s),
eRx: [facility], Take 2 tablets by mouth  twice daily

Start Date: 16

Status: Ordered



Nasonex 50 mcg/inh nasal spray

2 sprays, Nasal, Daily, # 1 Each, 2 Refill(s), Pharmacy: [facility]

Start Date: 17

Status: Ordered



Norco 10 mg-325 mg oral tablet

1 tabs, Oral, q6hr, as needed for pain, # 30 tabs, 0 Refill(s)

Start Date: 17

Status: Ordered



potassium chloride 20 mEq oral tablet, extended release

See Instructions, Take 1 tablet by mouth  daily, # 90 tabs, 1 Refill(s), [facility]Rx: [facility], Take 1 tablet by mouth  daily

Start Date: 16

Status: Ordered



ProAir HFA 90 mcg/inh inhalation aerosol

See Instructions, Inhale 2 puffs 4 times  daily as needed for  wheezing, # 25.5 
g, 5 Refill(s), eRx: [facility]

Start Date: 17

Status: Ordered



RABEprazole 20 mg oral delayed release tablet

See Instructions, Take 2 tablets by mouth  daily, # 180 tabs, 1 Refill(s), eRx: 
[facility], Take 2 tablets by mouth  daily

Start Date: 16

Status: Ordered



SUMAtriptan 50 mg oral tablet

50 mg 1 tabs, Oral, Daily, as needed for migraine headache, may repeat dose afte
r 2 hours up to a maximum of 200 mg in 24 hours, # 9 tabs, 1 Refill(s), Pharmacy
: [id number], 1 tabs Oral Daily,PRN:as needed for migraine heada
godwin,Instr:m...

Start Date: 16

Status: Ordered



Symbicort 160 mcg-4.5 mcg/inh inhalation aerosol

See Instructions, Use 2 puffs twice daily, # 30.6 g, 2 Refill(s), [facility], Use 2 puffs twice daily

Start Date: 10/3/16

Status: Ordered



SYRNG   .5CC ULTII 31 G SRT

See Instructions, Use to Inject insulin once  daily, # 90 Each, 3 Refill(s), eRx
: [facility], Use to Inject insulin once  daily

Start Date: 16

Status: Ordered



valsartan 160 mg oral tablet

160 mg 1 tabs, Oral, BID, # 60 tabs, 10 Refill(s), Pharmacy: [facility]
E, 1 tabs Oral BID

Start Date: 17

Status: Ordered



Results





Hematology





 



                           Most recent to            1



                                         oldest [Reference 



                                         Range]: 

 

 



                           WBC [4.8-10.8             12.0 10*3/uL



                           10*3/uL]                  *HI*



                                         (17 9:20 AM)

 

 



                           RBC [4.00-5.20]           4.39



                                         (17 9:20 AM)

 

 



                           Hgb [12.0-16.0            12.3 gm/dL



                           gm/dL]                    (17 9:20 AM)

 

 



                           Hct [37.0-47.0 %]         41.2 %



                                         (17 9:20 AM)

 

 



                           MCV [82.0-99.0 fL]        93.8 fL



                                         (17 9:20 AM)

 

 



                           MCH [27.0-32.0 pg]        28.0 pg



                                         (17 9:20 AM)

 

 



                           MCHC [32.0-36.0           29.9 gm/dL



                           gm/dL]                    *LOW*



                                         (17 9:20 AM)

 

 



                           RDW [11.5-14.5 %]         14.7 %



                                         *HI*



                                         (17 9:20 AM)

 

 



                           Platelet [150-400         315 10*3/uL



                           10*3/uL]                  (17 9:20 AM)

 

 



                           MPV [8.8-14.8 fL]         10.3 fL



                                         (17 9:20 AM)

 

 



                           Immature                  0.6 %



                           Granulocytes              (17)



                                         [0.0-1.0 %] 

 

 



                           Neutrophils [51-75        70 %



                           %]                        (17 AM)

 

 



                           Lymphocytes [20-46        22 %



                           %]                        (17 AM)

 

 



                           Monocytes [4-11 %]        5 %



                                         (17 AM)

 

 



                           Eosinophils [0-4 %]       2 %



                                         (17)

 

 



                           Basophils [0-2 %]         0 %



                                         (17 AM)

 

 



                           Neutro Absolute           8.41



                           [1.90-7.00]               *HI*



                                         (17 AM)

 

 



                           Lymph Absolute            2.64



                           [0.80-3.30]               (17 AM)

 

 



                           Mono Absolute             0.62



                           [0.30-1.00]               (17 AM)

 

 



                           Eos Absolute              0.26



                           [0.00-0.50]               (17 AM)

 

 



                           Baso Absolute             0.04



                           [0.00-0.20]               (17 AM)







Chemistry





 



                           Most recent to            1



                                         oldest [Reference 



                                         Range]: 

 

 



                           Sodium Lvl [135-144       140 mEq/L



                           mEq/L]                    (17 AM)

 

 



                           Potassium Lvl             5.0 mEq/L



                           [3.5-5.2 mEq/L]           (17 AM)

 

 



                           Chloride [          98 mEq/L



                           mEq/L]                    *LOW*



                                         (17)

 

 



                           CO2 [22-31 mEq/L]         29 mEq/L



                                         (17 AM)

 

 



                           AGAP [3-20]               13



                                         (17 AM)

 

 



                           BUN [10-20 mg/dL]         14 mg/dL



                                         (17 AM)

 

 



                           Glucose Lvl [70-99        191 mg/dL



                           mg/dL]                    *HI*



                                         (17 AM)

 

 



                           Creatinine Lvl            0.74 mg/dL



                           [0.57-1.11 mg/dL]         (17 AM)

 

 



                           eGFR [>60 mL/min]         >60 mL/min 1



                                         (17 AM)

 

 



                           Calcium Lvl               9.6 mg/dL 2



                           [8.4-10.2 mg/dL]          (17 9:20 AM)

 

 



                           Albumin Lvl [3.5-5.0      4.2 gm/dL



                           gm/dL]                    (17 9:20 AM)

 

 



                           Total Protein             7.2 gm/dL



                           [6.1-7.7 gm/dL]           (17 9:20 AM)

 

 



                           Globulin [1.8-4.0         3.0 gm/dL



                           gm/dL]                    (17 9:20 AM)

 

 



                           ALT [0-55 U/L]            68 U/L



                                         *HI*



                                         (17 9:20 AM)

 

 



                           AST [5-34 U/L]            45 U/L



                                         *HI*



                                         (17 9:20 AM)

 

 



                           Alk Phos [          140 U/L



                           U/L]                      (17 9:20 AM)

 

 



                           Bili Total [0.2-1.2       0.4 mg/dL



                           mg/dL]                    (17 9:20 AM)

 

 



                           TSH with Reflex Free      1.19



                           T4 [0.35-4.94]            (17 9:20 AM)

 

 



                           Hgb A1c [4.1-5.6 %]       11.5 %



                                         *HI*



                                         (17 9:20 AM)

 

 



                           eAvg Glucose              283.4 mg/dL



                                         (17 9:20 AM)







1Result Comment: Multiply eGFR results by 1.21 for  race.



2Result Comment: Please note reference range change effective 2017.



Urinalysis





 



                           Most recent to            1



                                         oldest [Reference 



                                         Range]: 

 

 



                           UA Color                  Yellow



                                         (17 9:20 AM)

 

 



                           UA Appear                 Clear



                                         (17 9:20 AM)

 

 



                           UA pH [5.0-8.0]           5.0



                                         (17 9:20 AM)

 

 



                           UA Leuk Est               Negative



                           [Negative]                (17 9:20 AM)

 

 



                           UA Nitrite                Negative



                           [Negative]                (17 9:20 AM)

 

 



                           UA Protein                Negative



                           [Negative]                (17 9:20 AM)

 

 



                           UA Glucose                Negative



                           [Negative]                (17 9:20 AM)

 

 



                           UA Ketones                Negative



                           [Negative]                (17 9:20 AM)

 

 



                           UA Urobilinogen           0.2 mg/dL



                           [<1.0 mg/dL]              (17 9:20 AM)

 

 



                           UA Bili [Negative]        Negative



                                         (17 9:20 AM)

 

 



                           UA Blood [Negative]       Negative



                                         (17 9:20 AM)

 

 



                           UA Spec Grav              1.006



                           [1.003-1.030]             (17 9:20 AM)

 

 



                           Type                      Voided



                                         (17 9:20 AM)







Immunizations





Given and Recorded





   



                 Vaccine         Date            Status          Refusal Reason

 

   



                     tetanus-diphth toxoids (Td) adult/adol     00             Given 







Procedures







   



                 Procedure       Date            Related Diagnosis     Body Site

 

   



                           Collection of venous blood by venipuncture     17  

 

   



                           L knee PMM/ part. synovectomy     13  

 

   



                           lt median nerve decompression     5/10/11  

 

   



                           Tubal ligation            1979  

 

   



                                         Carpal tunnel release rt   

 

   



                                         Cholecystectomy   

 

   



                                         Hysterectomy   

 

   



                                         Hysterectomy and bilateral   



                                         salpingo-oophorectomy sample   

 

   



                                         Laminectomy   

 

   



                                         Pacemaker   







Social History







 



                           Social History Type       Response

 

 



                           Smoking Status            Former smoker; Type: Cigarettes1







1Quit around 



Assessment and Plan

Extracted from:





  



                     Title: Office Visit Note     Author: Dhiraj Mijares MD     Date: 17









                                         Assessment/Plan 

      1.Physical exam  

  

  2.Hyperlipidemia  

  

  3.CHF (congestive heart failure)  

  

  4.Depression  

  

  5.GERD (gastroesophageal reflux disease)  

  

  CAD (coronary artery disease)  

   Ordered:

    CBC w/ Differential

  Collection Of Venous Blood By Venipuncture 79072

  Comprehensive Metabolic Panel  

   

  Diabetes 

   Poor control

     Ordered:

    Albumin Level Urine

  Comprehensive Metabolic Panel

  Hemoglobin A1c

  Urinalysis with Culture if Indicated  

   

  HTN (hypertension)  

   Ordered:

    CBC w/ Differential

  Comprehensive Metabolic Panel

  TSH with Reflex Free T4

  Urinalysis with Culture if Indicated  

    

  Follow-up on lab results when available

 

  We will schedule patient for a mammogram.

 

  We will arrange for a routine screening colonoscopy

 

  Referral to endocrinology for further evaluation

 

  No other changes are needed at this time. Recommend patient continue the 
same dose of their current medications, and otherwise followup as needed. 
Medication refills will be provided as needed. Anticipate seeing them back at 
their next scheduled appointment.

 

  Anticipate recheck in 4-6 months or before then if she has other problems

## 2019-07-31 NOTE — XMS REPORT
Continuity of Care Document (Encounter date: 2013 04:25 PM)

                             Created on: 2014



DYLAN KHAN

External Reference #: [id number]

: 1960

Sex: Female



Demographics







                          Address                   [address]

[place]  [address]

 

                          Home Phone                [phone]

 

                          Preferred Language        Unknown

 

                          Marital Status            

 

                          Samaritan Affiliation     Unknown

 

                          Race                      White

 

                          Ethnic Group              Not  or 





Author







                          Author                    STEVAN Wellernda

 

                          [facility]

 

                          Address                   [address]

[place]  [address]



 

                          Phone                     [phone]







Care Team Providers







                    Care Team Member Name    Role                Phone

 

                    Boston Hein PP                  Unavailable

 

                    Boston Hein RP                  Unavailable







Payers







                Payer name      Insurance type    Covered party ID    Authorization(s)

 

                                        Unknown 







Problems







                    Condition           Effective Dates (start - stop)    Clinical Status

 

                    Upper Respiratory Infection, Acute    Dec- -       *Acute

 

                    Pneumonia           Dec- -       *Acute

 

                    DMII WO CMP NT ST UNCNTR     -        

 

                    PURE HYPERCHOLESTEROLEM     -        

 

                    311 - DEPRESSIVE DISORDER NEC     -        

 

                    BENIGN HYPERTENSION     -        

 

                    Pneumonia           Dec- -       *Acute

 

                    Type I diabetes mellitus    Mar- -       *Acute

 

                    Weight gain         Mar- -       *Acute

 

                    Menopausal symptoms    Mar- -       *Acute

 

                    Upper Respiratory Infection, Acute    Sep- -       *Acute

 

                    Pharyngitis, Acute    Sep- -       *Acute

 

                    Osteoarthritis      Apr- -       *Chronic

 

                    Left foot drop      Apr- -       *Chronic

 

                    Pain in joint involving lower leg    Apr- -       *Acute

 

                    Hypertension, Benign     -       *Controlled

 

                    Diabetes Mellitus Type 2, Uncomplicated     -       *Controlled

 

                    Follow-up examination, following other surgery    May- -       *Acute

 

                    Follow-up examination, following other surgery    May- -       *Acute

 

                    Whiplash            Sep- -       *Acute

 

                    Injury of shoulder    Sep- -       *Acute

 

                    Pain in joint involving shoulder region    Oct- -       *Acute

 

                          Nonallopathic lesions of cervical region, not elsewhere classified    Oct- 

-                                       *Acute

 

                          Nonallopathic lesions of thoracic region, not elsewhere classified    Oct- 

-                                       *Acute

 

                    Nonallopathic lesions of rib cage, not elsewhere classified    Oct- -       *Acute



 

                    Injury of face and neck    Sep- -       *Acute

 

                    Injury, other and unspecified, shoulder and upper    Sep- -       *Acute

 

                    Sprain of neck      Sep- -       *Acute

 

                    Pneumonia           Dec- -       *Acute

 

                    Migraine NOS/not intrcbl     -       *Acute







Family History







                Family Member    Diagnosis       Age At Onset    Status

 

                Mother (Unknown)    Diabetes                        Yes

 

                Father (Unknown)    Heart disease                    Yes

 

                Father (Unknown)    Hypertension                    Yes

 

                Mother (Unknown)    CVA (Stroke)                    Yes







Social History







                    Social History Element    Description         Quantity

 

                    alcohol                                  







Allergies, Adverse Reactions, Alerts







                Substance       Reaction        Severity        Status









                    SULFA (SULFONAMIDE ANTIBIOTICS)    HIVES               Unknown

 

                    CELECOXIB           HIVES               Unknown

 

                    INFLUENZA VIRUS VACC,SPECIFIC    Convulsions         Unknown

 

                    BARIUM SULFATE      Swelling, hives     Unknown







Medications







             Medication    Instructions    Dosage       Effective Dates (start - stop)    Status

 

                          albuterol sulfate HFA 90 mcg/actuation Aerosol Inhaler    inhale 2 puff by inhalation

 route  every 4 - 6 hours as needed    0                   Dec- -       Active

 

                          Advair Diskus 100 mcg-50 mcg/dose powder for inhalation    inhale 1 puff by inhalation

 route 2 times every day in the morning and evening approximately 12 hours apart
                    0                   Dec- -       Active

 

                          Zithromax Z-Carlos 250 mg tablet    take 2 tablet (500MG)  by oral route  every day for

 1 day then 1 tablet (250 mg) by oral route once daily for 4 days    500 MG                    Dec-

 - Dec-                          No Longer Active

 

                          One Touch Ultra System Kit    USE MONITOR TO TEST BLOOD SUGARS ONE TO THREE TIMES 

DAILY .00                          -       Active

 

                          One Touch Delica Lancets 33 gauge    USE ONE LANCET THREE TIMES DAILY OR AS NEEDED

 .00                               -       Active

 

                    Advair Diskus 250 mcg-50 mcg/dose powder for inhalation    1 puff  twice a day.     

                          Oct- -             Active

 

                          albuterol sulfate 1.25 mg/3 mL Neb Solution    one vial q 4 h prn   dispense 1 box

 of 25                                  Oct- -       Active

 

             Cranberry (unknown strength)                               -     Active

 

             multivitamin capsule                               -     Active

 

             BLACK COHOSH (unknown strength)                               -     Active

 

                          One Touch Ultra Test Strips    USE ONE TEST STRIP THREE TIMES DAILY OR AS NEEDED DX

 250.00                                 May- -       Active

 

                Cymbalta 20 mg capsule,delayed release    Take 1 tablet by mouth daily                    

 -                                      Active

 

                Cymbalta 60 mg capsule,delayed release    Take 1 tablet by mouth every day.                    Aug-

 -                                      Active

 

                          Wellbutrin  mg tablet,sustained-release    Take 1 tablet by mouth twice a day.

                                        Sep- -       Active

 

                Imitrex 50 mg tablet    TAKE ONE TO TWO NOW. MAY REPEAT IN 2 HRS                    Sep- -

                                        Active

 

                          Tylenol-Codeine #3 300 mg-30 mg tablet    take 1 to 2 tablets by mouth four times 

daily as needed cough and pain                        Dec- -       Active

 

                          cefpodoxime 200 mg tablet    take 1 tablet (200MG)  by oral route  every 12 hours 

with food           200 MG              Dec- -       Active







Immunizations







                Vaccine         Date            Status          Comments

 

                Td (adult)           completed       - Completed reason: source unspecified 







Results







          Test Name    Date and Time    Measure    Units     Reference Range    Abnormal Flag    Comments



 

                                        Unknown 







Vital Signs







           Date / Time:    Height     Weight     Pulse Rate    Blood Pressure    Temperature

 

           Dec-/16:25:00    65.00 in    199.00 lbs               136/72 mm[Hg]    100.5 F







Procedures







                          Procedure                 Date

 

                                        Unknown 







Encounters







                    Encounter           Location            Date

 

                    Patient Visit       [address]          Dec-

 

                    Patient Visit       Conversion          

 

                    Patient Visit       [address]          Oct-

 

                    Patient Visit       [facility]        

 

                    Patient Visit       [address]          May-

 

                    Patient Visit       [address]          

 

                    Patient Visit       [address]          Aug-

 

                    Patient Visit       [address]          Sep-

 

                    Patient Visit       [address]          Sep-

 

                    Patient Visit       [address]          Dec-

 

                    Patient Visit       [address]          Dec-

 

                    Patient Visit       [address]          Mar-

 

                    Patient Visit       [address]          Sep-

 

                    Patient Visit       [address]          Apr-

 

                    Patient Visit       [address]          

 

                    Patient Visit       [facility]        May-

 

                    Patient Visit       [facility]        May-

 

                    Patient Visit       [address]          Sep-

 

                    Patient Visit       [address]          Oct-

 

                    Patient Visit       [address]          Sep-

 

                    Patient Visit       [address]          Dec-

 

                    Patient Visit       [address]          

 

                    Patient Visit       Conversion          Dec-







Advance Directives







                          Directive                 Effective Date

 

                                        Unknown

## 2019-07-31 NOTE — XMS REPORT
Transition of Care/Referral Summary

                             Created on: 06/15/2020



BILLDYLAN EDITH

External Reference #: [id number]

: 1960

Sex: Female



Demographics







                          Address                   [address]

[place]  [address]

 

                          Home Phone                [phone]

 

                          Preferred Language        English

 

                          Marital Status            

 

                          Orthodoxy Affiliation     Episcopalian

 

                          Race                      White

 

                                        Additional Race(s)  

 

                          Ethnic Group              Not  or 





Author







                          Author                    [facility]

 

                          Organization              [facility]

 

                          Address                   Unknown

 

                          Phone                     Unavailable







Care Team Providers







                    Care Team Member Name    Role                Phone

 

                    Dhiraj Mijares    PCP                 [phone]







Encounter





[facility] FIN [id number] Date(s): 19 - 19

[facility] [address], [place] [address] [phone]

Encounter Diagnosis

Diabetic peripheral neuropathy (Discharge Diagnosis) - 19

Type 2 diabetes mellitus (Discharge Diagnosis) - 19

Long term current use of insulin (Discharge Diagnosis) - 19

HTN (hypertension) (Discharge Diagnosis) - 19

Discharge Disposition: 01-Home or Self Care

Attending Physician: Jillian Boyd





Vital Signs







 



                           Most recent to            1



                                         oldest [Reference 



                                         Range]: 

 

 



                           Peripheral Pulse          82 bpm



                           Rate [ bpm]         (19 10:55 AM)

 

 



                           Blood Pressure            120/80 mmHg



                           [/60-90 mmHg]       (19 10:55 AM)







Problem List







    



              Condition     Effective Dates     Status       Health Status     Informant

 

    



                           Acquired                  Active  



                                         spondylolisthesis(Co    



                                         nfirmed)    

 

    



                           Acute                     Active  



                                         pain(Confirmed)    

 

    



                           Hay fever(Confirmed)      Active  

 

    



                           Arthritis(Confirmed)      Resolved  

 

    



                           Carpal tunnel             Resolved  



                                         release    



                                         decompression(Confir    



                                         med)    

 

    



                           Right Carpal tunnel       Resolved  



                                         release(Confirmed)    

 

    



                           Cholecystectomy(Conf      Resolved  



                                         irmed)    

 

    



                           IBS (irritable bowel      Active  



                                         syndrome)(Confirmed)    

 

    



                     CHF (congestive      Active              patient



                                         heart    



                                         failure)(Confirmed)    

 

    



                           CAD (coronary artery      Active  



                                         disease)(Confirmed)    

 

    



                           Decompression(Confir      Resolved  



                                         med)    

 

    



                           Depression(Confirmed      Resolved  



                                         )    

 

    



                           Depression(Confirmed      Active  



                                         )    

 

    



                           Diabetes(Confirmed)       Active  

 

    



                           Diabetes(Confirmed)       Resolved  

 

    



                           Diabetic                  Active  



                                         neuropathy(Confirmed    



                                         )    

 

    



                           Diabetic peripheral       Active  



                                         neuropathy(Confirmed    



                                         )    

 

    



                           GERD                      Active  



                                         (gastroesophageal    



                                         reflux    



                                         disease)(Confirmed)    

 

    



                     Ischemic            < 5/8/15            Resolved  



                                         cardiomyopathy(Confi    



                                         rmed)    

 

    



                           Presence of               Active  



                                         biventricular    



                                         AICD(Confirmed)    

 

    



                           History of lumbar         Active  



                                         fusion(Confirmed)    

 

    



                           Hyperlipidemia(Confi      Active  



                                         rmed)    

 

    



                     Asthma(Confirmed)     < 17           Resolved  

 

    



                           Hypertension(Confirm      Resolved  



                                         ed)    

 

    



                           Hypertension(Confirm      Resolved  



                                         ed)    

 

    



                           HTN                       Active  



                                         (hypertension)(Confi    



                                         rmed)    

 

    



                           Hysterectomy(Confirm      Resolved  



                                         ed)    

 

    



                           Impaired gas              Active  



                                         exchange(Confirmed)1    

 

    



                           Irritable                 Resolved  



                                         bowel(Confirmed)    

 

    



                           Laminectomy(Confirme      Resolved  



                                         d)    

 

    



                           LBBB (left bundle         Active  



                                         branch    



                                         block)(Confirmed)    

 

    



                           LUQ pain(Confirmed)       Active  

 

    



                           Long term current         Active  



                                         use of    



                                         insulin(Confirmed)    

 

    



                           Migraine(Confirmed)       Resolved  

 

    



                           Asthma(Confirmed)         Active  

 

    



                     Morbid              Active              patient



                                         obesity(Confirmed)    

 

    



                           Myocardial                Resolved  



                                         infarction(Confirmed    



                                         )    

 

    



                           Septicemia                Resolved  



                                         pneumonia(Confirmed)    

 

    



                     L knee              13              Resolved  



                                         Synovectomy(Confirme    



                                         d)    

 

    



                           Tissue perfusion          Active  



                                         alteration(Confirmed    



                                         )2    

 

    



                     Tobacco             Active              patient



                                         user(Confirmed)    

 

    



                     Tubal               79              Resolved  



                                         ligation(Confirmed)    

 

    



                           Type 2 diabetes           Active  



                                         mellitus(Confirmed)    

 

    



                           UTI (urinary tract        Active  



                                         infection)(Confirmed    



                                         )    

 

    



                     Chicken             < 17           Resolved  



                                         pox(Confirmed)    







1Problem added automatically by system based on initiation of Impaired Gas 
Exchange Plan of Care



2Problem added automatically by system based on initiation of Tissue Perfusion 
Cerebral Plan of Care



Allergies, Adverse Reactions, Alerts







   



                 Substance       Reaction        Severity        Status

 

   



                     sulfamethoxazole     Urticaria (hives)      Active



                                         HIVES  

 

   



                     morphine            Burning             Active

 

   



                     barium sulfate      Swelling, hives      Active

 

   



                     celecoxib           HIVES               Active

 

   



                     influenza virus vaccine,     Convulsion          Active



                                         inactivated   







Medications





100ML 3ML SOLUTION  VIAL HUMALOG U

See Instructions, INJECT SUBCUTANEOUSLY 10  UNITS 3 TIMES DAILY BEFORE  MEALS, #
27 mL, 4 Refill(s), eRx: [facility], INJECT SUBCUTANEOUSLY 10  UNITS 3
TIMES DAILY BEFORE  MEALS

Start Date: 18

Status: Ordered



acetaminophen

1,000 mg, Oral, q6hr, as needed for pain, 0 Refill(s)

Start Date: 14

Status: Ordered



Ambien 10 mg oral tablet

10 mg 1 tabs, Oral, Bedtime (once a day), as needed for sleep, [phone], #
90 tabs, 0 Refill(s)

Start Date: 19

Stop Date: 19

Status: Ordered



Aspir 81

81 mg, Oral, Daily, 0 Refill(s)

Start Date: 14

Status: Ordered



atorvastatin 20 mg oral tablet

20 mg 1 tabs, Oral, Daily, # 90 tabs, 1 Refill(s), Pharmacy: [facility], 1 tabs Oral Daily

Start Date: 19

Status: Ordered



Basaglar KwikPen 100 units/mL subcutaneous solution

See Instructions, INJECT SUBCUTANEOUSLY 56 units at hs, # 15 mL, 3 Refill(s), Ph
armacy: [facility], INJECT SUBCUTANEOUSLY 56 units at hs

Start Date: 3/11/19

Status: Ordered



bd syringes

bd syringes, See Instructions, bd syringe  0.5ml  31gx5/16  dx:e11.65, # [address]
h, 3 Refill(s), Pharmacy: [facility], bd syringe; 0.5ml; 31gx5/16; dx:
e11.65

Start Date: 18

Status: Ordered



Bentyl 20 mg oral tablet

20 mg 1 tabs, Oral, q8hr, Abdominal Cramping, # 15 tabs, 0 Refill(s), Pharmacy: 
[id number], 1 tabs Oral q8hr,PRN:Abdominal Cramping

Start Date: 3/28/19

Status: Ordered



bumetanide 1 mg oral tablet

See Instructions, TAKE 2 TABLETS BY MOUTH IN  THE MORNING AND 1 TABLET BY MOUTH 
IN THE EVENING, # 270 tabs, 3 Refill(s), eRx: [facility]

Start Date: 19

Status: Ordered



buPROPion 150 mg/12 hours (SR) oral tablet, extended release

See Instructions, TAKE 1 TABLET BY MOUTH TWO  TIMES DAILY, # 180 tabs, [facility]: [facility]

Start Date: 6/10/19

Status: Ordered



carvedilol 25 mg oral tablet

See Instructions, TAKE 1 TABLET BY MOUTH TWO  TIMES DAILY, # 180 tabs, [facility]

Start Date: 19

Status: Ordered



cloNIDine 0.2 mg oral tablet

See Instructions, TAKE 1 TABLET BY MOUTH TWO  TIMES DAILY, # 180 tabs, 3 Refill(
s), eRx: [facility]

Start Date: 18

Status: Ordered



clopidogrel 75 mg oral tablet

See Instructions, TAKE 1 TABLET BY MOUTH  DAILY, # 90 tabs, 1 Refill(s), Pharmac
y: [facility], TAKE 1 TABLET BY MOUTH  DAILY

Start Date: 19

Status: Ordered



colchicine 0.6 mg oral tablet

See Instructions, as needed for gout pain, first sign of gout flare then 1 tab 1
hr later.  Max 1.8 mg in 24 hours.  Must wait 3 days before next dose, # 10 tab
s, 0 Refill(s), Pharmacy: [facility] [id number], first sign of gout flare t
hen 1 tab 1...

Start Date: 19

Status: Ordered



diclofenac 3% topical gel

0.5 g, Topical, BID, # 50 g, 1 Refill(s), Pharmacy: [facility] [id number]

Start Date: 8/15/17

Status: Ordered



DULoxetine 20 mg oral delayed release capsule

See Instructions, TAKE 1 CAPSULE BY MOUTH  DAILY.  DO NOT CRUSH OR  CHEW.  (TAKE
WITH  DULOXETINE 60MG), # 90 caps, eRx: [facility]

Start Date: 6/10/19

Status: Ordered



DULoxetine 60 mg oral delayed release capsule

See Instructions, TAKE 1 CAPSULE BY MOUTH  DAILY. DO NOT CRUSH OR  CHEW. (TAKE W
ITH DULOXETINE 20MG), # 90 caps, eRx: [facility]

Start Date: 6/10/19

Status: Ordered



gabapentin 100 mg oral capsule

See Instructions, 1-3 caps up to 3 times daily, # 100 caps, 3 Refill(s)

Start Date: 18

Status: Ordered



Glucometer Lancets (DME)

DME Item one touch delica lancets  use to test bg x3/daily  dx:e11.65, See Instr
uctions, # 300 Each, 3 Refill(s), Pharmacy: [facility] [address], one touc
h delica lancets; use to test bg x3/daily; dx:e11.65, Supply

Start Date: 3/13/17

Status: Ordered



HumaLOG 100 units/mL injectable solution

See Instructions, INJECT 16 units for breakfast and lunch and 19 units for dinne
r., # 40 mL, 4 Refill(s), Pharmacy: [facility], INJECT 16 units for br
eakfast and lunch and 19 units for dinner.

Start Date: 3/11/19

Status: Ordered



Insulin Pen Needles (DME)

DME Item insulin pen needle jacob [id number]  use to inject insulin  dx:e1165, See 
Instructions, # 100 Each, 3 Refill(s), Pharmacy: [facility], insulin p
en needle jacob [id number]; use to inject insulin; dx:e11.65, Supply

Start Date: 18

Status: Ordered



Insulin Syringe (DME)

DME Item bd insulin syringe  1ml syringe [id number]  use to inject insulin x4/day  
dx:e1165, See Instructions, # 3 boxes, 3 Refill(s), Pharmacy: [facility] [address]
[facility] [address], bd insulin syringe; 1ml syringe [id number]; use to inject insulin x4/day
; dx:e1165...

Start Date: 18

Status: Ordered



losartan 50 mg oral tablet

50 mg 1 tabs, Oral, BID, # 180 tabs, 1 Refill(s), [facility]: [facility]

Start Date: 19

Status: Ordered



Melatonin

10 mg, Oral, Bedtime (once a day), as needed for insomnia, 0 Refill(s)

Start Date: 14

Status: Ordered



metFORMIN 500 mg oral tablet

See Instructions, TAKE 2 TABLETS BY MOUTH  TWICE DAILY, # 360 tabs, [facility]Rx: [facility]

Start Date: 19

Status: Ordered



mometasone 50 mcg/inh nasal spray

2 sprays, Nasal, Daily, # 3 Each, 3 Refill(s), Pharmacy: [facility]

Start Date: 19

Status: Ordered



Norco 7.5 mg-325 mg oral tablet

2 tabs, Oral, q8hr, as needed for pain, # 50 tabs, 0 Refill(s)

Start Date: 19

Status: Ordered



ONE TOUCH VERIO TEST ST(NEW)100'S

See Instructions, TEST THREE TIMES DAILY, # 300 strip, 3 Refill(s), [facility] [id number], TEST THREE TIMES DAILY

Start Date: 18

Status: Ordered



potassium chloride 20 mEq oral tablet, extended release

See Instructions, TAKE 1 TABLET BY MOUTH  DAILY, # 90 tabs, 3 Refill(s), Pharmac
y: [facility], TAKE 1 TABLET BY MOUTH  DAILY

Start Date: 19

Status: Ordered



ProAir HFA 90 mcg/inh inhalation aerosol

See Instructions, INHALE 2 PUFFS 4 TIMES  DAILY AS NEEDED FOR  WHEEZING, # 25.5 
g, 5 Refill(s), eRx: [facility]

Start Date: 18

Status: Ordered



RABEprazole 20 mg oral delayed release tablet

See Instructions, TAKE 2 TABLETS BY MOUTH  DAILY, # 180 tabs, 2 Refill(s), eRx: 
[facility]

Start Date: 12/10/18

Status: Ordered



SUMAtriptan 50 mg oral tablet

See Instructions, TAKE 1 TABLET BY MOUTH DAILY AS NEEDED FOR MIGRAINE HEADACHE. 
MAY REPEAT DOSE AFTER 2 HOURS UP TO A. MAXIMUM  MG IN 24 HOURS, # 9 tabs, 
eRx: [facility] [id number]

Start Date: 9/10/18

Status: Ordered



Symbicort 160 mcg-4.5 mcg/inh inhalation aerosol

See Instructions, INHALE 2 PUFFS TWICE DAILY, # 30.6 g, 3 Refill(s), [facility]

Start Date: 19

Status: Ordered



[id number]_INS_UF_0.5ML_31GX5/16"

See Instructions, USE TO INJECT INSULIN ONCE  DAILY, # 90 Each, [facility]Rx: [facility], USE TO INJECT INSULIN ONCE  DAILY

Start Date: 18

Status: Ordered



Results





No data available for this section



Immunizations





Given and Recorded





   



                 Vaccine         Date            Status          Refusal Reason

 

   



                     tetanus/diphth/pertuss (Tdap) adult/adol     19              Given 

 

   



                     tetanus-diphth toxoids (Td) adult/adol     00             Given 







Procedures







    



              Procedure     Date         Related Diagnosis     Body Site     Status

 

    



                     L knee PMM/ part. synovectomy     13              Completed

 

    



                     lt median nerve decompression     5/10/11             Completed

 

    



                     Tubal ligation                      Completed

 

    



                           Carpal tunnel release rt        Completed

 

    



                           Cholecystectomy           Completed

 

    



                           Hysterectomy              Completed

 

    



                           Hysterectomy and bilateral        Completed



                                         salpingo-oophorectomy sample    

 

    



                           Laminectomy               Completed

 

    



                           Pacemaker                 Completed







Social History







 



                           Social History Type       Response

 

 



                           Smoking Status            Former smoker; Type: Cigarettes1



                                         entered on: 5/8/15







1Quit around 



Assessment and Plan

Extracted from:





  



                     Title: Office Visit Note     Author: Jillian Boyd     Date: 19









                                                  1.Type 2 diabetes mellitus 

  1.check blood sugars fasting and 2 hours after meals 3 days per week

 2.continue current doses of insulin

 3.rotate injection sites

 4.monitor diet and exercise

 5.monitor feet daily

  I discussed the patient with the preceptor.     

  2.Long term current use of insulin   

  3.HTN (hypertension)   

  4.Diabetic neuropathy           







Extracted from:





  



                     Title: Ambulatory Patient Education     Author: Jillian Boyd     Date: 19











                                        The following Patient Education Materials have been given to the patient:

Preventive Health

Blood Glucose Monitoring, Adult

Monitoring your blood glucose (also known as blood sugar) helps you to manage 
your diabetes. It also helps you and your health care provider monitor your 
diabetes and determine how well your treatment plan is working.

WHY SHOULD YOU MONITOR YOUR BLOOD GLUCOSE?



It can help you understand how food, exercise, and medicine affect your blood 
glucose.



It allows you to know what your blood glucose is at any given moment. You can 
quickly tell if you are having low blood glucose (hypoglycemia) or high blood 
glucose (hyperglycemia).



It can help you and your health care provider know how to adjust your 
medicines.



It can help you understand how to manage an illness or adjust medicine for 
exercise.

WHEN SHOULD YOU TEST?

Your health care provider will help you decide how often you should check your 
blood glucose. This may depend on the type of diabetes you have, your diabetes 
control, or the types of medicines you are taking. Be sure to write down all of 
your blood glucose readings so that this information can be reviewed with your 
health care provider. See below for examples of testing times that your health 
care provider may suggest.

Type 1 Diabetes



Test your blood glucose at least 2 times a day.



Also test your blood glucose:



Before every insulin injection.



Before and after exercise.



Between meals.



2 hours after a meal.



Between 2:00 a.m. and 3:00 a.m. on occasional days.



You may need to test your blood glucose more often:



If you use an insulin pump.



If you need multiple daily injections.



If your diabetes is not well controlled.



If you are ill.

Type 2 Diabetes



If you are taking insulin, test at least 2 times per day. However, it is best 
to test before every insulin injection.



If you take medicines by mouth (orally), test 2 times a day.



If you are on a controlled diet, test once a day.



If your diabetes is not well controlled or if you are sick, you may need to 
monitor more often.

HOW TO MONITOR YOUR BLOOD GLUCOSE

Supplies Needed



Blood glucose meter.



Test strips for your meter. Each meter has its own strips. You must use the 
strips that go with your own meter.



A pricking needle (lancet).



A device that holds the lancet (lancing device).



A journal or log book to write down your results.

Procedure



Wash your hands with soap and water. Alcohol is not preferred.



Prick the side of your finger (not the tip) with the lancet.



Gently milk the finger until a small drop of blood appears.



Follow the instructions that come with your meter for inserting the test strip,
applying blood to the strip, and using your blood glucose meter.

Other Areas to Get Blood for Testing

Some meters allow you to use other areas of your body (other than your finger) 
to test your blood. These areas are called alternative sites. The most common 
alternative sites are:



The forearm.



The thigh.



The back area of the lower leg.



The palm of the hand.

The blood flow in these areas is slower. Therefore, the blood glucose values you
get may be delayed, and the numbers are different from what you would get from 
your fingers. Do not use alternative sites if you think you are having 
hypoglycemia. Your reading will not be accurate. Always use a finger if you are 
having hypoglycemia. Also, if you cannot feel your lows (hypoglycemia 
unawareness), always use your fingers for your blood glucose checks.

ADDITIONAL TIPS FOR GLUCOSE MONITORING



Do not reuse lancets.



Always carry your supplies with you.



All blood glucose meters have a 24-hour "hotline" number to call if you have 
questions or need help.



Adjust (calibrate) your blood glucose meter with a control solution after 
finishing a few boxes of strips.

BLOOD GLUCOSE RECORD KEEPING

It is a good idea to keep a daily record or log of your blood glucose readings. 
Most glucose meters, if not all, keep your glucose records stored in the meter. 
Some meters come with the ability to download your records to your home 
computer. Keeping a record of your blood glucose readings is especially helpful 
if you are wanting to look for patterns. Make notes to go along with the blood 
glucose readings because you might forget what happened at that exact time. 
Keeping good records helps you and your health care provider to work together to
achieve good diabetes management.

This information is not intended to replace advice given to you by your health 
care provider. Make sure you discuss any questions you have with your health 
care provider.

Document Released: 2004 Document Revised: 04/10/2017 Document Reviewed: 
2014

[employer] Interactive Patient Education 2017 [employer]





No follow up information was provided.

## 2019-07-31 NOTE — XMS REPORT
Transition of Care/Referral Summary

                             Created on: 2076



BILLDYLAN VANEGAS

External Reference #: [id number]

: 1960

Sex: Female



Demographics







                          Address                   [address]

[place]  [address]

 

                          Home Phone                [phone]

 

                          Preferred Language        English

 

                          Marital Status            

 

                          Tenriism Affiliation     Protestant

 

                          Race                      White

 

                          Ethnic Group              Not  or 





Author







                          Author                    [facility]

 

                          Organization              [facility]

 

                          Address                   Unknown

 

                          Phone                     Unavailable







Care Team Providers







                    Care Team Member Name    Role                Phone

 

                    Dhiraj Mijares    PCP                 [phone]







Encounter





VC FIN [id number] Date(s): 17 - 17

[facility] [address] [place], [place] [address] [phone]

Discharge Diagnosis: HTN (hypertension)

Discharge Diagnosis: Chronic systolic heart failure

Discharge Diagnosis: Nonischemic cardiomyopathy

Discharge Diagnosis: CAD (coronary artery disease)

Discharge Diagnosis: Presence of biventricular AICD

Discharge Disposition: 01-Home or Self Care

Attending Physician: Linda Kruger MD

Admitting Physician: Linda Kruger MD





Vital Signs







 



                           Most recent to            1



                                         oldest [Reference 



                                         Range]: 

 

 



                           Peripheral Pulse          96 bpm



                           Rate [ bpm]         (17 12:52 PM)

 

 



                           Blood Pressure            144/86 mmHg



                           [/60-90 mmHg]       *HI*



                                         (17 12:52 PM)







Problem List







    



              Condition     Effective Dates     Status       Health Status     Informant

 

    



                           Acquired                  Active  



                                         spondylolisthesis(Co    



                                         nfirmed)    

 

    



                           Acute                     Active  



                                         pain(Confirmed)    

 

    



                           Arthritis(Confirmed)      Resolved  

 

    



                           Right Carpal tunnel       Resolved  



                                         release(Confirmed)    

 

    



                           Carpal tunnel             Resolved  



                                         release    



                                         decompression(Confir    



                                         med)    

 

    



                           Cholecystectomy(Conf      Resolved  



                                         irmed)    

 

    



                     CHF (congestive      Active              patient



                                         heart    



                                         failure)(Confirmed)    

 

    



                           CAD (coronary artery      Active  



                                         disease)(Confirmed)    

 

    



                           Decompression(Confir      Resolved  



                                         med)    

 

    



                           Depression(Confirmed      Resolved  



                                         )    

 

    



                           Depression(Confirmed      Active  



                                         )    

 

    



                           Diabetes(Confirmed)       Active  

 

    



                           Diabetes(Confirmed)       Resolved  

 

    



                           GERD                      Active  



                                         (gastroesophageal    



                                         reflux    



                                         disease)(Confirmed)    

 

    



                           Ischemic                  Active  



                                         cardiomyopathy(Confi    



                                         rmed)    

 

    



                           Presence of               Active  



                                         biventricular    



                                         AICD(Confirmed)    

 

    



                           History of lumbar         Active  



                                         fusion(Confirmed)    

 

    



                           Hyperlipidemia(Confi      Resolved  



                                         rmed)    

 

    



                           Hypertension(Confirm      Resolved  



                                         ed)    

 

    



                           Hypertension(Confirm      Resolved  



                                         ed)    

 

    



                           HTN                       Active  



                                         (hypertension)(Confi    



                                         rmed)    

 

    



                           Hysterectomy(Confirm      Resolved  



                                         ed)    

 

    



                           Impaired gas              Active  



                                         exchange(Confirmed)1    

 

    



                           Irritable                 Resolved  



                                         bowel(Confirmed)    

 

    



                           Laminectomy(Confirme      Resolved  



                                         d)    

 

    



                           LBBB (left bundle         Active  



                                         branch    



                                         block)(Confirmed)    

 

    



                           LUQ pain(Confirmed)       Active  

 

    



                           Migraine(Confirmed)       Resolved  

 

    



                           Asthma(Confirmed)         Active  

 

    



                     Morbid              Active              patient



                                         obesity(Confirmed)    

 

    



                           Myocardial                Resolved  



                                         infarction(Confirmed    



                                         )    

 

    



                           Septicemia                Resolved  



                                         pneumonia(Confirmed)    

 

    



                     L knee              13              Resolved  



                                         Synovectomy(Confirme    



                                         d)    

 

    



                           Tissue perfusion          Active  



                                         alteration(Confirmed    



                                         )2    

 

    



                     Tobacco             Active              patient



                                         user(Confirmed)    

 

    



                     Tubal               79              Resolved  



                                         ligation(Confirmed)    







1Problem added automatically by system based on initiation of Impaired Gas 
Exchange Plan of Care



2Problem added automatically by system based on initiation of Tissue Perfusion 
Cerebral Plan of Care



Allergies, Adverse Reactions, Alerts







   



                 Substance       Reaction        Severity        Status

 

   



                     barium sulfate      Adverse Reaction      Active



                                         Swelling, hives  

 

   



                     celecoxib           HIVES               Active

 

   



                           influenza virus vaccine,       Active



                                         inactivated   

 

   



                     morphine            Adverse Reaction      Active

 

   



                     sulfamethoxazole     HIVES               Active



                                         Urticaria (hives)  







Medications





acetaminophen

1,000 mg, Oral, q6hr, as needed for pain, 0 Refill(s)

Start Date: 14

Status: Ordered



Ambien 10 mg oral tablet

10 mg 1 tabs, Oral, Bedtime (once a day), as needed for sleep, X 30 days, # 30 t
abs, 2 Refill(s)

Start Date: 16

Stop Date: 17

Status: Ordered



amLODIPine 10 mg oral tablet

10 mg 1 tabs, Oral, Daily, # 90 tabs, 5 Refill(s), [facility]: [facility]
E, 1 tabs Oral Daily

Start Date: 16

Status: Ordered



Aspir 81

81 mg, Oral, Daily, 0 Refill(s)

Start Date: 14

Status: Ordered



atorvastatin 10 mg oral tablet

10 mg 1 tabs, Oral, Daily, # 90 tabs, 5 Refill(s), [facility]: [facility]
E, 1 tabs Oral Daily

Start Date: 16

Status: Ordered



bumetanide 1 mg oral tablet

See Instructions, Take 2 tablets by mouth in  the morning and 1 tablet by mouth 
in the evening, # 270 tabs, [facility], Take 2 tablets by mouth i
n  the morning and 1 tablet by mouth in the evening

Start Date: 16

Status: Ordered



buPROPion 150 mg/12 hours (SR) oral tablet, extended release

See Instructions, Take 1 tablet by mouth two  times daily, # 180 tabs, 1 Refill(
s), [facility]Rx: [facility], Take 1 tablet by mouth two  times daily

Start Date: 10/3/16

Status: Ordered



carvedilol 25 mg oral tablet

See Instructions, Take 1 tablet by mouth two  times daily, # 180 tabs, [facility], Take 1 tablet by mouth two  times daily

Start Date: 10/3/16

Status: Ordered



cloNIDine 0.1 mg oral tablet

See Instructions, Take 1 tablet by mouth  every evening, # 90 tabs, 2 Refill(s),
eRx: [facility], Take 1 tablet by mouth  every evening

Start Date: 16

Status: Ordered



clopidogrel 75 mg oral tablet

See Instructions, Take 1 tablet by mouth  daily, # 90 tabs, 1 Refill(s), [facility]: [facility], Take 1 tablet by mouth  daily

Start Date: 10/3/16

Status: Ordered



digoxin 125 mcg (0.125 mg) oral tablet

See Instructions, Take 1 tablet by mouth  daily, # 90 tabs, [facility]: [facility]

Start Date: 17

Status: Ordered



DULoxetine 60 mg oral delayed release capsule

See Instructions, Take 1 capsule by mouth  daily . Do not crush or  chew., # 90 
caps, 1 Refill(s), eRx: [facility]

Start Date: 17

Status: Ordered



glipiZIDE 5 mg oral tablet

See Instructions, Take 1 tablet by mouth two  times daily, # 180 tabs, 3 Refill(
s), eRx: [facility], Take 1 tablet by mouth two  times daily

Start Date: 16

Status: Ordered



HumaLOG 100 units/mL subcutaneous solution

5 units, SubCutaneous, TIDAC, DX E11.9, # 30 mL, 2 Refill(s), Pharmacy: [facility], 5 units SubCutaneous TIDAC,Instr:DX E11.9

Start Date: 16

Status: Ordered



Insulin Syringe (DME)

DME Item use to inject insulin once daily     dx: E11.9, See Instructions, # 90 
Each, 4 Refill(s), Pharmacy: [facility] [id number], use to inject insulin o
nce daily     dx: E11.9, Supply

Start Date: 11/10/15

Status: Ordered



Lantus 100 units/mL subcutaneous solution

See Instructions, Inject subcutaneously 30  units every night at  bedtime, # 30 
mL, 1 Refill(s), eRx: [facility], Inject subcutaneously 30  units ever
y night at  bedtime

Start Date: 10/3/16

Status: Ordered



Melatonin

10 mg, Oral, Bedtime (once a day), as needed for insomnia, 0 Refill(s)

Start Date: 14

Status: Ordered



METFORMIN  500MG  TAB

See Instructions, Take 2 tablets by mouth  twice daily, # 360 tabs, 3 Refill(s),
eRx: [facility], Take 2 tablets by mouth  twice daily

Start Date: 16

Status: Ordered



Nasonex 50 mcg/inh nasal spray

2 sprays, Nasal, Daily, # 1 Each, 2 Refill(s), [facility]acy: [facility]

Start Date: 17

Status: Ordered



Norco 10 mg-325 mg oral tablet

1 tabs, Oral, q6hr, as needed for pain, # 30 tabs, 0 Refill(s)

Start Date: 17

Status: Ordered



potassium chloride 20 mEq oral tablet, extended release

See Instructions, Take 1 tablet by mouth  daily, # 90 tabs, 1 Refill(s), [facility]: [facility], Take 1 tablet by mouth  daily

Start Date: 16

Status: Ordered



ProAir HFA 90 mcg/inh inhalation aerosol

See Instructions, Inhale 2 puffs 4 times  daily as needed for  wheezing, # 25.5 
g, 5 Refill(s), eRx: [facility]

Start Date: 17

Status: Ordered



RABEprazole 20 mg oral delayed release tablet

See Instructions, Take 2 tablets by mouth  daily, # 180 tabs, 1 Refill(s), eRx: 
[facility], Take 2 tablets by mouth  daily

Start Date: 16

Status: Ordered



SUMAtriptan 50 mg oral tablet

50 mg 1 tabs, Oral, Daily, as needed for migraine headache, may repeat dose afte
r 2 hours up to a maximum of 200 mg in 24 hours, # 9 tabs, 1 Refill(s), Pharmacy
: [facility] [id number], 1 tabs Oral Daily,PRN:as needed for migraine heada
godwin,Instr:m...

Start Date: 16

Status: Ordered



Symbicort 160 mcg-4.5 mcg/inh inhalation aerosol

See Instructions, Use 2 puffs twice daily, # 30.6 g, 2 Refill(s), [facility]Rx: [facility], Use 2 puffs twice daily

Start Date: 10/3/16

Status: Ordered



SYRNG   .5CC ULTII 31 G SRT

See Instructions, Use to Inject insulin once  daily, # 90 Each, 3 Refill(s), eRx
: [facility], Use to Inject insulin once  daily

Start Date: 16

Status: Ordered



valsartan 160 mg oral tablet

160 mg 1 tabs, Oral, BID, # 60 tabs, 10 Refill(s), [facility]
E, 1 tabs Oral BID

Start Date: 17

Status: Ordered



Results





No data available for this section



Immunizations





Given and Recorded





   



                 Vaccine         Date            Status          Refusal Reason

 

   



                     tetanus-diphth toxoids (Td) adult/adol     00             Given 







Procedures







   



                 Procedure       Date            Related Diagnosis     Body Site

 

   



                           L knee PMM/ part. synovectomy     13  

 

   



                           lt median nerve decompression     5/10/11  

 

   



                           Tubal ligation            1979  

 

   



                                         Carpal tunnel release rt   

 

   



                                         Cholecystectomy   

 

   



                                         Hysterectomy   

 

   



                                         Hysterectomy and bilateral   



                                         salpingo-oophorectomy sample   

 

   



                                         Laminectomy   

 

   



                                         Pacemaker   







Social History







 



                           Social History Type       Response

 

 



                           Smoking Status            Former smoker; Type: Cigarettes1







1Quit around 



Assessment and Plan

Extracted from:





  



                     Title: Ambulatory Patient Education     Author: Linda Kruger MD     Date:

 17









                                        Cardiovascular

Heart Failure

Heart failure is a condition in which the heart has trouble pumping blood. This 
means your heart does not pump blood efficiently for your body to work well. In 
some cases of heart failure, fluid may back up into your lungs or you may have 
swelling (edema) in your lower legs. Heart failure is usually a long-term 
(chronic) condition. It is important for you to take good care of yourself and 
follow your health care provider's treatment plan.





CAUSES

Some health conditions can cause heart failure. Those health conditions include:



High blood pressure (hypertension). Hypertension causes the heart muscle to
work harder than normal. When pressure in the blood vessels is high, the heart 
needs to pump (contract) with more force in order to circulate blood throughout 
the body. High blood pressure eventually causes the heart to become stiff and 
weak.



Coronary artery disease (CAD). CAD is the buildup of cholesterol and fat 
(plaque) in the arteries of the heart. The blockage in the arteries deprives the
heart muscle of oxygen and blood. This can cause chest pain and may lead to a 
heart attack. High blood pressure can also contribute to CAD.



Heart attack (myocardial infarction). A heart attack occurs when one or 
more arteries in the heart become blocked. The loss of oxygen damages the muscle
tissue of the heart. When this happens, part of the heart muscle dies. The 
injured tissue does not contract as well and weakens the heart's ability to pump
blood.



Abnormal heart valves. When the heart valves do not open and close 
properly, it can cause heart failure. This makes the heart muscle pump harder to
keep the blood flowing. 



Heart muscle disease (cardiomyopathy or myocarditis). Heart muscle disease 
is damage to the heart muscle from a variety of causes. These can include drug 
or alcohol abuse, infections, or unknown reasons. These can increase the risk of
heart failure.



Lung disease. Lung disease makes the heart work harder because the lungs do
not work properly. This can cause a strain on the heart, leading it to fail. 



Diabetes. Diabetes increases the risk of heart failure. High blood sugar 
contributes to high fat (lipid) levels in the blood. Diabetes can also cause 
slow damage to tiny blood vessels that carry important nutrients to the heart 
muscle. When the heart does not get enough oxygen and food, it can cause the 
heart to become weak and stiff. This leads to a heart that does not contract 
efficiently.



Other conditions can contribute to heart failure. These include abnormal 
heart rhythms, thyroid problems, and low blood counts (anemia).

Certain unhealthy behaviors can increase the risk of heart failure, including:



Being overweight. 



Smoking or chewing tobacco. 



Eating foods high in fat and cholesterol. 



Abusing illicit drugs or alcohol. 



Lacking physical activity. 



SYMPTOMS

Heart failure symptoms may vary and can be hard to detect. Symptoms may include:



Shortness of breath with activity, such as climbing stairs.



Persistent cough.



Swelling of the feet, ankles, legs, or abdomen.



Unexplained weight gain.



Difficulty breathing when lying flat (orthopnea).



Waking from sleep because of the need to sit up and get more air.



Rapid heartbeat.



Fatigue and loss of energy.



Feeling light-headed, dizzy, or close to fainting.



Loss of appetite.



Nausea. 



Increased urination during the night (nocturia).



DIAGNOSIS

A diagnosis of heart failure is based on your history, symptoms, physical 
examination, and diagnostic tests. Diagnostic tests for heart failure may 
include:



Echocardiography.



Electrocardiography. 



Chest X-ray.



Blood tests.



Exercise stress test.



Cardiac angiography.



Radionuclide scans.



TREATMENT

Treatment is aimed at managing the symptoms of heart failure. Medicines, 
behavioral changes, or surgical intervention may be necessary to treat heart 
failure.



Medicines to help treat heart failure may include:



Angiotensin-converting enzyme (ACE) inhibitors. This type of medicine 
blocks the effects of a blood protein called angiotensin-converting enzyme. ACE 
inhibitors relax (dilate) the blood vessels and help lower blood pressure. 



Angiotensin receptor blockers (ARBs). This type of medicine blocks the 
actions of a blood protein called angiotensin. Angiotensin receptor blockers 
dilate the blood vessels and help lower blood pressure. 



Water pills (diuretics). Diuretics cause the kidneys to remove salt and 
water from the blood. The extra fluid is removed through urination. This loss of
extra fluid lowers the volume of blood the heart pumps.



Beta blockers. These prevent the heart from beating too fast and improve 
heart muscle strength. 



Digitalis. This increases the force of the heartbeat.



Healthy behavior changes include:



Obtaining and maintaining a healthy weight.



Stopping smoking or chewing tobacco.



Eating heart-healthy foods.



Limiting or avoiding alcohol.



Stopping illicit drug use. 



Physical activity as directed by your health care provider. 



Surgical treatment for heart failure may include:



A procedure to open blocked arteries, repair damaged heart valves, or 
remove damaged heart muscle tissue.



A pacemaker to improve heart muscle function and control certain abnormal 
heart rhythms.



An internal cardioverter defibrillator to treat certain serious abnormal 
heart rhythms.



A left ventricular assist device (LVAD) to assist the pumping ability of 
the heart.



HOME CARE INSTRUCTIONS



Take medicines only as directed by your health care provider. Medicines are
important in reducing the workload of your heart, slowing the progression of 
heart failure, and improving your symptoms.



Do not stop taking your medicine unless directed by your health care 
provider.



Do not skip any dose of medicine.



Refill your prescriptions before you run out of medicine. Your medicines 
are needed every day.



Engage in moderate physical activity if directed by your health care 
provider. Moderate physical activity can benefit some people. The elderly and 
people with severe heart failure should consult with a health care provider for 
physical activity recommendations.



Eat heart-healthy foods. Food choices should be free of trans fat and low 
in saturated fat, cholesterol, and salt (sodium). Healthy choices include fresh 
or frozen fruits and vegetables, fish, lean meats, legumes, fat-free or low-fat 
dairy products, and whole grain or high fiber foods. Talk to a dietitian to 
learn more about heart-healthy foods.



Limit sodium if directed by your health care provider. Sodium restriction 
may reduce symptoms of heart failure in some people. Talk to a dietitian to 
learn more about heart-healthy seasonings. 



Use healthy cooking methods. Healthy cooking methods include roasting, 
grilling, broiling, baking, poaching, steaming, or stir-frying. Talk to a 
dietitian to learn more about healthy cooking methods. 



Limit fluids if directed by your health care provider. Fluid restriction 
may reduce symptoms of heart failure in some people.



Weigh yourself every day. Daily weights are important in the early 
recognition of excess fluid. You should weigh yourself every morning after you 
urinate and before you eat breakfast. Wear the same amount of clothing each time
you weigh yourself. Record your daily weight. Provide your health care provider 
with your weight record. 



Monitor and record your blood pressure if directed by your health care 
provider.



Check your pulse if directed by your health care provider.



Lose weight if directed by your health care provider. Weight loss may 
reduce symptoms of heart failure in some people.



Stop smoking or chewing tobacco. Nicotine makes your heart work harder by 
causing your blood vessels to constrict. Do not use nicotine gum or patches 
before talking to your health care provider.



Keep all follow-up visits as directed by your health care provider. This is
important.



Limit alcohol intake to no more than 1 drink per day for nonpregnant women 
and 2 drinks per day for men. One drink equals 12 ounces of beer, 5 ounces of 
wine, or 1 ounces of hard liquor. Drinking more than that is harmful to your 
heart. Tell your health care provider if you drink alcohol several times a week.
Talk with your health care provider about whether alcohol is safe for you. If 
your heart has already been damaged by alcohol or you have severe heart failure,
drinking alcohol should be stopped completely. 



Stop illicit drug use.



Stay up-to-date with immunizations. It is especially important to prevent 
respiratory infections through current pneumococcal and influenza immunizations.



Manage other health conditions such as hypertension, diabetes, thyroid 
disease, or abnormal heart rhythms as directed by your health care provider.



Learn to manage stress.



Plan rest periods when fatigued.



Learn strategies to manage high temperatures. If the weather is extremely 
hot:



Avoid vigorous physical activity.



Use air conditioning or fans or seek a cooler location. 



Avoid caffeine and alcohol.



Wear loose-fitting, lightweight, and light-colored clothing.



Learn strategies to manage cold temperatures. If the weather is extremely 
cold:



Avoid vigorous physical activity.



Layer clothes.



Wear mittens or gloves, a hat, and a scarf when going outside.



Avoid alcohol.



Obtain ongoing education and support as needed.



Participate in or seek rehabilitation as needed to maintain or improve 
independence and quality of life.



SEEK MEDICAL CARE IF:



You have a rapid weight gain.



You have increasing shortness of breath that is unusual for you.



You are unable to participate in your usual physical activities.



You tire easily.



You cough more than normal, especially with physical activity.



You have any or more swelling in areas such as your hands, feet, ankles, or
abdomen.



You are unable to sleep because it is hard to breathe.



You feel like your heart is beating fast (palpitations).



You become dizzy or light-headed upon standing up. 



SEEK IMMEDIATE MEDICAL CARE IF:



You have difficulty breathing.



There is a change in mental status such as decreased alertness or 
difficulty with concentration.



You have a pain or discomfort in your chest.



You have an episode of fainting (syncope).



MAKE SURE YOU:



Understand these instructions.



Will watch your condition.



Will get help right away if you are not doing well or get worse.

This information is not intended to replace advice given to you by your health 
care provider. Make sure you discuss any questions you have with your health 
care provider.



Document Released: 2006 Document Revised: 2016 Document Reviewed: 
2014

[employer] Patient Education 2016 [employer]



Preventive Medicine

Hypertension

Hypertension, commonly called high blood pressure, is when the force of blood 
pumping through your arteries is too strong. Your arteries are the blood vessels
that carry blood from your heart throughout your body. A blood pressure reading 
consists of a higher number over a lower number, such as 110/72. The higher 
number (systolic) is the pressure inside your arteries when your heart pumps. 
The lower number (diastolic) is the pressure inside your arteries when your 
heart relaxes. Ideally you want your blood pressure below 120/80.

Hypertension forces your heart to work harder to pump blood. Your arteries may 
become narrow or stiff. Having untreated or uncontrolled hypertension can cause 
heart attack, stroke, kidney disease, and other problems.





RISK FACTORS

Some risk factors for high blood pressure are controllable. Others are not. 

Risk factors you cannot control include: 



Race. You may be at higher risk if you are .



Age. Risk increases with age. 



Gender. Men are at higher risk than women before age 45 years. After age 
65, women are at higher risk than men.

Risk factors you can control include:



Not getting enough exercise or physical activity.



Being overweight.



Getting too much fat, sugar, calories, or salt in your diet.



Drinking too much alcohol.



SIGNS AND SYMPTOMS

Hypertension does not usually cause signs or symptoms. Extremely high blood 
pressure (hypertensive crisis) may cause headache, anxiety, shortness of breath,
and nosebleed.



DIAGNOSIS

To check if you have hypertension, your health care provider will measure your 
blood pressure while you are seated, with your arm held at the level of your 
heart. It should be measured at least twice using the same arm. Certain 
conditions can cause a difference in blood pressure between your right and left 
arms. A blood pressure reading that is higher than normal on one occasion does 
not mean that you need treatment. If it is not clear whether you have high blood
pressure, you may be asked to return on a different day to have your blood 
pressure checked again. Or, you may be asked to monitor your blood pressure at 
home for 1 or more weeks.



TREATMENT

Treating high blood pressure includes making lifestyle changes and possibly 
taking medicine. Living a healthy lifestyle can help lower high blood pressure. 
You may need to change some of your habits.

Lifestyle changes may include:



Following the DASH diet. This diet is high in fruits, vegetables, and whole
grains. It is low in salt, red meat, and added sugars.



Keep your sodium intake below 2,300 mg per day.



Getting at least 30



45 minutes of aerobic exercise at least 4 times per week.



Losing weight if necessary.



Not smoking.



Limiting alcoholic beverages.



Learning ways to reduce stress.

Your health care provider may prescribe medicine if lifestyle changes are not 
enough to get your blood pressure under control, and if one of the following is 
true:



You are 18



59 years of age and your systolic blood pressure is above 140.



You are 60 years of age or older, and your systolic blood pressure is above
150.



Your diastolic blood pressure is above 90.



You have diabetes, and your systolic blood pressure is over 140 or your 
diastolic blood pressure is over 90.



You have kidney disease and your blood pressure is above 140/90.



You have heart disease and your blood pressure is above 140/90.

Your personal target blood pressure may vary depending on your medical 
conditions, your age, and other factors.



HOME CARE INSTRUCTIONS



Have your blood pressure rechecked as directed by your health care 
provider. 



Take medicines only as directed by your health care provider. Follow the 
directions carefully. Blood pressure medicines must be taken as prescribed. The 
medicine does not work as well when you skip doses. Skipping doses also puts you
at risk for problems. 



Do not smoke. 



Monitor your blood pressure at home as directed by your health care 
provider.



SEEK MEDICAL CARE IF:



You think you are having a reaction to medicines taken.



You have recurrent headaches or feel dizzy.



You have swelling in your ankles.



You have trouble with your vision.



SEEK IMMEDIATE MEDICAL CARE IF:



You develop a severe headache or confusion.



You have unusual weakness, numbness, or feel faint.



You have severe chest or abdominal pain.



You vomit repeatedly.



You have trouble breathing.



MAKE SURE YOU:



Understand these instructions. 



Will watch your condition.



Will get help right away if you are not doing well or get worse.

This information is not intended to replace advice given to you by your health 
care provider. Make sure you discuss any questions you have with your health 
care provider.



Document Released: 2006 Document Revised: 2016 Document Reviewed: 
10/10/2014

[employer] Patient Education 2016 [employer]





No follow up information was provided.





Extracted from:





  



                     Title: Office Visit Note     Author: Linda Kruger MD     Date: 17









                                         Assessment/Plan 

      1.Chronic systolic heart failure 

    Normalized LVEF per echo in 2016. Euvolemic on the exam. Continue Coreg.
Increase Valsartan to 160 mg bid   

  2.Nonischemic cardiomyopathy 

    See #1  

  3.CAD (coronary artery disease) 

    No angina. Continue aspirin, Plavix, statin, Coreg.  

  4.Presence of biventricular AICD   

  5.HTN (hypertension) 

    Elevated.Increase Valsartan to 160 mg bid.

  

   

  

    F/u 6 months        







Referrals to Other Providers





Referred by: Linda Kruger MD

## 2019-07-31 NOTE — XMS REPORT
Transition of Care/Referral Summary

                             Created on: 2126



DYLAN KHAN

External Reference #: [id number]

: 1960

Sex: Female



Demographics







                          Address                   [address]

[place]  [address]

 

                          Home Phone                [phone]

 

                          Preferred Language        English

 

                          Marital Status            

 

                          Buddhist Affiliation     Cheondoism

 

                          Race                      White

 

                          Ethnic Group              Not  or 





Author







                          Author                    [facility]

 

                          Organization              [facility]

 

                          Address                   Unknown

 

                          Phone                     Unavailable







Care Team Providers







                    Care Team Member Name    Role                Phone

 

                    Dhiraj Mijares    PCP                 [phone]







Encounter





 FIN [id number] Date(s): 17 - 17

[facility] [address] [place], [place] [address] [phone]

Discharge Diagnosis: Type 2 diabetes mellitus

Discharge Disposition: 01-Home or Self Care

Attending Physician: Boston Nova PA-C

Admitting Physician: Boston Nova PA-C





Vital Signs







 



                           Most recent to            1



                                         oldest [Reference 



                                         Range]: 

 

 



                           Peripheral Pulse          70 bpm



                           Rate [ bpm]         (17 9:25 AM)

 

 



                           Blood Pressure            126/68 mmHg



                           [/60-90 mmHg]       (17 9:25 AM)







Problem List







    



              Condition     Effective Dates     Status       Health Status     Informant

 

    



                           Acquired                  Active  



                                         spondylolisthesis(Co    



                                         nfirmed)    

 

    



                           Acute                     Active  



                                         pain(Confirmed)    

 

    



                           Hay fever(Confirmed)      Active  

 

    



                           Arthritis(Confirmed)      Resolved  

 

    



                           Right Carpal tunnel       Resolved  



                                         release(Confirmed)    

 

    



                           Carpal tunnel             Resolved  



                                         release    



                                         decompression(Confir    



                                         med)    

 

    



                           Cholecystectomy(Conf      Resolved  



                                         irmed)    

 

    



                           IBS (irritable bowel      Active  



                                         syndrome)(Confirmed)    

 

    



                     CHF (congestive      Active              patient



                                         heart    



                                         failure)(Confirmed)    

 

    



                           CAD (coronary artery      Active  



                                         disease)(Confirmed)    

 

    



                           Decompression(Confir      Resolved  



                                         med)    

 

    



                           Depression(Confirmed      Resolved  



                                         )    

 

    



                           Depression(Confirmed      Active  



                                         )    

 

    



                           Diabetes(Confirmed)       Active  

 

    



                           Diabetes(Confirmed)       Resolved  

 

    



                           GERD                      Active  



                                         (gastroesophageal    



                                         reflux    



                                         disease)(Confirmed)    

 

    



                           Ischemic                  Active  



                                         cardiomyopathy(Confi    



                                         rmed)    

 

    



                           Presence of               Active  



                                         biventricular    



                                         AICD(Confirmed)    

 

    



                           History of lumbar         Active  



                                         fusion(Confirmed)    

 

    



                           Hyperlipidemia(Confi      Resolved  



                                         rmed)    

 

    



                           Asthma(Confirmed)         Active  

 

    



                           Hypertension(Confirm      Resolved  



                                         ed)    

 

    



                           Hypertension(Confirm      Resolved  



                                         ed)    

 

    



                           HTN                       Active  



                                         (hypertension)(Confi    



                                         rmed)    

 

    



                           Hysterectomy(Confirm      Resolved  



                                         ed)    

 

    



                           Impaired gas              Active  



                                         exchange(Confirmed)1    

 

    



                           Irritable                 Resolved  



                                         bowel(Confirmed)    

 

    



                           Laminectomy(Confirme      Resolved  



                                         d)    

 

    



                           LBBB (left bundle         Active  



                                         branch    



                                         block)(Confirmed)    

 

    



                           LUQ pain(Confirmed)       Active  

 

    



                           Migraine(Confirmed)       Resolved  

 

    



                           Asthma(Confirmed)         Active  

 

    



                     Morbid              Active              patient



                                         obesity(Confirmed)    

 

    



                           Myocardial                Resolved  



                                         infarction(Confirmed    



                                         )    

 

    



                           Septicemia                Resolved  



                                         pneumonia(Confirmed)    

 

    



                     L knee              13              Resolved  



                                         Synovectomy(Confirme    



                                         d)    

 

    



                           Tissue perfusion          Active  



                                         alteration(Confirmed    



                                         )2    

 

    



                     Tobacco             Active              patient



                                         user(Confirmed)    

 

    



                     Tubal               79              Resolved  



                                         ligation(Confirmed)    

 

    



                           UTI (urinary tract        Active  



                                         infection)(Confirmed    



                                         )    

 

    



                           Chicken                   Active  



                                         pox(Confirmed)    







1Problem added automatically by system based on initiation of Impaired Gas 
Exchange Plan of Care



2Problem added automatically by system based on initiation of Tissue Perfusion 
Cerebral Plan of Care



Allergies, Adverse Reactions, Alerts







   



                 Substance       Reaction        Severity        Status

 

   



                     barium sulfate      Swelling, hives      Active

 

   



                     celecoxib           HIVES               Active

 

   



                     influenza virus vaccine,     Convulsion          Active



                                         inactivated   

 

   



                     morphine            Burning             Active

 

   



                     sulfamethoxazole     HIVES               Active



                                         Urticaria (hives)  







Medications





acetaminophen

1,000 mg, Oral, q6hr, as needed for pain, 0 Refill(s)

Start Date: 14

Status: Ordered



albuterol 2.5 mg/3 mL (0.083%) inhalation solution

2.5 mg 3 mL, Inhalation, q6hr (scheduled), # 25 Each, 0 Refill(s)

Start Date: 4/3/17

Status: Ordered



Ambien 10 mg oral tablet

10 mg 1 tabs, Oral, Bedtime (once a day), as needed for sleep, X 30 days, # 30 t
abs, 2 Refill(s)

Start Date: 17

Stop Date: 17

Status: Ordered



amLODIPine 10 mg oral tablet

10 mg 1 tabs, Oral, Daily, # 90 tabs, 5 Refill(s), Pharmacy: [facility]
E, 1 tabs Oral Daily

Start Date: 16

Status: Ordered



Aspir 81

81 mg, Oral, Daily, 0 Refill(s)

Start Date: 14

Status: Ordered



atorvastatin 10 mg oral tablet

See Instructions, Take 1 tablet by mouth  daily, # 90 tabs, 1 Refill(s), [facility]Rx: [facility]

Start Date: 17

Status: Ordered



basaglar

basaglar, See Instructions, inject 30 units at bedtime daily, # 1 boxes, 3 Refil
l(s), Pharmacy: [facility] [id number], inject 30 units at bedtime daily

Start Date: 17

Status: Ordered



bumetanide 1 mg oral tablet

See Instructions, Take 2 tablets by mouth in  the morning and 1 tablet by mouth 
in the evening, # 270 tabs, eRx: [facility]

Start Date: 3/6/17

Status: Ordered



buPROPion 150 mg/12 hours (SR) oral tablet, extended release

See Instructions, Take 1 tablet by mouth two  times daily, # 180 tabs, 1 Refill(
s), eRx: [facility], Take 1 tablet by mouth two  times daily

Start Date: 10/3/16

Status: Ordered



carvedilol 25 mg oral tablet

See Instructions, Take 1 tablet by mouth two  times daily, # 180 tabs, [facility], Take 1 tablet by mouth two  times daily

Start Date: 10/3/16

Status: Ordered



cloNIDine 0.1 mg oral tablet

See Instructions, Take 1 tablet by mouth  every evening, # 90 tabs, 2 Refill(s),
eRx: [facility], Take 1 tablet by mouth  every evening

Start Date: 16

Status: Ordered



clopidogrel 75 mg oral tablet

See Instructions, Take 1 tablet by mouth  daily, # 90 tabs, 1 Refill(s), eRx: [facility], Take 1 tablet by mouth  daily

Start Date: 10/3/16

Status: Ordered



digoxin 125 mcg (0.125 mg) oral tablet

See Instructions, Take 1 tablet by mouth  daily, # 90 tabs, [facility]: [facility]

Start Date: 17

Status: Ordered



DULoxetine 60 mg oral delayed release capsule

See Instructions, Take 1 capsule by mouth  daily . Do not crush or  chew., # 90 
caps, 1 Refill(s), eRx: [facility]

Start Date: 17

Status: Ordered



Glucometer Lancets (DME)

DME Item one touch delica lancets  use to test bg x3/daily  dx:e11.65, See Instr
uctions, # 300 Each, 3 Refill(s), Pharmacy: [facility] [id number], one touc
h delica lancets; use to test bg x3/daily; dx:e11.65, Supply

Start Date: 3/13/17

Status: Ordered



HumaLOG 100 units/mL subcutaneous solution

5 units, SubCutaneous, TIDAC, DX E11.9, # 30 mL, 2 Refill(s), Pharmacy: [facility], 5 units SubCutaneous TIDAC,Instr:DX E11.9

Start Date: 16

Status: Ordered



Insulin Pin Needles (DME)

DME Item 32g x4mm (jacob needles)  use to inject insulin   dx:e11.65, See Instruc
tions, # 100 Each, 3 Refill(s), Pharmacy: [facility] [id number], 32g x4mm (
jacob needles); use to inject insulin ; dx:e11.65, Supply

Start Date: 17

Status: Ordered



Insulin Syringe (DME)

DME Item use to inject insulin once daily     dx: E11.9, See Instructions, # 90 
Each, 4 Refill(s), Pharmacy: [facility] [id number], use to inject insulin o
nce daily     dx: E11.9, Supply

Start Date: 11/10/15

Status: Ordered



Lantus 100 units/mL subcutaneous solution

See Instructions, Inject subcutaneously 30  units every night at  bedtime, # 30 
mL, 2 Refill(s), eRx: [facility], Inject subcutaneously 30  units ever
y night at  bedtime

Start Date: 17

Status: Ordered



Melatonin

10 mg, Oral, Bedtime (once a day), as needed for insomnia, 0 Refill(s)

Start Date: 14

Status: Ordered



metFORMIN 500 mg oral tablet

1,000 mg 2 tabs, Oral, BID, # 360 tabs, 2 Refill(s), other reason (Rx)

Start Date: 17

Status: Ordered



Miscellaneous DME

DME Item One Touch verio Test Strips   Use to check blood sugar 3 times daily.  
 DX E11.9, See Instructions, # 300 Each, 6 Refill(s), Pharmacy: [facility] [id number], One Touch verio Test Strips ; Use to check blood sugar 3 times jey
y. DX E11.9...

Start Date: 17

Status: Ordered



Nasonex 50 mcg/inh nasal spray

2 sprays, Nasal, Daily, # 1 Each, 2 Refill(s), Pharmacy: [facility]

Start Date: 17

Status: Ordered



Norco 10 mg-325 mg oral tablet

1 tabs, Oral, q6hr, as needed for pain, # 30 tabs, 0 Refill(s)

Start Date: 17

Status: Ordered



potassium chloride 20 mEq oral tablet, extended release

See Instructions, Take 1 tablet by mouth  daily, # 90 tabs, 1 Refill(s), eRx: [facility]

Start Date: 17

Status: Ordered



ProAir HFA 90 mcg/inh inhalation aerosol

See Instructions, Inhale 2 puffs 4 times  daily as needed for  wheezing, # 25.5 
g, 5 Refill(s), eRx: [facility]

Start Date: 17

Status: Ordered



RABEprazole 20 mg oral delayed release tablet

See Instructions, Take 2 tablets by mouth  daily, # 180 tabs, 1 Refill(s), eRx: 
[facility], Take 2 tablets by mouth  daily

Start Date: 16

Status: Ordered



SUMAtriptan 50 mg oral tablet

50 mg 1 tabs, Oral, Daily, as needed for migraine headache, may repeat dose afte
r 2 hours up to a maximum of 200 mg in 24 hours, # 9 tabs, 1 Refill(s), [id number], 1 tabs Oral Daily,PRN:as needed for migraine heada
godwin,Instr:m...

Start Date: 2/15/17

Status: Ordered



Symbicort 160 mcg-4.5 mcg/inh inhalation aerosol

See Instructions, Use 2 puffs twice daily, # 30.6 g, 2 Refill(s), [facility]: [facility], Use 2 puffs twice daily

Start Date: 10/3/16

Status: Ordered



SYRNG   .5CC ULTII 31 G SRT

See Instructions, Use to Inject insulin once  daily, # 90 Each, 3 Refill(s), eRx
: [facility], Use to Inject insulin once  daily

Start Date: 16

Status: Ordered



valsartan 160 mg oral tablet

160 mg 1 tabs, Oral, BID, X 90 days, # 180 tabs, 3 Refill(s), Pharmacy: [facility], 1 tabs Oral BID,x90 days

Start Date: 4/3/17

Stop Date: 3/29/18

Status: Ordered



Results





No data available for this section



Immunizations





Given and Recorded





   



                 Vaccine         Date            Status          Refusal Reason

 

   



                     tetanus-diphth toxoids (Td) adult/adol     00             Given 







Procedures







   



                 Procedure       Date            Related Diagnosis     Body Site

 

   



                           L knee PMM/ part. synovectomy     13  

 

   



                           lt median nerve decompression     5/10/11  

 

   



                           Tubal ligation            1979  

 

   



                                         Carpal tunnel release rt   

 

   



                                         Cholecystectomy   

 

   



                                         Hysterectomy   

 

   



                                         Hysterectomy and bilateral   



                                         salpingo-oophorectomy sample   

 

   



                                         Laminectomy   

 

   



                                         Pacemaker   







Social History







 



                           Social History Type       Response

 

 



                           Smoking Status            Former smoker; Type: Cigarettes1







1Quit around 



Assessment and Plan

Extracted from:





  



                     Title: Office Visit Note     Author: Boston Nova PA-C     Date: 17









                                         Assessment/Plan 

      1.Type 2 diabetes mellitus 

  Counseled about long term complications of diabetes.

  

   Recommend eye exam, lipid profile, urine microalbumin yearly.

  

   Recommend hba1c q 3 months.

  

   Encouraged to check3 times daily, before meals.

  

   Change regimen to:

  

   basaglar 34 units qhs

  

   [id number] tid ac

  

   metformin 1000mg bid

  

   

  

   to email us her sugars [id number]

  

   

  

   Hyperlipidemia: LDL at target. Continue statin.

  

   

  

   Obesity: Counseled about diet and exercise.

  

   

  

   a1c, bmp in 1 month.

  

   rtc in12 weeks

## 2019-07-31 NOTE — XMS REPORT
Transition of Care/Referral Summary

                             Created on: 07/10/2070



BILLDYLAN VANEGAS EDITH

External Reference #: [id number]

: 1960

Sex: Female



Demographics







                          Address                   [address]

[place]  [address]

 

                          Home Phone                [phone]

 

                          Preferred Language        English

 

                          Marital Status            

 

                          Shinto Affiliation     Adventist

 

                          Race                      White

 

                          Ethnic Group              Not  or 





Author







                          Author                    [facility]

 

                          Organization              [facility]

 

                          Address                   Unknown

 

                          Phone                     Unavailable







Care Team Providers







                    Care Team Member Name    Role                Phone

 

                    Dhiraj Mijares    PCP                 [phone]







Encounter





VC FIN [id number] Date(s): 10/19/15 - 10/19/15

[facility] [address] [place], 
[facility] [phone]

Discharge Diagnosis: GERD (gastroesophageal reflux disease)

Discharge Diagnosis: LUQ pain

Discharge Disposition: -Home or Self Care

Attending Physician: ADI Anaya III, MD

Admitting Physician: ADI Anaya III, MD

Referring Physician: Dhiraj Mijares MD





Vital Signs







 



                           Most recent to            1



                                         oldest [Reference 



                                         Range]: 

 

 



                           Peripheral Pulse          72 bpm



                           Rate [ bpm]         (10/19/15 9:33 AM)

 

 



                           Blood Pressure            120/80 mmHg



                           [/60-90 mmHg]       (10/19/15 9:33 AM)







Problem List







    



              Condition     Effective Dates     Status       Health Status     Informant

 

    



                           Acquired                  Active  



                                         spondylolisthesis(Co    



                                         nfirmed)    

 

    



                           Acute                     Active  



                                         pain(Confirmed)    

 

    



                           Arthritis(Confirmed)      Resolved  

 

    



                           Right Carpal tunnel       Resolved  



                                         release(Confirmed)    

 

    



                           Carpal tunnel             Resolved  



                                         release    



                                         decompression(Confir    



                                         med)    

 

    



                           Cholecystectomy(Conf      Resolved  



                                         irmed)    

 

    



                     CHF (congestive      Active              patient



                                         heart    



                                         failure)(Confirmed)    

 

    



                           CAD (coronary artery      Active  



                                         disease)(Confirmed)    

 

    



                           Decompression(Confir      Resolved  



                                         med)    

 

    



                           Depression(Confirmed      Resolved  



                                         )    

 

    



                           Depression(Confirmed      Active  



                                         )    

 

    



                           Diabetes(Confirmed)       Resolved  

 

    



                           Diabetes(Confirmed)       Active  

 

    



                           GERD                      Active  



                                         (gastroesophageal    



                                         reflux    



                                         disease)(Confirmed)    

 

    



                           Ischemic                  Active  



                                         cardiomyopathy(Confi    



                                         rmed)    

 

    



                           Presence of               Active  



                                         biventricular    



                                         AICD(Confirmed)    

 

    



                           History of lumbar         Active  



                                         fusion(Confirmed)    

 

    



                           Hyperlipidemia(Confi      Resolved  



                                         rmed)    

 

    



                           Hypertension(Confirm      Resolved  



                                         ed)    

 

    



                           Hypertension(Confirm      Resolved  



                                         ed)    

 

    



                           HTN                       Active  



                                         (hypertension)(Confi    



                                         rmed)    

 

    



                           Hysterectomy(Confirm      Resolved  



                                         ed)    

 

    



                           Impaired gas              Active  



                                         exchange(Confirmed)1    

 

    



                           Irritable                 Resolved  



                                         bowel(Confirmed)    

 

    



                           Laminectomy(Confirme      Resolved  



                                         d)    

 

    



                           LBBB (left bundle         Active  



                                         branch    



                                         block)(Confirmed)    

 

    



                           LUQ pain(Confirmed)       Active  

 

    



                           Migraine(Confirmed)       Resolved  

 

    



                           Asthma(Confirmed)         Active  

 

    



                     Morbid              Active              patient



                                         obesity(Confirmed)    

 

    



                           Myocardial                Resolved  



                                         infarction(Confirmed    



                                         )    

 

    



                           Septicemia                Resolved  



                                         pneumonia(Confirmed)    

 

    



                     L knee              13              Resolved  



                                         Synovectomy(Confirme    



                                         d)    

 

    



                           Tissue perfusion          Active  



                                         alteration(Confirmed    



                                         )2    

 

    



                     Tobacco             Active              patient



                                         user(Confirmed)    

 

    



                     Tubal               79              Resolved  



                                         ligation(Confirmed)    







1Problem added automatically by system based on initiation of Impaired Gas 
Exchange Plan of Care



2Problem added automatically by system based on initiation of Tissue Perfusion 
Cerebral Plan of Care



Allergies, Adverse Reactions, Alerts







   



                 Substance       Reaction        Severity        Status

 

   



                     barium sulfate      Adverse Reaction      Active



                                         Swelling, hives  

 

   



                     celecoxib           HIVES               Active

 

   



                           influenza virus vaccine,       Active



                                         inactivated   

 

   



                     morphine            Adverse Reaction      Active

 

   



                     sulfamethoxazole     HIVES               Active



                                         Urticaria (hives)  







Medications





acetaminophen

1,000 mg, Oral, q6hr, as needed for pain, 0 Refill(s)

Start Date: 14

Status: Ordered



AcipHex 20 mg oral delayed release tablet

40 mg 2 tabs, Oral, Daily, # 180 tabs, 2 Refill(s), Pharmacy: [facility], 2 tabs Oral Daily

Start Date: 9/21/15

Status: Ordered



albuterol 2.5 mg/3 mL (0.083%) inhalation solution

3 mL, Inhalation, BID, Dx: Asthma, # 25 Each, 3 Refill(s), [facility]: [id number], 3 mL Inhalation BID,Instr:Dx: Asthma

Start Date: 4/21/15

Status: Ordered



amLODIPine 5 mg oral tablet

See Instructions, Take 1 tablet by mouth  daily, # 90 tabs, 1 Refill(s), Pharmac
y: [facility], Take 1 tablet by mouth  daily

Start Date: 9/2/15

Status: Ordered



Aspir 81

81 mg, Oral, Daily, 0 Refill(s)

Start Date: 14

Status: Ordered



bumetanide 1 mg oral tablet

See Instructions, Take 2 mg of Bumex in AM and 1 mg PM., # 300 tabs, 5 Refill(s)
, Pharmacy: [facility], Take 2 mg of Bumex in AM and 1 mg PM.

Start Date: 9/22/15

Status: Ordered



buPROPion 150 mg/12 hours (SR) oral tablet, extended release

See Instructions, Take 1 tablet by mouth two  times daily, # 180 unknown unit, 1
Refill(s), eRx: [facility], Take 1 tablet by mouth two  times daily

Start Date: 9/9/15

Status: Ordered



carvedilol 25 mg oral tablet

25 mg 1 tabs, Oral, BID, # 180 tabs, 5 Refill(s), Pharmacy: [facility]
, 1 tabs Oral BID

Start Date: 9/22/15

Status: Ordered



Claritin

10 mg, Oral, Daily, 0 Refill(s)

Start Date: 5/18/15

Status: Ordered



cloNIDine 0.1 mg oral tablet

0.1 mg 1 tabs, Oral, qPM, # 90 tabs, 1 Refill(s), [facility]
, 1 tabs Oral qPM

Start Date: 10/12/15

Status: Ordered



clopidogrel 75 mg oral tablet

1 tabs, Oral, Daily, # 90 tabs, 3 Refill(s), Pharmacy: [facility], 1 t
abs Oral Daily

Start Date: 4/14/15

Status: Ordered



digoxin 125 mcg (0.125 mg) oral tablet

125 mcg 1 tabs, Oral, Daily, # 90 tabs, 5 Refill(s), [facility], 1 tabs Oral Daily

Start Date: 9/22/15

Status: Ordered



DULoxetine 20 mg oral delayed release capsule

20 mg 1 caps, Oral, BID, 0 Refill(s)

Start Date: 10/19/15

Status: Ordered



DULoxetine 60 mg oral delayed release capsule

See Instructions, Take 1 capsule by mouth  daily do not crush or chew, # 90 unkn
own unit, 1 Refill(s), eRx: [facility], Take 1 capsule by mouth  daily
do not crush or chew

Start Date: 9/30/15

Status: Ordered



glipiZIDE 5 mg oral tablet

5 mg 1 tabs, Oral, BID, X 90 days, # 180 tabs, 1 Refill(s), [facility], 1 tabs Oral BID,x90 days

Start Date: 9/2/15

Stop Date: 16

Status: Ordered



Insulin Syringe (DME)

DME Item use to inject insulin once daily     dx: 250.00, See Instructions, # 90
Each, 4 Refill(s), [facility], use to inject insulin once d
aily     dx: 250.00, Supply

Start Date: 5/8/15

Status: Ordered



Lantus 100 units/mL subcutaneous solution

See Instructions, Inject subcutaneously 30  units every night at  bedtime once a
day, # 30 mL, 2 Refill(s), eRx: [facility], Inject subcutaneously 30  
units every night at  bedtime once a day

Start Date: 7/31/15

Status: Ordered



Melatonin

10 mg, Oral, Bedtime (once a day), as needed for insomnia, 0 Refill(s)

Start Date: 14

Status: Ordered



metFORMIN 500 mg oral tablet

See Instructions, Take 2 tablets by mouth  twice a day, # 360 tabs, 1 Refill(s),
[facility], Take 2 tablets by mouth  twice a day

Start Date: 9/2/15

Status: Ordered



Multiple Vitamins oral tablet

1 tabs, Oral, Daily, 0 Refill(s)

Start Date: 14

Status: Ordered



Norco 10 mg-325 mg oral tablet

1 tabs, Oral, q6hr, as needed for pain, # 30 tabs, 0 Refill(s)

Start Date: 10/19/15

Status: Ordered



OTC Black Cohash

OTC Black Cohash, See Instructions, 1 tablet oral daily, 0 Refill(s)

Start Date: 2/3/15

Status: Ordered



OTC Calcium

OTC Calcium, See Instructions, 1 tablet oral daily, 0 Refill(s)

Start Date: 2/3/15

Status: Ordered



potassium chloride 20 mEq oral tablet, extended release

See Instructions, Take 1 tablet by mouth  daily, # 90 unknown unit, 1 Refill(s),
eRx: [facility], Take 1 tablet by mouth  daily

Start Date: 10/2/15

Status: Ordered



ProAir HFA 90 mcg/inh inhalation aerosol

2 puffs, Inhalation, QID, as needed for wheezing, # 3 Each, 3 Refill(s), Pharmac
y: [facility]

Start Date: 1/23/15

Status: Ordered



SUMAtriptan 50 mg oral tablet

50 mg 1 tabs, Oral, Daily, as needed for migraine headache, may repeat dose afte
r 2 hours up to a maximum of 200 mg in 24 hours, # 9 tabs, 0 Refill(s), Pharmacy
: [id number], 1 tabs Oral Daily,PRN:as needed for migraine heada
godwin,Instr:m...

Start Date: 9/16/15

Status: Ordered



Symbicort 160 mcg-4.5 mcg/inh inhalation aerosol

See Instructions, Use 2 puffs twice daily, # 10.2 g, 2 Refill(s), [facility], Use 2 puffs twice daily

Start Date: 9/9/15

Status: Ordered



valsartan 160 mg oral tablet

See Instructions, 1 TABS ORAL DAILY, # 30 tabs, 1 Refill(s), [facility]Rx: [id number], 1 TABS ORAL DAILY

Start Date: 10/12/15

Status: Ordered



Vitamin D3

4000 unit, Oral, Daily, 0 Refill(s)

Start Date: 2/3/15

Status: Ordered



Results





No data available for this section



Immunizations







  



                     Vaccine             Date                Refusal Reason

 

  



                           tetanus-diphth toxoids (Td) adult/adol     00 







Procedures







   



                 Procedure       Date            Related Diagnosis     Body Site

 

   



                           L knee PMM/ part. synovectomy     13  

 

   



                           lt median nerve decompression     5/10/11  

 

   



                           Tubal ligation            1979  

 

   



                                         Carpal tunnel release rt   

 

   



                                         Cholecystectomy   

 

   



                                         Hysterectomy   

 

   



                                         Hysterectomy and bilateral   



                                         salpingo-oophorectomy sample   

 

   



                                         Laminectomy   

 

   



                                         Pacemaker   







Social History







 



                           Social History Type       Response

 

 



                           Smoking Status            Former smoker; Type: Cigarettes1







1Quit around 



Assessment and Plan





Referrals to Other Providers





Referred by: ADI Anaya III, MD

## 2019-07-31 NOTE — XMS REPORT
Transition of Care/Referral Summary

                             Created on: 10/15/2128



DYLAN KHAN

External Reference #: [id number]

: 1960

Sex: Female



Demographics







                          Address                   [address]

[place]  [address]

 

                          Home Phone                [phone]

 

                          Preferred Language        English

 

                          Marital Status            

 

                          Jehovah's witness Affiliation     Evangelical

 

                          Race                      White

 

                                        Additional Race(s)  

 

                          Ethnic Group              Not  or 





Author







                          Author                    [facility]

 

                          Organization              [facility]

 

                          Address                   Unknown

 

                          Phone                     Unavailable







Care Team Providers







                    Care Team Member Name    Role                Phone

 

                    Dhiraj Mijares    PCP                 [phone]







Encounter





VC FIN [id number] Date(s): 18 - 18

[facility]  [place], [place] [address] [phone]

Discharge Disposition: 01-Home or Self Care

Attending Physician: Saul Strange DO

Admitting Physician: Saul Strange DO

Referring Physician: Dhiraj Mijares MD





Vital Signs







 



                           Most recent to            1



                                         oldest [Reference 



                                         Range]: 

 

 



                           Respiratory Rate          18 br/min



                           [14-20 br/min]            (18 8:57 AM)







Problem List







    



              Condition     Effective Dates     Status       Health Status     Informant

 

    



                           Acquired                  Active  



                                         spondylolisthesis(Co    



                                         nfirmed)    

 

    



                           Acute                     Active  



                                         pain(Confirmed)    

 

    



                           Hay fever(Confirmed)      Active  

 

    



                           Arthritis(Confirmed)      Resolved  

 

    



                           Right Carpal tunnel       Resolved  



                                         release(Confirmed)    

 

    



                           Carpal tunnel             Resolved  



                                         release    



                                         decompression(Confir    



                                         med)    

 

    



                           Cholecystectomy(Conf      Resolved  



                                         irmed)    

 

    



                           IBS (irritable bowel      Active  



                                         syndrome)(Confirmed)    

 

    



                     CHF (congestive      Active              patient



                                         heart    



                                         failure)(Confirmed)    

 

    



                           CAD (coronary artery      Active  



                                         disease)(Confirmed)    

 

    



                           Decompression(Confir      Resolved  



                                         med)    

 

    



                           Depression(Confirmed      Resolved  



                                         )    

 

    



                           Depression(Confirmed      Active  



                                         )    

 

    



                           Diabetes(Confirmed)       Active  

 

    



                           Diabetes(Confirmed)       Resolved  

 

    



                           Diabetic                  Active  



                                         neuropathy(Confirmed    



                                         )    

 

    



                           GERD                      Active  



                                         (gastroesophageal    



                                         reflux    



                                         disease)(Confirmed)    

 

    



                     Ischemic            < 5/8/15            Resolved  



                                         cardiomyopathy(Confi    



                                         rmed)    

 

    



                           Presence of               Active  



                                         biventricular    



                                         AICD(Confirmed)    

 

    



                           History of lumbar         Active  



                                         fusion(Confirmed)    

 

    



                           Hyperlipidemia(Confi      Active  



                                         rmed)    

 

    



                     Asthma(Confirmed)     < 17           Resolved  

 

    



                           Hypertension(Confirm      Resolved  



                                         ed)    

 

    



                           Hypertension(Confirm      Resolved  



                                         ed)    

 

    



                           HTN                       Active  



                                         (hypertension)(Confi    



                                         rmed)    

 

    



                           Hysterectomy(Confirm      Resolved  



                                         ed)    

 

    



                           Impaired gas              Active  



                                         exchange(Confirmed)1    

 

    



                           Irritable                 Resolved  



                                         bowel(Confirmed)    

 

    



                           Laminectomy(Confirme      Resolved  



                                         d)    

 

    



                           LBBB (left bundle         Active  



                                         branch    



                                         block)(Confirmed)    

 

    



                           LUQ pain(Confirmed)       Active  

 

    



                           Migraine(Confirmed)       Resolved  

 

    



                           Asthma(Confirmed)         Active  

 

    



                     Morbid              Active              patient



                                         obesity(Confirmed)    

 

    



                           Myocardial                Resolved  



                                         infarction(Confirmed    



                                         )    

 

    



                           Septicemia                Resolved  



                                         pneumonia(Confirmed)    

 

    



                     L knee              13              Resolved  



                                         Synovectomy(Confirme    



                                         d)    

 

    



                           Tissue perfusion          Active  



                                         alteration(Confirmed    



                                         )2    

 

    



                     Tobacco             Active              patient



                                         user(Confirmed)    

 

    



                     Tubal               79              Resolved  



                                         ligation(Confirmed)    

 

    



                           UTI (urinary tract        Active  



                                         infection)(Confirmed    



                                         )    

 

    



                     Chicken             < 17           Resolved  



                                         pox(Confirmed)    







1Problem added automatically by system based on initiation of Impaired Gas 
Exchange Plan of Care



2Problem added automatically by system based on initiation of Tissue Perfusion 
Cerebral Plan of Care



Allergies, Adverse Reactions, Alerts







   



                 Substance       Reaction        Severity        Status

 

   



                     sulfamethoxazole     Urticaria (hives)      Active



                                         HIVES  

 

   



                     morphine            Burning             Active

 

   



                     barium sulfate      Swelling, hives      Active

 

   



                     celecoxib           HIVES               Active

 

   



                     influenza virus vaccine,     Convulsion          Active



                                         inactivated   







Medications





100ML 3ML SOLUTION  VIAL HUMALOG U

See Instructions, INJECT SUBCUTANEOUSLY 10  UNITS 3 TIMES DAILY BEFORE  MEALS, #
27 mL, 4 Refill(s), eRx: [facility], INJECT SUBCUTANEOUSLY 10  UNITS 3
TIMES DAILY BEFORE  MEALS

Start Date: 18

Status: Ordered



acetaminophen

1,000 mg, Oral, q6hr, as needed for pain, 0 Refill(s)

Start Date: 14

Status: Ordered



Ambien 10 mg oral tablet

10 mg 1 tabs, Oral, Bedtime (once a day), as needed for sleep, [phone], X
90 days, # 90 tabs, 0 Refill(s)

Start Date: 18

Stop Date: 18

Status: Ordered



amLODIPine 10 mg oral tablet

10 mg 1 tabs, Oral, Daily, # 90 tabs, 5 Refill(s), Pharmacy: [facility]
E, 1 tabs Oral Daily

Start Date: 16

Status: Ordered



Aspir 81

81 mg, Oral, Daily, 0 Refill(s)

Start Date: 14

Status: Ordered



atorvastatin 10 mg oral tablet

See Instructions, TAKE 1 TABLET BY MOUTH  DAILY, # 90 tabs, 3 Refill(s), [facility]: [facility]

Start Date: 18

Status: Ordered



B-D PEN NDL JACOB [id number]() GRN

See Instructions, USE TO INJECT INSULIN, # 100 unknown unit, 3 Refill(s), eRx: 
[facility] [id number], USE TO INJECT INSULIN

Start Date: 18

Status: Ordered



B-D PEN NDL JACOB [id number]() GRN

See Instructions, USE TO INJECT INSULIN, # 100 unknown unit, [id number], USE TO INJECT INSULIN

Start Date: 3/27/18

Status: Ordered



Basaglar KwikPen 100 units/mL subcutaneous solution

See Instructions, 42 units at hs SubCutaneous, # 1 boxes, 3 Refill(s), Pharmacy:
[id number], 42 units at hs SubCutaneous

Start Date: 18

Status: Ordered



bd syringes

bd syringes, See Instructions, bd syringe  0.5ml  31gx5/16  dx:e11.65, # 300 Eac
h, 3 Refill(s), Pharmacy: [facility], bd syringe; 0.5ml; 31gx5/16; dx:
e11.65

Start Date: 18

Status: Ordered



Bentyl 20 mg oral tablet

20 mg 1 tabs, Oral, q8hr, Abdominal Cramping, # 15 tabs, 1 Refill(s), Pharmacy: 
[facility] [id number], 1 tabs Oral q8hr,PRN:Abdominal Cramping

Start Date: 18

Status: Ordered



bumetanide 1 mg oral tablet

See Instructions, TAKE 2 TABLETS BY MOUTH IN  THE MORNING AND 1 TABLET BY MOUTH 
IN THE EVENING, # 270 tabs, eRx: [facility]

Start Date: 18

Status: Ordered



buPROPion 150 mg/12 hours (SR) oral tablet, extended release

See Instructions, Take 1 tablet by mouth two  times daily, # 180 tabs, 3 Refill(
s), eRx: [facility]

Start Date: 17

Status: Ordered



carvedilol 25 mg oral tablet

25 mg 1 tabs, Oral, BID, # 180 tabs, 1 Refill(s), Pharmacy: [facility]

Start Date: 18

Status: Ordered



cloNIDine 0.2 mg oral tablet

0.2 mg 1 tabs, Oral, BID, # 180 tabs, 2 Refill(s), Pharmacy: [facility]
E, 1 tabs Oral BID

Start Date: 18

Status: Ordered



clopidogrel 75 mg oral tablet

See Instructions, TAKE 1 TABLET BY MOUTH  DAILY, # 90 tabs, 2 Refill(s), [facility]: [facility]

Start Date: 18

Status: Ordered



diclofenac 3% topical gel

0.5 g, Topical, BID, # 50 g, 1 Refill(s), Pharmacy: [facility] [id number]

Start Date: 8/15/17

Status: Ordered



DULoxetine 20 mg oral delayed release capsule

See Instructions, TAKE 1 CAP BY MOUTH DAILY.  DO NOT CRUSH OR CHEW; (TAKE WITH D
ULOXETINE 60MG), # 90 caps, 1 Refill(s), eRx: [facility]

Start Date: 18

Status: Ordered



DULoxetine 60 mg oral delayed release capsule

See Instructions, TAKE 1 CAPSULE BY MOUTH  DAILY . DO NOT CRUSH OR  CHEW. (TAKE 
WITH DULOXETINE 20MG), # 90 caps, 1 Refill(s), eRx: [facility]

Start Date: 18

Status: Ordered



gabapentin 100 mg oral capsule

See Instructions, 1-3 caps up to 3 times daily, # 100 caps, 3 Refill(s)

Start Date: 18

Status: Ordered



Glucometer Lancets (DME)

DME Item one touch delica lancets  use to test bg x3/daily  dx:e11.65, See Instr
uctions, # 300 Each, 3 Refill(s), Pharmacy: [facility] [id number], one touc
h delica lancets; use to test bg x3/daily; dx:e11.65, Supply

Start Date: 3/13/17

Status: Ordered



HumaLOG 100 units/mL injectable solution

See Instructions, 12 units SubCutaneous TIDAC, # 3 vials, 1 Refill(s), Pharmacy:
[facility] [id number], 12 units SubCutaneous TIDAC

Start Date: 18

Status: Ordered



Insulin Pin Needles (DME)

DME Item 32g x4mm (jacob needles)  use to inject insulin   dx:e11.65, See Instruc
tions, # 100 Each, 3 Refill(s), Pharmacy: [id number], 32g x4mm (
jacob needles); use to inject insulin ; dx:e11.65, Supply

Start Date: 17

Status: Ordered



Insulin Syringe (DME)

DME Item bd insulin syringe  1ml syringe [id number]  use to inject insulin x4/day  
dx:e11.65, See Instructions, # 3 boxes, 3 Refill(s), [facility]: [id number], bd insulin syringe; 1ml syringe [id number]; use to inject insulin x4/day
; dx:e11.65...

Start Date: 18

Status: Ordered



Melatonin

10 mg, Oral, Bedtime (once a day), as needed for insomnia, 0 Refill(s)

Start Date: 14

Status: Ordered



metFORMIN 500 mg oral tablet

See Instructions, TAKE 2 TABLETS BY MOUTH  TWICE DAILY, # 360 tabs, [facility]Rx: [facility]

Start Date: 18

Status: Ordered



mometasone 50 mcg/inh nasal spray

See Instructions, Use 2 sprays nasally daily, # 17 g, 5 Refill(s), eRx: [facility]

Start Date: 17

Status: Ordered



Norco 10 mg-325 mg oral tablet

1 tabs, Oral, q6hr, as needed for pain, # 30 tabs, 0 Refill(s)

Start Date: 18

Status: Ordered



ONE TOUCH VERIO TEST ST(NEW)100'S

See Instructions, TEST THREE TIMES DAILY, # 300 strip, eRx: [id number], TEST THREE TIMES DAILY

Start Date: 3/27/18

Status: Ordered



potassium chloride 20 mEq oral tablet, extended release

See Instructions, TAKE 1 TABLET BY MOUTH  DAILY, # 90 tabs, 2 Refill(s), [facility]: [facility]

Start Date: 18

Status: Ordered



ProAir HFA 90 mcg/inh inhalation aerosol

See Instructions, INHALE 2 PUFFS 4 TIMES  DAILY AS NEEDED FOR  WHEEZING, # 25.5 
g, 5 Refill(s), eRx: [facility]

Start Date: 18

Status: Ordered



RABEPRAZOLE  20MG  TAB

See Instructions, TAKE 2 TABLETS BY MOUTH  DAILY, # 180 tabs, 2 Refill(s), eRx: 
[facility], TAKE 2 TABLETS BY MOUTH  DAILY

Start Date: 4/3/18

Status: Ordered



RABEprazole 20 mg oral delayed release tablet

See Instructions, Take 2 tablets by mouth  daily, # 180 tabs, 1 Refill(s), eRx: 
[facility]

Start Date: 17

Status: Ordered



SUMAtriptan 50 mg oral tablet

See Instructions, TAKE 1 TABLET BY MOUTH DAILY AS NEEDED FOR MIGRAINE HEADACHE. 
MAY REPEAT DOSE AFTER 2 HOURS UP TO A. MAXIMUM  MG IN 24 HOURS, # 9 tabs, 
eRx: [facility] [id number]

Start Date: 18

Status: Ordered



Symbicort 160 mcg-4.5 mcg/inh inhalation aerosol

See Instructions, INHALE 2 PUFFS TWICE DAILY, # 30.6 g, 2 Refill(s), [facility]

Start Date: 18

Status: Ordered



valsartan 160 mg oral tablet

160 mg 1 tabs, Oral, BID, # 180 tabs, 1 Refill(s), Pharmacy: [facility]
E, 1 tabs Oral BID

Start Date: 18

Status: Ordered



Results





No data available for this section



Immunizations





Given and Recorded





   



                 Vaccine         Date            Status          Refusal Reason

 

   



                     tetanus-diphth toxoids (Td) adult/adol     00             Given 







Procedures







    



              Procedure     Date         Related Diagnosis     Body Site     Status

 

    



                     Arthrocentesis, aspiration and/or injection,     18              Completed



                                         major joint or bursa (eg, shoulder, hip,    



                                         knee, subacromial bursa); without ultrasound    



                                         guidance    

 

    



                     Arthrocentesis, aspiration and/or injection,     18              Completed



                                         major joint or bursa (eg, shoulder, hip,    



                                         knee, subacromial bursa); without ultrasound    



                                         guidance    

 

    



                     L knee PMM/ part. synovectomy     13              Completed

 

    



                     lt median nerve decompression     5/10/11             Completed

 

    



                     Tubal ligation      1979                Completed

 

    



                           Carpal tunnel release rt        Completed

 

    



                           Cholecystectomy           Completed

 

    



                           Hysterectomy              Completed

 

    



                           Hysterectomy and bilateral        Completed



                                         salpingo-oophorectomy sample    

 

    



                           Laminectomy               Completed

 

    



                           Pacemaker                 Completed







Social History







 



                           Social History Type       Response

 

 



                           Smoking Status            Former smoker; Type: Cigarettes1



                                         entered on: 5/8/15







1Quit around 



Assessment and Plan

Extracted from:





  



                     Title: Ortho Clinic Visit- [facility]     Author: Saul Strange DO     Date: 18





                                         *  









                                         Impression and Plan

Diagnosis

Bilateral knee pain ([id number] M25.561, Working, Medical).

Arthritis of left knee (LBO39-RK M17.12, Working, Medical).

Arthritis of right knee (NII10-DD M17.11, Working, Medical).

Plan:  X-rays were reviewed with the patient in clinic today. Plain radiographs 
show good joint space in the bilateral knees and lateral tracking patellas 
bilaterally. There is an osteophyte behind the left patella from arthritis, as 
well as calcification to the soft tissues of medial aspect of the left lower 
extremity. She does have mild degenerative changes at the bilateral knees, as 
well as patellofemoral arthritis. I feel the patients pain is directly related 
to the degenerative changes. I discussed the etiology of arthritis, as well as 
the natural history of progression of disease. Conservative options such as 
anti-inflammatories, pain medications, wearing knee braces, weight loss, 
activity modifications as well as intra-articular steroid injections and 
hyaluronic injections were discussed. Patient elected to proceed with bilateral 
knee injections. I discussed with the patient that she may receive injections on
a 3-4 month interval as long as she is having at least 2 months of relief.  She 
may return to clinic sooner if she has any trouble with her knees.



Bilateral Knee Injection:

After informed consent was obtained, the both knees were prepped with 
chloroprep, the skin over the inferolateral portal site. The knee joints were 
injected with one percent Lidocaine and 40 mg of Kenalog using a 25-gauge 
needle. The injection sites were then dressed with a sterile dressing. The 
patient tolerated the procedures well. The patient was advised to contact our 
office if this does not bring some relief..

## 2019-07-31 NOTE — XMS REPORT
Transition of Care/Referral Summary

                             Created on: 2112



DYLAN KHAN

External Reference #: [id number]

: 1960

Sex: Female



Demographics







                          Address                   [address]  [address]

 

                          Home Phone                [phone]

 

                          Preferred Language        English

 

                          Marital Status            

 

                          Anabaptism Affiliation     [employer]eh[employer]

 

                          Race                      White

 

                          Additional Race(s)        White



 

                          Ethnic Group              Not  or 





Author







                          Author                    [facility]

 

                          Organization              [facility]

 

                          Address                   Unknown

 

                          Phone                     Unavailable







Care Team Providers







                    Care Team Member Name    Role                Phone

 

                    Dhiraj Mijares    PCP                 [phone]







Encounter





VC FIN [id number] Date(s): 8/15/17 - 8/15/17

[facility] [address] [place], [place] [address]
S [phone]

Discharge Disposition: 01-Home or Self Care

Attending Physician: Dhiraj Mijares MD

Admitting Physician: Dhiraj Mijares MD





Vital Signs







 



                           Most recent to            1



                                         oldest [Reference 



                                         Range]: 

 

 



                           Peripheral Pulse          87 bpm



                           Rate [ bpm]         (8/15/17 1:30 PM)

 

 



                           Blood Pressure            118/72 mmHg



                           [/60-90 mmHg]       (8/15/17 1:30 PM)

 

 



                           SpO2                      92 %



                                         (8/15/17 1:30 PM)







Problem List







    



              Condition     Effective Dates     Status       Health Status     Informant

 

    



                           Acquired                  Active  



                                         spondylolisthesis(Co    



                                         nfirmed)    

 

    



                           Acute                     Active  



                                         pain(Confirmed)    

 

    



                           Hay fever(Confirmed)      Active  

 

    



                           Arthritis(Confirmed)      Resolved  

 

    



                           Right Carpal tunnel       Resolved  



                                         release(Confirmed)    

 

    



                           Carpal tunnel             Resolved  



                                         release    



                                         decompression(Confir    



                                         med)    

 

    



                           Cholecystectomy(Conf      Resolved  



                                         irmed)    

 

    



                           IBS (irritable bowel      Active  



                                         syndrome)(Confirmed)    

 

    



                     CHF (congestive      Active              patient



                                         heart    



                                         failure)(Confirmed)    

 

    



                           CAD (coronary artery      Active  



                                         disease)(Confirmed)    

 

    



                           Decompression(Confir      Resolved  



                                         med)    

 

    



                           Depression(Confirmed      Resolved  



                                         )    

 

    



                           Depression(Confirmed      Active  



                                         )    

 

    



                           Diabetes(Confirmed)       Active  

 

    



                           Diabetes(Confirmed)       Resolved  

 

    



                           GERD                      Active  



                                         (gastroesophageal    



                                         reflux    



                                         disease)(Confirmed)    

 

    



                           Ischemic                  Active  



                                         cardiomyopathy(Confi    



                                         rmed)    

 

    



                           Presence of               Active  



                                         biventricular    



                                         AICD(Confirmed)    

 

    



                           History of lumbar         Active  



                                         fusion(Confirmed)    

 

    



                           Hyperlipidemia(Confi      Resolved  



                                         rmed)    

 

    



                           Asthma(Confirmed)         Active  

 

    



                           Hypertension(Confirm      Resolved  



                                         ed)    

 

    



                           Hypertension(Confirm      Resolved  



                                         ed)    

 

    



                           HTN                       Active  



                                         (hypertension)(Confi    



                                         rmed)    

 

    



                           Hysterectomy(Confirm      Resolved  



                                         ed)    

 

    



                           Impaired gas              Active  



                                         exchange(Confirmed)1    

 

    



                           Irritable                 Resolved  



                                         bowel(Confirmed)    

 

    



                           Laminectomy(Confirme      Resolved  



                                         d)    

 

    



                           LBBB (left bundle         Active  



                                         branch    



                                         block)(Confirmed)    

 

    



                           LUQ pain(Confirmed)       Active  

 

    



                           Migraine(Confirmed)       Resolved  

 

    



                           Asthma(Confirmed)         Active  

 

    



                     Morbid              Active              patient



                                         obesity(Confirmed)    

 

    



                           Myocardial                Resolved  



                                         infarction(Confirmed    



                                         )    

 

    



                           Septicemia                Resolved  



                                         pneumonia(Confirmed)    

 

    



                     L knee              13              Resolved  



                                         Synovectomy(Confirme    



                                         d)    

 

    



                           Tissue perfusion          Active  



                                         alteration(Confirmed    



                                         )2    

 

    



                     Tobacco             Active              patient



                                         user(Confirmed)    

 

    



                     Tubal               79              Resolved  



                                         ligation(Confirmed)    

 

    



                           UTI (urinary tract        Active  



                                         infection)(Confirmed    



                                         )    

 

    



                           Chicken                   Active  



                                         pox(Confirmed)    







1Problem added automatically by system based on initiation of Impaired Gas 
Exchange Plan of Care



2Problem added automatically by system based on initiation of Tissue Perfusion 
Cerebral Plan of Care



Allergies, Adverse Reactions, Alerts







   



                 Substance       Reaction        Severity        Status

 

   



                     barium sulfate      Swelling, hives      Active

 

   



                     celecoxib           HIVES               Active

 

   



                     influenza virus vaccine,     Convulsion          Active



                                         inactivated   

 

   



                     morphine            Burning             Active

 

   



                     sulfamethoxazole     HIVES               Active



                                         Urticaria (hives)  







Medications





acetaminophen

1,000 mg, Oral, q6hr, as needed for pain, 0 Refill(s)

Start Date: 14

Status: Ordered



albuterol 2.5 mg/3 mL (0.083%) inhalation solution

2.5 mg 3 mL, Inhalation, q6hr (scheduled), # 25 Each, 0 Refill(s)

Start Date: 4/3/17

Status: Ordered



Ambien 10 mg oral tablet

10 mg 1 tabs, Oral, Bedtime (once a day), as needed for sleep, X 30 days, # 30 t
abs, 2 Refill(s)

Start Date: 17

Stop Date: 17

Status: Ordered



amLODIPine 10 mg oral tablet

10 mg 1 tabs, Oral, Daily, # 90 tabs, 5 Refill(s), Pharmacy: [facility]
E, 1 tabs Oral Daily

Start Date: 16

Status: Ordered



Aspir 81

81 mg, Oral, Daily, 0 Refill(s)

Start Date: 14

Status: Ordered



atorvastatin 10 mg oral tablet

See Instructions, Take 1 tablet by mouth  daily, # 90 tabs, 1 Refill(s), [facility]: [facility]

Start Date: 17

Status: Ordered



basaglar

basaglar, See Instructions, inject 30 units at bedtime daily, # 1 boxes, 3 Refil
l(s), Pharmacy: [id number], inject 30 units at bedtime daily

Start Date: 17

Status: Ordered



bumetanide 1 mg oral tablet

See Instructions, Take 2 tablets by mouth in  the morning and 1 tablet by mouth 
in the evening, # 270 tabs, eRx: [facility]

Start Date: 17

Status: Ordered



buPROPion 150 mg/12 hours (SR) oral tablet, extended release

See Instructions, Take 1 tablet by mouth two  times daily, # 180 tabs, 1 Refill(
s), eRx: [facility], Take 1 tablet by mouth two  times daily

Start Date: 10/3/16

Status: Ordered



carvedilol 25 mg oral tablet

See Instructions, Take 1 tablet by mouth two  times daily, # 180 tabs, [facility]

Start Date: 17

Status: Ordered



cloNIDine 0.1 mg oral tablet

See Instructions, Take 1 tablet by mouth  every evening, # 90 tabs, [facility]: [facility]

Start Date: 17

Status: Ordered



clopidogrel 75 mg oral tablet

See Instructions, Take 1 tablet by mouth  daily, # 90 tabs, [facility]: [facility]

Start Date: 17

Status: Ordered



diclofenac 3% topical gel

0.5 g, Topical, BID, # 50 g, 1 Refill(s), Pharmacy: [id number]

Start Date: 8/15/17

Status: Ordered



digoxin 125 mcg (0.125 mg) oral tablet

See Instructions, Take 1 tablet by mouth  daily, # 90 tabs, eRx: [facility]

Start Date: 17

Status: Ordered



DULoxetine 60 mg oral delayed release capsule

See Instructions, Take 1 capsule by mouth  daily . Do not crush or  chew., # 90 
caps, 1 Refill(s), eRx: [facility]

Start Date: 17

Status: Ordered



Glucometer Lancets (DME)

DME Item one touch delica lancets  use to test bg x3/daily  dx:e11.65, See Instr
uctions, # 300 Each, 3 Refill(s), Pharmacy: [id number], one touc
h delica lancets; use to test bg x3/daily; dx:e11.65, Supply

Start Date: 3/13/17

Status: Ordered



glucosamine

Oral, 0 Refill(s)

Start Date: 17

Status: Ordered



HumaLOG 100 units/mL subcutaneous solution

See Instructions, 10 units SubCutaneous TIDAC, # 3 boxes, 2 Refill(s), Pharmacy:
[facility], 10 units SubCutaneous TIDAC

Start Date: 17

Status: Ordered



Insulin Pin Needles (DME)

DME Item 32g x4mm (jacob needles)  use to inject insulin   dx:e11.65, See Instruc
tions, # 100 Each, 3 Refill(s), Pharmacy: [id number], 32g x4mm (
jacob needles); use to inject insulin ; dx:e11.65, Supply

Start Date: 17

Status: Ordered



Insulin Syringe (DME)

DME Item use to inject insulin once daily     dx: E11.9, See Instructions, # 90 
Each, 4 Refill(s), Pharmacy: [facility] [id number], use to inject insulin o
nce daily     dx: E11.9, Supply

Start Date: 11/10/15

Status: Ordered



Melatonin

10 mg, Oral, Bedtime (once a day), as needed for insomnia, 0 Refill(s)

Start Date: 14

Status: Ordered



metFORMIN 500 mg oral tablet

1,000 mg 2 tabs, Oral, BID, # 360 tabs, 2 Refill(s), other reason (Rx)

Start Date: 17

Status: Ordered



mometasone 50 mcg/inh nasal spray

See Instructions, Use 2 sprays nasally daily, # 51 g, eRx: [facility]

Start Date: 17

Status: Ordered



Norco 10 mg-325 mg oral tablet

1 tabs, Oral, q6hr, as needed for pain, # 30 tabs, 0 Refill(s)

Start Date: 17

Status: Ordered



potassium chloride 20 mEq oral tablet, extended release

See Instructions, Take 1 tablet by mouth  daily, # 90 tabs, 1 Refill(s), [facility]Rx: [facility]

Start Date: 17

Status: Ordered



ProAir HFA 90 mcg/inh inhalation aerosol

See Instructions, Inhale 2 puffs 4 times  daily as needed for  wheezing, # 25.5 
g, 5 Refill(s), eRx: [facility]

Start Date: 17

Status: Ordered



RABEprazole 20 mg oral delayed release tablet

See Instructions, Take 2 tablets by mouth  daily, # 180 tabs, [facility]: [facility]

Start Date: 17

Status: Ordered



SUMAtriptan 50 mg oral tablet

50 mg 1 tabs, Oral, Daily, as needed for migraine headache, may repeat dose afte
r 2 hours up to a maximum of 200 mg in 24 hours, # 9 tabs, 1 Refill(s), [facility]
: [facility] [id number], 1 tabs Oral Daily,PRN:as needed for migraine heada
godwin,Instr:m...

Start Date: 2/15/17

Status: Ordered



Symbicort 160 mcg-4.5 mcg/inh inhalation aerosol

See Instructions, Use 2 puffs twice daily, # 30.6 g, 2 Refill(s), [facility]

Start Date: 17

Status: Ordered



SYRNG   .5CC ULTII 31 G SRT

See Instructions, Use to Inject insulin once  daily, # 90 Each, 3 Refill(s), eRx
: [facility], Use to Inject insulin once  daily

Start Date: 16

Status: Ordered



SYRNG   .5CC ULTII 31 G SRT

See Instructions, Use to Inject insulin once  daily, # 90 Each, [facility]: [facility], Use to Inject insulin once  daily

Start Date: 17

Status: Ordered



valsartan 160 mg oral tablet

160 mg 1 tabs, Oral, BID, X 90 days, # 180 tabs, 3 Refill(s), Pharmacy: [facility], 1 tabs Oral BID,x90 days

Start Date: 4/3/17

Stop Date: 3/29/18

Status: Ordered



Results





No data available for this section



Immunizations





Given and Recorded





   



                 Vaccine         Date            Status          Refusal Reason

 

   



                     tetanus-diphth toxoids (Td) adult/adol     00             Given 







Procedures







   



                 Procedure       Date            Related Diagnosis     Body Site

 

   



                           L knee PMM/ part. synovectomy     13  

 

   



                           lt median nerve decompression     5/10/11  

 

   



                           Tubal ligation            1979  

 

   



                                         Carpal tunnel release rt   

 

   



                                         Cholecystectomy   

 

   



                                         Hysterectomy   

 

   



                                         Hysterectomy and bilateral   



                                         salpingo-oophorectomy sample   

 

   



                                         Laminectomy   

 

   



                                         Pacemaker   







Social History







 



                           Social History Type       Response

 

 



                           Smoking Status            Former smoker; Type: Cigarettes1







1Quit around 



Assessment and Plan





No data available for this section

## 2020-06-29 NOTE — DIAGNOSTIC IMAGING REPORT
INDICATION: Routine screening.



COMPARISON is made with prior mammogram from 03/27/2019.



2-D and 3-D bilateral screening mammography was performed with

CAD.



Scattered fibroglandular densities are identified bilaterally.

The parenchymal pattern appears stable. No dominant mass or

malignant appearing microcalcifications are seen. There are

benign calcifications noted. Axillae are unremarkable.



IMPRESSION: BI-RADS Category 2



No mammographic features suspicious for malignancy are

identified.



ACR BI-RADS Category 2: Benign findings.

Result letter will be mailed to the patient.

Note: At least 10% of breast cancer is not imaged by mammography.



Dictated by: 



  Dictated on workstation # [id number]

## 2020-08-26 ENCOUNTER — HOSPITAL ENCOUNTER (EMERGENCY)
Dept: HOSPITAL 75 - ER | Age: 60
Discharge: HOME | End: 2020-08-26
Payer: MEDICARE

## 2020-08-26 VITALS — DIASTOLIC BLOOD PRESSURE: 101 MMHG | SYSTOLIC BLOOD PRESSURE: 155 MMHG

## 2020-08-26 VITALS — WEIGHT: 230.38 LBS | BODY MASS INDEX: 38.38 KG/M2 | HEIGHT: 65 IN

## 2020-08-26 DIAGNOSIS — Z88.2: ICD-10-CM

## 2020-08-26 DIAGNOSIS — Z88.8: ICD-10-CM

## 2020-08-26 DIAGNOSIS — Z91.041: ICD-10-CM

## 2020-08-26 DIAGNOSIS — R07.9: Primary | ICD-10-CM

## 2020-08-26 LAB
ALBUMIN SERPL-MCNC: 4.1 GM/DL (ref 3.2–4.5)
ALP SERPL-CCNC: 120 U/L (ref 40–136)
ALT SERPL-CCNC: 28 U/L (ref 0–55)
APTT BLD: 26 SEC (ref 24–35)
BASOPHILS # BLD AUTO: 0 10^3/UL (ref 0–0.1)
BASOPHILS NFR BLD AUTO: 0 % (ref 0–10)
BILIRUB SERPL-MCNC: 0.2 MG/DL (ref 0.1–1)
BUN/CREAT SERPL: 22
CALCIUM SERPL-MCNC: 9.7 MG/DL (ref 8.5–10.1)
CHLORIDE SERPL-SCNC: 98 MMOL/L (ref 98–107)
CO2 SERPL-SCNC: 28 MMOL/L (ref 21–32)
CREAT SERPL-MCNC: 1.34 MG/DL (ref 0.6–1.3)
EOSINOPHIL # BLD AUTO: 0.2 10^3/UL (ref 0–0.3)
EOSINOPHIL NFR BLD AUTO: 2 % (ref 0–10)
ERYTHROCYTE [DISTWIDTH] IN BLOOD BY AUTOMATED COUNT: 14 % (ref 10–14.5)
GFR SERPLBLD BASED ON 1.73 SQ M-ARVRAT: 40 ML/MIN
GLUCOSE SERPL-MCNC: 311 MG/DL (ref 70–105)
HCT VFR BLD CALC: 37 % (ref 35–52)
HGB BLD-MCNC: 11.7 G/DL (ref 11.5–16)
INR PPP: 0.9 (ref 0.8–1.4)
LIPASE SERPL-CCNC: 41 U/L (ref 8–78)
LYMPHOCYTES # BLD AUTO: 3 X 10^3 (ref 1–4)
LYMPHOCYTES NFR BLD AUTO: 27 % (ref 12–44)
MAGNESIUM SERPL-MCNC: 2.2 MG/DL (ref 1.6–2.4)
MANUAL DIFFERENTIAL PERFORMED BLD QL: NO
MCH RBC QN AUTO: 30 PG (ref 25–34)
MCHC RBC AUTO-ENTMCNC: 32 G/DL (ref 32–36)
MCV RBC AUTO: 93 FL (ref 80–99)
MONOCYTES # BLD AUTO: 0.6 X 10^3 (ref 0–1)
MONOCYTES NFR BLD AUTO: 5 % (ref 0–12)
NEUTROPHILS # BLD AUTO: 7.6 X 10^3 (ref 1.8–7.8)
NEUTROPHILS NFR BLD AUTO: 66 % (ref 42–75)
PLATELET # BLD: 424 10^3/UL (ref 130–400)
PMV BLD AUTO: 9.6 FL (ref 7.4–10.4)
POTASSIUM SERPL-SCNC: 4.5 MMOL/L (ref 3.6–5)
PROT SERPL-MCNC: 8 GM/DL (ref 6.4–8.2)
PROTHROMBIN TIME: 13 SEC (ref 12.2–14.7)
SODIUM SERPL-SCNC: 137 MMOL/L (ref 135–145)
WBC # BLD AUTO: 11.5 10^3/UL (ref 4.3–11)

## 2020-08-26 PROCEDURE — 85730 THROMBOPLASTIN TIME PARTIAL: CPT

## 2020-08-26 PROCEDURE — 71250 CT THORAX DX C-: CPT

## 2020-08-26 PROCEDURE — 83735 ASSAY OF MAGNESIUM: CPT

## 2020-08-26 PROCEDURE — 84484 ASSAY OF TROPONIN QUANT: CPT

## 2020-08-26 PROCEDURE — 83690 ASSAY OF LIPASE: CPT

## 2020-08-26 PROCEDURE — 83874 ASSAY OF MYOGLOBIN: CPT

## 2020-08-26 PROCEDURE — 85610 PROTHROMBIN TIME: CPT

## 2020-08-26 PROCEDURE — 85025 COMPLETE CBC W/AUTO DIFF WBC: CPT

## 2020-08-26 PROCEDURE — 71045 X-RAY EXAM CHEST 1 VIEW: CPT

## 2020-08-26 PROCEDURE — 93005 ELECTROCARDIOGRAM TRACING: CPT

## 2020-08-26 PROCEDURE — 93041 RHYTHM ECG TRACING: CPT

## 2020-08-26 PROCEDURE — 80053 COMPREHEN METABOLIC PANEL: CPT

## 2020-08-26 PROCEDURE — 36415 COLL VENOUS BLD VENIPUNCTURE: CPT

## 2020-08-26 PROCEDURE — 83880 ASSAY OF NATRIURETIC PEPTIDE: CPT

## 2020-08-26 RX ADMIN — NITROGLYCERIN PRN MG: 0.4 TABLET SUBLINGUAL at 20:34

## 2020-08-26 RX ADMIN — NITROGLYCERIN PRN MG: 0.4 TABLET SUBLINGUAL at 20:27

## 2020-08-26 NOTE — DIAGNOSTIC IMAGING REPORT
Clinical indication: Patient with chest pain.



Exam: Portable chest x-ray upright view.



Comparisons: Chest x-ray dated 04/03/2017.



Findings:



Lungs/pleura: Lungs are clear. There is no pneumothorax. There is

no pleural effusion.



Mediastinum: Unremarkable. 



Pulmonary vasculature: Unremarkable.



Heart: Heart size within normal limits for portable projection.

Stable cardiac pacemaker/AICD is seen overlying the chest.



Bones/extrathoracic soft tissue: Unremarkable.



Impression: Stable chest x-ray exam with no interval radiographic

evidence of acute cardiopulmonary process. 



Dictated by: 



  Dictated on workstation # [id number]

## 2020-08-26 NOTE — ED CHEST PAIN
General


Stated Complaint:  CP


Source:  patient


Exam Limitations:  no limitations





History of Present Illness


Date Seen by Provider:  Aug 26, 2020


Time Seen by Provider:  20:21


Initial Comments


To ER by private vehicle with reports of lower central chest pain described as a

throbbing sensation that began about 3 hours ago which was about 30 minutes 

after eating a hamburger.  No nausea.  She is always short of breath.  She 

brings her medication box with her but does not know the names of them.  She 

states that she does not know what kind of heart problems she has, she sees a 

cardiologist in [place] and has an AICD.


Severity/Quality:  moderate


Location:  central, epigastric


Radiation:  no radiation


Activities at Onset:  none


ASA po PTA:  No


NTG SL PTA:  No


Associated Symptoms:  shortness of breath





Allergies and Home Medications


Allergies


Coded Allergies:  


     Sulfa (Sulfonamide Antibiotics) (Unverified  Allergy, Unknown, 7/31/19)


     barium iodide (Unverified  Allergy, Unknown, 7/31/19)


     celecoxib (Unverified  Allergy, Unknown, 7/31/19)


Uncoded Allergies:  


     flu shot (Adverse Reaction, Unknown, 7/31/19)





Home Medications


Amoxicillin/Potassium Clav 1 Each Tablet, 1 EACH PO BID


   Prescribed by: ROBERTO WEBB on 7/31/19 1127





Patient Home Medication List


Home Medication List Reviewed:  Yes





Review of Systems


Review of Systems


Constitutional:  see HPI; No chills, No fever


EENTM:  No Symptoms Reported


Respiratory:  No Symptoms Reported


Cardiovascular:  See HPI, Chest Pain


Gastrointestinal:  No Symptoms Reported


Genitourinary:  No Symptoms Reported


Musculoskeletal:  no symptoms reported


Skin:  no symptoms reported


Psychiatric/Neurological:  No Symptoms Reported


Endocrine:  No Symptoms Reported


Hematologic/Lymphatic:  No Symptoms Reported





Past Medical-Social-Family Hx


Patient Social History


2nd Hand Smoke Exposure:  No


Recent Foreign Travel:  No


Contact w/Someone Who Travel:  No


Recent Hopitalizations:  No





Seasonal Allergies


Seasonal Allergies:  No





Past Medical History


Surgeries:  Yes (pacemaker)


Cardiac, Gallbladder, Hysterectomy


Respiratory:  Yes


Asthma


Cardiac:  Yes (chf)


Genitourinary:  No


Musculoskeletal:  No


Endocrine:  Yes


Diabetes, Non-Insulin dep


HEENT:  No


Cancer:  No


Psychosocial:  No


Integumentary:  No


Blood Disorders:  No





Physical Exam


Vital Signs





Vital Signs - First Documented




















Capillary Refill :


Height, Weight, BMI


Height: 5'5.00"


Weight: 246lbs. oz. [id number];  BMI


Method:Stated


General Appearance:  No Apparent Distress, WD/WN, Obese, Other (very short 

tempered with staff, states "im not normally this rude Im just very 

uncomfortable". )


HEENT:  Normal ENT Inspection, Pharynx Normal


Respiratory:  No Accessory Muscle Use, No Respiratory Distress


Cardiovascular:  Regular Rate, Rhythm, Normal Peripheral Pulses


Extremity:  Normal Capillary Refill, Normal Inspection


Neurologic/Psychiatric:  Alert, Oriented x3


Skin:  Normal Color, Warm/Dry


Other comments


Flat depressed affect





Progress/Results/Core Measures


Results/Orders


Lab Results





Laboratory Tests








Test


 8/26/20


20:20 8/26/20


22:10 Range/Units


 


 


White Blood Count


 11.5 H


 


 4.3-11.0


10^3/uL


 


Red Blood Count


 3.96 L


 


 4.35-5.85


10^6/uL


 


Hemoglobin 11.7   11.5-16.0  G/DL


 


Hematocrit 37   35-52  %


 


Mean Corpuscular Volume 93   80-99  FL


 


Mean Corpuscular Hemoglobin 30   25-34  PG


 


Mean Corpuscular Hemoglobin


Concent 32 


 


 32-36  G/DL





 


Red Cell Distribution Width 14.0   10.0-14.5  %


 


Platelet Count


 424 H


 


 130-400


10^3/uL


 


Mean Platelet Volume 9.6   7.4-10.4  FL


 


Neutrophils (%) (Auto) 66   42-75  %


 


Lymphocytes (%) (Auto) 27   12-44  %


 


Monocytes (%) (Auto) 5   0-12  %


 


Eosinophils (%) (Auto) 2   0-10  %


 


Basophils (%) (Auto) 0   0-10  %


 


Neutrophils # (Auto) 7.6   1.8-7.8  X 10^3


 


Lymphocytes # (Auto) 3.0   1.0-4.0  X 10^3


 


Monocytes # (Auto) 0.6   0.0-1.0  X 10^3


 


Eosinophils # (Auto)


 0.2 


 


 0.0-0.3


10^3/uL


 


Basophils # (Auto)


 0.0 


 


 0.0-0.1


10^3/uL


 


Prothrombin Time 13.0   12.2-14.7  SEC


 


INR Comment 0.9   0.8-1.4  


 


Activated Partial


Thromboplast Time 26 


 


 24-35  SEC





 


Sodium Level 137   135-145  MMOL/L


 


Potassium Level 4.5   3.6-5.0  MMOL/L


 


Chloride Level 98     MMOL/L


 


Carbon Dioxide Level 28   21-32  MMOL/L


 


Anion Gap 11   5-14  MMOL/L


 


Blood Urea Nitrogen 29 H  7-18  MG/DL


 


Creatinine


 1.34 H


 


 0.60-1.30


MG/DL


 


Estimat Glomerular Filtration


Rate 40 


 


  





 


BUN/Creatinine Ratio 22    


 


Glucose Level 311 H    MG/DL


 


Calcium Level 9.7   8.5-10.1  MG/DL


 


Corrected Calcium 9.6   8.5-10.1  MG/DL


 


Magnesium Level 2.2   1.6-2.4  MG/DL


 


Total Bilirubin 0.2   0.1-1.0  MG/DL


 


Aspartate Amino Transf


(AST/SGOT) 19 


 


 5-34  U/L





 


Alanine Aminotransferase


(ALT/SGPT) 28 


 


 0-55  U/L





 


Alkaline Phosphatase 120     U/L


 


Myoglobin


 44.4 


 


 10.0-92.0


NG/ML


 


Troponin I < 0.028  < 0.028  <0.028  NG/ML


 


B-Type Natriuretic Peptide 22.0   <100.0  PG/ML


 


Total Protein 8.0   6.4-8.2  GM/DL


 


Albumin 4.1   3.2-4.5  GM/DL


 


Lipase 41   8-78  U/L








My Orders





Orders - ROBERTO WEBB APRN


Cbc With Automated Diff (8/26/20 20:18)


Magnesium (8/26/20 20:18)


Chest 1 View, Ap/Pa Only (8/26/20 20:18)


Ekg Tracing (8/26/20 20:18)


Comprehensive Metabolic Panel (8/26/20 20:18)


Myoglobin Serum (8/26/20 20:18)


Protime With Inr (8/26/20 20:18)


Partial Thromboplastin Time (8/26/20 20:18)


O2 (8/26/20 20:18)


Monitor-Rhythm Ecg Trace Only (8/26/20 20:18)


Lipid Panel (8/27/20 06:00)


Ed Iv/Invasive Line Start (8/26/20 20:18)


BNP (8/26/20 20:18)


Nitroglycerin 0.4 Mg Btl 25's (Nitrostat (8/26/20 20:30)


Aspirin Chewable Tablet (Baby Aspirin Ch (8/26/20 20:30)


Lipase (8/26/20 20:20)


Troponin I (8/26/20 20:20)


Antacid  Suspension (Mylanta  Suspension (8/26/20 21:00)


Lidocaine 2% Viscous 15 Ml (Xylocaine Vi (8/26/20 21:00)


Fentanyl  Injection (Sublimaze Injection (8/26/20 21:30)


Ct Chest Wo (8/26/20 21:31)


Troponin I (8/26/20 21:55)


Fentanyl  Injection (Sublimaze Injection (8/26/20 22:30)





Medications Given in ED





Current Medications








 Medications  Dose


 Ordered  Sig/Judah


 Route  Start Time


 Stop Time Status Last Admin


Dose Admin


 


 Al Hydrox/Mg


 Hydrox/Simethicone  30 ml  ONCE  ONCE


 PO  8/26/20 21:00


 8/26/20 21:01 DC 8/26/20 21:15


30 ML


 


 Aspirin  162 mg  ONCE  ONCE


 PO  8/26/20 20:30


 8/26/20 20:31 DC 8/26/20 20:26


162 MG


 


 Fentanyl Citrate  50 mcg  ONCE  ONCE


 IVP  8/26/20 21:30


 8/26/20 21:31 DC 8/26/20 21:38


50 MCG


 


 Fentanyl Citrate  50 mcg  ONCE  ONCE


 IVP  8/26/20 22:30


 8/26/20 22:31 DC 8/26/20 22:27


50 MCG


 


 Lidocaine HCl  10 ml  ONCE  ONCE


 PO  8/26/20 21:00


 8/26/20 21:01 DC 8/26/20 21:15


10 ML


 


 Nitroglycerin  0.4 mg  UD  PRN


 SL  8/26/20 20:30


    8/26/20 20:34


0.4 MG








Vital Signs/I&O











 8/26/20 8/26/20





 20:10 20:10


 


Temp 36.6 


 


Pulse 81 


 


Resp 20 


 


B/P (MAP) 171/113 (132) 


 


Pulse Ox 94 


 


O2 Delivery Room Air Room Air











Departure


Communication (Admissions)


2024-No JVD, lungs clear, however she is hypertensive at 170s/100, HR 75 av 

paced, sats 93% room air, RR 22. 


2127-CP still 6/10, was 8/10 on arrival. Nitro helped a bit, GI coctail did 

not.Advised her we'd do a dose of fentanyl and try to rid her of her pain. She 

states "itd be the first time in 20 years". 


2226-Ct chest without contrast shos no acute findings per [facility]. 


2242-pt states that her pain is now gone. again apologizes for "being a bitch 

when i got here". Agrees to f/u with cardiologist and return to ER for any 

worsening.





Impression





   Primary Impression:  


   Chest pain


   Qualified Codes:  R07.9 - Chest pain, unspecified


Disposition:  01 HOME, SELF-CARE


Condition:  Stable





Departure-Patient Inst.


Decision time for Depature:  22:43


Referrals:  


NO,LOCAL PHYSICIAN (PCP/Family)


Primary Care Physician


Patient Instructions:  Chest Pain





Add. Discharge Instructions:  


1. return to ER for any for any concerns


2. Call your cardiologist tomorrow for follow up.











ROBERTO WEBB APRN             Aug 26, 2020 20:24

## 2020-08-26 NOTE — NUR
2027: /75 Pain 8/10 Nitro #1 adm.

2032: /82 Pain 8/10

2034: Nitro #2 adm 

2039: /85 Pain 6/0

Pt requests to hold third nitro. Provider notified.

## 2020-08-26 NOTE — DIAGNOSTIC IMAGING REPORT
CLINICAL INDICATION: Patient with chest pain.



EXAM: Axial CT scan of the chest performed without IV contrast

with coronal and sagittal reformatted images.



COMPARISON: CT angiogram of the chest dated 12/19/2014.



FINDINGS: There is improved aeration of both lungs compared to

the prior study. There is interval development of a 2 mm

calcified granuloma in the right lung base. There is minimal

atelectasis involving the posterior aspects of both lungs.

Otherwise, lungs are clear. There is no pleural effusion or

pneumothorax. Stable multiple subcentimeter mediastinal lymph

nodes are again seen. There is no significant lymphadenopathy.

Cardiac pacemaker seen overlying the left chest with two leads

projecting over the heart. There is no axillary lymphadenopathy

seen. Limited visualization of the thyroid gland is unremarkable.



Vascular calcifications are seen. Limited visualization of upper

abdominal structures are unremarkable. There are degenerative

spurs involving the thoracic spine.



IMPRESSION:

1: Interval development of a 2 mm calcified granuloma in the

right lung base.



2: Mild bilateral lung atelectasis. Otherwise, lungs are clear.



3: Stable multiple subcentimeter mediastinal lymph nodes with no

significant lymphadenopathy. 



Dictated by: 



  Dictated on workstation # [id number]

## 2021-02-11 ENCOUNTER — HOSPITAL ENCOUNTER (OUTPATIENT)
Dept: HOSPITAL 75 - LABNPT | Age: 61
End: 2021-02-11
Attending: ORTHOPAEDIC SURGERY
Payer: MEDICARE

## 2021-02-11 DIAGNOSIS — Z20.822: ICD-10-CM

## 2021-02-11 DIAGNOSIS — Z01.812: Primary | ICD-10-CM

## 2021-02-11 PROCEDURE — 87635 SARS-COV-2 COVID-19 AMP PRB: CPT

## 2021-06-30 ENCOUNTER — HOSPITAL ENCOUNTER (OUTPATIENT)
Dept: HOSPITAL 75 - RAD | Age: 61
End: 2021-06-30
Attending: NURSE PRACTITIONER
Payer: MEDICARE

## 2021-06-30 DIAGNOSIS — Z12.31: Primary | ICD-10-CM

## 2021-06-30 PROCEDURE — 77063 BREAST TOMOSYNTHESIS BI: CPT

## 2021-06-30 PROCEDURE — 77067 SCR MAMMO BI INCL CAD: CPT

## 2021-06-30 NOTE — DIAGNOSTIC IMAGING REPORT
INDICATION: 

Routine screening.



COMPARISON:    

06/29/2020.



TECHNIQUE: 

2D and 3D bilateral screening mammography was performed with CAD.



FINDINGS:

Scattered fibroglandular densities are identified bilaterally.

The parenchymal pattern is stable. No mass or malignant appearing

microcalcifications are seen. The axillae are unremarkable.



IMPRESSION: 

No mammographic features suspicious for malignancy are

identified.



ACR BI-RADS Category 1: Negative.

Result letter will be mailed to the patient.

Note:  At least 10% of breast cancer is not imaged by

mammography.



Dictated by: 



  Dictated on workstation # [id number]

## 2022-08-03 ENCOUNTER — HOSPITAL ENCOUNTER (OUTPATIENT)
Dept: HOSPITAL 75 - RT | Age: 62
End: 2022-08-03
Attending: NURSE PRACTITIONER
Payer: MEDICARE

## 2022-08-03 DIAGNOSIS — Z99.81: ICD-10-CM

## 2022-08-03 DIAGNOSIS — J45.40: Primary | ICD-10-CM

## 2022-08-03 PROCEDURE — 94726 PLETHYSMOGRAPHY LUNG VOLUMES: CPT

## 2022-08-03 PROCEDURE — 94060 EVALUATION OF WHEEZING: CPT

## 2022-08-03 PROCEDURE — 94729 DIFFUSING CAPACITY: CPT

## 2023-10-12 ENCOUNTER — HOSPITAL ENCOUNTER (EMERGENCY)
Dept: HOSPITAL 75 - ER | Age: 63
Discharge: ANOTHER HOSPITAL | End: 2023-10-12
Payer: MEDICARE

## 2023-10-12 VITALS — HEIGHT: 65 IN | BODY MASS INDEX: 32.14 KG/M2 | WEIGHT: 192.9 LBS

## 2023-10-12 VITALS — SYSTOLIC BLOOD PRESSURE: 131 MMHG | DIASTOLIC BLOOD PRESSURE: 79 MMHG

## 2023-10-12 DIAGNOSIS — Z99.81: ICD-10-CM

## 2023-10-12 DIAGNOSIS — J45.909: ICD-10-CM

## 2023-10-12 DIAGNOSIS — Z79.02: ICD-10-CM

## 2023-10-12 DIAGNOSIS — S06.5XAA: Primary | ICD-10-CM
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
## 2023-10-12 LAB
ALBUMIN SERPL-MCNC: 4.3 GM/DL (ref 3.2–4.5)
ALP SERPL-CCNC: 106 U/L (ref 40–136)
ALT SERPL-CCNC: 24 U/L (ref 0–55)
APTT BLD: 27 SEC (ref 24–35)
BASOPHILS # BLD AUTO: 0.1 10^3/UL (ref 0–0.1)
BASOPHILS NFR BLD AUTO: 0 % (ref 0–10)
BILIRUB SERPL-MCNC: 0.9 MG/DL (ref 0.1–1)
BUN/CREAT SERPL: 29
CALCIUM SERPL-MCNC: 9.4 MG/DL (ref 8.5–10.1)
CHLORIDE SERPL-SCNC: 88 MMOL/L (ref 98–107)
CO2 SERPL-SCNC: 24 MMOL/L (ref 21–32)
CREAT SERPL-MCNC: 1.43 MG/DL (ref 0.6–1.3)
EOSINOPHIL # BLD AUTO: 0.2 10^3/UL (ref 0–0.3)
EOSINOPHIL NFR BLD AUTO: 2 % (ref 0–10)
GFR SERPLBLD BASED ON 1.73 SQ M-ARVRAT: 41 ML/MIN
GLUCOSE SERPL-MCNC: 166 MG/DL (ref 70–105)
HCT VFR BLD CALC: 34 % (ref 35–52)
HGB BLD-MCNC: 11.2 G/DL (ref 11.5–16)
INR PPP: 1.1 (ref 0.8–1.4)
LYMPHOCYTES # BLD AUTO: 1.7 10^3/UL (ref 1–4)
LYMPHOCYTES NFR BLD AUTO: 14 % (ref 12–44)
MANUAL DIFFERENTIAL PERFORMED BLD QL: NO
MCH RBC QN AUTO: 29 PG (ref 25–34)
MCHC RBC AUTO-ENTMCNC: 33 G/DL (ref 32–36)
MCV RBC AUTO: 88 FL (ref 80–99)
MONOCYTES # BLD AUTO: 0.8 10^3/UL (ref 0–1)
MONOCYTES NFR BLD AUTO: 6 % (ref 0–12)
NEUTROPHILS # BLD AUTO: 9.5 10^3/UL (ref 1.8–7.8)
NEUTROPHILS NFR BLD AUTO: 77 % (ref 42–75)
PLATELET # BLD: 289 10^3/UL (ref 130–400)
PMV BLD AUTO: 9.5 FL (ref 9–12.2)
POTASSIUM SERPL-SCNC: 3.8 MMOL/L (ref 3.6–5)
PROT SERPL-MCNC: 7.7 GM/DL (ref 6.4–8.2)
PROTHROMBIN TIME: 13.9 SEC (ref 12.2–14.7)
SODIUM SERPL-SCNC: 128 MMOL/L (ref 135–145)
WBC # BLD AUTO: 12.3 10^3/UL (ref 4.3–11)

## 2023-10-12 PROCEDURE — 70450 CT HEAD/BRAIN W/O DYE: CPT

## 2023-10-12 PROCEDURE — 85610 PROTHROMBIN TIME: CPT

## 2023-10-12 PROCEDURE — 85025 COMPLETE CBC W/AUTO DIFF WBC: CPT

## 2023-10-12 PROCEDURE — 80053 COMPREHEN METABOLIC PANEL: CPT

## 2023-10-12 PROCEDURE — 72125 CT NECK SPINE W/O DYE: CPT

## 2023-10-12 PROCEDURE — 85730 THROMBOPLASTIN TIME PARTIAL: CPT

## 2023-10-12 PROCEDURE — 36415 COLL VENOUS BLD VENIPUNCTURE: CPT

## 2023-10-12 PROCEDURE — 93005 ELECTROCARDIOGRAM TRACING: CPT

## 2023-10-12 PROCEDURE — 82947 ASSAY GLUCOSE BLOOD QUANT: CPT

## 2023-10-12 RX ADMIN — FENTANYL CITRATE STA MCG: 50 INJECTION, SOLUTION INTRAMUSCULAR; INTRAVENOUS at 17:43

## 2023-10-12 NOTE — DIAGNOSTIC IMAGING REPORT
Clinical Indication: Patient was in bathroom yesterday and woke

up in the tub. Patient does not remember falling and is taking

Plavix. Patient did have a headache and trouble forming the

correct words.



Exam: Head CT without IV contrast with sagittal and coronal

reformations. Axial CT scan of the cervical spine with sagittal

and coronal reformations. Auto Exposure Controls were utilized

during the CT exam to meet ALARA standards for radiation dose

reduction.



Comparison: None.



Findings:



Head CT:

There is interval development of a small hyperdense acute

subdural hematoma which measures 5 mm in width. The subdural

hematoma involves the falx cerebri anteriorly all the way to the

midportion top of head region.



There are no other areas of intracranial hemorrhage. There is no

brain herniation or midline shift. The brain parenchymal volume

appears appropriate for patient's age. There are patchy areas of

low-attenuation white matter changes involving both cerebral

hemispheres, likely representing chronic small vessel ischemic

disease. There is no hydrocephalus. There is no intraventricular

blood. Basal cisterns are unremarkable.



There is no significant extracranial soft tissue abnormality.

There is minimal mucosal thickening involving ethmoid sinus.

Mastoid air cells are clear.



Cervical spine:

There is no acute cervical spine fracture or dislocation. There

are hypertrophic spurs involving the C5-C7 levels. There is no

significant neck soft tissue abnormality. There is facet

arthropathy. Visualized upper lung fields are clear. The neck

soft tissue structures show no significant abnormality.



IMPRESSION:

1: There is a small acute subdural hematoma along the falx

cerebri.



2: There are no other areas of intracranial hemorrhage. There is

no skull fracture.



3: There is no acute cervical spine fracture or dislocation.



Results of this report discussed with Dr. Darryl Lisa via

the telephone on 10/12/2023 at 1746 hours.



Dictated by: 



  Dictated on workstation # [id number]

## 2023-10-12 NOTE — ED FALL/INJURY
General


Stated Complaint:  AMS


Source:  patient, EMS


Exam Limitations:  no limitations


 (DARRYL GUZMAN MD)





History of Present Illness


Date Seen by Provider:  Oct 12, 2023


Time Seen by Provider:  17:14


Initial Comments


Here with report of altered mental status after fall in the bathtub yesterday.  

Apparently she was getting in and passed out she does not remember after that 

and has a bump to the top of her head.  She apparently vomited twice yesterday 

and has seemed more confused today per the  and he took her to the 

[facility].  Once there, they were concerned about head injury and 

fall as the patient is on Plavix and she was somewhat confused and slow talking 

and so requested EMS transport here due to appropriate concerns for intracranial

hemorrhage.  Patient is confused on date but is able to answer questions but is 

slow to respond.  Eyes are open.  She is complaining of pain to the entirety of 

the top of her head.  Denies significant neck pain although states there may be 

a little pain in her neck.  She does report that she is diabetic.  She is 

chronically on oxygen at 2 L and arrives on that.  Did have some nausea in route

per EMS.  EMS reports that she was mentating okay but confused and slow as well.

 They report normal vital signs.


Occurred:  yesterday


Severity:  moderate


Injuries/Pain Location:  head


Context:  unknown


Loss of Consciousness:  unsure (Suspected)


Modifying Factors:  Improves With Immobilization; Worse With Movement


Associated Symptoms (Fall):  No Abdominal Pain, No Chest Pain; Headache, 

Nausea/Vomiting, Neck Pain; No Shortness of Air; Other (Speech) 

(DARRYL GUZMAN MD)





Allergies and Home Medications


Allergies


Coded Allergies:  


     Sulfa (Sulfonamide Antibiotics) (Unverified  Allergy, Unknown, 19)


     barium iodide (Unverified  Allergy, Unknown, 19)


     celecoxib (Unverified  Allergy, Unknown, 19)


Uncoded Allergies:  


     flu shot (Adverse Reaction, Unknown, 19)





Patient Home Medication List


Home Medication List Reviewed:  Yes


 (DARRYL GUZMAN MD)


Amoxicillin/Potassium Clav (Augmentin 875-125 Tablet) 1 Each Tablet, 1 EACH PO 

BID


   Prescribed by: ROBERTO WEBB on 19 0673





Review of Systems


Review of Systems


Constitutional:  see HPI; No chills, No fever


Eyes:  Denies Blurred Vision


Ears, Nose, Mouth, Throat:  denies ear pain, denies nose discharge


Respiratory:  No cough, No short of breath


Cardiovascular:  No chest pain, No edema


Gastrointestinal:  No abdominal pain; nausea, vomiting


Genitourinary:  no symptoms reported


Musculoskeletal:  muscle pain, neck pain


Skin:  No change in color, No lesions


Psychiatric/Neurological:  See HPI, Headache (DARRYL GUZMAN MD)





Past Medical-Social-Family Hx


Patient Social History


Tobacco Use?:  No


Use of E-Cig and/or Vaping dev:  No


Substance use?:  No


Alcohol Use?:  No


 (DARRYL GUZMAN MD)





Seasonal Allergies


Seasonal Allergies:  No


 (DARRYL GUZMAN MD)





Past Medical History


Surgeries:  Yes (pacemaker)


Cardiac, Gallbladder, Hysterectomy


Respiratory:  Yes


Asthma


Cardiac:  Yes (chf)


Hypertension


Genitourinary:  No


Musculoskeletal:  No


Endocrine:  Yes


Diabetes, Non-Insulin dep


HEENT:  No


Cancer:  No


Psychosocial:  No


Integumentary:  No


Blood Disorders:  No


 (DARRYL GUZMAN MD)





Family Medical History


Reviewed Nursing Family Hx


 (DARRYL GUZMAN MD)





Physical Exam


Vital Signs





Vital Signs - First Documented








 10/12/23 10/12/23





 17:16 17:27


 


Temp 37.4 


 


Pulse 87 


 


Resp 18 


 


B/P (MAP) 144/74 (97) 


 


Pulse Ox 96 


 


O2 Delivery Nasal Cannula 


 


O2 Flow Rate  2.00





 (TYLOR BLACK)


Vital Signs


Capillary Refill :  


 (DARRYL GUZMAN MD)


Height, Weight, BMI


Height: 5'5.00"


Weight: 246lbs. oz. [phone]; 38.00 BMI


Method:Stated


General Appearance:  WD/WN, no apparent distress


HEENT:  PERRL/EOMI, pharynx normal


Neck:  full range of motion, supple, other ( mild pain with range of motion but 

nontender.  Tenderness across the top of the head without abrasion or 

significant lesion)


Cardiovascular:  regular rate, rhythm, no murmur


Respiratory:  lungs clear, no accessory muscle use


Gastrointestinal:  non tender, soft


Extremities:  non-tender, normal inspection


Neurologic/Psychiatric:  alert, oriented x 3


Skin:  normal color, warm/dry (DARRYL GUZMAN MD)


Extremities:  normal range of motion


Neurologic/Psychiatric:  CNs II-XII nml as tested, no motor/sensory deficits 

(TYLOR BLACK)


Craig Coma Score


Best Eye Response:  (4) Open Spontaneously


Best Verbal Response:  (4) Confused Conversation


Best Motor Response:  (6) Obeys Commands


Mago Total:  14


 (DARRYL GUZMAN MD)





Progress/Results/Core Measures


Results/Orders


Lab Results





Laboratory Tests








Test


 10/12/23


17:18 10/12/23


17:19 Range/Units


 


 


White Blood Count


 12.3 H


 


 4.3-11.0


10^3/uL


 


Red Blood Count


 3.87 


 


 3.80-5.11


10^6/uL


 


Hemoglobin 11.2 L  11.5-16.0  g/dL


 


Hematocrit 34 L  35-52  %


 


Mean Corpuscular Volume 88   80-99  fL


 


Mean Corpuscular Hemoglobin 29   25-34  pg


 


Mean Corpuscular Hemoglobin


Concent 33 


 


 32-36  g/dL





 


Red Cell Distribution Width 14.6 H  10.0-14.5  %


 


Platelet Count


 289 


 


 130-400


10^3/uL


 


Mean Platelet Volume 9.5   9.0-12.2  fL


 


Immature Granulocyte % (Auto) 1    %


 


Neutrophils (%) (Auto) 77 H  42-75  %


 


Lymphocytes (%) (Auto) 14   12-44  %


 


Monocytes (%) (Auto) 6   0-12  %


 


Eosinophils (%) (Auto) 2   0-10  %


 


Basophils (%) (Auto) 0   0-10  %


 


Neutrophils # (Auto)


 9.5 H


 


 1.8-7.8


10^3/uL


 


Lymphocytes # (Auto)


 1.7 


 


 1.0-4.0


10^3/uL


 


Monocytes # (Auto)


 0.8 


 


 0.0-1.0


10^3/uL


 


Eosinophils # (Auto)


 0.2 


 


 0.0-0.3


10^3/uL


 


Basophils # (Auto)


 0.1 


 


 0.0-0.1


10^3/uL


 


Immature Granulocyte # (Auto)


 0.1 


 


 0.0-0.1


10^3/uL


 


Prothrombin Time 13.9   12.2-14.7  SEC


 


INR Comment 1.1   0.8-1.4  


 


Activated Partial


Thromboplast Time 27 


 


 24-35  SEC





 


Sodium Level 128 L  135-145  MMOL/L


 


Potassium Level 3.8   3.6-5.0  MMOL/L


 


Chloride Level 88 L    MMOL/L


 


Carbon Dioxide Level 24   21-32  MMOL/L


 


Anion Gap 16 H  5-14  MMOL/L


 


Blood Urea Nitrogen 42 H  7-18  MG/DL


 


Creatinine


 1.43 H


 


 0.60-1.30


MG/DL


 


Estimat Glomerular Filtration


Rate 41 


 


  





 


BUN/Creatinine Ratio 29    


 


Glucose Level 166 H    MG/DL


 


Calcium Level 9.4   8.5-10.1  MG/DL


 


Corrected Calcium 9.2   8.5-10.1  MG/DL


 


Total Bilirubin 0.9   0.1-1.0  MG/DL


 


Aspartate Amino Transf


(AST/SGOT) 17 


 


 5-34  U/L





 


Alanine Aminotransferase


(ALT/SGPT) 24 


 


 0-55  U/L





 


Alkaline Phosphatase 106     U/L


 


Total Protein 7.7   6.4-8.2  GM/DL


 


Albumin 4.3   3.2-4.5  GM/DL


 


Glucometer  168 H   MG/DL





 (TYLOR BLACK)


Vital Signs/I&O











 10/12/23 10/12/23 10/12/23 10/12/23





 17:16 17:27 17:30 19:14


 


Temp 37.4  37.4 


 


Pulse 87  83 84


 


Resp 18  16 16


 


B/P (MAP) 144/74 (97)   131/79


 


Pulse Ox 96 96 96 94


 


O2 Delivery Nasal Cannula Nasal Cannula Nasal Cannula Nasal Cannula


 


O2 Flow Rate  2.00  





 (TYLOR BLACK)





Progress


Progress Note :  


Progress Note


Seen and evaluated with SIMON Adrian.  IV, labs including CBC, CMP, PTT,

PT, INR as well as EKG and CT head and C-spine ordered.  Monitor patient.





Differential diagnosis includes intracranial hemorrhage, cerebral contusion, 

concussion, C-spine injury, electrolyte abnormality, cardiac event.





174: CT head and C-spine reviewed by me and there is question of blood in the 

falx C-spine injury on my interpretation.  Radiology called shortly after and 

confirmed 5 mm of subdural hematoma same location.  We will transfer patient to 

facility with neurosurgery and this will be accomplished by SIMON Hung.  Dr. Boykin trauma surgeon on-call notified and agrees.  CBC shows 

slightly elevated white count with slightly low hemoglobin and normal platelets.

 Coags are normal.  CMP shows decreased sodium and chloride at 128 and 88 with 

normal potassium and slight increase in creatinine at 1.43 with elevated blood 

sugar at 166 with normal LFTs.  Pending transfer.


 (DARRYL GUZMAN MD)


Progress Note :  


Progress Note


 patient was excepted by Dr. Ceballos, ER physician at [facility].  Setting up

transport at this time.





 all ground transports declined transfer.  [facility] excepted patient for 

transfer.


 (TYLOR BLACK RITA MONTES)


Initial ECG Impression Date:  Oct 12, 2023


Initial ECG Impression Time:  17:28


Initial ECG Rate:  84


Comment


Regular paced rhythm with left axis deviation.  Rate 84.  No evidence of ST 

elevation MI.  Interpreted by me.


 (DARRYL GUZMAN MD)





Diagnostic Imaging





   Diagonstic Imaging:  CT


   Plain Films/CT/US/NM/MRI:  c-spine, head


Comments


                 [facility] [place], [place]





NAME:   DYLAN KHAN


[facility] REC#:   [id number]


ACCOUNT#:   [id number]


PT STATUS:   REG ER


:   1960


PHYSICIAN:   DARRYL GUZMAN MD


ADMIT DATE:   10/12/23/ER


                                   ***Draft***


Date of Exam:10/12/23





CT HEAD/CERVICAL SPINE WO








Clinical Indication: Patient was in bathroom yesterday and woke


up in the tub. Patient does not remember falling and is taking


Plavix. Patient did have a headache and trouble forming the


correct words.





Exam: Head CT without IV contrast with sagittal and coronal


reformations. Axial CT scan of the cervical spine with sagittal


and coronal reformations. Auto Exposure Controls were utilized


during the CT exam to meet ALARA standards for radiation dose


reduction.





Comparison: None.





Findings:





Head CT:


There is interval development of a small hyperdense acute


subdural hematoma which measures 5 mm in width. The subdural


hematoma involves the falx cerebri anteriorly all the way to the


midportion top of head region.





There are no other areas of intracranial hemorrhage. There is no


brain herniation or midline shift. The brain parenchymal volume


appears appropriate for patient's age. There are patchy areas of


low-attenuation white matter changes involving both cerebral


hemispheres, likely representing chronic small vessel ischemic


disease. There is no hydrocephalus. There is no intraventricular


blood. Basal cisterns are unremarkable.





There is no significant extracranial soft tissue abnormality.


There is minimal mucosal thickening involving ethmoid sinus.


Mastoid air cells are clear.





Cervical spine:


There is no acute cervical spine fracture or dislocation. There


are hypertrophic spurs involving the C5-C7 levels. There is no


significant neck soft tissue abnormality. There is facet


arthropathy. Visualized upper lung fields are clear. The neck


soft tissue structures show no significant abnormality.





IMPRESSION:


1: There is a small acute subdural hematoma along the falx


cerebri.





2: There are no other areas of intracranial hemorrhage. There is


no skull fracture.





3: There is no acute cervical spine fracture or dislocation.





Results of this report discussed with Dr. Darryl Guzman via


the telephone on 10/12/2023 at 1746 hours.





  Dictated on workstation # [id number]








Dict:   10/12/23 [id number]


Trans:   10/12/23 [id number]


[facility] 9450-1774





Interpreted by:     LILLIAN DESAI MD


Electronically signed by:  


 (DARRYL GUZMAN MD)





Departure


Impression





   Primary Impression:  


   Subdural hematoma


Disposition:  02 XFER SHT-TRM HOSP


Condition:  Stable





Transfer


Transfer Reason:  Exceeds level of care


Time Spoke to Accepting Phy:  17:57


Transfer Progress Notes


Dr. Ceballos, ER physician at [facility] excepted patient for transfer.


Transfer Time:  18:06


Transfer Facility:  


[facility]


Method of Transfer:  Air


 (TYLOR BLACK)





Departure-Patient Inst.


Referrals:  


NO,LOCAL PHYSICIAN (PCP/Family)


Primary Care Physician











DARRYL GUZMAN MD          Oct 12, 2023 17:23


TYLOR BLACK          Oct 12, 2023 18:06